# Patient Record
Sex: MALE | Race: WHITE | NOT HISPANIC OR LATINO | URBAN - METROPOLITAN AREA
[De-identification: names, ages, dates, MRNs, and addresses within clinical notes are randomized per-mention and may not be internally consistent; named-entity substitution may affect disease eponyms.]

---

## 2020-06-24 ENCOUNTER — OFFICE (AMBULATORY)
Dept: URBAN - METROPOLITAN AREA CLINIC 75 | Facility: CLINIC | Age: 75
End: 2020-06-24
Payer: MEDICARE

## 2020-06-24 VITALS — TEMPERATURE: 98 F | HEIGHT: 70 IN | WEIGHT: 150 LBS

## 2020-06-24 DIAGNOSIS — I77.9 DISORDER OF ARTERIES AND ARTERIOLES, UNSPECIFIED: ICD-10-CM

## 2020-06-24 DIAGNOSIS — R19.5 OTHER FECAL ABNORMALITIES: ICD-10-CM

## 2020-06-24 DIAGNOSIS — Z86.010 PERSONAL HISTORY OF COLONIC POLYPS: ICD-10-CM

## 2020-06-24 DIAGNOSIS — Z79.82 LONG TERM (CURRENT) USE OF ASPIRIN: ICD-10-CM

## 2020-06-24 DIAGNOSIS — F17.290 NICOTINE DEPENDENCE, OTHER TOBACCO PRODUCT, UNCOMPLICATED: ICD-10-CM

## 2020-06-24 DIAGNOSIS — Z79.01 LONG TERM (CURRENT) USE OF ANTICOAGULANTS: ICD-10-CM

## 2020-06-24 PROCEDURE — 82274 ASSAY TEST FOR BLOOD FECAL: CPT | Mod: QW | Performed by: INTERNAL MEDICINE

## 2020-06-24 PROCEDURE — 99204 OFFICE O/P NEW MOD 45 MIN: CPT | Mod: 25 | Performed by: INTERNAL MEDICINE

## 2020-07-22 ENCOUNTER — OFFICE (AMBULATORY)
Dept: URBAN - METROPOLITAN AREA LAB 2 | Facility: LAB | Age: 75
End: 2020-07-22
Payer: MEDICARE

## 2020-07-22 VITALS
SYSTOLIC BLOOD PRESSURE: 166 MMHG | DIASTOLIC BLOOD PRESSURE: 66 MMHG | SYSTOLIC BLOOD PRESSURE: 125 MMHG | RESPIRATION RATE: 12 BRPM | RESPIRATION RATE: 16 BRPM | DIASTOLIC BLOOD PRESSURE: 73 MMHG | OXYGEN SATURATION: 100 % | OXYGEN SATURATION: 95 % | SYSTOLIC BLOOD PRESSURE: 156 MMHG | HEART RATE: 58 BPM | DIASTOLIC BLOOD PRESSURE: 60 MMHG | HEART RATE: 65 BPM | SYSTOLIC BLOOD PRESSURE: 150 MMHG | RESPIRATION RATE: 18 BRPM | HEART RATE: 78 BPM | SYSTOLIC BLOOD PRESSURE: 126 MMHG | DIASTOLIC BLOOD PRESSURE: 74 MMHG | HEART RATE: 72 BPM | OXYGEN SATURATION: 96 % | HEART RATE: 62 BPM | HEART RATE: 70 BPM | RESPIRATION RATE: 13 BRPM | HEART RATE: 66 BPM | TEMPERATURE: 97.5 F | WEIGHT: 160 LBS | SYSTOLIC BLOOD PRESSURE: 129 MMHG | DIASTOLIC BLOOD PRESSURE: 69 MMHG | SYSTOLIC BLOOD PRESSURE: 124 MMHG | HEART RATE: 59 BPM | TEMPERATURE: 98.2 F | RESPIRATION RATE: 8 BRPM | HEART RATE: 69 BPM | SYSTOLIC BLOOD PRESSURE: 119 MMHG | SYSTOLIC BLOOD PRESSURE: 127 MMHG | HEIGHT: 70 IN | DIASTOLIC BLOOD PRESSURE: 64 MMHG | DIASTOLIC BLOOD PRESSURE: 77 MMHG | DIASTOLIC BLOOD PRESSURE: 85 MMHG | SYSTOLIC BLOOD PRESSURE: 133 MMHG | DIASTOLIC BLOOD PRESSURE: 71 MMHG | RESPIRATION RATE: 9 BRPM

## 2020-07-22 DIAGNOSIS — Z11.59 ENCOUNTER FOR SCREENING FOR OTHER VIRAL DISEASES: ICD-10-CM

## 2020-07-22 PROCEDURE — 87633 RESP VIRUS 12-25 TARGETS: CPT | Performed by: INTERNAL MEDICINE

## 2020-07-22 PROCEDURE — U0002 COVID-19 LAB TEST NON-CDC: HCPCS | Performed by: INTERNAL MEDICINE

## 2020-07-24 ENCOUNTER — OFFICE (AMBULATORY)
Dept: URBAN - METROPOLITAN AREA PATHOLOGY 4 | Facility: PATHOLOGY | Age: 75
End: 2020-07-24
Payer: MEDICARE

## 2020-07-24 ENCOUNTER — AMBULATORY SURGICAL CENTER (AMBULATORY)
Dept: URBAN - METROPOLITAN AREA SURGERY 17 | Facility: SURGERY | Age: 75
End: 2020-07-24
Payer: MEDICARE

## 2020-07-24 DIAGNOSIS — R19.5 OTHER FECAL ABNORMALITIES: ICD-10-CM

## 2020-07-24 DIAGNOSIS — K22.70 BARRETT'S ESOPHAGUS WITHOUT DYSPLASIA: ICD-10-CM

## 2020-07-24 DIAGNOSIS — K31.819 ANGIODYSPLASIA OF STOMACH AND DUODENUM WITHOUT BLEEDING: ICD-10-CM

## 2020-07-24 DIAGNOSIS — K31.811 ANGIODYSPLASIA OF STOMACH AND DUODENUM WITH BLEEDING: ICD-10-CM

## 2020-07-24 DIAGNOSIS — K31.89 OTHER DISEASES OF STOMACH AND DUODENUM: ICD-10-CM

## 2020-07-24 LAB
GI HISTOLOGY: A. UNSPECIFIED: (no result)
GI HISTOLOGY: PDF REPORT: (no result)

## 2020-07-24 PROCEDURE — 43255 EGD CONTROL BLEEDING ANY: CPT | Mod: 59 | Performed by: INTERNAL MEDICINE

## 2020-07-24 PROCEDURE — 43239 EGD BIOPSY SINGLE/MULTIPLE: CPT | Mod: 59 | Performed by: INTERNAL MEDICINE

## 2020-07-24 PROCEDURE — 43270 EGD LESION ABLATION: CPT | Performed by: INTERNAL MEDICINE

## 2020-07-24 PROCEDURE — 88305 TISSUE EXAM BY PATHOLOGIST: CPT | Performed by: INTERNAL MEDICINE

## 2020-08-05 ENCOUNTER — OFFICE (AMBULATORY)
Dept: URBAN - METROPOLITAN AREA CLINIC 75 | Facility: CLINIC | Age: 75
End: 2020-08-05
Payer: MEDICARE

## 2020-08-05 VITALS — HEIGHT: 70 IN

## 2020-08-05 DIAGNOSIS — Z86.010 PERSONAL HISTORY OF COLONIC POLYPS: ICD-10-CM

## 2020-08-05 DIAGNOSIS — R19.5 OTHER FECAL ABNORMALITIES: ICD-10-CM

## 2020-08-05 DIAGNOSIS — I77.9 DISORDER OF ARTERIES AND ARTERIOLES, UNSPECIFIED: ICD-10-CM

## 2020-08-05 DIAGNOSIS — K31.89 OTHER DISEASES OF STOMACH AND DUODENUM: ICD-10-CM

## 2020-08-05 DIAGNOSIS — K22.8 OTHER SPECIFIED DISEASES OF ESOPHAGUS: ICD-10-CM

## 2020-08-05 DIAGNOSIS — Z79.82 LONG TERM (CURRENT) USE OF ASPIRIN: ICD-10-CM

## 2020-08-05 DIAGNOSIS — K31.819 ANGIODYSPLASIA OF STOMACH AND DUODENUM WITHOUT BLEEDING: ICD-10-CM

## 2020-08-05 DIAGNOSIS — Z79.01 LONG TERM (CURRENT) USE OF ANTICOAGULANTS: ICD-10-CM

## 2020-08-05 DIAGNOSIS — Z11.59 ENCOUNTER FOR SCREENING FOR OTHER VIRAL DISEASES: ICD-10-CM

## 2020-08-05 DIAGNOSIS — K31.811 ANGIODYSPLASIA OF STOMACH AND DUODENUM WITH BLEEDING: ICD-10-CM

## 2020-08-05 PROCEDURE — 99441: CPT | Mod: 95 | Performed by: NURSE PRACTITIONER

## 2020-08-05 NOTE — SERVICEHPINOTES
6/24/2020 I did have the pleasure of seeing MOE SHARP 75 1945 at the request of KIMBERLEE CHARLES for a new patient consultation in our [facility] office. I sincerely appreciate the opportunity to participate in the care of this patient. He developed black tarry stool earlier this month. He has not had any significant abdominal pain. His hemoglobin has remained stable. He is on multiple anticoagulants due to his history of vascular disease. No heartburn indigestion dyspepsia or dysphagia. He is not on NSAIDsLast colonoscopy was many years ago although he does have a history of polyps. There has been no change in stool caliber or frequency. He denies Pepto-Bismol or iron ingestion Pertinent positive symptoms include black stools, change in bowel habits pertinent negative symptoms include abdominal pain, belching, bloating, constipation, diarrhea, difficulty swallowing, painful swallowing, flatulence/rectal gas, heartburn/reflux, mucous in stools, nausea, painful stools, rectal bleeding, rectal protrusions, rectal urgency, soiling/incontinence, vomiting, vomiting with blood.BR_______________________________________________________________________________________BR8/5/2020: I had the pleasure of seeing Mr. Sharp via telemedicine. As you know, he is a very pleasant 75-year-old male who was initially seen 6/24/2020 by Dr. Lynne for evaluation of melena... 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 No iron or Pepto Bismol. He denies abdominal pain, nausea or vomiting. He continues omeprazole 40mg once daily. He has resumed the Eliquis. DATA REVIEWED:BREGD 7/24/2020 as below bx (+) nondysplastic Knight's esophagus

## 2021-06-23 ENCOUNTER — TRANSCRIBE ORDERS (OUTPATIENT)
Dept: ADMINISTRATIVE | Facility: HOSPITAL | Age: 76
End: 2021-06-23

## 2021-06-28 ENCOUNTER — LAB (OUTPATIENT)
Dept: LAB | Facility: HOSPITAL | Age: 76
End: 2021-06-28

## 2021-06-28 ENCOUNTER — TRANSCRIBE ORDERS (OUTPATIENT)
Dept: ADMINISTRATIVE | Facility: HOSPITAL | Age: 76
End: 2021-06-28

## 2021-06-28 DIAGNOSIS — I25.10 CORONARY ARTERIOSCLEROSIS: ICD-10-CM

## 2021-06-28 DIAGNOSIS — E78.5 HYPERLIPIDEMIA, UNSPECIFIED HYPERLIPIDEMIA TYPE: ICD-10-CM

## 2021-06-28 DIAGNOSIS — I10 ESSENTIAL HYPERTENSION, MALIGNANT: Primary | ICD-10-CM

## 2021-06-28 DIAGNOSIS — I10 ESSENTIAL HYPERTENSION, MALIGNANT: ICD-10-CM

## 2021-06-28 LAB
ALBUMIN UR-MCNC: <1.2 MG/DL
ALT SERPL W P-5'-P-CCNC: 40 U/L (ref 1–41)
ANION GAP SERPL CALCULATED.3IONS-SCNC: 10.3 MMOL/L (ref 5–15)
BUN SERPL-MCNC: 10 MG/DL (ref 8–23)
BUN/CREAT SERPL: 11.6 (ref 7–25)
CALCIUM SPEC-SCNC: 9.3 MG/DL (ref 8.6–10.5)
CHLORIDE SERPL-SCNC: 103 MMOL/L (ref 98–107)
CHOLEST SERPL-MCNC: 160 MG/DL (ref 0–200)
CO2 SERPL-SCNC: 24.7 MMOL/L (ref 22–29)
CREAT SERPL-MCNC: 0.86 MG/DL (ref 0.76–1.27)
CREAT UR-MCNC: 66.1 MG/DL
CRP SERPL-MCNC: 0.1 MG/DL (ref 0.01–0.5)
GFR SERPL CREATININE-BSD FRML MDRD: 86 ML/MIN/1.73
GLUCOSE SERPL-MCNC: 87 MG/DL (ref 65–99)
HBA1C MFR BLD: 5.42 % (ref 4.8–5.6)
HDLC SERPL-MCNC: 76 MG/DL (ref 40–60)
LDLC SERPL CALC-MCNC: 69 MG/DL (ref 0–100)
LDLC/HDLC SERPL: 0.89 {RATIO}
MICROALBUMIN/CREAT UR: NORMAL MG/G{CREAT}
POTASSIUM SERPL-SCNC: 4.3 MMOL/L (ref 3.5–5.2)
SODIUM SERPL-SCNC: 138 MMOL/L (ref 136–145)
TRIGL SERPL-MCNC: 82 MG/DL (ref 0–150)
VLDLC SERPL-MCNC: 15 MG/DL (ref 5–40)

## 2021-06-28 PROCEDURE — 82570 ASSAY OF URINE CREATININE: CPT

## 2021-06-28 PROCEDURE — 80061 LIPID PANEL: CPT

## 2021-06-28 PROCEDURE — 80048 BASIC METABOLIC PNL TOTAL CA: CPT

## 2021-06-28 PROCEDURE — 82043 UR ALBUMIN QUANTITATIVE: CPT

## 2021-06-28 PROCEDURE — 36415 COLL VENOUS BLD VENIPUNCTURE: CPT

## 2021-06-28 PROCEDURE — 84460 ALANINE AMINO (ALT) (SGPT): CPT

## 2021-06-28 PROCEDURE — 83036 HEMOGLOBIN GLYCOSYLATED A1C: CPT

## 2021-06-28 PROCEDURE — 86141 C-REACTIVE PROTEIN HS: CPT

## 2021-11-29 ENCOUNTER — TRANSCRIBE ORDERS (OUTPATIENT)
Dept: ADMINISTRATIVE | Facility: HOSPITAL | Age: 76
End: 2021-11-29

## 2021-11-29 DIAGNOSIS — Z12.2 SCREENING FOR MALIGNANT NEOPLASM OF RESPIRATORY ORGAN: ICD-10-CM

## 2021-11-29 DIAGNOSIS — Z87.891 PERSONAL HISTORY OF TOBACCO USE, PRESENTING HAZARDS TO HEALTH: Primary | ICD-10-CM

## 2021-12-28 ENCOUNTER — TELEPHONE (OUTPATIENT)
Dept: FAMILY MEDICINE CLINIC | Facility: CLINIC | Age: 76
End: 2021-12-28

## 2021-12-28 NOTE — TELEPHONE ENCOUNTER
PATIENT MISSED APPOINTMENT ON 12/27/*2021 @ 1030 WITH NATALIA GILLESPIE. CALLED NUMBER IN CHART-SPOKE TO WIFE. EXPRESSED UNDERSTANDING OF POLICY CONCERNING MISSED APPOINTMENTS AND SAME DAY CANCELLATIONS

## 2022-01-19 ENCOUNTER — HOSPITAL ENCOUNTER (OUTPATIENT)
Dept: CT IMAGING | Facility: HOSPITAL | Age: 77
Discharge: HOME | End: 2022-01-19
Admitting: FAMILY MEDICINE

## 2022-01-19 DIAGNOSIS — Z12.2 SCREENING FOR MALIGNANT NEOPLASM OF RESPIRATORY ORGAN: ICD-10-CM

## 2022-01-19 DIAGNOSIS — Z87.891 PERSONAL HISTORY OF TOBACCO USE, PRESENTING HAZARDS TO HEALTH: ICD-10-CM

## 2022-01-19 PROCEDURE — 71271 CT THORAX LUNG CANCER SCR C-: CPT

## 2022-01-27 ENCOUNTER — OFFICE VISIT (OUTPATIENT)
Dept: FAMILY MEDICINE CLINIC | Facility: CLINIC | Age: 77
End: 2022-01-27

## 2022-01-27 VITALS
WEIGHT: 149.1 LBS | DIASTOLIC BLOOD PRESSURE: 68 MMHG | SYSTOLIC BLOOD PRESSURE: 122 MMHG | HEIGHT: 70 IN | HEART RATE: 60 BPM | BODY MASS INDEX: 21.35 KG/M2 | TEMPERATURE: 97.8 F | OXYGEN SATURATION: 97 %

## 2022-01-27 DIAGNOSIS — I65.23 BILATERAL CAROTID ARTERY STENOSIS: ICD-10-CM

## 2022-01-27 DIAGNOSIS — J30.9 ALLERGIC RHINITIS, UNSPECIFIED SEASONALITY, UNSPECIFIED TRIGGER: ICD-10-CM

## 2022-01-27 DIAGNOSIS — I25.810 CORONARY ARTERY DISEASE INVOLVING CORONARY BYPASS GRAFT OF NATIVE HEART WITHOUT ANGINA PECTORIS: ICD-10-CM

## 2022-01-27 DIAGNOSIS — R73.09 ABNORMAL GLUCOSE: ICD-10-CM

## 2022-01-27 DIAGNOSIS — E78.5 HYPERLIPIDEMIA, UNSPECIFIED HYPERLIPIDEMIA TYPE: ICD-10-CM

## 2022-01-27 DIAGNOSIS — Z51.81 MEDICATION MONITORING ENCOUNTER: ICD-10-CM

## 2022-01-27 DIAGNOSIS — I10 PRIMARY HYPERTENSION: Primary | ICD-10-CM

## 2022-01-27 DIAGNOSIS — Z12.5 SCREENING FOR PROSTATE CANCER: ICD-10-CM

## 2022-01-27 DIAGNOSIS — J43.9 PULMONARY EMPHYSEMA, UNSPECIFIED EMPHYSEMA TYPE: ICD-10-CM

## 2022-01-27 DIAGNOSIS — Z86.718 HISTORY OF DVT (DEEP VEIN THROMBOSIS): ICD-10-CM

## 2022-01-27 DIAGNOSIS — I73.9 PERIPHERAL ARTERIAL DISEASE: ICD-10-CM

## 2022-01-27 DIAGNOSIS — R21 RASH: ICD-10-CM

## 2022-01-27 DIAGNOSIS — K21.9 GASTROESOPHAGEAL REFLUX DISEASE, UNSPECIFIED WHETHER ESOPHAGITIS PRESENT: ICD-10-CM

## 2022-01-27 DIAGNOSIS — Z78.9 ALCOHOL USE: ICD-10-CM

## 2022-01-27 PROCEDURE — 99204 OFFICE O/P NEW MOD 45 MIN: CPT | Performed by: FAMILY MEDICINE

## 2022-01-27 RX ORDER — ALBUTEROL SULFATE 90 UG/1
2 AEROSOL, METERED RESPIRATORY (INHALATION) EVERY 4 HOURS PRN
Qty: 8 G | Refills: 1 | Status: SHIPPED | OUTPATIENT
Start: 2022-01-27 | End: 2022-04-27

## 2022-01-27 RX ORDER — CETIRIZINE HYDROCHLORIDE 10 MG/1
10 TABLET ORAL DAILY
Qty: 90 TABLET | Refills: 3 | Status: SHIPPED | OUTPATIENT
Start: 2022-01-27 | End: 2023-02-06

## 2022-01-27 RX ORDER — CLOBETASOL PROPIONATE 0.5 MG/G
1 CREAM TOPICAL 2 TIMES DAILY
Qty: 60 G | Refills: 1 | Status: SHIPPED | OUTPATIENT
Start: 2022-01-27

## 2022-01-27 NOTE — PROGRESS NOTES
Chief Complaint  Establish care  Discuss allergies  Rash  Alcohol use  Recent Chest CT    Subjective          Familia Thompson presents to [facility]  History of Present Illness  Patient presents today to establish care with myself.  His most recent primary care was Dr. Stanford Cordon.  Patient reports that they are establishing here to be closer to home as they had to go to [place] for appointments.  He reports that he recently had a chest CT done which was low-dose chest CT for lung cancer screening purposes.  He did have a calcified granuloma in the left upper lobe per report.  There is mild emphysema noted.  Patient had no evidence of lung cancer on his low-dose chest CT.  He quit smoking about a year ago.  He does cough from time to time which his wife is concerned about.  He is accompanied by his wife, Mely today.  He denies any shortness of breath.  Patient does drink about 12 beers a week.  He reports that he used to drink more.  He has cut back.  We discussed on further cutting this back and hopefully over time eliminating his alcohol use.  He reports having a rash on the lower extremities that has been present for the past several months.  He reports that triamcinolone has not helped.  He does have dry skin.  Reports that it does itch.  Patient also has issues with allergies and runny nose.  Zyrtec does help out.  He is requesting a prescription today.  He does have hypertension and takes metoprolol.  He is taking 25 mg twice daily.  He also has had open heart surgery.  He does see a cardiologist and will be establishing with a new cardiologist, Dr. Dixon.  He was previously seeing Dr. Hilario but reports that due to distance he will be switching to a more local cardiologist.  He denies any chest pain.  He is taking simvastatin 40 mg daily.  He also takes Eliquis 5 mg twice daily for previous DVTs in the lower extremities.  He is still being seen by vascular with  "Dr. Kenia Burger.  He is also taking aspirin.  He reports that he has had bypasses.  Depression screening is negative at 0 points.  He was previously noted to have carotid artery disease.  He did have a recent Doppler done on 11/12/2021.  This showed that he had plaque buildup bilaterally but less than 50% stenosis.  Objective   Vital Signs:   /68   Pulse 60   Temp 97.8 °F (36.6 °C)   Ht 177.8 cm (70\")   Wt 67.6 kg (149 lb 1.6 oz)   SpO2 97%   BMI 21.39 kg/m²     Physical Exam   General: AAO ×3, no acute distress, pleasant  HEENT: Normocephalic, atraumatic, no discharge in the eyes, nasal congestion noted, no oropharyngeal erythema or exudates, and TMs intact bilaterally with no erythema, no cervical tenderness or lymphadenopathy  Cardiovascular: Regular rate and rhythm without appreciable murmur  Respiratory: Clear to auscultation bilaterally no RRW  Gastrointestinal: Soft nontender nondistended with bowel sounds present  Skin: Dry in the lower extremities.  extremities: No edema  Neurologic: CN II through XII grossly intact   Psychiatric: Normal mood and affect  Result Review :                 Assessment and Plan    Diagnoses and all orders for this visit:    1. Primary hypertension (Primary)  -     CBC & Differential; Future  -     Comprehensive Metabolic Panel; Future    2. History of DVT (deep vein thrombosis)    3. Peripheral arterial disease (HCC)    4. Bilateral carotid artery stenosis    5. Coronary artery disease involving coronary bypass graft of native heart without angina pectoris    6. Gastroesophageal reflux disease, unspecified whether esophagitis present    7. Hyperlipidemia, unspecified hyperlipidemia type  -     Lipid Panel; Future    8. Rash    9. Pulmonary emphysema, unspecified emphysema type (HCC)    10. Alcohol use    11. Abnormal glucose  -     Hemoglobin A1c; Future    12. Allergic rhinitis, unspecified seasonality, unspecified trigger    13. Screening for prostate cancer  -     " PSA Screen; Future    14. Medication monitoring encounter  -     CBC & Differential; Future  -     Comprehensive Metabolic Panel; Future  -     Lipid Panel; Future  -     Hemoglobin A1c; Future  -     PSA Screen; Future    Other orders  -     clobetasol (TEMOVATE) 0.05 % cream; Apply 1 application topically to the appropriate area as directed 2 (Two) Times a Day.  Dispense: 60 g; Refill: 1  -     albuterol sulfate  (90 Base) MCG/ACT inhaler; Inhale 2 puffs Every 4 (Four) Hours As Needed for Wheezing.  Dispense: 8 g; Refill: 1  -     cetirizine (zyrTEC) 10 MG tablet; Take 1 tablet by mouth Daily.  Dispense: 90 tablet; Refill: 3    I discussed with patient continuing current medications at this time.  I will add Zyrtec for treatment of allergic rhinitis and give him albuterol for emphysema.  If he does have issues with coughing he is instructed on proper use on how to take albuterol.  Clobetasol has also been prescribed today for his lower extremity rash/dry skin.  If patient continues to have issues he is instructed to call or return.  I also asked for them to start using Cetaphil twice daily to help out with dry skin as I suspect his symptoms will improve with daily moisturization.  I will see patient back in 1 month with labs done prior to next appointment.  They are instructed to call with any questions or concerns.    Follow Up   Return in about 1 month (around 2/27/2022) for CAD.  Patient was given instructions and counseling regarding his condition or for health maintenance advice. Please see specific information pulled into the AVS if appropriate.

## 2022-01-28 ENCOUNTER — PATIENT ROUNDING (BHMG ONLY) (OUTPATIENT)
Dept: FAMILY MEDICINE CLINIC | Facility: CLINIC | Age: 77
End: 2022-01-28

## 2022-01-28 NOTE — PROGRESS NOTES
January 28, 2022    Hello, may I speak with Familia Thompson?    My name is Narinder Polanco       I am  with [facility]  [address]  [address]  [place] [address]  [phone].    Before we get started may I verify your date of birth? 1945    I am calling to officially welcome you to our practice and ask about your recent visit. Is this a good time to talk? yes    Tell me about your visit with us. What things went well?  He  liked the provider        We're always looking for ways to make our patients' experiences even better. Do you have recommendations on ways we may improve?  no    Overall were you satisfied with your first visit to our practice? yes       I appreciate you taking the time to speak with me today. Is there anything else I can do for you? no      Thank you, and have a great day.

## 2022-02-01 ENCOUNTER — LAB (OUTPATIENT)
Dept: FAMILY MEDICINE CLINIC | Facility: CLINIC | Age: 77
End: 2022-02-01

## 2022-02-01 DIAGNOSIS — Z12.5 SCREENING FOR PROSTATE CANCER: ICD-10-CM

## 2022-02-01 DIAGNOSIS — I10 PRIMARY HYPERTENSION: ICD-10-CM

## 2022-02-01 DIAGNOSIS — R73.09 ABNORMAL GLUCOSE: ICD-10-CM

## 2022-02-01 DIAGNOSIS — Z51.81 MEDICATION MONITORING ENCOUNTER: ICD-10-CM

## 2022-02-01 DIAGNOSIS — E78.5 HYPERLIPIDEMIA, UNSPECIFIED HYPERLIPIDEMIA TYPE: ICD-10-CM

## 2022-02-01 LAB
ALBUMIN SERPL-MCNC: 4.2 G/DL (ref 3.5–5.2)
ALBUMIN/GLOB SERPL: 1.7 G/DL
ALP SERPL-CCNC: 84 U/L (ref 39–117)
ALT SERPL W P-5'-P-CCNC: 16 U/L (ref 1–41)
ANION GAP SERPL CALCULATED.3IONS-SCNC: 8.5 MMOL/L (ref 5–15)
AST SERPL-CCNC: 24 U/L (ref 1–40)
BASOPHILS # BLD AUTO: 0.06 10*3/MM3 (ref 0–0.2)
BASOPHILS NFR BLD AUTO: 1.4 % (ref 0–1.5)
BILIRUB SERPL-MCNC: 0.5 MG/DL (ref 0–1.2)
BUN SERPL-MCNC: 8 MG/DL (ref 8–23)
BUN/CREAT SERPL: 8.7 (ref 7–25)
CALCIUM SPEC-SCNC: 9.6 MG/DL (ref 8.6–10.5)
CHLORIDE SERPL-SCNC: 102 MMOL/L (ref 98–107)
CHOLEST SERPL-MCNC: 160 MG/DL (ref 0–200)
CO2 SERPL-SCNC: 25.5 MMOL/L (ref 22–29)
CREAT SERPL-MCNC: 0.92 MG/DL (ref 0.76–1.27)
DEPRECATED RDW RBC AUTO: 40.1 FL (ref 37–54)
EOSINOPHIL # BLD AUTO: 0.12 10*3/MM3 (ref 0–0.4)
EOSINOPHIL NFR BLD AUTO: 2.8 % (ref 0.3–6.2)
ERYTHROCYTE [DISTWIDTH] IN BLOOD BY AUTOMATED COUNT: 12.4 % (ref 12.3–15.4)
GFR SERPL CREATININE-BSD FRML MDRD: 80 ML/MIN/1.73
GLOBULIN UR ELPH-MCNC: 2.5 GM/DL
GLUCOSE SERPL-MCNC: 94 MG/DL (ref 65–99)
HBA1C MFR BLD: 5.85 % (ref 4.8–5.6)
HCT VFR BLD AUTO: 43.2 % (ref 37.5–51)
HDLC SERPL-MCNC: 76 MG/DL (ref 40–60)
HGB BLD-MCNC: 14.5 G/DL (ref 13–17.7)
IMM GRANULOCYTES # BLD AUTO: 0.01 10*3/MM3 (ref 0–0.05)
IMM GRANULOCYTES NFR BLD AUTO: 0.2 % (ref 0–0.5)
LDLC SERPL CALC-MCNC: 72 MG/DL (ref 0–100)
LDLC/HDLC SERPL: 0.95 {RATIO}
LYMPHOCYTES # BLD AUTO: 1.57 10*3/MM3 (ref 0.7–3.1)
LYMPHOCYTES NFR BLD AUTO: 37 % (ref 19.6–45.3)
MCH RBC QN AUTO: 30.6 PG (ref 26.6–33)
MCHC RBC AUTO-ENTMCNC: 33.6 G/DL (ref 31.5–35.7)
MCV RBC AUTO: 91.1 FL (ref 79–97)
MONOCYTES # BLD AUTO: 0.39 10*3/MM3 (ref 0.1–0.9)
MONOCYTES NFR BLD AUTO: 9.2 % (ref 5–12)
NEUTROPHILS NFR BLD AUTO: 2.09 10*3/MM3 (ref 1.7–7)
NEUTROPHILS NFR BLD AUTO: 49.4 % (ref 42.7–76)
NRBC BLD AUTO-RTO: 0 /100 WBC (ref 0–0.2)
PLATELET # BLD AUTO: 168 10*3/MM3 (ref 140–450)
PMV BLD AUTO: 11.6 FL (ref 6–12)
POTASSIUM SERPL-SCNC: 4.2 MMOL/L (ref 3.5–5.2)
PROT SERPL-MCNC: 6.7 G/DL (ref 6–8.5)
RBC # BLD AUTO: 4.74 10*6/MM3 (ref 4.14–5.8)
SODIUM SERPL-SCNC: 136 MMOL/L (ref 136–145)
TRIGL SERPL-MCNC: 59 MG/DL (ref 0–150)
VLDLC SERPL-MCNC: 12 MG/DL (ref 5–40)
WBC NRBC COR # BLD: 4.24 10*3/MM3 (ref 3.4–10.8)

## 2022-02-01 PROCEDURE — 80053 COMPREHEN METABOLIC PANEL: CPT | Performed by: FAMILY MEDICINE

## 2022-02-01 PROCEDURE — 83036 HEMOGLOBIN GLYCOSYLATED A1C: CPT | Performed by: FAMILY MEDICINE

## 2022-02-01 PROCEDURE — 80061 LIPID PANEL: CPT | Performed by: FAMILY MEDICINE

## 2022-02-01 PROCEDURE — G0103 PSA SCREENING: HCPCS | Performed by: FAMILY MEDICINE

## 2022-02-01 PROCEDURE — 85025 COMPLETE CBC W/AUTO DIFF WBC: CPT | Performed by: FAMILY MEDICINE

## 2022-02-01 PROCEDURE — 36415 COLL VENOUS BLD VENIPUNCTURE: CPT | Performed by: FAMILY MEDICINE

## 2022-02-02 LAB — PSA SERPL-MCNC: 1.67 NG/ML (ref 0–4)

## 2022-02-08 ENCOUNTER — TELEPHONE (OUTPATIENT)
Dept: FAMILY MEDICINE CLINIC | Facility: CLINIC | Age: 77
End: 2022-02-08

## 2022-02-08 NOTE — TELEPHONE ENCOUNTER
Caller: Familia Thompson    Relationship: Self    Best call back number: [phone]     What is the best time to reach you: ANY     Who are you requesting to speak with STAFF     What was the call regarding: PATIENT IS RETURNING PHONE CALL FROM OFFICE.    HUB WAS UNABLE TO WARM TRANSFER     Do you require a callback: YES

## 2022-02-21 ENCOUNTER — OFFICE VISIT (OUTPATIENT)
Dept: FAMILY MEDICINE CLINIC | Facility: CLINIC | Age: 77
End: 2022-02-21

## 2022-02-21 VITALS
SYSTOLIC BLOOD PRESSURE: 128 MMHG | OXYGEN SATURATION: 98 % | TEMPERATURE: 97.9 F | DIASTOLIC BLOOD PRESSURE: 70 MMHG | WEIGHT: 150.8 LBS | BODY MASS INDEX: 21.59 KG/M2 | HEIGHT: 70 IN | HEART RATE: 62 BPM

## 2022-02-21 DIAGNOSIS — I65.23 BILATERAL CAROTID ARTERY STENOSIS: ICD-10-CM

## 2022-02-21 DIAGNOSIS — H53.9 VISION CHANGES: ICD-10-CM

## 2022-02-21 DIAGNOSIS — R73.03 PREDIABETES: ICD-10-CM

## 2022-02-21 DIAGNOSIS — Z78.9 ALCOHOL USE: ICD-10-CM

## 2022-02-21 DIAGNOSIS — L85.3 DRY SKIN: ICD-10-CM

## 2022-02-21 DIAGNOSIS — I25.810 CORONARY ARTERY DISEASE INVOLVING CORONARY BYPASS GRAFT OF NATIVE HEART WITHOUT ANGINA PECTORIS: ICD-10-CM

## 2022-02-21 DIAGNOSIS — I10 PRIMARY HYPERTENSION: Primary | ICD-10-CM

## 2022-02-21 DIAGNOSIS — J30.9 ALLERGIC RHINITIS, UNSPECIFIED SEASONALITY, UNSPECIFIED TRIGGER: ICD-10-CM

## 2022-02-21 DIAGNOSIS — E78.00 PURE HYPERCHOLESTEROLEMIA: ICD-10-CM

## 2022-02-21 DIAGNOSIS — R19.8 ABNORMAL FINDINGS ON ESOPHAGOGASTRODUODENOSCOPY (EGD): ICD-10-CM

## 2022-02-21 DIAGNOSIS — Z86.718 HISTORY OF DVT (DEEP VEIN THROMBOSIS): ICD-10-CM

## 2022-02-21 PROCEDURE — 99214 OFFICE O/P EST MOD 30 MIN: CPT | Performed by: FAMILY MEDICINE

## 2022-02-21 RX ORDER — FLUTICASONE PROPIONATE 50 MCG
2 SPRAY, SUSPENSION (ML) NASAL DAILY
Qty: 16 G | Refills: 3 | Status: SHIPPED | OUTPATIENT
Start: 2022-02-21

## 2022-02-21 NOTE — PROGRESS NOTES
Chief Complaint  Hypertension    Subjective          Familia Thompson presents to [facility]  History of Present Illness  Patient presents today for 1 month follow-up for hypertension.  Blood pressure has been adequately controlled.  He is taking metoprolol tartrate 25 mg twice daily.  He denies any adverse effects from medication.  He is accompanied by his wife, Mely today.  She brings in an EGD report from June 2020.  This was an EGD done by Dr. Reyez.  There was mention about biopsies being done and if there was Collins's esophagus without dysplasia then recommended follow-up in 2 to 3 years and if dysplasia was noted on repeat EGD in 3 to 12 months.  I do not have the results of this and patient/spouse were never notified of the results per the report.  I discussed obtaining the biopsy records with further recommendations to follow depending on findings.  Patient still has issues with allergic rhinitis.  He is taking Zyrtec which helps out some.  Discussed adding Flonase today.  He has decreased his alcohol use intake and now drinks about 12 beers a week whereas previously he was drinking more.  Last time I spoke with him he was also drinking 12 beers a week.  He is working on cutting back.  Patient had really dry skin the last time I saw him.  I placed him on Cetaphil cream and also gave him clobetasol as needed.  This has helped out quite a bit.  His hands are dry as well.  I discussed with him using Cetaphil regularly to help out with the dry hands.  He admits to washing his hands a lot which contributes to this as well.  He did have labs done prior to the appointment.  His PSA level was within normal range at 1.670.  A year prior it was 3.47.  No history of prior prostate cancer.  His A1c was in the prediabetic range at 5.85%.  I discussed lifestyle changes including diet and exercise.  His LDL cholesterol is at 72.  He is currently taking Zocor 40 mg daily.  We discussed  "continue current management.  He does have history of coronary artery disease with CABG previously being done.  His CBC is unremarkable.  He is still being seen by vascular for bilateral carotid artery stenosis.  He has been seen by Dr. Burger.  Objective   Vital Signs:   /70   Pulse 62   Temp 97.9 °F (36.6 °C)   Ht 177.8 cm (70\")   Wt 68.4 kg (150 lb 12.8 oz)   SpO2 98%   BMI 21.64 kg/m²     Physical Exam   General: AAO ×3, no acute distress, pleasant  HEENT: Normocephalic, atraumatic, no discharge in the eyes, nasal congestion noted bilaterally, no oropharyngeal erythema or exudates, and TMs intact bilaterally with no erythema, no cervical tenderness or lymphadenopathy  Cardiovascular: Regular rate and rhythm without appreciable murmur  Respiratory: Clear to auscultation bilaterally no RRW  Gastrointestinal: Soft nontender nondistended with bowel sounds present  extremities: No edema  Neurologic: CN II through XII grossly intact   Psychiatric: Normal mood and affect  Result Review :                 Assessment and Plan    Diagnoses and all orders for this visit:    1. Primary hypertension (Primary)    2. Alcohol use    3. History of DVT (deep vein thrombosis)    4. Dry skin    5. Allergic rhinitis, unspecified seasonality, unspecified trigger    6. Coronary artery disease involving coronary bypass graft of native heart without angina pectoris    7. Bilateral carotid artery stenosis    8. Prediabetes    9. Pure hypercholesterolemia    10. Vision changes  -     Ambulatory Referral to Ophthalmology    11. Abnormal findings on esophagogastroduodenoscopy (EGD)    Other orders  -     fluticasone (Flonase) 50 MCG/ACT nasal spray; 2 sprays into the nostril(s) as directed by provider Daily.  Dispense: 16 g; Refill: 3    I discussed with patient adding Flonase at this time in addition to the Zyrtec he is taking.  He does need to get into see ophthalmology due to vision issues he has had.  We discussed continue " current management of hypertension as well as dry skin.  Plan to see patient back in 3 months or sooner if needed.  He is instructed to call with any questions or concerns.    Follow Up   Return in about 3 months (around 5/21/2022) for hypertension.  Patient was given instructions and counseling regarding his condition or for health maintenance advice. Please see specific information pulled into the AVS if appropriate.

## 2022-03-11 ENCOUNTER — TRANSCRIBE ORDERS (OUTPATIENT)
Dept: ADMINISTRATIVE | Facility: HOSPITAL | Age: 77
End: 2022-03-11

## 2022-03-11 DIAGNOSIS — R07.9 CHEST PAIN, UNSPECIFIED TYPE: Primary | ICD-10-CM

## 2022-04-05 ENCOUNTER — HOSPITAL ENCOUNTER (OUTPATIENT)
Dept: NUCLEAR MEDICINE | Facility: HOSPITAL | Age: 77
Discharge: HOME | End: 2022-04-05

## 2022-04-05 DIAGNOSIS — R07.9 CHEST PAIN, UNSPECIFIED TYPE: ICD-10-CM

## 2022-04-05 LAB
BH CV IMMEDIATE POST TECH DATA BLOOD PRESSURE: NORMAL MMHG
BH CV IMMEDIATE POST TECH DATA HEART RATE: 81 BPM
BH CV IMMEDIATE POST TECH DATA OXYGEN SATS: 97 %
BH CV NINE MINUTE RECOVERY TECH DATA BLOOD PRESSURE: NORMAL MMHG
BH CV NINE MINUTE RECOVERY TECH DATA HEART RATE: 70 BPM
BH CV NINE MINUTE RECOVERY TECH DATA OXYGEN SATURATION: 94 %
BH CV NINE MINUTE RECOVERY TECH DATA SYMPTOMS: NORMAL
BH CV REST NUCLEAR ISOTOPE DOSE: 9.7 MCI
BH CV SIX MINUTE RECOVERY TECH DATA BLOOD PRESSURE: NORMAL
BH CV SIX MINUTE RECOVERY TECH DATA HEART RATE: 72 BPM
BH CV SIX MINUTE RECOVERY TECH DATA OXYGEN SATURATION: 96 %
BH CV SIX MINUTE RECOVERY TECH DATA SYMPTOMS: NORMAL
BH CV STRESS BP STAGE 1: NORMAL
BH CV STRESS COMMENTS STAGE 1: NORMAL
BH CV STRESS DOSE REGADENOSON STAGE 1: 0.4
BH CV STRESS DURATION MIN STAGE 1: 0
BH CV STRESS DURATION SEC STAGE 1: 10
BH CV STRESS HR STAGE 1: 83
BH CV STRESS NUCLEAR ISOTOPE DOSE: 36.9 MCI
BH CV STRESS O2 STAGE 1: 97
BH CV STRESS PROTOCOL 1: NORMAL
BH CV STRESS RECOVERY BP: NORMAL MMHG
BH CV STRESS RECOVERY HR: 69 BPM
BH CV STRESS RECOVERY O2: 94 %
BH CV STRESS STAGE 1: 1
BH CV THREE MINUTE POST TECH DATA BLOOD PRESSURE: NORMAL MMHG
BH CV THREE MINUTE POST TECH DATA HEART RATE: 74 BPM
BH CV THREE MINUTE POST TECH DATA OXYGEN SATURATION: 97 %
LV EF NUC BP: 42 %
MAXIMAL PREDICTED HEART RATE: 143 BPM
PERCENT MAX PREDICTED HR: 56.64 %
STRESS BASELINE BP: NORMAL MMHG
STRESS BASELINE HR: 58 BPM
STRESS O2 SAT REST: 93 %
STRESS PERCENT HR: 67 %
STRESS POST O2 SAT PEAK: 97 %
STRESS POST PEAK BP: NORMAL MMHG
STRESS POST PEAK HR: 81 BPM
STRESS TARGET HR: 122 BPM

## 2022-04-05 PROCEDURE — 93017 CV STRESS TEST TRACING ONLY: CPT

## 2022-04-05 PROCEDURE — A9502 TC99M TETROFOSMIN: HCPCS | Performed by: SPECIALIST

## 2022-04-05 PROCEDURE — 0 TECHNETIUM TETROFOSMIN KIT: Performed by: SPECIALIST

## 2022-04-05 PROCEDURE — 25010000002 REGADENOSON 0.4 MG/5ML SOLUTION

## 2022-04-05 PROCEDURE — 78452 HT MUSCLE IMAGE SPECT MULT: CPT

## 2022-04-05 RX ADMIN — REGADENOSON: 0.08 INJECTION, SOLUTION INTRAVENOUS at 10:36

## 2022-04-05 RX ADMIN — TETROFOSMIN 1 DOSE: 1.38 INJECTION, POWDER, LYOPHILIZED, FOR SOLUTION INTRAVENOUS at 09:15

## 2022-04-05 RX ADMIN — TETROFOSMIN 1 DOSE: 1.38 INJECTION, POWDER, LYOPHILIZED, FOR SOLUTION INTRAVENOUS at 10:36

## 2022-04-12 RX ORDER — OMEPRAZOLE 40 MG/1
CAPSULE, DELAYED RELEASE ORAL
Qty: 90 CAPSULE | Refills: 1 | Status: SHIPPED | OUTPATIENT
Start: 2022-04-12 | End: 2023-01-30

## 2022-04-12 RX ORDER — SIMVASTATIN 40 MG
TABLET ORAL
Qty: 90 TABLET | Refills: 1 | Status: SHIPPED | OUTPATIENT
Start: 2022-04-12 | End: 2022-05-03 | Stop reason: HOSPADM

## 2022-04-27 ENCOUNTER — APPOINTMENT (OUTPATIENT)
Dept: GENERAL RADIOLOGY | Facility: HOSPITAL | Age: 77
End: 2022-04-27

## 2022-04-27 ENCOUNTER — HOSPITAL ENCOUNTER (INPATIENT)
Facility: HOSPITAL | Age: 77
LOS: 1 days | Discharge: ANOTHER HOSPITAL | End: 2022-04-29
Attending: EMERGENCY MEDICINE | Admitting: INTERNAL MEDICINE

## 2022-04-27 ENCOUNTER — APPOINTMENT (OUTPATIENT)
Dept: CT IMAGING | Facility: HOSPITAL | Age: 77
End: 2022-04-27

## 2022-04-27 DIAGNOSIS — J44.1 COPD EXACERBATION: Primary | ICD-10-CM

## 2022-04-27 DIAGNOSIS — I49.01 VENTRICULAR FIBRILLATION: ICD-10-CM

## 2022-04-27 DIAGNOSIS — I21.4 NSTEMI (NON-ST ELEVATED MYOCARDIAL INFARCTION): ICD-10-CM

## 2022-04-27 DIAGNOSIS — I73.9 PERIPHERAL ARTERIAL DISEASE: ICD-10-CM

## 2022-04-27 DIAGNOSIS — J18.9 PNEUMONIA DUE TO INFECTIOUS ORGANISM, UNSPECIFIED LATERALITY, UNSPECIFIED PART OF LUNG: ICD-10-CM

## 2022-04-27 DIAGNOSIS — I47.20 VENTRICULAR TACHYCARDIA: ICD-10-CM

## 2022-04-27 DIAGNOSIS — I47.20 VENTRICULAR TACHYARRHYTHMIA: ICD-10-CM

## 2022-04-27 DIAGNOSIS — I48.91 ATRIAL FIBRILLATION WITH RAPID VENTRICULAR RESPONSE: ICD-10-CM

## 2022-04-27 LAB
ALBUMIN SERPL-MCNC: 4.3 G/DL (ref 3.5–5.2)
ALBUMIN/GLOB SERPL: 1.5 G/DL
ALP SERPL-CCNC: 84 U/L (ref 39–117)
ALT SERPL W P-5'-P-CCNC: 27 U/L (ref 1–41)
ANION GAP SERPL CALCULATED.3IONS-SCNC: 14.8 MMOL/L (ref 5–15)
AST SERPL-CCNC: 26 U/L (ref 1–40)
BASOPHILS # BLD AUTO: 0.03 10*3/MM3 (ref 0–0.2)
BASOPHILS NFR BLD AUTO: 0.5 % (ref 0–1.5)
BILIRUB SERPL-MCNC: 0.6 MG/DL (ref 0–1.2)
BUN SERPL-MCNC: 4 MG/DL (ref 8–23)
BUN/CREAT SERPL: 5.3 (ref 7–25)
CALCIUM SPEC-SCNC: 9.5 MG/DL (ref 8.6–10.5)
CHLORIDE SERPL-SCNC: 91 MMOL/L (ref 98–107)
CO2 SERPL-SCNC: 21.2 MMOL/L (ref 22–29)
CREAT SERPL-MCNC: 0.75 MG/DL (ref 0.76–1.27)
D DIMER PPP FEU-MCNC: 2.39 MCGFEU/ML (ref 0–0.57)
DEPRECATED RDW RBC AUTO: 40.8 FL (ref 37–54)
EGFRCR SERPLBLD CKD-EPI 2021: 92.9 ML/MIN/1.73
EOSINOPHIL # BLD AUTO: 0.09 10*3/MM3 (ref 0–0.4)
EOSINOPHIL NFR BLD AUTO: 1.4 % (ref 0.3–6.2)
ERYTHROCYTE [DISTWIDTH] IN BLOOD BY AUTOMATED COUNT: 12.3 % (ref 12.3–15.4)
FLUAV AG NPH QL: NEGATIVE
FLUBV AG NPH QL IA: NEGATIVE
GLOBULIN UR ELPH-MCNC: 2.9 GM/DL
GLUCOSE SERPL-MCNC: 90 MG/DL (ref 65–99)
HCT VFR BLD AUTO: 39.7 % (ref 37.5–51)
HGB BLD-MCNC: 13.8 G/DL (ref 13–17.7)
HOLD SPECIMEN: NORMAL
HOLD SPECIMEN: NORMAL
IMM GRANULOCYTES # BLD AUTO: 0.05 10*3/MM3 (ref 0–0.05)
IMM GRANULOCYTES NFR BLD AUTO: 0.8 % (ref 0–0.5)
LYMPHOCYTES # BLD AUTO: 1.52 10*3/MM3 (ref 0.7–3.1)
LYMPHOCYTES NFR BLD AUTO: 24 % (ref 19.6–45.3)
MCH RBC QN AUTO: 31.4 PG (ref 26.6–33)
MCHC RBC AUTO-ENTMCNC: 34.8 G/DL (ref 31.5–35.7)
MCV RBC AUTO: 90.4 FL (ref 79–97)
MONOCYTES # BLD AUTO: 0.71 10*3/MM3 (ref 0.1–0.9)
MONOCYTES NFR BLD AUTO: 11.2 % (ref 5–12)
NEUTROPHILS NFR BLD AUTO: 3.93 10*3/MM3 (ref 1.7–7)
NEUTROPHILS NFR BLD AUTO: 62.1 % (ref 42.7–76)
NRBC BLD AUTO-RTO: 0 /100 WBC (ref 0–0.2)
NT-PROBNP SERPL-MCNC: 1186 PG/ML (ref 0–1800)
PLATELET # BLD AUTO: 245 10*3/MM3 (ref 140–450)
PMV BLD AUTO: 10.3 FL (ref 6–12)
POTASSIUM SERPL-SCNC: 3.6 MMOL/L (ref 3.5–5.2)
PROT SERPL-MCNC: 7.2 G/DL (ref 6–8.5)
RBC # BLD AUTO: 4.39 10*6/MM3 (ref 4.14–5.8)
SODIUM SERPL-SCNC: 127 MMOL/L (ref 136–145)
TROPONIN T SERPL-MCNC: <0.01 NG/ML (ref 0–0.03)
WBC NRBC COR # BLD: 6.33 10*3/MM3 (ref 3.4–10.8)
WHOLE BLOOD HOLD SPECIMEN: NORMAL
WHOLE BLOOD HOLD SPECIMEN: NORMAL

## 2022-04-27 PROCEDURE — 93005 ELECTROCARDIOGRAM TRACING: CPT

## 2022-04-27 PROCEDURE — 85379 FIBRIN DEGRADATION QUANT: CPT | Performed by: EMERGENCY MEDICINE

## 2022-04-27 PROCEDURE — 71045 X-RAY EXAM CHEST 1 VIEW: CPT

## 2022-04-27 PROCEDURE — 0 IOPAMIDOL PER 1 ML: Performed by: EMERGENCY MEDICINE

## 2022-04-27 PROCEDURE — 25010000002 METHYLPREDNISOLONE PER 125 MG: Performed by: EMERGENCY MEDICINE

## 2022-04-27 PROCEDURE — 84484 ASSAY OF TROPONIN QUANT: CPT

## 2022-04-27 PROCEDURE — 93005 ELECTROCARDIOGRAM TRACING: CPT | Performed by: EMERGENCY MEDICINE

## 2022-04-27 PROCEDURE — 83880 ASSAY OF NATRIURETIC PEPTIDE: CPT

## 2022-04-27 PROCEDURE — 93010 ELECTROCARDIOGRAM REPORT: CPT | Performed by: INTERNAL MEDICINE

## 2022-04-27 PROCEDURE — 94799 UNLISTED PULMONARY SVC/PX: CPT

## 2022-04-27 PROCEDURE — 71260 CT THORAX DX C+: CPT

## 2022-04-27 PROCEDURE — 99291 CRITICAL CARE FIRST HOUR: CPT

## 2022-04-27 PROCEDURE — 87804 INFLUENZA ASSAY W/OPTIC: CPT | Performed by: EMERGENCY MEDICINE

## 2022-04-27 PROCEDURE — U0004 COV-19 TEST NON-CDC HGH THRU: HCPCS | Performed by: EMERGENCY MEDICINE

## 2022-04-27 PROCEDURE — 80053 COMPREHEN METABOLIC PANEL: CPT

## 2022-04-27 PROCEDURE — 25010000002 MORPHINE PER 10 MG: Performed by: EMERGENCY MEDICINE

## 2022-04-27 PROCEDURE — 36415 COLL VENOUS BLD VENIPUNCTURE: CPT

## 2022-04-27 PROCEDURE — 94640 AIRWAY INHALATION TREATMENT: CPT

## 2022-04-27 PROCEDURE — 85025 COMPLETE CBC W/AUTO DIFF WBC: CPT

## 2022-04-27 RX ORDER — METHYLPREDNISOLONE SODIUM SUCCINATE 125 MG/2ML
125 INJECTION, POWDER, LYOPHILIZED, FOR SOLUTION INTRAMUSCULAR; INTRAVENOUS ONCE
Status: COMPLETED | OUTPATIENT
Start: 2022-04-27 | End: 2022-04-27

## 2022-04-27 RX ORDER — SODIUM CHLORIDE 0.9 % (FLUSH) 0.9 %
10 SYRINGE (ML) INJECTION AS NEEDED
Status: DISCONTINUED | OUTPATIENT
Start: 2022-04-27 | End: 2022-04-29 | Stop reason: HOSPADM

## 2022-04-27 RX ORDER — IPRATROPIUM BROMIDE AND ALBUTEROL SULFATE 2.5; .5 MG/3ML; MG/3ML
3 SOLUTION RESPIRATORY (INHALATION)
Status: COMPLETED | OUTPATIENT
Start: 2022-04-27 | End: 2022-04-27

## 2022-04-27 RX ADMIN — IPRATROPIUM BROMIDE AND ALBUTEROL SULFATE 3 ML: .5; 3 SOLUTION RESPIRATORY (INHALATION) at 21:45

## 2022-04-27 RX ADMIN — IPRATROPIUM BROMIDE AND ALBUTEROL SULFATE 3 ML: .5; 3 SOLUTION RESPIRATORY (INHALATION) at 21:55

## 2022-04-27 RX ADMIN — MORPHINE SULFATE 4 MG: 4 INJECTION INTRAVENOUS at 21:53

## 2022-04-27 RX ADMIN — METHYLPREDNISOLONE SODIUM SUCCINATE 125 MG: 125 INJECTION, POWDER, FOR SOLUTION INTRAMUSCULAR; INTRAVENOUS at 21:52

## 2022-04-27 RX ADMIN — IPRATROPIUM BROMIDE AND ALBUTEROL SULFATE 3 ML: .5; 3 SOLUTION RESPIRATORY (INHALATION) at 21:50

## 2022-04-27 RX ADMIN — IOPAMIDOL 100 ML: 755 INJECTION, SOLUTION INTRAVENOUS at 23:43

## 2022-04-28 ENCOUNTER — APPOINTMENT (OUTPATIENT)
Dept: GENERAL RADIOLOGY | Facility: HOSPITAL | Age: 77
End: 2022-04-28

## 2022-04-28 ENCOUNTER — APPOINTMENT (OUTPATIENT)
Dept: CARDIOLOGY | Facility: HOSPITAL | Age: 77
End: 2022-04-28

## 2022-04-28 PROBLEM — I47.20 VENTRICULAR TACHYARRHYTHMIA: Status: ACTIVE | Noted: 2022-04-28

## 2022-04-28 LAB
ALBUMIN SERPL-MCNC: 3.9 G/DL (ref 3.5–5.2)
ALBUMIN/GLOB SERPL: 1.3 G/DL
ALP SERPL-CCNC: 84 U/L (ref 39–117)
ALT SERPL W P-5'-P-CCNC: 25 U/L (ref 1–41)
ANION GAP SERPL CALCULATED.3IONS-SCNC: 15.7 MMOL/L (ref 5–15)
AST SERPL-CCNC: 41 U/L (ref 1–40)
BACTERIA SPEC RESP CULT: NORMAL
BH CV ECHO MEAS - AO ROOT DIAM: 3.5 CM
BH CV ECHO MEAS - EDV(MOD-SP2): 86 ML
BH CV ECHO MEAS - EDV(MOD-SP4): 94 ML
BH CV ECHO MEAS - EF(MOD-BP): 40 %
BH CV ECHO MEAS - ESV(MOD-SP2): 49 ML
BH CV ECHO MEAS - ESV(MOD-SP4): 57 ML
BH CV ECHO MEAS - IVSD: 1.1 CM
BH CV ECHO MEAS - LA DIMENSION(2D): 4.3 CM
BH CV ECHO MEAS - LAT PEAK E' VEL: 5.9 CM/SEC
BH CV ECHO MEAS - LVIDD: 5 CM
BH CV ECHO MEAS - LVIDS: 3.6 CM
BH CV ECHO MEAS - LVOT DIAM: 2 CM
BH CV ECHO MEAS - LVPWD: 1.1 CM
BH CV ECHO MEAS - MED PEAK E' VEL: 4.12 CM/SEC
BH CV ECHO MEAS - MV A MAX VEL: 46 CM/SEC
BH CV ECHO MEAS - MV DEC TIME: 161 MSEC
BH CV ECHO MEAS - MV E MAX VEL: 86 CM/SEC
BH CV ECHO MEAS - MV E/A: 1.9
BH CV ECHO MEAS - RAP SYSTOLE: 3 MMHG
BH CV ECHO MEAS - RVDD: 2.8 CM
BH CV ECHO MEAS - RVSP: 44 MMHG
BH CV ECHO MEAS - TR MAX PG: 41 MMHG
BH CV ECHO MEAS - TR MAX VEL: 320 CM/SEC
BH CV ECHO MEASUREMENTS AVERAGE E/E' RATIO: 17.17
BILIRUB SERPL-MCNC: 0.5 MG/DL (ref 0–1.2)
BUN SERPL-MCNC: 7 MG/DL (ref 8–23)
BUN/CREAT SERPL: 8 (ref 7–25)
CALCIUM SPEC-SCNC: 9.4 MG/DL (ref 8.6–10.5)
CHLORIDE SERPL-SCNC: 89 MMOL/L (ref 98–107)
CO2 SERPL-SCNC: 18.3 MMOL/L (ref 22–29)
CREAT SERPL-MCNC: 0.87 MG/DL (ref 0.76–1.27)
D-LACTATE SERPL-SCNC: 2.4 MMOL/L (ref 0.5–2)
D-LACTATE SERPL-SCNC: 3.2 MMOL/L (ref 0.5–2)
EGFRCR SERPLBLD CKD-EPI 2021: 88.9 ML/MIN/1.73
GLOBULIN UR ELPH-MCNC: 3.1 GM/DL
GLUCOSE BLDC GLUCOMTR-MCNC: 137 MG/DL (ref 70–99)
GLUCOSE BLDC GLUCOMTR-MCNC: 202 MG/DL (ref 70–99)
GLUCOSE BLDC GLUCOMTR-MCNC: 252 MG/DL (ref 70–99)
GLUCOSE SERPL-MCNC: 212 MG/DL (ref 65–99)
GRAM STN SPEC: NORMAL
IVRT: 85 MSEC
L PNEUMO1 AG UR QL IA: NEGATIVE
LEFT ATRIUM VOLUME INDEX: 36.1 ML/M2
MAGNESIUM SERPL-MCNC: 1.8 MG/DL (ref 1.6–2.4)
MAGNESIUM SERPL-MCNC: 1.8 MG/DL (ref 1.6–2.4)
MAXIMAL PREDICTED HEART RATE: 143 BPM
MRSA DNA SPEC QL NAA+PROBE: NORMAL
PHOSPHATE SERPL-MCNC: 3.2 MG/DL (ref 2.5–4.5)
POTASSIUM SERPL-SCNC: 3.8 MMOL/L (ref 3.5–5.2)
PROCALCITONIN SERPL-MCNC: 0.1 NG/ML (ref 0–0.25)
PROT SERPL-MCNC: 7 G/DL (ref 6–8.5)
S PNEUM AG SPEC QL LA: NEGATIVE
SARS-COV-2 RNA PNL SPEC NAA+PROBE: NOT DETECTED
SODIUM SERPL-SCNC: 123 MMOL/L (ref 136–145)
STRESS TARGET HR: 122 BPM
T4 FREE SERPL-MCNC: 1.34 NG/DL (ref 0.93–1.7)
TROPONIN I SERPL-MCNC: 0.02 NG/ML (ref 0–0.6)

## 2022-04-28 PROCEDURE — 99231 SBSQ HOSP IP/OBS SF/LOW 25: CPT | Performed by: INTERNAL MEDICINE

## 2022-04-28 PROCEDURE — 82962 GLUCOSE BLOOD TEST: CPT

## 2022-04-28 PROCEDURE — 84484 ASSAY OF TROPONIN QUANT: CPT

## 2022-04-28 PROCEDURE — 99291 CRITICAL CARE FIRST HOUR: CPT | Performed by: INTERNAL MEDICINE

## 2022-04-28 PROCEDURE — 93306 TTE W/DOPPLER COMPLETE: CPT

## 2022-04-28 PROCEDURE — 94799 UNLISTED PULMONARY SVC/PX: CPT

## 2022-04-28 PROCEDURE — 25010000002 AMIODARONE PER 30 MG

## 2022-04-28 PROCEDURE — 25010000002 AMIODARONE IN DEXTROSE 5% 360-4.14 MG/200ML-% SOLUTION: Performed by: EMERGENCY MEDICINE

## 2022-04-28 PROCEDURE — 71045 X-RAY EXAM CHEST 1 VIEW: CPT

## 2022-04-28 PROCEDURE — 93005 ELECTROCARDIOGRAM TRACING: CPT | Performed by: EMERGENCY MEDICINE

## 2022-04-28 PROCEDURE — 99223 1ST HOSP IP/OBS HIGH 75: CPT | Performed by: INTERNAL MEDICINE

## 2022-04-28 PROCEDURE — 83605 ASSAY OF LACTIC ACID: CPT | Performed by: EMERGENCY MEDICINE

## 2022-04-28 PROCEDURE — 87040 BLOOD CULTURE FOR BACTERIA: CPT | Performed by: EMERGENCY MEDICINE

## 2022-04-28 PROCEDURE — 25010000002 MAGNESIUM SULFATE 2 GM/50ML SOLUTION: Performed by: INTERNAL MEDICINE

## 2022-04-28 PROCEDURE — 83735 ASSAY OF MAGNESIUM: CPT | Performed by: EMERGENCY MEDICINE

## 2022-04-28 PROCEDURE — 25010000002 ENOXAPARIN PER 10 MG: Performed by: INTERNAL MEDICINE

## 2022-04-28 PROCEDURE — 93010 ELECTROCARDIOGRAM REPORT: CPT | Performed by: INTERNAL MEDICINE

## 2022-04-28 PROCEDURE — 87205 SMEAR GRAM STAIN: CPT | Performed by: INTERNAL MEDICINE

## 2022-04-28 PROCEDURE — 25010000002 CEFTRIAXONE PER 250 MG: Performed by: INTERNAL MEDICINE

## 2022-04-28 PROCEDURE — 63710000001 PREDNISONE PER 1 MG: Performed by: INTERNAL MEDICINE

## 2022-04-28 PROCEDURE — 84439 ASSAY OF FREE THYROXINE: CPT | Performed by: INTERNAL MEDICINE

## 2022-04-28 PROCEDURE — 84145 PROCALCITONIN (PCT): CPT | Performed by: INTERNAL MEDICINE

## 2022-04-28 PROCEDURE — 87641 MR-STAPH DNA AMP PROBE: CPT | Performed by: INTERNAL MEDICINE

## 2022-04-28 PROCEDURE — 87899 AGENT NOS ASSAY W/OPTIC: CPT | Performed by: INTERNAL MEDICINE

## 2022-04-28 PROCEDURE — 25010000002 PIPERACILLIN SOD-TAZOBACTAM PER 1 G: Performed by: EMERGENCY MEDICINE

## 2022-04-28 PROCEDURE — 80053 COMPREHEN METABOLIC PANEL: CPT | Performed by: INTERNAL MEDICINE

## 2022-04-28 PROCEDURE — 94761 N-INVAS EAR/PLS OXIMETRY MLT: CPT

## 2022-04-28 PROCEDURE — 63710000001 INSULIN LISPRO (HUMAN) PER 5 UNITS: Performed by: PHYSICIAN ASSISTANT

## 2022-04-28 PROCEDURE — 84100 ASSAY OF PHOSPHORUS: CPT | Performed by: INTERNAL MEDICINE

## 2022-04-28 PROCEDURE — 83735 ASSAY OF MAGNESIUM: CPT | Performed by: INTERNAL MEDICINE

## 2022-04-28 RX ORDER — PREDNISONE 20 MG/1
40 TABLET ORAL
Status: DISCONTINUED | OUTPATIENT
Start: 2022-04-28 | End: 2022-04-29 | Stop reason: HOSPADM

## 2022-04-28 RX ORDER — SODIUM CHLORIDE 0.9 % (FLUSH) 0.9 %
10 SYRINGE (ML) INJECTION AS NEEDED
Status: DISCONTINUED | OUTPATIENT
Start: 2022-04-28 | End: 2022-04-28

## 2022-04-28 RX ORDER — ACETAMINOPHEN 325 MG/1
650 TABLET ORAL EVERY 4 HOURS PRN
Status: DISCONTINUED | OUTPATIENT
Start: 2022-04-28 | End: 2022-04-29 | Stop reason: SDUPTHER

## 2022-04-28 RX ORDER — INSULIN LISPRO 100 [IU]/ML
0-9 INJECTION, SOLUTION INTRAVENOUS; SUBCUTANEOUS
Status: DISCONTINUED | OUTPATIENT
Start: 2022-04-28 | End: 2022-04-29 | Stop reason: HOSPADM

## 2022-04-28 RX ORDER — ASPIRIN 81 MG/1
81 TABLET ORAL DAILY
Status: DISCONTINUED | OUTPATIENT
Start: 2022-04-28 | End: 2022-04-29 | Stop reason: HOSPADM

## 2022-04-28 RX ORDER — ATORVASTATIN CALCIUM 40 MG/1
80 TABLET, FILM COATED ORAL ONCE
Status: COMPLETED | OUTPATIENT
Start: 2022-04-28 | End: 2022-04-28

## 2022-04-28 RX ORDER — CEFTRIAXONE SODIUM 1 G/50ML
1 INJECTION, SOLUTION INTRAVENOUS ONCE
Status: DISCONTINUED | OUTPATIENT
Start: 2022-04-28 | End: 2022-04-28

## 2022-04-28 RX ORDER — POTASSIUM CHLORIDE 750 MG/1
40 CAPSULE, EXTENDED RELEASE ORAL ONCE
Status: COMPLETED | OUTPATIENT
Start: 2022-04-28 | End: 2022-04-28

## 2022-04-28 RX ORDER — BUDESONIDE 0.5 MG/2ML
0.5 INHALANT ORAL
Status: DISCONTINUED | OUTPATIENT
Start: 2022-04-28 | End: 2022-04-29 | Stop reason: HOSPADM

## 2022-04-28 RX ORDER — ARFORMOTEROL TARTRATE 15 UG/2ML
15 SOLUTION RESPIRATORY (INHALATION)
Status: DISCONTINUED | OUTPATIENT
Start: 2022-04-28 | End: 2022-04-29 | Stop reason: HOSPADM

## 2022-04-28 RX ORDER — MAGNESIUM SULFATE HEPTAHYDRATE 40 MG/ML
2 INJECTION, SOLUTION INTRAVENOUS ONCE
Status: COMPLETED | OUTPATIENT
Start: 2022-04-28 | End: 2022-04-28

## 2022-04-28 RX ORDER — CEFTRIAXONE SODIUM 1 G/50ML
1 INJECTION, SOLUTION INTRAVENOUS EVERY 24 HOURS
Status: DISCONTINUED | OUTPATIENT
Start: 2022-04-28 | End: 2022-04-29 | Stop reason: HOSPADM

## 2022-04-28 RX ORDER — DEXTROSE MONOHYDRATE 100 MG/ML
25 INJECTION, SOLUTION INTRAVENOUS
Status: DISCONTINUED | OUTPATIENT
Start: 2022-04-28 | End: 2022-04-29 | Stop reason: HOSPADM

## 2022-04-28 RX ORDER — ATORVASTATIN CALCIUM 40 MG/1
80 TABLET, FILM COATED ORAL NIGHTLY
Status: DISCONTINUED | OUTPATIENT
Start: 2022-04-28 | End: 2022-04-29 | Stop reason: HOSPADM

## 2022-04-28 RX ORDER — ALBUTEROL SULFATE 90 UG/1
2 AEROSOL, METERED RESPIRATORY (INHALATION) EVERY 4 HOURS PRN
COMMUNITY

## 2022-04-28 RX ORDER — ENOXAPARIN SODIUM 100 MG/ML
1 INJECTION SUBCUTANEOUS ONCE
Status: COMPLETED | OUTPATIENT
Start: 2022-04-28 | End: 2022-04-28

## 2022-04-28 RX ORDER — PANTOPRAZOLE SODIUM 40 MG/1
40 TABLET, DELAYED RELEASE ORAL EVERY MORNING
Status: DISCONTINUED | OUTPATIENT
Start: 2022-04-28 | End: 2022-04-29 | Stop reason: HOSPADM

## 2022-04-28 RX ORDER — NITROGLYCERIN 0.4 MG/1
0.4 TABLET SUBLINGUAL
Status: DISCONTINUED | OUTPATIENT
Start: 2022-04-28 | End: 2022-04-29 | Stop reason: HOSPADM

## 2022-04-28 RX ORDER — NICOTINE POLACRILEX 4 MG
24 LOZENGE BUCCAL
Status: DISCONTINUED | OUTPATIENT
Start: 2022-04-28 | End: 2022-04-29 | Stop reason: HOSPADM

## 2022-04-28 RX ORDER — SODIUM CHLORIDE 0.9 % (FLUSH) 0.9 %
10 SYRINGE (ML) INJECTION EVERY 12 HOURS SCHEDULED
Status: DISCONTINUED | OUTPATIENT
Start: 2022-04-28 | End: 2022-04-29 | Stop reason: HOSPADM

## 2022-04-28 RX ORDER — FLUTICASONE PROPIONATE 50 MCG
2 SPRAY, SUSPENSION (ML) NASAL DAILY
Status: DISCONTINUED | OUTPATIENT
Start: 2022-04-28 | End: 2022-04-29 | Stop reason: HOSPADM

## 2022-04-28 RX ORDER — DILTIAZEM HYDROCHLORIDE 5 MG/ML
20 INJECTION INTRAVENOUS ONCE
Status: DISCONTINUED | OUTPATIENT
Start: 2022-04-28 | End: 2022-04-28

## 2022-04-28 RX ORDER — ETOMIDATE 2 MG/ML
INJECTION INTRAVENOUS
Status: DISCONTINUED
Start: 2022-04-28 | End: 2022-04-28

## 2022-04-28 RX ADMIN — ARFORMOTEROL TARTRATE 15 MCG: 15 SOLUTION RESPIRATORY (INHALATION) at 10:38

## 2022-04-28 RX ADMIN — INSULIN LISPRO 4 UNITS: 100 INJECTION, SOLUTION INTRAVENOUS; SUBCUTANEOUS at 13:38

## 2022-04-28 RX ADMIN — ATORVASTATIN CALCIUM 80 MG: 40 TABLET, FILM COATED ORAL at 03:41

## 2022-04-28 RX ADMIN — METOPROLOL TARTRATE 25 MG: 25 TABLET, FILM COATED ORAL at 13:38

## 2022-04-28 RX ADMIN — AMIODARONE HYDROCHLORIDE 1 MG/MIN: 1.8 INJECTION, SOLUTION INTRAVENOUS at 01:25

## 2022-04-28 RX ADMIN — SODIUM CHLORIDE, POTASSIUM CHLORIDE, SODIUM LACTATE AND CALCIUM CHLORIDE 1000 ML: 600; 310; 30; 20 INJECTION, SOLUTION INTRAVENOUS at 09:35

## 2022-04-28 RX ADMIN — APIXABAN 5 MG: 5 TABLET, FILM COATED ORAL at 13:38

## 2022-04-28 RX ADMIN — CEFTRIAXONE SODIUM 1 G: 1 INJECTION, SOLUTION INTRAVENOUS at 10:56

## 2022-04-28 RX ADMIN — POTASSIUM CHLORIDE 40 MEQ: 750 CAPSULE, EXTENDED RELEASE ORAL at 06:34

## 2022-04-28 RX ADMIN — PREDNISONE 40 MG: 20 TABLET ORAL at 10:56

## 2022-04-28 RX ADMIN — MAGNESIUM SULFATE 2 G: 2 INJECTION INTRAVENOUS at 06:35

## 2022-04-28 RX ADMIN — FLUTICASONE PROPIONATE 2 SPRAY: 50 SPRAY, METERED NASAL at 13:39

## 2022-04-28 RX ADMIN — PANTOPRAZOLE SODIUM 40 MG: 40 TABLET, DELAYED RELEASE ORAL at 13:38

## 2022-04-28 RX ADMIN — BUDESONIDE 0.5 MG: 0.5 SUSPENSION RESPIRATORY (INHALATION) at 10:38

## 2022-04-28 RX ADMIN — AMIODARONE HYDROCHLORIDE 1 MG/MIN: 1.8 INJECTION, SOLUTION INTRAVENOUS at 06:50

## 2022-04-28 RX ADMIN — ARFORMOTEROL TARTRATE 15 MCG: 15 SOLUTION RESPIRATORY (INHALATION) at 22:09

## 2022-04-28 RX ADMIN — Medication 10 ML: at 20:04

## 2022-04-28 RX ADMIN — TAZOBACTAM SODIUM AND PIPERACILLIN SODIUM 3.38 G: 375; 3 INJECTION, SOLUTION INTRAVENOUS at 02:16

## 2022-04-28 RX ADMIN — ATORVASTATIN CALCIUM 80 MG: 40 TABLET, FILM COATED ORAL at 20:04

## 2022-04-28 RX ADMIN — BUDESONIDE 0.5 MG: 0.5 SUSPENSION RESPIRATORY (INHALATION) at 22:09

## 2022-04-28 RX ADMIN — METOPROLOL TARTRATE 25 MG: 25 TABLET, FILM COATED ORAL at 20:04

## 2022-04-28 RX ADMIN — ENOXAPARIN SODIUM 60 MG: 100 INJECTION SUBCUTANEOUS at 20:02

## 2022-04-28 RX ADMIN — ASPIRIN 81 MG: 81 TABLET, COATED ORAL at 13:38

## 2022-04-29 ENCOUNTER — HOSPITAL ENCOUNTER (INPATIENT)
Facility: HOSPITAL | Age: 77
LOS: 4 days | Discharge: HOME | End: 2022-05-03
Attending: INTERNAL MEDICINE | Admitting: INTERNAL MEDICINE

## 2022-04-29 VITALS
DIASTOLIC BLOOD PRESSURE: 55 MMHG | HEART RATE: 70 BPM | BODY MASS INDEX: 21.24 KG/M2 | HEIGHT: 70 IN | WEIGHT: 148.37 LBS | TEMPERATURE: 97.1 F | OXYGEN SATURATION: 93 % | RESPIRATION RATE: 20 BRPM | SYSTOLIC BLOOD PRESSURE: 115 MMHG

## 2022-04-29 DIAGNOSIS — I50.22 CHRONIC SYSTOLIC HEART FAILURE: ICD-10-CM

## 2022-04-29 DIAGNOSIS — I21.4 NSTEMI (NON-ST ELEVATED MYOCARDIAL INFARCTION): ICD-10-CM

## 2022-04-29 DIAGNOSIS — I47.20 VENTRICULAR TACHYARRHYTHMIA: Primary | ICD-10-CM

## 2022-04-29 LAB
ALBUMIN SERPL-MCNC: 3.1 G/DL (ref 3.5–5.2)
ALBUMIN/GLOB SERPL: 1.1 G/DL
ALP SERPL-CCNC: 74 U/L (ref 39–117)
ALT SERPL W P-5'-P-CCNC: 23 U/L (ref 1–41)
ANION GAP SERPL CALCULATED.3IONS-SCNC: 11.7 MMOL/L (ref 5–15)
APTT PPP: 28.9 SECONDS (ref 22.7–35.4)
AST SERPL-CCNC: 39 U/L (ref 1–40)
BASOPHILS # BLD AUTO: 0.03 10*3/MM3 (ref 0–0.2)
BASOPHILS # BLD AUTO: 0.04 10*3/MM3 (ref 0–0.2)
BASOPHILS NFR BLD AUTO: 0.3 % (ref 0–1.5)
BASOPHILS NFR BLD AUTO: 0.5 % (ref 0–1.5)
BILIRUB CONJ SERPL-MCNC: <0.2 MG/DL (ref 0–0.3)
BILIRUB SERPL-MCNC: 0.3 MG/DL (ref 0–1.2)
BUN SERPL-MCNC: 7 MG/DL (ref 8–23)
BUN/CREAT SERPL: 9 (ref 7–25)
CALCIUM SPEC-SCNC: 9.1 MG/DL (ref 8.6–10.5)
CHLORIDE SERPL-SCNC: 102 MMOL/L (ref 98–107)
CK SERPL-CCNC: 211 U/L (ref 20–200)
CO2 SERPL-SCNC: 21.3 MMOL/L (ref 22–29)
CREAT SERPL-MCNC: 0.78 MG/DL (ref 0.76–1.27)
DEPRECATED RDW RBC AUTO: 42.4 FL (ref 37–54)
DEPRECATED RDW RBC AUTO: 43.4 FL (ref 37–54)
EGFRCR SERPLBLD CKD-EPI 2021: 91.9 ML/MIN/1.73
EOSINOPHIL # BLD AUTO: 0.01 10*3/MM3 (ref 0–0.4)
EOSINOPHIL # BLD AUTO: 0.01 10*3/MM3 (ref 0–0.4)
EOSINOPHIL NFR BLD AUTO: 0.1 % (ref 0.3–6.2)
EOSINOPHIL NFR BLD AUTO: 0.1 % (ref 0.3–6.2)
ERYTHROCYTE [DISTWIDTH] IN BLOOD BY AUTOMATED COUNT: 12.7 % (ref 12.3–15.4)
ERYTHROCYTE [DISTWIDTH] IN BLOOD BY AUTOMATED COUNT: 12.8 % (ref 12.3–15.4)
GLOBULIN UR ELPH-MCNC: 2.8 GM/DL
GLUCOSE BLDC GLUCOMTR-MCNC: 103 MG/DL (ref 70–99)
GLUCOSE BLDC GLUCOMTR-MCNC: 111 MG/DL (ref 70–130)
GLUCOSE BLDC GLUCOMTR-MCNC: 114 MG/DL (ref 70–130)
GLUCOSE BLDC GLUCOMTR-MCNC: 114 MG/DL (ref 70–99)
GLUCOSE SERPL-MCNC: 112 MG/DL (ref 65–99)
HCT VFR BLD AUTO: 35.7 % (ref 37.5–51)
HCT VFR BLD AUTO: 39.4 % (ref 37.5–51)
HGB BLD-MCNC: 12 G/DL (ref 13–17.7)
HGB BLD-MCNC: 13 G/DL (ref 13–17.7)
IMM GRANULOCYTES # BLD AUTO: 0.05 10*3/MM3 (ref 0–0.05)
IMM GRANULOCYTES # BLD AUTO: 0.07 10*3/MM3 (ref 0–0.05)
IMM GRANULOCYTES NFR BLD AUTO: 0.6 % (ref 0–0.5)
IMM GRANULOCYTES NFR BLD AUTO: 0.7 % (ref 0–0.5)
INR PPP: 1.17 (ref 0.9–1.1)
INR PPP: 1.28 (ref 0.86–1.15)
LYMPHOCYTES # BLD AUTO: 1.06 10*3/MM3 (ref 0.7–3.1)
LYMPHOCYTES # BLD AUTO: 1.15 10*3/MM3 (ref 0.7–3.1)
LYMPHOCYTES NFR BLD AUTO: 15 % (ref 19.6–45.3)
LYMPHOCYTES NFR BLD AUTO: 9.3 % (ref 19.6–45.3)
MAGNESIUM SERPL-MCNC: 2.1 MG/DL (ref 1.6–2.4)
MCH RBC QN AUTO: 30.7 PG (ref 26.6–33)
MCH RBC QN AUTO: 30.8 PG (ref 26.6–33)
MCHC RBC AUTO-ENTMCNC: 33 G/DL (ref 31.5–35.7)
MCHC RBC AUTO-ENTMCNC: 33.6 G/DL (ref 31.5–35.7)
MCV RBC AUTO: 91.5 FL (ref 79–97)
MCV RBC AUTO: 93.1 FL (ref 79–97)
MONOCYTES # BLD AUTO: 0.6 10*3/MM3 (ref 0.1–0.9)
MONOCYTES # BLD AUTO: 1.13 10*3/MM3 (ref 0.1–0.9)
MONOCYTES NFR BLD AUTO: 7.8 % (ref 5–12)
MONOCYTES NFR BLD AUTO: 9.9 % (ref 5–12)
NEUTROPHILS NFR BLD AUTO: 5.83 10*3/MM3 (ref 1.7–7)
NEUTROPHILS NFR BLD AUTO: 75.9 % (ref 42.7–76)
NEUTROPHILS NFR BLD AUTO: 79.8 % (ref 42.7–76)
NEUTROPHILS NFR BLD AUTO: 9.12 10*3/MM3 (ref 1.7–7)
NRBC BLD AUTO-RTO: 0 /100 WBC (ref 0–0.2)
NRBC BLD AUTO-RTO: 0 /100 WBC (ref 0–0.2)
PHOSPHATE SERPL-MCNC: 3.2 MG/DL (ref 2.5–4.5)
PLATELET # BLD AUTO: 248 10*3/MM3 (ref 140–450)
PLATELET # BLD AUTO: 281 10*3/MM3 (ref 140–450)
PMV BLD AUTO: 10.5 FL (ref 6–12)
PMV BLD AUTO: 9.9 FL (ref 6–12)
POTASSIUM SERPL-SCNC: 3.9 MMOL/L (ref 3.5–5.2)
PROT SERPL-MCNC: 5.9 G/DL (ref 6–8.5)
PROTHROMBIN TIME: 14.9 SECONDS (ref 11.7–14.2)
PROTHROMBIN TIME: 16.2 SECONDS (ref 11.8–14.9)
RBC # BLD AUTO: 3.9 10*6/MM3 (ref 4.14–5.8)
RBC # BLD AUTO: 4.23 10*6/MM3 (ref 4.14–5.8)
SODIUM SERPL-SCNC: 135 MMOL/L (ref 136–145)
TROPONIN T SERPL-MCNC: 0.32 NG/ML (ref 0–0.03)
WBC NRBC COR # BLD: 11.42 10*3/MM3 (ref 3.4–10.8)
WBC NRBC COR # BLD: 7.68 10*3/MM3 (ref 3.4–10.8)

## 2022-04-29 PROCEDURE — 25010000002 MIDAZOLAM PER 1 MG: Performed by: INTERNAL MEDICINE

## 2022-04-29 PROCEDURE — 82962 GLUCOSE BLOOD TEST: CPT

## 2022-04-29 PROCEDURE — 94799 UNLISTED PULMONARY SVC/PX: CPT

## 2022-04-29 PROCEDURE — 93459 L HRT ART/GRFT ANGIO: CPT | Performed by: INTERNAL MEDICINE

## 2022-04-29 PROCEDURE — 85610 PROTHROMBIN TIME: CPT | Performed by: INTERNAL MEDICINE

## 2022-04-29 PROCEDURE — 99152 MOD SED SAME PHYS/QHP 5/>YRS: CPT | Performed by: INTERNAL MEDICINE

## 2022-04-29 PROCEDURE — 85025 COMPLETE CBC W/AUTO DIFF WBC: CPT | Performed by: FAMILY MEDICINE

## 2022-04-29 PROCEDURE — 83735 ASSAY OF MAGNESIUM: CPT | Performed by: FAMILY MEDICINE

## 2022-04-29 PROCEDURE — 84100 ASSAY OF PHOSPHORUS: CPT | Performed by: FAMILY MEDICINE

## 2022-04-29 PROCEDURE — 25010000002 CEFTRIAXONE PER 250 MG: Performed by: INTERNAL MEDICINE

## 2022-04-29 PROCEDURE — C1769 GUIDE WIRE: HCPCS | Performed by: INTERNAL MEDICINE

## 2022-04-29 PROCEDURE — 80053 COMPREHEN METABOLIC PANEL: CPT | Performed by: INTERNAL MEDICINE

## 2022-04-29 PROCEDURE — 99231 SBSQ HOSP IP/OBS SF/LOW 25: CPT | Performed by: INTERNAL MEDICINE

## 2022-04-29 PROCEDURE — B2181ZZ FLUOROSCOPY OF LEFT INTERNAL MAMMARY BYPASS GRAFT USING LOW OSMOLAR CONTRAST: ICD-10-PCS | Performed by: INTERNAL MEDICINE

## 2022-04-29 PROCEDURE — 25010000002 HEPARIN (PORCINE) PER 1000 UNITS: Performed by: INTERNAL MEDICINE

## 2022-04-29 PROCEDURE — 84484 ASSAY OF TROPONIN QUANT: CPT | Performed by: INTERNAL MEDICINE

## 2022-04-29 PROCEDURE — C1894 INTRO/SHEATH, NON-LASER: HCPCS | Performed by: INTERNAL MEDICINE

## 2022-04-29 PROCEDURE — 82248 BILIRUBIN DIRECT: CPT | Performed by: FAMILY MEDICINE

## 2022-04-29 PROCEDURE — 85730 THROMBOPLASTIN TIME PARTIAL: CPT | Performed by: INTERNAL MEDICINE

## 2022-04-29 PROCEDURE — 99239 HOSP IP/OBS DSCHRG MGMT >30: CPT | Performed by: FAMILY MEDICINE

## 2022-04-29 PROCEDURE — 85025 COMPLETE CBC W/AUTO DIFF WBC: CPT | Performed by: INTERNAL MEDICINE

## 2022-04-29 PROCEDURE — 99153 MOD SED SAME PHYS/QHP EA: CPT | Performed by: INTERNAL MEDICINE

## 2022-04-29 PROCEDURE — 25010000002 FENTANYL CITRATE (PF) 100 MCG/2ML SOLUTION: Performed by: INTERNAL MEDICINE

## 2022-04-29 PROCEDURE — B2111ZZ FLUOROSCOPY OF MULTIPLE CORONARY ARTERIES USING LOW OSMOLAR CONTRAST: ICD-10-PCS | Performed by: INTERNAL MEDICINE

## 2022-04-29 PROCEDURE — 82550 ASSAY OF CK (CPK): CPT | Performed by: INTERNAL MEDICINE

## 2022-04-29 PROCEDURE — 0 IOPAMIDOL PER 1 ML: Performed by: INTERNAL MEDICINE

## 2022-04-29 PROCEDURE — 4A023N7 MEASUREMENT OF CARDIAC SAMPLING AND PRESSURE, LEFT HEART, PERCUTANEOUS APPROACH: ICD-10-PCS | Performed by: INTERNAL MEDICINE

## 2022-04-29 PROCEDURE — B2151ZZ FLUOROSCOPY OF LEFT HEART USING LOW OSMOLAR CONTRAST: ICD-10-PCS | Performed by: INTERNAL MEDICINE

## 2022-04-29 PROCEDURE — 99291 CRITICAL CARE FIRST HOUR: CPT | Performed by: INTERNAL MEDICINE

## 2022-04-29 RX ORDER — HEPARIN SODIUM 1000 [USP'U]/ML
INJECTION, SOLUTION INTRAVENOUS; SUBCUTANEOUS AS NEEDED
Status: DISCONTINUED | OUTPATIENT
Start: 2022-04-29 | End: 2022-04-29 | Stop reason: HOSPADM

## 2022-04-29 RX ORDER — PREDNISONE 20 MG/1
40 TABLET ORAL
Qty: 10 TABLET | Refills: 0
Start: 2022-04-30 | End: 2022-05-03 | Stop reason: HOSPADM

## 2022-04-29 RX ORDER — HEPARIN SOD,PORCINE/0.9 % NACL 25000/250
12 INTRAVENOUS SOLUTION INTRAVENOUS
Status: DISCONTINUED | OUTPATIENT
Start: 2022-04-29 | End: 2022-05-03 | Stop reason: HOSPADM

## 2022-04-29 RX ORDER — ONDANSETRON 4 MG/1
4 TABLET, FILM COATED ORAL EVERY 6 HOURS PRN
Status: DISCONTINUED | OUTPATIENT
Start: 2022-04-29 | End: 2022-05-03 | Stop reason: HOSPADM

## 2022-04-29 RX ORDER — ATORVASTATIN CALCIUM 80 MG/1
80 TABLET, FILM COATED ORAL NIGHTLY
Start: 2022-04-29 | End: 2022-05-04 | Stop reason: SDUPTHER

## 2022-04-29 RX ORDER — BUDESONIDE 0.5 MG/2ML
0.5 INHALANT ORAL
Status: DISCONTINUED | OUTPATIENT
Start: 2022-04-29 | End: 2022-05-03 | Stop reason: HOSPADM

## 2022-04-29 RX ORDER — MIDAZOLAM HYDROCHLORIDE 1 MG/ML
INJECTION INTRAMUSCULAR; INTRAVENOUS AS NEEDED
Status: DISCONTINUED | OUTPATIENT
Start: 2022-04-29 | End: 2022-04-29 | Stop reason: HOSPADM

## 2022-04-29 RX ORDER — ONDANSETRON 2 MG/ML
4 INJECTION INTRAMUSCULAR; INTRAVENOUS EVERY 6 HOURS PRN
Status: DISCONTINUED | OUTPATIENT
Start: 2022-04-29 | End: 2022-04-29 | Stop reason: HOSPADM

## 2022-04-29 RX ORDER — CEFTRIAXONE SODIUM 1 G/50ML
1 INJECTION, SOLUTION INTRAVENOUS EVERY 24 HOURS
Qty: 150 ML | Refills: 0
Start: 2022-04-30 | End: 2022-05-03 | Stop reason: HOSPADM

## 2022-04-29 RX ORDER — SODIUM CHLORIDE 9 MG/ML
INJECTION, SOLUTION INTRAVENOUS CONTINUOUS PRN
Status: COMPLETED | OUTPATIENT
Start: 2022-04-29 | End: 2022-04-29

## 2022-04-29 RX ORDER — ARFORMOTEROL TARTRATE 15 UG/2ML
15 SOLUTION RESPIRATORY (INHALATION)
Status: DISCONTINUED | OUTPATIENT
Start: 2022-04-29 | End: 2022-05-03 | Stop reason: HOSPADM

## 2022-04-29 RX ORDER — NITROGLYCERIN 0.4 MG/1
0.4 TABLET SUBLINGUAL
Status: DISCONTINUED | OUTPATIENT
Start: 2022-04-29 | End: 2022-05-03 | Stop reason: HOSPADM

## 2022-04-29 RX ORDER — ACETAMINOPHEN 325 MG/1
650 TABLET ORAL EVERY 4 HOURS PRN
Status: DISCONTINUED | OUTPATIENT
Start: 2022-04-29 | End: 2022-05-02 | Stop reason: SDUPTHER

## 2022-04-29 RX ORDER — ACETAMINOPHEN 160 MG/5ML
650 SOLUTION ORAL EVERY 4 HOURS PRN
Status: DISCONTINUED | OUTPATIENT
Start: 2022-04-29 | End: 2022-05-02 | Stop reason: SDUPTHER

## 2022-04-29 RX ORDER — HEPARIN SODIUM 5000 [USP'U]/ML
30-60 INJECTION, SOLUTION INTRAVENOUS; SUBCUTANEOUS EVERY 6 HOURS PRN
Status: DISCONTINUED | OUTPATIENT
Start: 2022-04-29 | End: 2022-05-03 | Stop reason: HOSPADM

## 2022-04-29 RX ORDER — AMIODARONE HYDROCHLORIDE 200 MG/1
200 TABLET ORAL EVERY 12 HOURS SCHEDULED
Status: DISCONTINUED | OUTPATIENT
Start: 2022-04-29 | End: 2022-04-29

## 2022-04-29 RX ORDER — SODIUM CHLORIDE 9 MG/ML
100 INJECTION, SOLUTION INTRAVENOUS CONTINUOUS
Status: ACTIVE | OUTPATIENT
Start: 2022-04-29 | End: 2022-04-29

## 2022-04-29 RX ORDER — NITROGLYCERIN 5 MG/ML
INJECTION, SOLUTION INTRAVENOUS AS NEEDED
Status: DISCONTINUED | OUTPATIENT
Start: 2022-04-29 | End: 2022-04-29 | Stop reason: HOSPADM

## 2022-04-29 RX ORDER — ACETAMINOPHEN 325 MG/1
650 TABLET ORAL EVERY 4 HOURS PRN
Status: DISCONTINUED | OUTPATIENT
Start: 2022-04-29 | End: 2022-04-29 | Stop reason: HOSPADM

## 2022-04-29 RX ORDER — HEPARIN SODIUM 5000 [USP'U]/ML
60 INJECTION, SOLUTION INTRAVENOUS; SUBCUTANEOUS ONCE
Status: COMPLETED | OUTPATIENT
Start: 2022-04-29 | End: 2022-04-29

## 2022-04-29 RX ORDER — PREDNISONE 20 MG/1
40 TABLET ORAL
Status: DISCONTINUED | OUTPATIENT
Start: 2022-04-30 | End: 2022-05-03

## 2022-04-29 RX ORDER — FENTANYL CITRATE 50 UG/ML
INJECTION, SOLUTION INTRAMUSCULAR; INTRAVENOUS AS NEEDED
Status: DISCONTINUED | OUTPATIENT
Start: 2022-04-29 | End: 2022-04-29 | Stop reason: HOSPADM

## 2022-04-29 RX ORDER — ONDANSETRON 4 MG/1
4 TABLET, FILM COATED ORAL EVERY 6 HOURS PRN
Status: DISCONTINUED | OUTPATIENT
Start: 2022-04-29 | End: 2022-04-29 | Stop reason: HOSPADM

## 2022-04-29 RX ORDER — AMIODARONE HYDROCHLORIDE 400 MG/1
400 TABLET ORAL EVERY 12 HOURS SCHEDULED
Start: 2022-04-29 | End: 2022-05-03 | Stop reason: HOSPADM

## 2022-04-29 RX ORDER — AMIODARONE HYDROCHLORIDE 200 MG/1
400 TABLET ORAL EVERY 12 HOURS SCHEDULED
Status: DISCONTINUED | OUTPATIENT
Start: 2022-04-29 | End: 2022-04-29 | Stop reason: HOSPADM

## 2022-04-29 RX ORDER — PANTOPRAZOLE SODIUM 40 MG/1
40 TABLET, DELAYED RELEASE ORAL
Status: DISCONTINUED | OUTPATIENT
Start: 2022-04-30 | End: 2022-04-30

## 2022-04-29 RX ORDER — ONDANSETRON 2 MG/ML
4 INJECTION INTRAMUSCULAR; INTRAVENOUS EVERY 6 HOURS PRN
Status: DISCONTINUED | OUTPATIENT
Start: 2022-04-29 | End: 2022-05-03 | Stop reason: HOSPADM

## 2022-04-29 RX ORDER — SODIUM CHLORIDE 9 MG/ML
75 INJECTION, SOLUTION INTRAVENOUS CONTINUOUS
Status: DISCONTINUED | OUTPATIENT
Start: 2022-04-29 | End: 2022-04-29

## 2022-04-29 RX ORDER — ATORVASTATIN CALCIUM 80 MG/1
80 TABLET, FILM COATED ORAL NIGHTLY
Status: DISCONTINUED | OUTPATIENT
Start: 2022-04-29 | End: 2022-05-03 | Stop reason: HOSPADM

## 2022-04-29 RX ORDER — LIDOCAINE HYDROCHLORIDE 20 MG/ML
INJECTION, SOLUTION INFILTRATION; PERINEURAL AS NEEDED
Status: DISCONTINUED | OUTPATIENT
Start: 2022-04-29 | End: 2022-04-29 | Stop reason: HOSPADM

## 2022-04-29 RX ORDER — AMIODARONE HYDROCHLORIDE 200 MG/1
200 TABLET ORAL EVERY 12 HOURS SCHEDULED
Status: DISCONTINUED | OUTPATIENT
Start: 2022-04-29 | End: 2022-05-03 | Stop reason: HOSPADM

## 2022-04-29 RX ADMIN — METOPROLOL TARTRATE 25 MG: 25 TABLET, FILM COATED ORAL at 20:31

## 2022-04-29 RX ADMIN — HEPARIN SODIUM 12 UNITS/KG/HR: 5000 INJECTION INTRAVENOUS; SUBCUTANEOUS at 21:10

## 2022-04-29 RX ADMIN — HEPARIN SODIUM 4000 UNITS: 5000 INJECTION INTRAVENOUS; SUBCUTANEOUS at 21:10

## 2022-04-29 RX ADMIN — ATORVASTATIN CALCIUM 80 MG: 80 TABLET, FILM COATED ORAL at 20:31

## 2022-04-29 RX ADMIN — METOPROLOL TARTRATE 25 MG: 25 TABLET, FILM COATED ORAL at 09:29

## 2022-04-29 RX ADMIN — AMIODARONE HYDROCHLORIDE 200 MG: 200 TABLET ORAL at 20:31

## 2022-04-29 RX ADMIN — ARFORMOTEROL TARTRATE 15 MCG: 15 SOLUTION RESPIRATORY (INHALATION) at 10:17

## 2022-04-29 RX ADMIN — SODIUM CHLORIDE 100 ML/HR: 9 INJECTION, SOLUTION INTRAVENOUS at 13:50

## 2022-04-29 RX ADMIN — ACETAMINOPHEN 650 MG: 325 TABLET ORAL at 20:30

## 2022-04-29 RX ADMIN — CEFTRIAXONE SODIUM 1 G: 1 INJECTION, SOLUTION INTRAVENOUS at 09:30

## 2022-04-29 RX ADMIN — BUDESONIDE 0.5 MG: 0.5 SUSPENSION RESPIRATORY (INHALATION) at 10:17

## 2022-04-29 RX ADMIN — AMIODARONE HYDROCHLORIDE 200 MG: 200 TABLET ORAL at 09:29

## 2022-04-29 RX ADMIN — ACETAMINOPHEN 650 MG: 325 TABLET ORAL at 15:49

## 2022-04-30 LAB
ANION GAP SERPL CALCULATED.3IONS-SCNC: 12 MMOL/L (ref 5–15)
APTT PPP: 65.4 SECONDS (ref 22.7–35.4)
BASOPHILS # BLD AUTO: 0.02 10*3/MM3 (ref 0–0.2)
BASOPHILS NFR BLD AUTO: 0.3 % (ref 0–1.5)
BUN SERPL-MCNC: 7 MG/DL (ref 8–23)
BUN/CREAT SERPL: 9 (ref 7–25)
CALCIUM SPEC-SCNC: 7.7 MG/DL (ref 8.6–10.5)
CHLORIDE SERPL-SCNC: 102 MMOL/L (ref 98–107)
CO2 SERPL-SCNC: 23 MMOL/L (ref 22–29)
CREAT SERPL-MCNC: 0.78 MG/DL (ref 0.76–1.27)
DEPRECATED RDW RBC AUTO: 46.2 FL (ref 37–54)
EGFRCR SERPLBLD CKD-EPI 2021: 91.9 ML/MIN/1.73
EOSINOPHIL # BLD AUTO: 0.04 10*3/MM3 (ref 0–0.4)
EOSINOPHIL NFR BLD AUTO: 0.6 % (ref 0.3–6.2)
ERYTHROCYTE [DISTWIDTH] IN BLOOD BY AUTOMATED COUNT: 13.2 % (ref 12.3–15.4)
GLUCOSE BLDC GLUCOMTR-MCNC: 111 MG/DL (ref 70–130)
GLUCOSE SERPL-MCNC: 90 MG/DL (ref 65–99)
HCT VFR BLD AUTO: 37.2 % (ref 37.5–51)
HGB BLD-MCNC: 12.3 G/DL (ref 13–17.7)
IMM GRANULOCYTES # BLD AUTO: 0.04 10*3/MM3 (ref 0–0.05)
IMM GRANULOCYTES NFR BLD AUTO: 0.6 % (ref 0–0.5)
LYMPHOCYTES # BLD AUTO: 1.41 10*3/MM3 (ref 0.7–3.1)
LYMPHOCYTES NFR BLD AUTO: 22.2 % (ref 19.6–45.3)
MCH RBC QN AUTO: 31.4 PG (ref 26.6–33)
MCHC RBC AUTO-ENTMCNC: 33.1 G/DL (ref 31.5–35.7)
MCV RBC AUTO: 94.9 FL (ref 79–97)
MONOCYTES # BLD AUTO: 0.53 10*3/MM3 (ref 0.1–0.9)
MONOCYTES NFR BLD AUTO: 8.3 % (ref 5–12)
NEUTROPHILS NFR BLD AUTO: 4.32 10*3/MM3 (ref 1.7–7)
NEUTROPHILS NFR BLD AUTO: 68 % (ref 42.7–76)
NRBC BLD AUTO-RTO: 0 /100 WBC (ref 0–0.2)
PLATELET # BLD AUTO: 258 10*3/MM3 (ref 140–450)
PMV BLD AUTO: 10.1 FL (ref 6–12)
POTASSIUM SERPL-SCNC: 4.3 MMOL/L (ref 3.5–5.2)
RBC # BLD AUTO: 3.92 10*6/MM3 (ref 4.14–5.8)
SODIUM SERPL-SCNC: 137 MMOL/L (ref 136–145)
WBC NRBC COR # BLD: 6.36 10*3/MM3 (ref 3.4–10.8)

## 2022-04-30 PROCEDURE — 85025 COMPLETE CBC W/AUTO DIFF WBC: CPT | Performed by: INTERNAL MEDICINE

## 2022-04-30 PROCEDURE — 25010000002 CEFTRIAXONE PER 250 MG: Performed by: INTERNAL MEDICINE

## 2022-04-30 PROCEDURE — 94799 UNLISTED PULMONARY SVC/PX: CPT

## 2022-04-30 PROCEDURE — 80048 BASIC METABOLIC PNL TOTAL CA: CPT | Performed by: INTERNAL MEDICINE

## 2022-04-30 PROCEDURE — 85730 THROMBOPLASTIN TIME PARTIAL: CPT | Performed by: INTERNAL MEDICINE

## 2022-04-30 PROCEDURE — 99223 1ST HOSP IP/OBS HIGH 75: CPT | Performed by: INTERNAL MEDICINE

## 2022-04-30 PROCEDURE — 63710000001 PREDNISONE PER 1 MG: Performed by: INTERNAL MEDICINE

## 2022-04-30 PROCEDURE — 82962 GLUCOSE BLOOD TEST: CPT

## 2022-04-30 PROCEDURE — 94664 DEMO&/EVAL PT USE INHALER: CPT

## 2022-04-30 PROCEDURE — 94761 N-INVAS EAR/PLS OXIMETRY MLT: CPT

## 2022-04-30 RX ORDER — SODIUM CHLORIDE 0.9 % (FLUSH) 0.9 %
10 SYRINGE (ML) INJECTION AS NEEDED
Status: DISCONTINUED | OUTPATIENT
Start: 2022-04-30 | End: 2022-05-03 | Stop reason: HOSPADM

## 2022-04-30 RX ORDER — MAGNESIUM SULFATE HEPTAHYDRATE 40 MG/ML
2 INJECTION, SOLUTION INTRAVENOUS AS NEEDED
Status: DISCONTINUED | OUTPATIENT
Start: 2022-04-30 | End: 2022-05-03 | Stop reason: HOSPADM

## 2022-04-30 RX ORDER — PANTOPRAZOLE SODIUM 40 MG/1
40 TABLET, DELAYED RELEASE ORAL EVERY MORNING
Refills: 1 | Status: DISCONTINUED | OUTPATIENT
Start: 2022-04-30 | End: 2022-05-03 | Stop reason: HOSPADM

## 2022-04-30 RX ORDER — POTASSIUM CHLORIDE 750 MG/1
40 TABLET, FILM COATED, EXTENDED RELEASE ORAL AS NEEDED
Status: DISCONTINUED | OUTPATIENT
Start: 2022-04-30 | End: 2022-05-03 | Stop reason: HOSPADM

## 2022-04-30 RX ORDER — CEFTRIAXONE SODIUM 1 G/50ML
1 INJECTION, SOLUTION INTRAVENOUS EVERY 24 HOURS
Status: DISCONTINUED | OUTPATIENT
Start: 2022-04-30 | End: 2022-04-30 | Stop reason: CLARIF

## 2022-04-30 RX ORDER — SODIUM CHLORIDE 0.9 % (FLUSH) 0.9 %
10 SYRINGE (ML) INJECTION EVERY 12 HOURS SCHEDULED
Status: DISCONTINUED | OUTPATIENT
Start: 2022-04-30 | End: 2022-05-03 | Stop reason: HOSPADM

## 2022-04-30 RX ORDER — MAGNESIUM SULFATE HEPTAHYDRATE 40 MG/ML
4 INJECTION, SOLUTION INTRAVENOUS AS NEEDED
Status: DISCONTINUED | OUTPATIENT
Start: 2022-04-30 | End: 2022-05-03 | Stop reason: HOSPADM

## 2022-04-30 RX ORDER — POTASSIUM CHLORIDE 1.5 G/1.77G
40 POWDER, FOR SOLUTION ORAL AS NEEDED
Status: DISCONTINUED | OUTPATIENT
Start: 2022-04-30 | End: 2022-05-03 | Stop reason: HOSPADM

## 2022-04-30 RX ADMIN — ARFORMOTEROL TARTRATE 15 MCG: 15 SOLUTION RESPIRATORY (INHALATION) at 09:19

## 2022-04-30 RX ADMIN — PANTOPRAZOLE SODIUM 40 MG: 40 TABLET, DELAYED RELEASE ORAL at 06:15

## 2022-04-30 RX ADMIN — AMIODARONE HYDROCHLORIDE 200 MG: 200 TABLET ORAL at 21:01

## 2022-04-30 RX ADMIN — ARFORMOTEROL TARTRATE 15 MCG: 15 SOLUTION RESPIRATORY (INHALATION) at 20:21

## 2022-04-30 RX ADMIN — ACETAMINOPHEN 650 MG: 325 TABLET ORAL at 06:15

## 2022-04-30 RX ADMIN — BUDESONIDE 0.5 MG: 0.5 INHALANT ORAL at 09:19

## 2022-04-30 RX ADMIN — ACETAMINOPHEN 650 MG: 325 TABLET ORAL at 00:40

## 2022-04-30 RX ADMIN — AMIODARONE HYDROCHLORIDE 200 MG: 200 TABLET ORAL at 08:15

## 2022-04-30 RX ADMIN — PREDNISONE 40 MG: 20 TABLET ORAL at 08:15

## 2022-04-30 RX ADMIN — ATORVASTATIN CALCIUM 80 MG: 80 TABLET, FILM COATED ORAL at 21:01

## 2022-04-30 RX ADMIN — METOPROLOL TARTRATE 25 MG: 25 TABLET, FILM COATED ORAL at 08:52

## 2022-04-30 RX ADMIN — METOPROLOL TARTRATE 25 MG: 25 TABLET, FILM COATED ORAL at 21:01

## 2022-04-30 RX ADMIN — BUDESONIDE 0.5 MG: 0.5 INHALANT ORAL at 20:21

## 2022-04-30 RX ADMIN — Medication 10 ML: at 21:02

## 2022-04-30 RX ADMIN — Medication 10 ML: at 09:00

## 2022-04-30 RX ADMIN — CEFTRIAXONE 1 G: 1 INJECTION, POWDER, FOR SOLUTION INTRAMUSCULAR; INTRAVENOUS at 08:52

## 2022-05-01 LAB
ANION GAP SERPL CALCULATED.3IONS-SCNC: 12 MMOL/L (ref 5–15)
APTT PPP: 32.8 SECONDS (ref 22.7–35.4)
APTT PPP: 38 SECONDS (ref 22.7–35.4)
APTT PPP: 46.9 SECONDS (ref 22.7–35.4)
BASOPHILS # BLD AUTO: 0.01 10*3/MM3 (ref 0–0.2)
BASOPHILS NFR BLD AUTO: 0.1 % (ref 0–1.5)
BUN SERPL-MCNC: 7 MG/DL (ref 8–23)
BUN/CREAT SERPL: 10.6 (ref 7–25)
CALCIUM SPEC-SCNC: 8.4 MG/DL (ref 8.6–10.5)
CHLORIDE SERPL-SCNC: 102 MMOL/L (ref 98–107)
CO2 SERPL-SCNC: 22 MMOL/L (ref 22–29)
CREAT SERPL-MCNC: 0.66 MG/DL (ref 0.76–1.27)
DEPRECATED RDW RBC AUTO: 40.8 FL (ref 37–54)
EGFRCR SERPLBLD CKD-EPI 2021: 96.6 ML/MIN/1.73
EOSINOPHIL # BLD AUTO: 0 10*3/MM3 (ref 0–0.4)
EOSINOPHIL NFR BLD AUTO: 0 % (ref 0.3–6.2)
ERYTHROCYTE [DISTWIDTH] IN BLOOD BY AUTOMATED COUNT: 12.5 % (ref 12.3–15.4)
GLUCOSE SERPL-MCNC: 100 MG/DL (ref 65–99)
HCT VFR BLD AUTO: 35.4 % (ref 37.5–51)
HGB BLD-MCNC: 12.2 G/DL (ref 13–17.7)
IMM GRANULOCYTES # BLD AUTO: 0.04 10*3/MM3 (ref 0–0.05)
IMM GRANULOCYTES NFR BLD AUTO: 0.6 % (ref 0–0.5)
LYMPHOCYTES # BLD AUTO: 0.91 10*3/MM3 (ref 0.7–3.1)
LYMPHOCYTES NFR BLD AUTO: 13 % (ref 19.6–45.3)
MCH RBC QN AUTO: 30.8 PG (ref 26.6–33)
MCHC RBC AUTO-ENTMCNC: 34.5 G/DL (ref 31.5–35.7)
MCV RBC AUTO: 89.4 FL (ref 79–97)
MONOCYTES # BLD AUTO: 0.42 10*3/MM3 (ref 0.1–0.9)
MONOCYTES NFR BLD AUTO: 6 % (ref 5–12)
NEUTROPHILS NFR BLD AUTO: 5.62 10*3/MM3 (ref 1.7–7)
NEUTROPHILS NFR BLD AUTO: 80.3 % (ref 42.7–76)
NRBC BLD AUTO-RTO: 0 /100 WBC (ref 0–0.2)
PLATELET # BLD AUTO: 270 10*3/MM3 (ref 140–450)
PMV BLD AUTO: 10.2 FL (ref 6–12)
POTASSIUM SERPL-SCNC: 3.9 MMOL/L (ref 3.5–5.2)
QT INTERVAL: 245 MS
QT INTERVAL: 285 MS
QT INTERVAL: 375 MS
RBC # BLD AUTO: 3.96 10*6/MM3 (ref 4.14–5.8)
SODIUM SERPL-SCNC: 136 MMOL/L (ref 136–145)
WBC NRBC COR # BLD: 7 10*3/MM3 (ref 3.4–10.8)

## 2022-05-01 PROCEDURE — 85730 THROMBOPLASTIN TIME PARTIAL: CPT | Performed by: INTERNAL MEDICINE

## 2022-05-01 PROCEDURE — 99232 SBSQ HOSP IP/OBS MODERATE 35: CPT | Performed by: INTERNAL MEDICINE

## 2022-05-01 PROCEDURE — 63710000001 PREDNISONE PER 1 MG: Performed by: INTERNAL MEDICINE

## 2022-05-01 PROCEDURE — 25010000002 CEFTRIAXONE PER 250 MG: Performed by: INTERNAL MEDICINE

## 2022-05-01 PROCEDURE — 80048 BASIC METABOLIC PNL TOTAL CA: CPT | Performed by: INTERNAL MEDICINE

## 2022-05-01 PROCEDURE — 94664 DEMO&/EVAL PT USE INHALER: CPT

## 2022-05-01 PROCEDURE — 94761 N-INVAS EAR/PLS OXIMETRY MLT: CPT

## 2022-05-01 PROCEDURE — 25010000002 HEPARIN (PORCINE) PER 1000 UNITS: Performed by: INTERNAL MEDICINE

## 2022-05-01 PROCEDURE — 94799 UNLISTED PULMONARY SVC/PX: CPT

## 2022-05-01 PROCEDURE — 85025 COMPLETE CBC W/AUTO DIFF WBC: CPT | Performed by: INTERNAL MEDICINE

## 2022-05-01 RX ORDER — CLOPIDOGREL BISULFATE 75 MG/1
75 TABLET ORAL DAILY
Status: DISCONTINUED | OUTPATIENT
Start: 2022-05-01 | End: 2022-05-02 | Stop reason: SDUPTHER

## 2022-05-01 RX ADMIN — PANTOPRAZOLE SODIUM 40 MG: 40 TABLET, DELAYED RELEASE ORAL at 06:17

## 2022-05-01 RX ADMIN — AMIODARONE HYDROCHLORIDE 200 MG: 200 TABLET ORAL at 08:27

## 2022-05-01 RX ADMIN — METOPROLOL TARTRATE 25 MG: 25 TABLET, FILM COATED ORAL at 08:28

## 2022-05-01 RX ADMIN — HEPARIN SODIUM 4000 UNITS: 5000 INJECTION INTRAVENOUS; SUBCUTANEOUS at 21:46

## 2022-05-01 RX ADMIN — CEFTRIAXONE 1 G: 1 INJECTION, POWDER, FOR SOLUTION INTRAMUSCULAR; INTRAVENOUS at 08:27

## 2022-05-01 RX ADMIN — Medication 10 ML: at 08:28

## 2022-05-01 RX ADMIN — ACETAMINOPHEN 650 MG: 325 TABLET ORAL at 20:22

## 2022-05-01 RX ADMIN — BUDESONIDE 0.5 MG: 0.5 INHALANT ORAL at 23:10

## 2022-05-01 RX ADMIN — METOPROLOL TARTRATE 25 MG: 25 TABLET, FILM COATED ORAL at 20:20

## 2022-05-01 RX ADMIN — BUDESONIDE 0.5 MG: 0.5 INHALANT ORAL at 07:03

## 2022-05-01 RX ADMIN — ARFORMOTEROL TARTRATE 15 MCG: 15 SOLUTION RESPIRATORY (INHALATION) at 07:03

## 2022-05-01 RX ADMIN — Medication 10 ML: at 20:23

## 2022-05-01 RX ADMIN — ARFORMOTEROL TARTRATE 15 MCG: 15 SOLUTION RESPIRATORY (INHALATION) at 23:10

## 2022-05-01 RX ADMIN — HEPARIN SODIUM 12 UNITS/KG/HR: 5000 INJECTION INTRAVENOUS; SUBCUTANEOUS at 03:19

## 2022-05-01 RX ADMIN — HEPARIN SODIUM 3912 UNITS: 5000 INJECTION INTRAVENOUS; SUBCUTANEOUS at 05:12

## 2022-05-01 RX ADMIN — AMIODARONE HYDROCHLORIDE 200 MG: 200 TABLET ORAL at 20:20

## 2022-05-01 RX ADMIN — CLOPIDOGREL 75 MG: 75 TABLET, FILM COATED ORAL at 16:13

## 2022-05-01 RX ADMIN — HEPARIN SODIUM 3900 UNITS: 5000 INJECTION INTRAVENOUS; SUBCUTANEOUS at 13:16

## 2022-05-01 RX ADMIN — ATORVASTATIN CALCIUM 80 MG: 80 TABLET, FILM COATED ORAL at 20:20

## 2022-05-01 RX ADMIN — PREDNISONE 40 MG: 20 TABLET ORAL at 07:32

## 2022-05-01 NOTE — PROGRESS NOTES
[facility] Progress Note       Encounter Date:22  Patient:Familia Thompson  :1945  MRN:[id number]      Chief Complaint: Follow-up on coronary artery disease      Subjective:        Patient doing well no further episodes of chest pain.  Plan for cardiac catheterization tomorrow we will start clopidogrel today    Review of Systems:  Review of Systems   Constitutional: Negative for malaise/fatigue.   Cardiovascular: Negative for chest pain.   Respiratory: Negative for shortness of breath.        Medications:  Scheduled Meds:  amiodarone, 200 mg, Oral, Q12H  arformoterol, 15 mcg, Nebulization, BID - RT  atorvastatin, 80 mg, Oral, Nightly  budesonide, 0.5 mg, Nebulization, BID - RT  cefTRIAXone (ROCEPHIN) 1GM IVPB in 100 mL NS (VTB), 1 g, Intravenous, Q24H  metoprolol tartrate, 25 mg, Oral, Q12H  pantoprazole, 40 mg, Oral, QAM  predniSONE, 40 mg, Oral, Daily With Breakfast  sodium chloride, 10 mL, Intravenous, Q12H    Continuous Infusions:  heparin, 12 Units/kg/hr, Last Rate: 18 Units/kg/hr (22 1316)    PRN Meds:  •  acetaminophen  •  acetaminophen  •  heparin (porcine)  •  magnesium sulfate **OR** magnesium sulfate **OR** magnesium sulfate  •  nitroglycerin  •  ondansetron  •  ondansetron  •  potassium chloride  •  potassium chloride  •  sodium chloride         Objective:       Vitals:    22 0703 22 0827 22 1001 22 1201   BP:  (!) 119/107 115/69 128/77   Pulse: 66 74 66 61   Resp: 18      Temp:       TempSrc:       SpO2: 94%  91% 98%   Weight:       Height:               Physical Exam:  Constitutional: Well appearing, well developed, no acute distress   HENT: Oropharynx clear and membrane moist  Eyes: Normal conjunctiva, no sclera icterus.  Neck: Supple, no carotid bruit bilaterally.  Cardiovascular: Regular rate and rhythm, No Murmur, No bilateral lower extremity edema.  Pulmonary: Normal respiratory effort, normal lung sounds, no wheezing.  Abdominal: Soft,  nontender, no hepatosplenomegaly, liver is non-pulsatile.  Neurological: Alert and orient x 3.   Skin: Warm, dry, no ecchymosis, no rash.  Psych: Appropriate mood and affect. Normal judgment and insight.           Lab Review:   Results from last 7 days   Lab Units 05/01/22 0317 04/30/22 0315 04/29/22 0552 04/28/22 0642 04/27/22  1903   SODIUM mmol/L 136 137 135* 123* 127*   POTASSIUM mmol/L 3.9 4.3 3.9 3.8 3.6   CHLORIDE mmol/L 102 102 102 89* 91*   CO2 mmol/L 22.0 23.0 21.3* 18.3* 21.2*   BUN mg/dL 7* 7* 7* 7* 4*   CREATININE mg/dL 0.66* 0.78 0.78 0.87 0.75*   GLUCOSE mg/dL 100* 90 112* 212* 90   CALCIUM mg/dL 8.4* 7.7* 9.1 9.4 9.5   AST (SGOT) U/L  --   --  39 41* 26   ALT (SGPT) U/L  --   --  23 25 27     Results from last 7 days   Lab Units 04/29/22 0552 04/27/22  1903   CK TOTAL U/L 211*  --    TROPONIN T ng/mL 0.323* <0.010     Results from last 7 days   Lab Units 05/01/22 0317 04/30/22 0315 04/29/22 2038 04/29/22 0552 04/27/22  1903   WBC 10*3/mm3 7.00 6.36 7.68 11.42* 6.33   HEMOGLOBIN g/dL 12.2* 12.3* 13.0 12.0* 13.8   HEMATOCRIT % 35.4* 37.2* 39.4 35.7* 39.7   PLATELETS 10*3/mm3 270 258 281 248 245     Results from last 7 days   Lab Units 05/01/22  1129 05/01/22 0317 04/30/22 0315 04/29/22 2038 04/29/22  0552   INR   --   --   --  1.17* 1.28*   APTT seconds 46.9* 38.0* 65.4* 28.9  --      Results from last 7 days   Lab Units 04/29/22  0552 04/28/22  0642 04/28/22  0140   MAGNESIUM mg/dL 2.1 1.8 1.8           Invalid input(s): LDLCALC  Results from last 7 days   Lab Units 04/27/22  1903   PROBNP pg/mL 1,186.0           Cardiac catheterization 4/29/2022:  · Dominance: Left  · Left Main: Heavily calcified with 95% stenosis  · Left Anterior Descending: Arteries 100% occluded at its ostium.  A long stented segment is noted in mid to distal LAD artery  · Circumflex Artery: Large dominant vessel giving rise to various OM branches and left PDA branch.  A tight lesion is noted in the ostium of left  circumflex artery, and continuation with the left main stenosis.  This lesion is angiographically 95% severity.  There are no graft attachments noted on any of the OM branches.  · Right Coronary Artery: Medium caliber nondominant vessel with no significant stenosis.  It has an anterior takeoff.  Mid artery has a 30% stenosis.   · LIMA graft to LAD : The graft is widely patent its ostium, body and anastomosis.  Distal LAD, distal to the graft anastomosis has no significant stenosis.  The graft partially fills made LAD retrogradely.  · Patient also has a SVG to OM branch per previous cardiology office notes.  We could not engage this graft, no graft markers were seen.  There were no possible graft attachments noted on any of the OM branches.     Echocardiogram 4/28/2022 [facility]:  · Estimated right ventricular systolic pressure from tricuspid regurgitation is moderately elevated (45-55 mmHg).  · Calculated left ventricular EF = 40% Estimated left ventricular EF was in agreement with the calculated left ventricular EF.  · Left ventricular diastolic function is consistent with (grade II w/high LAP) pseudonormalization.  · There is moderate calcification of the aortic valve.     Stress MPI 4/5/2022 [facility]:  · Left ventricular ejection fraction is mildly reduced. (Calculated EF = 42%).  · There was no clear evidence of ischemia in this study             Assessment:          Diagnosis Plan   1. Ventricular tachyarrhythmia (HCC)  Case Request Cath Lab: Percutaneous Coronary Intervention    Case Request Cath Lab: Percutaneous Coronary Intervention   2. NSTEMI (non-ST elevated myocardial infarction) ([facility])  Case Request Cath Lab: Percutaneous Coronary Intervention    Case Request Cath Lab: Percutaneous Coronary Intervention          Plan:       Mr. Thompson is a 77 y.o. gentleman who follows with Dr. Byrd in [place] with past medical history notable for coronary artery disease status  post CABG, peripheral artery disease status post bilateral femoropopliteal bypasses, history of paroxysmal atrial fibrillation, COPD, hypertension, and mixed hyperlipidemia who originally presented to [facility] on 4/27/2022 with worsening dyspnea exertion and shortness of breath over the last couple of days while there he was found to be in atrial fibrillation with rapid ventricular response which unfortunately deteriorated into ventricular tachycardia and subsequent ventricular fibrillation and was defibrillated.  Fortunately has done well but was found to have complex left main into circumflex disease which will require rotational atherectomy.  Patient also likely had an aspiration but fortunately thus far showing no overt signs of infection.  We will continue empiric antibiotics over the next couple of days but so far his white count is stable.  Did receive some [facility] at [facility].  We will plan on PCI on Monday, 5/2/2022 assuming that he remains stable over the weekend given the complex nature of his coronary disease and access would likely try and do this during regular hours where we would have more time to get an optimal result.     Cardiac arrest/VF:  · VF arrest likely related to induced ischemia in the setting of complex fixed left main and circumflex stenoses  · We will plan on intervention Monday  · Currently on amiodarone and metoprolol to help prevent recurrent atrial fibrillation with rapid ventricular response  · If any change in clinical status would pursue more emergent revascularization.     Type II myocardial infarction due to tachycardia:  · Likely represents more of a type II myocardial infarction with demand ischemia in setting of tachycardia rather than acute plaque rupture  · Will need revascularization  · Currently on heparin infusion and aspirin  · Will likely initiate clopidogrel in preparation for PCI on Monday  · Continue high potency statin  · Continue  beta-blocker  · Plan for PCI on Monday     Coronary artery disease with stable angina:  · As above     Paroxysmal atrial fibrillation:  · Continue heparin infusion  · Continue beta-blocker and amiodarone  · Will restart Eliquis after revascularization     Aspiration:  · Likely in setting of cardiac arrest  · Will continue empiric ceftriaxone this admission total of 5 days                  Pawan Guerrero MD  [facility]  05/01/22  13:42 EDT

## 2022-05-01 NOTE — PLAN OF CARE
"Goal Outcome Evaluation:      pt in [facility]U transferred here from [facility] for \"high risk angioplasty\" Monday. No procedure time scheduled yet. VSS on room air. Still a heparin gtt overnight. No chest pain or rhythm changes. Pt did well overnight           Problem: Adult Inpatient Plan of Care  Goal: Plan of Care Review  Outcome: Ongoing, Progressing  Goal: Patient-Specific Goal (Individualized)  Outcome: Ongoing, Progressing  Goal: Absence of Hospital-Acquired Illness or Injury  Outcome: Ongoing, Progressing  Intervention: Identify and Manage Fall Risk  Recent Flowsheet Documentation  Taken 5/1/2022 0400 by Emily Turner RN  Safety Promotion/Fall Prevention:   safety round/check completed   room organization consistent   lighting adjusted   fall prevention program maintained   clutter free environment maintained   assistive device/personal items within reach  Taken 5/1/2022 0300 by Emily Turner RN  Safety Promotion/Fall Prevention:   safety round/check completed   room organization consistent   lighting adjusted   fall prevention program maintained   assistive device/personal items within reach   clutter free environment maintained  Taken 5/1/2022 0200 by Emily Turner RN  Safety Promotion/Fall Prevention:   safety round/check completed   room organization consistent   lighting adjusted   fall prevention program maintained   clutter free environment maintained   assistive device/personal items within reach  Taken 5/1/2022 0100 by Emily Turner RN  Safety Promotion/Fall Prevention:   safety round/check completed   room organization consistent   lighting adjusted   fall prevention program maintained   clutter free environment maintained   assistive device/personal items within reach  Taken 5/1/2022 0000 by Emily Turner RN  Safety Promotion/Fall Prevention:   safety round/check completed   room organization consistent   lighting adjusted   fall prevention program maintained   clutter free environment " maintained   assistive device/personal items within reach  Taken 4/30/2022 2300 by Emily Turner RN  Safety Promotion/Fall Prevention:   safety round/check completed   room organization consistent   lighting adjusted   fall prevention program maintained   clutter free environment maintained   assistive device/personal items within reach  Taken 4/30/2022 2200 by Emily Turner RN  Safety Promotion/Fall Prevention:   safety round/check completed   room organization consistent   lighting adjusted   fall prevention program maintained   assistive device/personal items within reach   clutter free environment maintained  Taken 4/30/2022 2100 by Emily Turner RN  Safety Promotion/Fall Prevention:   safety round/check completed   room organization consistent   lighting adjusted   fall prevention program maintained   clutter free environment maintained   assistive device/personal items within reach  Taken 4/30/2022 2000 by Emily Turner RN  Safety Promotion/Fall Prevention:   safety round/check completed   room organization consistent   lighting adjusted   fall prevention program maintained   clutter free environment maintained   assistive device/personal items within reach  Intervention: Prevent Skin Injury  Recent Flowsheet Documentation  Taken 5/1/2022 0400 by Emily Turner RN  Body Position: position changed independently  Taken 5/1/2022 0200 by Emily Turner RN  Body Position: position changed independently  Taken 5/1/2022 0000 by Emily Turner RN  Body Position: position changed independently  Taken 4/30/2022 2200 by Emily Turner RN  Body Position: position changed independently  Taken 4/30/2022 2000 by Emily Turner RN  Body Position: position changed independently  Intervention: Prevent and Manage VTE (Venous Thromboembolism) Risk  Recent Flowsheet Documentation  Taken 5/1/2022 0430 by Emily Turner RN  VTE Prevention/Management: (heparin gtt) other (see comments)  Taken 5/1/2022 0000 by Johnny  RICARDO Diaz  VTE Prevention/Management: (heparin gtt) other (see comments)  Taken 4/30/2022 2030 by Emily Turner RN  Activity Management: activity adjusted per tolerance  VTE Prevention/Management: (heparin gtt) other (see comments)  Goal: Optimal Comfort and Wellbeing  Outcome: Ongoing, Progressing  Goal: Readiness for Transition of Care  Outcome: Ongoing, Progressing     Problem: Arrhythmia/Dysrhythmia (Cardiac Catheterization)  Goal: Stable Heart Rate and Rhythm  Outcome: Ongoing, Progressing     Problem: Bleeding (Cardiac Catheterization)  Goal: Absence of Bleeding  Outcome: Ongoing, Progressing     Problem: Contrast-Induced Injury Risk (Cardiac Catheterization)  Goal: Absence of Contrast-Induced Injury  Outcome: Ongoing, Progressing     Problem: Embolism (Cardiac Catheterization)  Goal: Absence of Embolism Signs and Symptoms  Outcome: Ongoing, Progressing  Intervention: Prevent or Manage Embolism  Recent Flowsheet Documentation  Taken 5/1/2022 0430 by Emily Turner RN  VTE Prevention/Management: (heparin gtt) other (see comments)  Taken 5/1/2022 0000 by Emily Turner RN  VTE Prevention/Management: (heparin gtt) other (see comments)  Taken 4/30/2022 2030 by Emily Turner RN  VTE Prevention/Management: (heparin gtt) other (see comments)     Problem: Ongoing Anesthesia/Sedation Effects (Cardiac Catheterization)  Goal: Anesthesia/Sedation Recovery  Outcome: Ongoing, Progressing  Intervention: Optimize Anesthesia Recovery  Recent Flowsheet Documentation  Taken 5/1/2022 0400 by Emily Turner RN  Safety Promotion/Fall Prevention:   safety round/check completed   room organization consistent   lighting adjusted   fall prevention program maintained   clutter free environment maintained   assistive device/personal items within reach  Taken 5/1/2022 0300 by Emily Turner RN  Safety Promotion/Fall Prevention:   safety round/check completed   room organization consistent   lighting adjusted   fall prevention  program maintained   assistive device/personal items within reach   clutter free environment maintained  Taken 5/1/2022 0200 by Emily Turner RN  Safety Promotion/Fall Prevention:   safety round/check completed   room organization consistent   lighting adjusted   fall prevention program maintained   clutter free environment maintained   assistive device/personal items within reach  Taken 5/1/2022 0100 by Emily Turner RN  Safety Promotion/Fall Prevention:   safety round/check completed   room organization consistent   lighting adjusted   fall prevention program maintained   clutter free environment maintained   assistive device/personal items within reach  Taken 5/1/2022 0000 by Emily Turner RN  Safety Promotion/Fall Prevention:   safety round/check completed   room organization consistent   lighting adjusted   fall prevention program maintained   clutter free environment maintained   assistive device/personal items within reach  Taken 4/30/2022 2300 by Emily Turner RN  Safety Promotion/Fall Prevention:   safety round/check completed   room organization consistent   lighting adjusted   fall prevention program maintained   clutter free environment maintained   assistive device/personal items within reach  Taken 4/30/2022 2200 by Emily Turner RN  Safety Promotion/Fall Prevention:   safety round/check completed   room organization consistent   lighting adjusted   fall prevention program maintained   assistive device/personal items within reach   clutter free environment maintained  Taken 4/30/2022 2100 by Emily Turner RN  Safety Promotion/Fall Prevention:   safety round/check completed   room organization consistent   lighting adjusted   fall prevention program maintained   clutter free environment maintained   assistive device/personal items within reach  Taken 4/30/2022 2000 by Emily Turner RN  Safety Promotion/Fall Prevention:   safety round/check completed   room organization consistent    lighting adjusted   fall prevention program maintained   clutter free environment maintained   assistive device/personal items within reach     Problem: Pain (Cardiac Catheterization)  Goal: Acceptable Pain Control  Outcome: Ongoing, Progressing     Problem: Vascular Access Protection (Cardiac Catheterization)  Goal: Absence of Vascular Access Complication  Outcome: Ongoing, Progressing  Intervention: Prevent Access Site Complications  Recent Flowsheet Documentation  Taken 5/1/2022 0400 by Emily Turner RN  Head of Bed (HOB) Positioning: HOB at 20-30 degrees  Taken 5/1/2022 0200 by Emily Turner RN  Head of Bed (HOB) Positioning: HOB at 20-30 degrees  Taken 5/1/2022 0000 by Emily Turner RN  Head of Bed (HOB) Positioning: HOB at 20-30 degrees  Taken 4/30/2022 2200 by Emily Turner RN  Head of Bed (HOB) Positioning: HOB at 30-45 degrees  Taken 4/30/2022 2030 by Emily Turner RN  Activity Management: activity adjusted per tolerance  Taken 4/30/2022 2000 by Emily Turner RN  Head of Bed (HOB) Positioning: HOB at 30-45 degrees     Problem: Adjustment to Illness COPD (Chronic Obstructive Pulmonary Disease)  Goal: Optimal Chronic Illness Coping  Outcome: Ongoing, Progressing     Problem: Functional Ability Impaired COPD (Chronic Obstructive Pulmonary Disease)  Goal: Optimal Level of Functional Atco  Outcome: Ongoing, Progressing  Intervention: Optimize Functional Ability  Recent Flowsheet Documentation  Taken 4/30/2022 2030 by Emily Turner RN  Activity Management: activity adjusted per tolerance     Problem: Infection COPD (Chronic Obstructive Pulmonary Disease)  Goal: Absence of Infection Signs and Symptoms  Outcome: Ongoing, Progressing     Problem: Oral Intake Inadequate COPD (Chronic Obstructive Pulmonary Disease)  Goal: Improved Nutrition Intake  Outcome: Ongoing, Progressing     Problem: Respiratory Compromise COPD (Chronic Obstructive Pulmonary Disease)  Goal: Effective Oxygenation and  Ventilation  Outcome: Ongoing, Progressing  Intervention: Promote Airway Secretion Clearance  Recent Flowsheet Documentation  Taken 4/30/2022 2030 by Emily Turner RN  Activity Management: activity adjusted per tolerance  Intervention: Optimize Oxygenation and Ventilation  Recent Flowsheet Documentation  Taken 5/1/2022 0400 by Emily Turenr RN  Head of Bed (HOB) Positioning: HOB at 20-30 degrees  Taken 5/1/2022 0200 by Emily Turner RN  Head of Bed (HOB) Positioning: HOB at 20-30 degrees  Taken 5/1/2022 0000 by Emily Turner RN  Head of Bed (HOB) Positioning: HOB at 20-30 degrees  Taken 4/30/2022 2200 by Emily Turner RN  Head of Bed (HOB) Positioning: HOB at 30-45 degrees  Taken 4/30/2022 2000 by Emily Turner RN  Head of Bed (HOB) Positioning: HOB at 30-45 degrees     Problem: Infection  Goal: Absence of Infection Signs and Symptoms  Outcome: Ongoing, Progressing     Problem: Fluid Imbalance (Pneumonia)  Goal: Fluid Balance  Outcome: Ongoing, Progressing     Problem: Infection (Pneumonia)  Goal: Resolution of Infection Signs and Symptoms  Outcome: Ongoing, Progressing     Problem: Respiratory Compromise (Pneumonia)  Goal: Effective Oxygenation and Ventilation  Outcome: Ongoing, Progressing  Intervention: Optimize Oxygenation and Ventilation  Recent Flowsheet Documentation  Taken 5/1/2022 0400 by Emily Turner RN  Head of Bed (HOB) Positioning: HOB at 20-30 degrees  Taken 5/1/2022 0200 by Emily Turner RN  Head of Bed (HOB) Positioning: HOB at 20-30 degrees  Taken 5/1/2022 0000 by Emily Turner RN  Head of Bed (HOB) Positioning: HOB at 20-30 degrees  Taken 4/30/2022 2200 by Emily Turner RN  Head of Bed (HOB) Positioning: HOB at 30-45 degrees  Taken 4/30/2022 2000 by Emily Turner RN  Head of Bed (HOB) Positioning: HOB at 30-45 degrees     Problem: COPD (Chronic Obstructive Pulmonary Disease) Comorbidity  Goal: Maintenance of COPD Symptom Control  Outcome: Ongoing, Progressing  Intervention:  Maintain COPD-Symptom Control  Recent Flowsheet Documentation  Taken 5/1/2022 0400 by Emily Turner RN  Medication Review/Management: medications reviewed  Taken 5/1/2022 0300 by Emily Turner RN  Medication Review/Management: medications reviewed  Taken 5/1/2022 0200 by Emily Turner RN  Medication Review/Management: medications reviewed  Taken 5/1/2022 0100 by Emily Turner RN  Medication Review/Management: medications reviewed  Taken 5/1/2022 0000 by Emily Turner RN  Medication Review/Management: medications reviewed  Taken 4/30/2022 2300 by Emily Turner RN  Medication Review/Management: medications reviewed  Taken 4/30/2022 2200 by Emily Turner RN  Medication Review/Management: medications reviewed  Taken 4/30/2022 2100 by Emily Turner RN  Medication Review/Management: medications reviewed  Taken 4/30/2022 2000 by Emily Turner RN  Medication Review/Management: medications reviewed     Problem: Heart Failure Comorbidity  Goal: Maintenance of Heart Failure Symptom Control  Outcome: Ongoing, Progressing  Intervention: Maintain Heart Failure-Management  Recent Flowsheet Documentation  Taken 5/1/2022 0400 by Emily Turner RN  Medication Review/Management: medications reviewed  Taken 5/1/2022 0300 by Emily Turner RN  Medication Review/Management: medications reviewed  Taken 5/1/2022 0200 by Emily Turner RN  Medication Review/Management: medications reviewed  Taken 5/1/2022 0100 by Emily Turner RN  Medication Review/Management: medications reviewed  Taken 5/1/2022 0000 by Emily Turner RN  Medication Review/Management: medications reviewed  Taken 4/30/2022 2300 by Emily Turner RN  Medication Review/Management: medications reviewed  Taken 4/30/2022 2200 by Emily Turner RN  Medication Review/Management: medications reviewed  Taken 4/30/2022 2100 by Emily Turner RN  Medication Review/Management: medications reviewed  Taken 4/30/2022 2000 by Emily Turner RN  Medication  Review/Management: medications reviewed     Problem: Hypertension Comorbidity  Goal: Blood Pressure in Desired Range  Outcome: Ongoing, Progressing  Intervention: Maintain Blood Pressure Management  Recent Flowsheet Documentation  Taken 5/1/2022 0400 by Emily Turner RN  Medication Review/Management: medications reviewed  Taken 5/1/2022 0300 by Emily Turner RN  Medication Review/Management: medications reviewed  Taken 5/1/2022 0200 by Emily Turner RN  Medication Review/Management: medications reviewed  Taken 5/1/2022 0100 by Emily Turner RN  Medication Review/Management: medications reviewed  Taken 5/1/2022 0000 by Emily Turner RN  Medication Review/Management: medications reviewed  Taken 4/30/2022 2300 by Emily Turner RN  Medication Review/Management: medications reviewed  Taken 4/30/2022 2200 by Emily Turner RN  Medication Review/Management: medications reviewed  Taken 4/30/2022 2100 by Emily Turner RN  Medication Review/Management: medications reviewed  Taken 4/30/2022 2000 by Emily Turner RN  Medication Review/Management: medications reviewed

## 2022-05-02 LAB
ACT BLD: 190 SECONDS (ref 82–152)
ACT BLD: 261 SECONDS (ref 82–152)
ACT BLD: 273 SECONDS (ref 82–152)
ANION GAP SERPL CALCULATED.3IONS-SCNC: 11 MMOL/L (ref 5–15)
APTT PPP: 137.3 SECONDS (ref 22.7–35.4)
APTT PPP: 61.6 SECONDS (ref 22.7–35.4)
APTT PPP: >200 SECONDS (ref 22.7–35.4)
BASOPHILS # BLD AUTO: 0.01 10*3/MM3 (ref 0–0.2)
BASOPHILS NFR BLD AUTO: 0.1 % (ref 0–1.5)
BUN SERPL-MCNC: 7 MG/DL (ref 8–23)
BUN/CREAT SERPL: 10 (ref 7–25)
CALCIUM SPEC-SCNC: 8.6 MG/DL (ref 8.6–10.5)
CHLORIDE SERPL-SCNC: 104 MMOL/L (ref 98–107)
CO2 SERPL-SCNC: 23 MMOL/L (ref 22–29)
CREAT SERPL-MCNC: 0.7 MG/DL (ref 0.76–1.27)
DEPRECATED RDW RBC AUTO: 41 FL (ref 37–54)
EGFRCR SERPLBLD CKD-EPI 2021: 94.9 ML/MIN/1.73
EOSINOPHIL # BLD AUTO: 0 10*3/MM3 (ref 0–0.4)
EOSINOPHIL NFR BLD AUTO: 0 % (ref 0.3–6.2)
ERYTHROCYTE [DISTWIDTH] IN BLOOD BY AUTOMATED COUNT: 12.5 % (ref 12.3–15.4)
GLUCOSE SERPL-MCNC: 92 MG/DL (ref 65–99)
HCT VFR BLD AUTO: 36 % (ref 37.5–51)
HGB BLD-MCNC: 12.2 G/DL (ref 13–17.7)
IMM GRANULOCYTES # BLD AUTO: 0.05 10*3/MM3 (ref 0–0.05)
IMM GRANULOCYTES NFR BLD AUTO: 0.7 % (ref 0–0.5)
LYMPHOCYTES # BLD AUTO: 1.48 10*3/MM3 (ref 0.7–3.1)
LYMPHOCYTES NFR BLD AUTO: 19.8 % (ref 19.6–45.3)
MCH RBC QN AUTO: 30.9 PG (ref 26.6–33)
MCHC RBC AUTO-ENTMCNC: 33.9 G/DL (ref 31.5–35.7)
MCV RBC AUTO: 91.1 FL (ref 79–97)
MONOCYTES # BLD AUTO: 0.59 10*3/MM3 (ref 0.1–0.9)
MONOCYTES NFR BLD AUTO: 7.9 % (ref 5–12)
NEUTROPHILS NFR BLD AUTO: 5.33 10*3/MM3 (ref 1.7–7)
NEUTROPHILS NFR BLD AUTO: 71.5 % (ref 42.7–76)
NRBC BLD AUTO-RTO: 0 /100 WBC (ref 0–0.2)
PLATELET # BLD AUTO: 300 10*3/MM3 (ref 140–450)
PMV BLD AUTO: 9.6 FL (ref 6–12)
POTASSIUM SERPL-SCNC: 4 MMOL/L (ref 3.5–5.2)
RBC # BLD AUTO: 3.95 10*6/MM3 (ref 4.14–5.8)
SODIUM SERPL-SCNC: 138 MMOL/L (ref 136–145)
WBC NRBC COR # BLD: 7.46 10*3/MM3 (ref 3.4–10.8)

## 2022-05-02 PROCEDURE — 93005 ELECTROCARDIOGRAM TRACING: CPT | Performed by: INTERNAL MEDICINE

## 2022-05-02 PROCEDURE — 99152 MOD SED SAME PHYS/QHP 5/>YRS: CPT | Performed by: INTERNAL MEDICINE

## 2022-05-02 PROCEDURE — 94799 UNLISTED PULMONARY SVC/PX: CPT

## 2022-05-02 PROCEDURE — 25010000002 HEPARIN (PORCINE) PER 1000 UNITS: Performed by: INTERNAL MEDICINE

## 2022-05-02 PROCEDURE — 94760 N-INVAS EAR/PLS OXIMETRY 1: CPT

## 2022-05-02 PROCEDURE — C1769 GUIDE WIRE: HCPCS | Performed by: INTERNAL MEDICINE

## 2022-05-02 PROCEDURE — 25010000002 FENTANYL CITRATE (PF) 50 MCG/ML SOLUTION: Performed by: INTERNAL MEDICINE

## 2022-05-02 PROCEDURE — 94761 N-INVAS EAR/PLS OXIMETRY MLT: CPT

## 2022-05-02 PROCEDURE — 85730 THROMBOPLASTIN TIME PARTIAL: CPT | Performed by: INTERNAL MEDICINE

## 2022-05-02 PROCEDURE — 99153 MOD SED SAME PHYS/QHP EA: CPT | Performed by: INTERNAL MEDICINE

## 2022-05-02 PROCEDURE — 25010000002 CEFTRIAXONE PER 250 MG: Performed by: INTERNAL MEDICINE

## 2022-05-02 PROCEDURE — C1894 INTRO/SHEATH, NON-LASER: HCPCS | Performed by: INTERNAL MEDICINE

## 2022-05-02 PROCEDURE — 92933 PRQ TRLML C ATHRC ST ANGIOP1: CPT | Performed by: INTERNAL MEDICINE

## 2022-05-02 PROCEDURE — 63710000001 PREDNISONE PER 1 MG: Performed by: INTERNAL MEDICINE

## 2022-05-02 PROCEDURE — C1725 CATH, TRANSLUMIN NON-LASER: HCPCS | Performed by: INTERNAL MEDICINE

## 2022-05-02 PROCEDURE — C1887 CATHETER, GUIDING: HCPCS | Performed by: INTERNAL MEDICINE

## 2022-05-02 PROCEDURE — C9602 PERC D-E COR STENT ATHER S: HCPCS | Performed by: INTERNAL MEDICINE

## 2022-05-02 PROCEDURE — 85347 COAGULATION TIME ACTIVATED: CPT

## 2022-05-02 PROCEDURE — C1874 STENT, COATED/COV W/DEL SYS: HCPCS | Performed by: INTERNAL MEDICINE

## 2022-05-02 PROCEDURE — 25010000002 MIDAZOLAM PER 1 MG: Performed by: INTERNAL MEDICINE

## 2022-05-02 PROCEDURE — 85025 COMPLETE CBC W/AUTO DIFF WBC: CPT | Performed by: INTERNAL MEDICINE

## 2022-05-02 PROCEDURE — 80048 BASIC METABOLIC PNL TOTAL CA: CPT | Performed by: INTERNAL MEDICINE

## 2022-05-02 PROCEDURE — 027034Z DILATION OF CORONARY ARTERY, ONE ARTERY WITH DRUG-ELUTING INTRALUMINAL DEVICE, PERCUTANEOUS APPROACH: ICD-10-PCS | Performed by: INTERNAL MEDICINE

## 2022-05-02 PROCEDURE — 99232 SBSQ HOSP IP/OBS MODERATE 35: CPT | Performed by: INTERNAL MEDICINE

## 2022-05-02 PROCEDURE — 0 IOPAMIDOL PER 1 ML: Performed by: INTERNAL MEDICINE

## 2022-05-02 PROCEDURE — C1761: HCPCS | Performed by: INTERNAL MEDICINE

## 2022-05-02 PROCEDURE — 02C03ZZ EXTIRPATION OF MATTER FROM CORONARY ARTERY, ONE ARTERY, PERCUTANEOUS APPROACH: ICD-10-PCS | Performed by: INTERNAL MEDICINE

## 2022-05-02 DEVICE — XIENCE SKYPOINT™ EVEROLIMUS ELUTING CORONARY STENT SYSTEM 3.50 MM X 23 MM / RAPID-EXCHANGE
Type: IMPLANTABLE DEVICE | Status: FUNCTIONAL
Brand: XIENCE SKYPOINT™

## 2022-05-02 RX ORDER — ASPIRIN 325 MG
TABLET ORAL AS NEEDED
Status: DISCONTINUED | OUTPATIENT
Start: 2022-05-02 | End: 2022-05-02 | Stop reason: HOSPADM

## 2022-05-02 RX ORDER — SODIUM CHLORIDE 9 MG/ML
INJECTION, SOLUTION INTRAVENOUS CONTINUOUS PRN
Status: COMPLETED | OUTPATIENT
Start: 2022-05-02 | End: 2022-05-02

## 2022-05-02 RX ORDER — LIDOCAINE HYDROCHLORIDE 20 MG/ML
INJECTION, SOLUTION INFILTRATION; PERINEURAL AS NEEDED
Status: DISCONTINUED | OUTPATIENT
Start: 2022-05-02 | End: 2022-05-02 | Stop reason: HOSPADM

## 2022-05-02 RX ORDER — ASPIRIN 81 MG/1
81 TABLET ORAL DAILY
Status: DISCONTINUED | OUTPATIENT
Start: 2022-05-02 | End: 2022-05-03 | Stop reason: HOSPADM

## 2022-05-02 RX ORDER — FENTANYL CITRATE 50 UG/ML
INJECTION, SOLUTION INTRAMUSCULAR; INTRAVENOUS AS NEEDED
Status: DISCONTINUED | OUTPATIENT
Start: 2022-05-02 | End: 2022-05-02 | Stop reason: HOSPADM

## 2022-05-02 RX ORDER — HEPARIN SODIUM 1000 [USP'U]/ML
INJECTION, SOLUTION INTRAVENOUS; SUBCUTANEOUS AS NEEDED
Status: DISCONTINUED | OUTPATIENT
Start: 2022-05-02 | End: 2022-05-02 | Stop reason: HOSPADM

## 2022-05-02 RX ORDER — MIDAZOLAM HYDROCHLORIDE 1 MG/ML
INJECTION INTRAMUSCULAR; INTRAVENOUS AS NEEDED
Status: DISCONTINUED | OUTPATIENT
Start: 2022-05-02 | End: 2022-05-02 | Stop reason: HOSPADM

## 2022-05-02 RX ORDER — CLOPIDOGREL BISULFATE 75 MG/1
75 TABLET ORAL DAILY
Status: DISCONTINUED | OUTPATIENT
Start: 2022-05-03 | End: 2022-05-03 | Stop reason: HOSPADM

## 2022-05-02 RX ORDER — SODIUM CHLORIDE 9 MG/ML
125 INJECTION, SOLUTION INTRAVENOUS CONTINUOUS
Status: ACTIVE | OUTPATIENT
Start: 2022-05-02 | End: 2022-05-03

## 2022-05-02 RX ORDER — ONDANSETRON 2 MG/ML
4 INJECTION INTRAMUSCULAR; INTRAVENOUS EVERY 6 HOURS PRN
Status: DISCONTINUED | OUTPATIENT
Start: 2022-05-02 | End: 2022-05-02 | Stop reason: SDUPTHER

## 2022-05-02 RX ORDER — ONDANSETRON 4 MG/1
4 TABLET, FILM COATED ORAL EVERY 6 HOURS PRN
Status: DISCONTINUED | OUTPATIENT
Start: 2022-05-02 | End: 2022-05-02 | Stop reason: SDUPTHER

## 2022-05-02 RX ORDER — ACETAMINOPHEN 325 MG/1
650 TABLET ORAL EVERY 4 HOURS PRN
Status: DISCONTINUED | OUTPATIENT
Start: 2022-05-02 | End: 2022-05-03 | Stop reason: HOSPADM

## 2022-05-02 RX ORDER — CLOPIDOGREL BISULFATE 75 MG/1
TABLET ORAL AS NEEDED
Status: DISCONTINUED | OUTPATIENT
Start: 2022-05-02 | End: 2022-05-02 | Stop reason: HOSPADM

## 2022-05-02 RX ADMIN — Medication 10 ML: at 08:38

## 2022-05-02 RX ADMIN — BUDESONIDE 0.5 MG: 0.5 INHALANT ORAL at 07:32

## 2022-05-02 RX ADMIN — METOPROLOL TARTRATE 25 MG: 25 TABLET, FILM COATED ORAL at 20:10

## 2022-05-02 RX ADMIN — PREDNISONE 40 MG: 20 TABLET ORAL at 05:16

## 2022-05-02 RX ADMIN — AMIODARONE HYDROCHLORIDE 200 MG: 200 TABLET ORAL at 08:38

## 2022-05-02 RX ADMIN — ATORVASTATIN CALCIUM 80 MG: 80 TABLET, FILM COATED ORAL at 20:10

## 2022-05-02 RX ADMIN — CLOPIDOGREL 75 MG: 75 TABLET, FILM COATED ORAL at 08:38

## 2022-05-02 RX ADMIN — HEPARIN SODIUM 19 UNITS/KG/HR: 5000 INJECTION INTRAVENOUS; SUBCUTANEOUS at 11:52

## 2022-05-02 RX ADMIN — ARFORMOTEROL TARTRATE 15 MCG: 15 SOLUTION RESPIRATORY (INHALATION) at 19:44

## 2022-05-02 RX ADMIN — CEFTRIAXONE 1 G: 1 INJECTION, POWDER, FOR SOLUTION INTRAMUSCULAR; INTRAVENOUS at 08:38

## 2022-05-02 RX ADMIN — SODIUM CHLORIDE 125 ML/HR: 9 INJECTION, SOLUTION INTRAVENOUS at 16:18

## 2022-05-02 RX ADMIN — BUDESONIDE 0.5 MG: 0.5 INHALANT ORAL at 19:44

## 2022-05-02 RX ADMIN — PANTOPRAZOLE SODIUM 40 MG: 40 TABLET, DELAYED RELEASE ORAL at 05:16

## 2022-05-02 RX ADMIN — HEPARIN SODIUM 2000 UNITS: 5000 INJECTION INTRAVENOUS; SUBCUTANEOUS at 11:51

## 2022-05-02 RX ADMIN — AMIODARONE HYDROCHLORIDE 200 MG: 200 TABLET ORAL at 20:10

## 2022-05-02 RX ADMIN — HEPARIN SODIUM 21 UNITS/KG/HR: 5000 INJECTION INTRAVENOUS; SUBCUTANEOUS at 02:32

## 2022-05-02 RX ADMIN — ARFORMOTEROL TARTRATE 15 MCG: 15 SOLUTION RESPIRATORY (INHALATION) at 07:33

## 2022-05-02 RX ADMIN — METOPROLOL TARTRATE 25 MG: 25 TABLET, FILM COATED ORAL at 08:38

## 2022-05-02 NOTE — PROGRESS NOTES
"Familia Thompson  1945 77 y.o.  [id number]      Patient Care Team:  Robb Gómez DO as PCP - General (Family Medicine)    CC: History of PVD, hypertension A. fib, VF and cath with critical left main disease    Interval History: He has been doing well he did have some epistaxis this winter      Objective   Vital Signs  Temp:  [97.5 °F (36.4 °C)-97.9 °F (36.6 °C)] 97.9 °F (36.6 °C)  Heart Rate:  [58-80] 63  Resp:  [16-19] 16  BP: (120-153)/(63-97) 138/76    Intake/Output Summary (Last 24 hours) at 5/2/2022 1019  Last data filed at 5/2/2022 0732  Gross per 24 hour   Intake 240 ml   Output 275 ml   Net -35 ml     Flowsheet Rows    Flowsheet Row First Filed Value   Admission Height 177.8 cm (70\") Documented at 04/29/2022 1923   Admission Weight 65.2 kg (143 lb 11.8 oz) Documented at 04/29/2022 1923          Physical Exam:   General Appearance:    Alert,oriented, in no acute distress   Lungs:     Clear to auscultation,BS are equal    Heart:    Normal S1 and S2, iRRR without murmur, gallop or rub   HEENT:    Sclerae are clear, no JVD or adenopathy   Abdomen:     Normal bowel sounds, soft nontender, nondistended, no HSM   Extremities:   Moves all extremities well, no edema, no cyanosis, no             Redness, no rash     Medication Review:      amiodarone, 200 mg, Oral, Q12H  arformoterol, 15 mcg, Nebulization, BID - RT  atorvastatin, 80 mg, Oral, Nightly  budesonide, 0.5 mg, Nebulization, BID - RT  clopidogrel, 75 mg, Oral, Daily  metoprolol tartrate, 25 mg, Oral, Q12H  pantoprazole, 40 mg, Oral, QAM  predniSONE, 40 mg, Oral, Daily With Breakfast  sodium chloride, 10 mL, Intravenous, Q12H      heparin, 12 Units/kg/hr, Last Rate: 18 Units/kg/hr (05/02/22 0502)          I reviewed the patient's new clinical results.  I personally viewed and interpreted the patient's EKG/Telemetry data    Assessment/Plan  Active Hospital Problems    Diagnosis  POA   • Ventricular tachyarrhythmia (HCC) [I47.2]  Unknown   • NSTEMI " (non-ST elevated myocardial infarction) ([facility]) [I21.4]  Yes      Resolved Hospital Problems   No resolved problems to display.       Gentleman who had a VF arrest subsequently revealed a critical left main the really just supplies the circumflex it is densely calcified his LIMA to the LAD is patent.  He has been referred for complex intervention on his left main.  He has a history of being on anticoagulation for his A. fib also and has had some epistaxis.  I think if we can we get a try and do this from the radial artery and were going to depending on if I can get a wire to go through then we will do rotational atherectomy followed by PTCA shockwave lithotripsy and PTCA and stenting.  Like to stay away from his legs if possible as he has had femoropopliteal bypasses.  I discussed this with the patient and the family today and they agree and understand.  H&P reviewed. The patient was examined and there are no changes to the H&P. I have explained the risks and benefits of the procedure to the patient.  The patient understands and agrees to proceed    Josue Hensley MD  05/02/22  10:19 EDT

## 2022-05-02 NOTE — PAYOR COMM NOTE
"Geraldo Thompson (77 y.o. Male)                   ATTENTION;     INPATIENT AUTH REQUEST -     ICD 10 -   I21.4                     FACILITY NPI [id number] [facility]                            PHYSICIAN NPI - PREMA GUERRERO MD  [id number]                 Date of Birth   1945    Social Security Number       Address   [address] [place] [address]    Home Phone   [phone]    MRN   [id number]       Bahai   Sikhism    Marital Status                               Admission Date   4/29/22    Admission Type   Urgent    Admitting Provider   Prema Guerrero MD    Attending Provider   Prema Guerrero MD    Department, Room/Bed   [facility] CARDIOVASC UNIT, 2224/1       Discharge Date       Discharge Disposition       Discharge Destination                               Attending Provider: Prema Guerrero MD    Allergies: No Known Allergies    Isolation: None   Infection: None   Code Status: CPR   Advance Care Planning Activity    Ht: 177.8 cm (70\")   Wt: 66.2 kg (146 lb)    Admission Cmt: None   Principal Problem: None                Active Insurance as of 4/29/2022     Primary Coverage     Payor Plan Insurance Group Employer/Plan Group    [facility] MEDICARE REPLACEMENT [employer] MEDICARE REPLACEMENT      Payor Plan Address Payor Plan Phone Number Payor Plan Fax Number Effective Dates    [address] [address] [phone]  6/1/2021 - None Entered    [facility] [id number]       Subscriber Name Subscriber Birth Date Member ID       GERALDO THOMPSON 1945 [id number]                 Emergency Contacts      (Rel.) Home Phone Work Phone Mobile Phone    EDWIN THOMPSON (Spouse) [phone] -- [phone]    TristonElke fry (Daughter) -- -- [phone]               History & Physical      Prema Guerrero MD at 04/30/22 0933          [facility] History and Physical       Encounter Date:04/30/22  Patient:Geraldo" Jay  :1945  MRN:[id number]    Date of Admission: 2022  Date of Encounter Visit: 22  Encounter Provider: Pawan Guerrero MD  Place of Service: [facility]  Patient Care Team:  Robb Gómez DO as PCP - General (Family Medicine)      Chief Complaint: Chest pain      History of Presenting Illness:      Mr. Thompson is a 77 y.o. gentleman who follows with Dr. Byrd in [place] with past medical history notable for coronary artery disease status post CABG, peripheral artery disease status post bilateral femoropopliteal bypasses, history of paroxysmal atrial fibrillation, COPD, hypertension, and mixed hyperlipidemia who originally presented to [facility] on 2022 with worsening dyspnea exertion and shortness of breath over the last couple of days while there he was found to be in atrial fibrillation with rapid ventricular response.  He initially was medically managed but unfortunately rates became more erratic and actually had episodes and runs of ventricular tachycardia and actually eventually decompensated down into ventricular fibrillation requiring brief chest compressions and defibrillation.  During this time there is concern that he might of aspirated but fortunately converted to sinus rhythm and since then has actually been doing fairly well.  He was taken to the Cath Lab on 2022 where he was found to have complex left main into circumflex calcified lesion.  He does have a patent LIMA to his LAD.  No other revascularization targets were noted.  This was a 99% stenoses was felt to be possibly the contributor to his arrhythmia and cardiac arrest.  Given the complex nature of his coronary disease he was transferred to our institution for possible PCI and rotational atherectomy.  Fortunately since coming here the patient has continued to do quite well.  He denies any chest pains does have somewhat of a mild productive cough but denies any fevers or chills.   He has been maintained on amiodarone and beta-blocker.      Review of Systems:  Review of Systems   Constitutional: Negative.   HENT: Negative.    Eyes: Negative.    Cardiovascular: Positive for chest pain, dyspnea on exertion and palpitations.   Respiratory: Positive for cough and shortness of breath.    Endocrine: Negative.    Hematologic/Lymphatic: Negative.    Skin: Negative.    Musculoskeletal: Negative.    Gastrointestinal: Negative.    Genitourinary: Negative.    Neurological: Negative.    Psychiatric/Behavioral: Negative.    Allergic/Immunologic: Negative.        Medications:  Prior to Admission medications    Medication Sig Start Date End Date Taking? Authorizing Provider   albuterol sulfate  (90 Base) MCG/ACT inhaler Inhale 2 puffs Every 4 (Four) Hours As Needed for Wheezing or Shortness of Air.   Yes ProviderRoberta MD   apixaban (ELIQUIS) 5 MG tablet tablet TAKE 1 TABLET BY MOUTH TWICE DAILY 6/7/21  Yes ProviderRoberta MD   aspirin 81 MG EC tablet Take 81 mg by mouth Daily.   Yes Roberta Melendrez MD   atorvastatin (LIPITOR) 80 MG tablet Take 1 tablet by mouth Every Night. 4/29/22  Yes Tatiana Veliz MD   cefTRIAXone (ROCEPHIN) 20 MG/ML IVPB Infuse 50 mL into a venous catheter Daily for 3 doses. Indications: Pneumonia 4/30/22 5/3/22 Yes Tatiana Veliz MD   cetirizine (zyrTEC) 10 MG tablet Take 1 tablet by mouth Daily. 1/27/22  Yes Robb Gómez DO   fluticasone (Flonase) 50 MCG/ACT nasal spray 2 sprays into the nostril(s) as directed by provider Daily. 2/21/22  Yes Robb óGmez DO   metoprolol tartrate (LOPRESSOR) 25 MG tablet TAKE 1 TABLET BY MOUTH TWICE DAILY 4/12/22  Yes Robb Gómez DO   omeprazole (priLOSEC) 40 MG capsule TAKE 1 CAPSULE BY MOUTH DAILY 4/12/22  Yes Robb Gómez DO   predniSONE (DELTASONE) 20 MG tablet Take 2 tablets by mouth Daily With Breakfast for 5 days. 4/30/22 5/5/22 Yes Tatiana Veliz MD   simvastatin (ZOCOR) 40 MG  tablet TAKE 1 TABLET BY MOUTH EVERY NIGHT 22  Yes Robb Gómez DO   amiodarone (PACERONE) 400 MG tablet Take 1 tablet by mouth Every 12 (Twelve) Hours. 22   Tatiana Veliz MD   clobetasol (TEMOVATE) 0.05 % cream Apply 1 application topically to the appropriate area as directed 2 (Two) Times a Day. 22   Robb Gómez DO   Iron-Vitamins (GERITOL PO) Take 1 tablet by mouth Daily.    Provider, MD Roberta       No Known Allergies    Past Medical History:   Diagnosis Date   • Arthritis    • Chronic systolic CHF (congestive heart failure) ([facility])    • COPD (chronic obstructive pulmonary disease) ([facility])    • Coronary artery disease    • Hemorrhoids    • HL (hearing loss)    • Hyperlipidemia    • Hypertension    • Myocardial infarction ([facility])    • PVD (peripheral vascular disease) ([facility])    • Sinus disorder        Past Surgical History:   Procedure Laterality Date   • CORONARY ANGIOPLASTY WITH STENT PLACEMENT     • VEIN LIGATION AND STRIPPING     • WRIST FRACTURE SURGERY         Social History     Socioeconomic History   • Marital status:    Tobacco Use   • Smoking status: Former Smoker     Packs/day: 1.00     Years: 30.00     Pack years: 30.00     Types: Cigarettes     Quit date:      Years since quittin.3   • Smokeless tobacco: Never Used   Substance and Sexual Activity   • Alcohol use: Not Currently     Alcohol/week: 12.0 standard drinks     Types: 12 Cans of beer per week   • Drug use: Not Currently       History reviewed. No pertinent family history.      The following portions of the patient's history were reviewed and updated as appropriate: allergies, current medications, past family history, past medical history, past social history, past surgical history and problem list.         Objective:      Temp:  [97.4 °F (36.3 °C)-98.2 °F (36.8 °C)] 97.4 °F (36.3 °C)  Heart Rate:  [61-92] 71  Resp:  [12-20] 18  BP: ()/() 120/73     Intake/Output Summary (Last 24 hours)  at 4/30/2022 0933  Last data filed at 4/30/2022 0600  Gross per 24 hour   Intake 1081.4 ml   Output 500 ml   Net 581.4 ml     Body mass index is 20.62 kg/m².      04/29/22 1923   Weight: 65.2 kg (143 lb 11.8 oz)           Physical Exam:  Constitutional: Well appearing, well developed, no acute distress   HENT: Oropharynx clear and membrane moist  Eyes: Normal conjunctiva, no sclera icterus.  Neck: Supple, no carotid bruit bilaterally.  Cardiovascular: Regular rate and rhythm, No Murmur, No bilateral lower extremity edema.  Pulmonary: Normal respiratory effort, normal lung sounds, no wheezing.  Abdominal: Soft, nontender, no hepatosplenomegaly, liver is non-pulsatile.  Neurological: Alert and orient x 3.   Skin: Warm, dry, no ecchymosis, no rash.  Psych: Appropriate mood and affect. Normal judgment and insight.        Lab Review:   Results from last 7 days   Lab Units 04/30/22 0315 04/29/22 0552 04/28/22 0642 04/27/22 1903   SODIUM mmol/L 137 135* 123* 127*   POTASSIUM mmol/L 4.3 3.9 3.8 3.6   CHLORIDE mmol/L 102 102 89* 91*   CO2 mmol/L 23.0 21.3* 18.3* 21.2*   BUN mg/dL 7* 7* 7* 4*   CREATININE mg/dL 0.78 0.78 0.87 0.75*   GLUCOSE mg/dL 90 112* 212* 90   CALCIUM mg/dL 7.7* 9.1 9.4 9.5   AST (SGOT) U/L  --  39 41* 26   ALT (SGPT) U/L  --  23 25 27     Results from last 7 days   Lab Units 04/29/22  0552 04/27/22  1903   CK TOTAL U/L 211*  --    TROPONIN T ng/mL 0.323* <0.010     Results from last 7 days   Lab Units 04/30/22 0315 04/29/22 2038 04/29/22  0552 04/27/22 1903   WBC 10*3/mm3 6.36 7.68 11.42* 6.33   HEMOGLOBIN g/dL 12.3* 13.0 12.0* 13.8   HEMATOCRIT % 37.2* 39.4 35.7* 39.7   PLATELETS 10*3/mm3 258 281 248 245     Results from last 7 days   Lab Units 04/30/22  0315 04/29/22 2038 04/29/22  0552   INR   --  1.17* 1.28*   APTT seconds 65.4* 28.9  --      Results from last 7 days   Lab Units 04/29/22  0552 04/28/22  0642 04/28/22  0140   MAGNESIUM mg/dL 2.1 1.8 1.8           Invalid input(s):  LDLCALC  The ASCVD Risk score (Samuel STEWART Jr., et al., 2013) failed to calculate for the following reasons:    The patient has a prior MI or stroke diagnosis     Results from last 7 days   Lab Units 04/27/22  1903   PROBNP pg/mL 1,186.0           Cardiac catheterization 4/29/2022 with images reviewed by myself:  · Dominance: Left  · Left Main: Heavily calcified with 95% stenosis  · Left Anterior Descending: Arteries 100% occluded at its ostium.  A long stented segment is noted in mid to distal LAD artery  · Circumflex Artery: Large dominant vessel giving rise to various OM branches and left PDA branch.  A tight lesion is noted in the ostium of left circumflex artery, and continuation with the left main stenosis.  This lesion is angiographically 95% severity.  There are no graft attachments noted on any of the OM branches.  · Right Coronary Artery: Medium caliber nondominant vessel with no significant stenosis.  It has an anterior takeoff.  Mid artery has a 30% stenosis.   · LIMA graft to LAD : The graft is widely patent its ostium, body and anastomosis.  Distal LAD, distal to the graft anastomosis has no significant stenosis.  The graft partially fills made LAD retrogradely.  · Patient also has a SVG to OM branch per previous cardiology office notes.  We could not engage this graft, no graft markers were seen.  There were no possible graft attachments noted on any of the OM branches.    Echocardiogram 4/28/2022 :  · Estimated right ventricular systolic pressure from tricuspid regurgitation is moderately elevated (45-55 mmHg).  · Calculated left ventricular EF = 40% Estimated left ventricular EF was in agreement with the calculated left ventricular EF.  · Left ventricular diastolic function is consistent with (grade II w/high LAP) pseudonormalization.  · There is moderate calcification of the aortic valve.    Stress MPI 4/5/2022 :  · Left ventricular ejection fraction is  mildly reduced. (Calculated EF = 42%).  · There was no clear evidence of ischemia in this study            Assessment:           NSTEMI (non-ST elevated myocardial infarction) ([facility])         Plan:       Mr. Thompson is a 77 y.o. gentleman who follows with Dr. Byrd in [place] with past medical history notable for coronary artery disease status post CABG, peripheral artery disease status post bilateral femoropopliteal bypasses, history of paroxysmal atrial fibrillation, COPD, hypertension, and mixed hyperlipidemia who originally presented to [facility] on 4/27/2022 with worsening dyspnea exertion and shortness of breath over the last couple of days while there he was found to be in atrial fibrillation with rapid ventricular response which unfortunately deteriorated into ventricular tachycardia and subsequent ventricular fibrillation and was defibrillated.  Fortunately has done well but was found to have complex left main into circumflex disease which will require rotational atherectomy.  Patient also likely had an aspiration but fortunately thus far showing no overt signs of infection.  We will continue empiric antibiotics over the next couple of days but so far his white count is stable.  Did receive some steroids at [facility].  We will plan on PCI on Monday, 5/2/2022 assuming that he remains stable over the weekend given the complex nature of his coronary disease and access would likely try and do this during regular hours where we would have more time to get an optimal result.    Cardiac arrest/VF:  · VF arrest likely related to induced ischemia in the setting of complex fixed left main and circumflex stenoses  · We will plan on intervention Monday  · Currently on amiodarone and metoprolol to help prevent recurrent atrial fibrillation with rapid ventricular response  · If any change in clinical status would pursue more emergent revascularization.    Type II myocardial infarction due to  tachycardia:  · Likely represents more of a type II myocardial infarction with demand ischemia in setting of tachycardia rather than acute plaque rupture  · Will need revascularization  · Currently on heparin infusion and aspirin  · Will likely initiate clopidogrel in preparation for PCI on Monday  · Continue high potency statin  · Continue beta-blocker  · Plan for PCI on Monday    Coronary artery disease with stable angina:  · As above    Paroxysmal atrial fibrillation:  · Continue heparin infusion  · Continue beta-blocker and amiodarone  · Will restart Eliquis after revascularization    Aspiration:  · Likely in setting of cardiac arrest  · Will continue empiric ceftriaxone this admission total of 5 days             Pawan Guerrero MD  [facility]  04/30/22  09:33 EDT      Electronically signed by Pawan Guerrero MD at 04/30/22 0910

## 2022-05-03 ENCOUNTER — READMISSION MANAGEMENT (OUTPATIENT)
Dept: CALL CENTER | Facility: HOSPITAL | Age: 77
End: 2022-05-03

## 2022-05-03 VITALS
HEIGHT: 70 IN | TEMPERATURE: 97.5 F | RESPIRATION RATE: 18 BRPM | WEIGHT: 146 LBS | HEART RATE: 60 BPM | DIASTOLIC BLOOD PRESSURE: 65 MMHG | OXYGEN SATURATION: 95 % | SYSTOLIC BLOOD PRESSURE: 121 MMHG | BODY MASS INDEX: 20.9 KG/M2

## 2022-05-03 LAB
ANION GAP SERPL CALCULATED.3IONS-SCNC: 8 MMOL/L (ref 5–15)
APTT PPP: 28.4 SECONDS (ref 22.7–35.4)
BACTERIA SPEC AEROBE CULT: NORMAL
BACTERIA SPEC AEROBE CULT: NORMAL
BASOPHILS # BLD AUTO: 0.01 10*3/MM3 (ref 0–0.2)
BASOPHILS NFR BLD AUTO: 0.1 % (ref 0–1.5)
BUN SERPL-MCNC: 8 MG/DL (ref 8–23)
BUN/CREAT SERPL: 10.1 (ref 7–25)
CALCIUM SPEC-SCNC: 8.5 MG/DL (ref 8.6–10.5)
CHLORIDE SERPL-SCNC: 105 MMOL/L (ref 98–107)
CO2 SERPL-SCNC: 24 MMOL/L (ref 22–29)
CREAT SERPL-MCNC: 0.79 MG/DL (ref 0.76–1.27)
DEPRECATED RDW RBC AUTO: 41.9 FL (ref 37–54)
EGFRCR SERPLBLD CKD-EPI 2021: 91.5 ML/MIN/1.73
EOSINOPHIL # BLD AUTO: 0.01 10*3/MM3 (ref 0–0.4)
EOSINOPHIL NFR BLD AUTO: 0.1 % (ref 0.3–6.2)
ERYTHROCYTE [DISTWIDTH] IN BLOOD BY AUTOMATED COUNT: 12.7 % (ref 12.3–15.4)
GLUCOSE SERPL-MCNC: 81 MG/DL (ref 65–99)
HCT VFR BLD AUTO: 34.8 % (ref 37.5–51)
HGB BLD-MCNC: 11.8 G/DL (ref 13–17.7)
IMM GRANULOCYTES # BLD AUTO: 0.06 10*3/MM3 (ref 0–0.05)
IMM GRANULOCYTES NFR BLD AUTO: 0.9 % (ref 0–0.5)
LYMPHOCYTES # BLD AUTO: 1.6 10*3/MM3 (ref 0.7–3.1)
LYMPHOCYTES NFR BLD AUTO: 23.4 % (ref 19.6–45.3)
MCH RBC QN AUTO: 31.1 PG (ref 26.6–33)
MCHC RBC AUTO-ENTMCNC: 33.9 G/DL (ref 31.5–35.7)
MCV RBC AUTO: 91.8 FL (ref 79–97)
MONOCYTES # BLD AUTO: 0.55 10*3/MM3 (ref 0.1–0.9)
MONOCYTES NFR BLD AUTO: 8 % (ref 5–12)
NEUTROPHILS NFR BLD AUTO: 4.61 10*3/MM3 (ref 1.7–7)
NEUTROPHILS NFR BLD AUTO: 67.5 % (ref 42.7–76)
NRBC BLD AUTO-RTO: 0.1 /100 WBC (ref 0–0.2)
PLATELET # BLD AUTO: 289 10*3/MM3 (ref 140–450)
PMV BLD AUTO: 10 FL (ref 6–12)
POTASSIUM SERPL-SCNC: 3.8 MMOL/L (ref 3.5–5.2)
QT INTERVAL: 490 MS
QT INTERVAL: 532 MS
RBC # BLD AUTO: 3.79 10*6/MM3 (ref 4.14–5.8)
SODIUM SERPL-SCNC: 137 MMOL/L (ref 136–145)
WBC NRBC COR # BLD: 6.84 10*3/MM3 (ref 3.4–10.8)

## 2022-05-03 PROCEDURE — 94799 UNLISTED PULMONARY SVC/PX: CPT

## 2022-05-03 PROCEDURE — 93005 ELECTROCARDIOGRAM TRACING: CPT | Performed by: INTERNAL MEDICINE

## 2022-05-03 PROCEDURE — 85025 COMPLETE CBC W/AUTO DIFF WBC: CPT | Performed by: INTERNAL MEDICINE

## 2022-05-03 PROCEDURE — 80048 BASIC METABOLIC PNL TOTAL CA: CPT | Performed by: INTERNAL MEDICINE

## 2022-05-03 PROCEDURE — 85730 THROMBOPLASTIN TIME PARTIAL: CPT | Performed by: INTERNAL MEDICINE

## 2022-05-03 PROCEDURE — 99238 HOSP IP/OBS DSCHRG MGMT 30/<: CPT | Performed by: INTERNAL MEDICINE

## 2022-05-03 PROCEDURE — 93010 ELECTROCARDIOGRAM REPORT: CPT | Performed by: INTERNAL MEDICINE

## 2022-05-03 RX ORDER — CLOPIDOGREL BISULFATE 75 MG/1
75 TABLET ORAL DAILY
Qty: 30 TABLET | Refills: 11 | Status: SHIPPED | OUTPATIENT
Start: 2022-05-03

## 2022-05-03 RX ORDER — AMIODARONE HYDROCHLORIDE 200 MG/1
200 TABLET ORAL EVERY 12 HOURS SCHEDULED
Qty: 30 TABLET | Refills: 11 | Status: SHIPPED | OUTPATIENT
Start: 2022-05-03

## 2022-05-03 RX ADMIN — CLOPIDOGREL 75 MG: 75 TABLET, FILM COATED ORAL at 09:43

## 2022-05-03 RX ADMIN — ARFORMOTEROL TARTRATE 15 MCG: 15 SOLUTION RESPIRATORY (INHALATION) at 07:15

## 2022-05-03 RX ADMIN — AMIODARONE HYDROCHLORIDE 200 MG: 200 TABLET ORAL at 11:44

## 2022-05-03 RX ADMIN — Medication 10 ML: at 09:43

## 2022-05-03 RX ADMIN — ASPIRIN 81 MG: 81 TABLET, COATED ORAL at 09:43

## 2022-05-03 RX ADMIN — PANTOPRAZOLE SODIUM 40 MG: 40 TABLET, DELAYED RELEASE ORAL at 06:37

## 2022-05-03 RX ADMIN — BUDESONIDE 0.5 MG: 0.5 INHALANT ORAL at 07:13

## 2022-05-03 RX ADMIN — METOPROLOL TARTRATE 25 MG: 25 TABLET, FILM COATED ORAL at 09:43

## 2022-05-03 NOTE — PROGRESS NOTES
[facility] Clinical Pharmacy Services: National Cardiology Data Registry (NCDR) Medication Review    Familia Thompson is s/p PCI with drug-eluding stent placement for NSTEMI. Pharmacy to review discharge medications to make sure appropriate medications have been prescribed.    Patient has been discharged on the following:  · Aspirin: 81 mg daily  · High Intensity Statin: atorvastatin 80 mg nightly  · Beta-blocker: metoprolol tartrate 25 mg twice daily  · P2Y12 Inhibitor: clopidogrel 75 mg daily    These medications meet the requirements for NCDR discharge medication for chest pain and MI.    Andressa Kamara, PharmD  Clinical Pharmacist

## 2022-05-03 NOTE — DISCHARGE SUMMARY
Familia Thompson  [id number]    Date of Admit: 4/29/2022  Date of Discharge:  5/3/2022    Discharge Diagnosis:  Active Hospital Problems    Diagnosis  POA   • Ventricular tachyarrhythmia (HCC) [I47.2]  Unknown   • NSTEMI (non-ST elevated myocardial infarction) (HCC) [I21.4]  Yes      Resolved Hospital Problems   No resolved problems to display.       Hospital Course:   This is a 77 year old male who is normally followed by Dr. Byrd in [place] with a past history of CAD s/p CABG,PAD status post bilateral femoropopliteal bypasses,PAF, COPD, HTN, HLD, who originally presented to [facility] on 4/27/2022 with worsening dyspnea exertion and shortness of breath over the last couple of days while there he was found to be in atrial fibrillation with rapid ventricular response which unfortunately deteriorated into ventricular tachycardia and subsequent ventricular fibrillation and was defibrillated. He did have aspiration likely from this event and he was treated with ceftriaxone this admission total of 5 days.  He was found to have complex left main into circumflex disease . He was referred & transferred here for complex intervention on his left main. On 5/2/22 he underwent Successful but complex intervention/BLAIRE with rotational atherectomy and lithotripsy of his left main into circumflex.     I had a long discussion with him I think he is very frail to take aspirin clopidogrel and Eliquis he is in sinus rhythm and will get a keep him on amiodarone at least for a period of time and so I am not going to treat him with Eliquis.  We will have him on amnio at 200 twice a day and then when he follows up in [place] they probably can decrease it to 200 mg a day and may even consider stopping it.  I would continue the aspirin for 3 months and then stop it and if he has recurrent A. fib then he will need clopidogrel and Eliquis    He is now stable for discharge to home. He will need a follow up in 1-2 weeks with his primary  cardiologist in [place].       Procedures Performed  Procedure(s):  Percutaneous Coronary Intervention  Stent BLAIRE coronary  Intravascular Lithotripsy-Atherectomy      Cardiac catheterization on 5/2/22-  SUMMARY: Successful but complex intervention with rotational atherectomy and lithotripsy     EBL:                     Minimal  Specimens:         None     PCI Segment:                   Left main  Pre-stenosis:                    90  Post-stenosis                   0  Lesion Type                      C  MYAH Flow Pre                   3  MYAH Flow Post                 3  Dissection                        none        RECOMMENDATIONS: Routine PCI care Home in the morning if he is okay      Consults     Date and Time Order Name Status Description    4/28/2022 10:49 AM Inpatient Interventional Cardiology Consult Completed     4/28/2022  6:33 AM Inpatient Pulmonology Consult Completed     4/28/2022  3:51 AM Inpatient Cardiology Consult Completed           Discharge Medications     Your medication list      START taking these medications      Instructions Last Dose Given Next Dose Due   clopidogrel 75 MG tablet  Commonly known as: PLAVIX      Take 1 tablet by mouth Daily.          CHANGE how you take these medications      Instructions Last Dose Given Next Dose Due   amiodarone 200 MG tablet  Commonly known as: PACERONE  What changed:   · medication strength  · how much to take      Take 1 tablet by mouth Every 12 (Twelve) Hours.          CONTINUE taking these medications      Instructions Last Dose Given Next Dose Due   albuterol sulfate  (90 Base) MCG/ACT inhaler  Commonly known as: PROVENTIL HFA;VENTOLIN HFA;PROAIR HFA      Inhale 2 puffs Every 4 (Four) Hours As Needed for Wheezing or Shortness of Air.       aspirin 81 MG EC tablet      Take 81 mg by mouth Daily.       atorvastatin 80 MG tablet  Commonly known as: LIPITOR      Take 1 tablet by mouth Every Night.       cetirizine 10 MG tablet  Commonly known  as: zyrTEC      Take 1 tablet by mouth Daily.       clobetasol 0.05 % cream  Commonly known as: TEMOVATE      Apply 1 application topically to the appropriate area as directed 2 (Two) Times a Day.       fluticasone 50 MCG/ACT nasal spray  Commonly known as: Flonase      2 sprays into the nostril(s) as directed by provider Daily.       metoprolol tartrate 25 MG tablet  Commonly known as: LOPRESSOR      TAKE 1 TABLET BY MOUTH TWICE DAILY       omeprazole 40 MG capsule  Commonly known as: priLOSEC      TAKE 1 CAPSULE BY MOUTH DAILY          STOP taking these medications    apixaban 5 MG tablet tablet  Commonly known as: ELIQUIS        cefTRIAXone 20 MG/ML IVPB  Commonly known as: ROCEPHIN        GERITOL PO        predniSONE 20 MG tablet  Commonly known as: DELTASONE        simvastatin 40 MG tablet  Commonly known as: ZOCOR              Where to Get Your Medications      These medications were sent to [id number] - [place], [place] - [address] AT [facility] OF [address]/[facility] & [place] - [phone] [facility] [phone]   [address] [place] [address]    Phone: [phone]   · amiodarone 200 MG tablet  · clopidogrel 75 MG tablet         Discharge Diet: healthy heart    Activity at Discharge: as tolerated    Discharge disposition: home    Condition on Discharge: stable    Follow-up Appointments  Future Appointments   Date Time Provider Department Center   5/24/2022  8:00 AM Robb Gómez DO [facility]     Additional Instructions for the Follow-ups that You Need to Schedule     Ambulatory Referral to Cardiac Rehab   As directed      Discharge Follow-up with Specialty: Follow up with your regular cardiologist in 1-2 weeks   As directed      Specialty: Follow up with your regular cardiologist in 1-2 weeks               Test Results Pending at Discharge       Josue Hensley MD  05/03/22  11:40 EDT

## 2022-05-04 ENCOUNTER — TELEPHONE (OUTPATIENT)
Dept: FAMILY MEDICINE CLINIC | Facility: CLINIC | Age: 77
End: 2022-05-04

## 2022-05-04 ENCOUNTER — TRANSITIONAL CARE MANAGEMENT TELEPHONE ENCOUNTER (OUTPATIENT)
Dept: CALL CENTER | Facility: HOSPITAL | Age: 77
End: 2022-05-04

## 2022-05-04 ENCOUNTER — NURSE TRIAGE (OUTPATIENT)
Dept: CALL CENTER | Facility: HOSPITAL | Age: 77
End: 2022-05-04

## 2022-05-04 RX ORDER — ATORVASTATIN CALCIUM 80 MG/1
80 TABLET, FILM COATED ORAL NIGHTLY
Qty: 30 TABLET | Refills: 3
Start: 2022-05-04 | End: 2022-05-05 | Stop reason: SDUPTHER

## 2022-05-04 RX ORDER — ATORVASTATIN CALCIUM 80 MG/1
80 TABLET, FILM COATED ORAL NIGHTLY
Qty: 30 TABLET | Refills: 3
Start: 2022-05-04 | End: 2022-05-04 | Stop reason: SDUPTHER

## 2022-05-04 NOTE — TELEPHONE ENCOUNTER
Phone call placed to patient with review of what medicines he needs at present. Patient stated he picked up the other medicine sent but needs his Atorvastatin refill.  Refill sent to his pharmacy for pickup. Offered further assistance if needed with voiced appreciation.

## 2022-05-04 NOTE — OUTREACH NOTE
Prep Survey    Flowsheet Row Responses   [facility] patient discharged from? [facility]   Is LACE score < 7 ? No   Emergency Room discharge w/ pulse ox? No   Eligibility [facility]   Date of Admission 04/29/22   Date of Discharge 05/03/22   Discharge Disposition Home or Self Care   Discharge diagnosis Cardiac arrest/VF,    Type II myocardial infarction due to tachycardia   Does the patient have one of the following disease processes/diagnoses(primary or secondary)? Other   Does the patient have Home health ordered? No   Is there a DME ordered? No   Prep survey completed? Yes          BUFFY ZHU - Registered Nurse

## 2022-05-04 NOTE — PAYOR COMM NOTE
"Geraldo Thompson (77 y.o. Male)             ATTENTION DC DATE CASE REF [id number]            Date of Birth   1945    Social Security Number       Address   [address] [place] [address]    Home Phone   [phone]    MRN   [id number]       Baptism   Gnosticism    Marital Status                               Admission Date   4/29/22    Admission Type   Urgent    Admitting Provider   Pawan Guerrero MD    Attending Provider       Department, Room/Bed   [facility] CARDIOVASC UNIT, 2224/1       Discharge Date   5/3/2022    Discharge Disposition   Home or Self Care    Discharge Destination                               Attending Provider: (none)   Allergies: No Known Allergies    Isolation: None   Infection: None   Code Status: Prior   Advance Care Planning Activity    Ht: 177.8 cm (70\")   Wt: 66.2 kg (146 lb)    Admission Cmt: None   Principal Problem: None                Active Insurance as of 4/29/2022     Primary Coverage     Payor Plan Insurance Group Employer/Plan Group    [facility] MEDICARE REPLACEMENT [employer] MEDICARE REPLACEMENT      Payor Plan Address Payor Plan Phone Number Payor Plan Fax Number Effective Dates    [address] [address] [phone]  6/1/2021 - None Entered    [facility] [id number]       Subscriber Name Subscriber Birth Date Member ID       GERALDO THOMPSON 1945 [id number]                 Emergency Contacts      (Rel.) Home Phone Work Phone Mobile Phone    EDWIN THOMPSON (Spouse) [phone] -- [phone]    Elke Carter (Daughter) -- -- [phone]               Discharge Summary      Josue Hensley MD at 05/03/22 0818          Geraldo Thompson  [id number]    Date of Admit: 4/29/2022  Date of Discharge:  5/3/2022    Discharge Diagnosis:  Active Hospital Problems    Diagnosis  POA   • Ventricular tachyarrhythmia (HCC) [I47.2]  Unknown   • NSTEMI (non-ST elevated myocardial infarction) (HCC) [I21.4]  Yes      Resolved Hospital " Problems   No resolved problems to display.       Hospital Course:   This is a 77 year old male who is normally followed by Dr. Byrd in [place] with a past history of CAD s/p CABG,PAD status post bilateral femoropopliteal bypasses,PAF, COPD, HTN, HLD, who originally presented to [facility] on 4/27/2022 with worsening dyspnea exertion and shortness of breath over the last couple of days while there he was found to be in atrial fibrillation with rapid ventricular response which unfortunately deteriorated into ventricular tachycardia and subsequent ventricular fibrillation and was defibrillated. He did have aspiration likely from this event and he was treated with ceftriaxone this admission total of 5 days.  He was found to have complex left main into circumflex disease . He was referred & transferred here for complex intervention on his left main. On 5/2/22 he underwent Successful but complex intervention/BLAIRE with rotational atherectomy and lithotripsy of his left main into circumflex.     I had a long discussion with him I think he is very frail to take aspirin clopidogrel and Eliquis he is in sinus rhythm and will get a keep him on amiodarone at least for a period of time and so I am not going to treat him with Eliquis.  We will have him on amnio at 200 twice a day and then when he follows up in [facility] they probably can decrease it to 200 mg a day and may even consider stopping it.  I would continue the aspirin for 3 months and then stop it and if he has recurrent A. fib then he will need clopidogrel and Eliquis    He is now stable for discharge to home. He will need a follow up in 1-2 weeks with his primary cardiologist in [place].       Procedures Performed  Procedure(s):  Percutaneous Coronary Intervention  Stent BLAIRE coronary  Intravascular Lithotripsy-Atherectomy      Cardiac catheterization on 5/2/22-  SUMMARY: Successful but complex intervention with rotational atherectomy and  lithotripsy     EBL:                     Minimal  Specimens:         None     PCI Segment:                   Left main  Pre-stenosis:                    90  Post-stenosis                   0  Lesion Type                      C  MYAH Flow Pre                   3  MYAH Flow Post                 3  Dissection                        none        RECOMMENDATIONS: Routine PCI care Home in the morning if he is okay      Consults     Date and Time Order Name Status Description    4/28/2022 10:49 AM Inpatient Interventional Cardiology Consult Completed     4/28/2022  6:33 AM Inpatient Pulmonology Consult Completed     4/28/2022  3:51 AM Inpatient Cardiology Consult Completed           Discharge Medications     Your medication list      START taking these medications      Instructions Last Dose Given Next Dose Due   clopidogrel 75 MG tablet  Commonly known as: PLAVIX      Take 1 tablet by mouth Daily.          CHANGE how you take these medications      Instructions Last Dose Given Next Dose Due   amiodarone 200 MG tablet  Commonly known as: PACERONE  What changed:   · medication strength  · how much to take      Take 1 tablet by mouth Every 12 (Twelve) Hours.          CONTINUE taking these medications      Instructions Last Dose Given Next Dose Due   albuterol sulfate  (90 Base) MCG/ACT inhaler  Commonly known as: PROVENTIL HFA;VENTOLIN HFA;PROAIR HFA      Inhale 2 puffs Every 4 (Four) Hours As Needed for Wheezing or Shortness of Air.       aspirin 81 MG EC tablet      Take 81 mg by mouth Daily.       atorvastatin 80 MG tablet  Commonly known as: LIPITOR      Take 1 tablet by mouth Every Night.       cetirizine 10 MG tablet  Commonly known as: zyrTEC      Take 1 tablet by mouth Daily.       clobetasol 0.05 % cream  Commonly known as: TEMOVATE      Apply 1 application topically to the appropriate area as directed 2 (Two) Times a Day.       fluticasone 50 MCG/ACT nasal spray  Commonly known as: Flonase      2 sprays into  the nostril(s) as directed by provider Daily.       metoprolol tartrate 25 MG tablet  Commonly known as: LOPRESSOR      TAKE 1 TABLET BY MOUTH TWICE DAILY       omeprazole 40 MG capsule  Commonly known as: priLOSEC      TAKE 1 CAPSULE BY MOUTH DAILY          STOP taking these medications    apixaban 5 MG tablet tablet  Commonly known as: ELIQUIS        cefTRIAXone 20 MG/ML IVPB  Commonly known as: ROCEPHIN        GERITOL PO        predniSONE 20 MG tablet  Commonly known as: DELTASONE        simvastatin 40 MG tablet  Commonly known as: ZOCOR              Where to Get Your Medications      These medications were sent to [id number] - [place], [place] - [address] AT [facility] OF [address] [facility] & [place] - [phone]  - [phone] FX  [address], [place] [address]    Phone: [phone]   · amiodarone 200 MG tablet  · clopidogrel 75 MG tablet         Discharge Diet: healthy heart    Activity at Discharge: as tolerated    Discharge disposition: home    Condition on Discharge: stable    Follow-up Appointments  Future Appointments   Date Time Provider Department Center   5/24/2022  8:00 AM Robb Gómez DO [facility]     Additional Instructions for the Follow-ups that You Need to Schedule     Ambulatory Referral to Cardiac Rehab   As directed      Discharge Follow-up with Specialty: Follow up with your regular cardiologist in 1-2 weeks   As directed      Specialty: Follow up with your regular cardiologist in 1-2 weeks               Test Results Pending at Discharge       Josue Hensley MD  05/03/22  11:40 EDT                          Electronically signed by Josue Hensley MD at 05/03/22 1141       Discharge Order (From admission, onward)     Start     Ordered    05/03/22 1140  Discharge patient  Once        Expected Discharge Date: 05/03/22    Expected Discharge Time: Afternoon    Discharge Disposition: Home or Self Care    Physician of Record for Attribution - Please select  from Treatment Team: NGUYEN PAZ [[id number]]    Review needed by CMO to determine Physician of Record: No       Question Answer Comment   Physician of Record for Attribution - Please select from Treatment Team NGUYEN PAZ    Review needed by CMO to determine Physician of Record No        05/03/22 3631

## 2022-05-04 NOTE — TELEPHONE ENCOUNTER
Phone call placed with spouse to inform  that he needs to take his atorvastatin daily with a refill sent to pharmacy with voiced understanding.

## 2022-05-04 NOTE — PAYOR COMM NOTE
"Geraldo Thompson (77 y.o. Male)                          ATTENTION DC DATE CASE REF             Date of Birth   1945    Social Security Number       Address   [address] [place] [address]    Home Phone   [phone]    MRN   [id number]       Congregational   Hindu    Marital Status                               Admission Date   4/29/22    Admission Type   Urgent    Admitting Provider   Pawan Guerrero MD    Attending Provider       Department, Room/Bed   [facility] CARDIOVASC UNIT, 2224/1       Discharge Date   5/3/2022    Discharge Disposition   Home or Self Care    Discharge Destination                               Attending Provider: (none)   Allergies: No Known Allergies    Isolation: None   Infection: None   Code Status: Prior   Advance Care Planning Activity    Ht: 177.8 cm (70\")   Wt: 66.2 kg (146 lb)    Admission Cmt: None   Principal Problem: None                Active Insurance as of 4/29/2022     Primary Coverage     Payor Plan Insurance Group Employer/Plan Group    [employer] [place] MEDICARE REPLACEMENT WELL[employer]ARE MEDICARE REPLACEMENT      Payor Plan Address Payor Plan Phone Number Payor Plan Fax Number Effective Dates    [address] [address] [phone]  6/1/2021 - None Entered    [facility] [id number]       Subscriber Name Subscriber Birth Date Member ID       GERALDO THOMPSON 1945 [id number]                 Emergency Contacts      (Rel.) Home Phone Work Phone Mobile Phone    EDWIN THOMPSON (Spouse) [phone] -- [phone]    Elke Carter (Daughter) -- -- [phone]            Regina: NPI [id number]  Tax ID [id number]        Discharge Summaryl       Geraldo Thompson is s/p PCI with drug-eluding stent placement for NSTEMI. Pharmacy to review discharge medications to make sure appropriate medications have been prescribed.       Josue Hensley MD at 05/02/22 Coy9          Geraldo Thompson  1945 77 y.o.  [id number]      Patient Care " "Team:  Robb Gómez DO as PCP - General (Family Medicine)    CC: History of PVD, hypertension A. fib, VF and cath with critical left main disease    Interval History: He has been doing well he did have some epistaxis this winter      Objective   Vital Signs  Temp:  [97.5 °F (36.4 °C)-97.9 °F (36.6 °C)] 97.9 °F (36.6 °C)  Heart Rate:  [58-80] 63  Resp:  [16-19] 16  BP: (120-153)/(63-97) 138/76    Intake/Output Summary (Last 24 hours) at 5/2/2022 1019  Last data filed at 5/2/2022 0732  Gross per 24 hour   Intake 240 ml   Output 275 ml   Net -35 ml     Flowsheet Rows    Flowsheet Row First Filed Value   Admission Height 177.8 cm (70\") Documented at 04/29/2022 1923   Admission Weight 65.2 kg (143 lb 11.8 oz) Documented at 04/29/2022 1923          Physical Exam:   General Appearance:    Alert,oriented, in no acute distress   Lungs:     Clear to auscultation,BS are equal    Heart:    Normal S1 and S2, iRRR without murmur, gallop or rub   HEENT:    Sclerae are clear, no JVD or adenopathy   Abdomen:     Normal bowel sounds, soft nontender, nondistended, no HSM   Extremities:   Moves all extremities well, no edema, no cyanosis, no             Redness, no rash     Medication Review:      amiodarone, 200 mg, Oral, Q12H  arformoterol, 15 mcg, Nebulization, BID - RT  atorvastatin, 80 mg, Oral, Nightly  budesonide, 0.5 mg, Nebulization, BID - RT  clopidogrel, 75 mg, Oral, Daily  metoprolol tartrate, 25 mg, Oral, Q12H  pantoprazole, 40 mg, Oral, QAM  predniSONE, 40 mg, Oral, Daily With Breakfast  sodium chloride, 10 mL, Intravenous, Q12H      heparin, 12 Units/kg/hr, Last Rate: 18 Units/kg/hr (05/02/22 0502)          I reviewed the patient's new clinical results.  I personally viewed and interpreted the patient's EKG/Telemetry data    Assessment/Plan  Active Hospital Problems    Diagnosis  POA   • Ventricular tachyarrhythmia (HCC) [I47.2]  Unknown   • NSTEMI (non-ST elevated myocardial infarction) (HCC) [I21.4]  Yes    "   Resolved Hospital Problems   No resolved problems to display.       Gentleman who had a VF arrest subsequently revealed a critical left main the really just supplies the circumflex it is densely calcified his LIMA to the LAD is patent.  He has been referred for complex intervention on his left main.  He has a history of being on anticoagulation for his A. fib also and has had some epistaxis.  I think if we can we get a try and do this from the radial artery and were going to depending on if I can get a wire to go through then we will do rotational atherectomy followed by PTCA shockwave lithotripsy and PTCA and stenting.  Like to stay away from his legs if possible as he has had femoropopliteal bypasses.  I discussed this with the patient and the family today and they agree and understand.  H&P reviewed. The patient was examined and there are no changes to the H&P. I have explained the risks and benefits of the procedure to the patient.  The patient understands and agrees to proceed    Josue Hensley MD  05/02/22  10:19 EDT              Electronically signed by Josue Hensley MD at 05/02/22 1021     Karen Adler LPN at 05/02/22 0917          Javi Thompsono (77 y.o. Male)                   ATTENTION;     INPATIENT AUTH REQUEST -     ICD 10 -   I21.4                     FACILITY NPI [id number] [facility]                            PHYSICIAN NPI - PAWAN GUERRERO MD  [id number]                 Date of Birth   1945    Social Security Number       Address   [address] [place] [address]    Home Phone   [phone]    MRN   [id number]       Worship   Buddhist    Marital Status                               Admission Date   4/29/22    Admission Type   Urgent    Admitting Provider   Pawan Guerrero MD    Attending Provider   Pawan Guerrero MD    Department, Room/Bed   [facility] CARDIOVASC UNIT, 2224/1       Discharge Date       Discharge  "Disposition       Discharge Destination                               Attending Provider: Pawan Guerrero MD    Allergies: No Known Allergies    Isolation: None   Infection: None   Code Status: CPR   Advance Care Planning Activity    Ht: 177.8 cm (70\")   Wt: 66.2 kg (146 lb)    Admission Cmt: None   Principal Problem: None                Active Insurance as of 2022     Primary Coverage     Payor Plan Insurance Group Employer/Plan Group    [facility] MEDICARE REPLACEMENT WELLCARE MEDICARE REPLACEMENT      Payor Plan Address Payor Plan Phone Number Payor Plan Fax Number Effective Dates    [address] [address] [phone]  2021 - None Entered    [facility] [id number]       Subscriber Name Subscriber Birth Date Member ID       GERALDO THOMPSON 1945 [id number]                 Emergency Contacts      (Rel.) Home Phone Work Phone Mobile Phone    EDWIN THOMPSON (Spouse) [phone] -- [phone]    Elke Carter (Daughter) -- -- [phone]               History & Physical      Pawan Guerrero MD at 22 [address] [place] [address] Cardiology [facility] History and Physical       Encounter Date:22  Patient:Geraldo Thompson  :1945  MRN:[id number]    Date of Admission: 2022  Date of Encounter Visit: 22  Encounter Provider: Pawan Guerrero MD  Place of Service: [facility]  Patient Care Team:  Robb Gómez DO as PCP - General (Family Medicine)      Chief Complaint: Chest pain      History of Presenting Illness:      Mr. Thompson is a 77 y.o. gentleman who follows with Dr. Byrd in [place] with past medical history notable for coronary artery disease status post CABG, peripheral artery disease status post bilateral femoropopliteal bypasses, history of paroxysmal atrial fibrillation, COPD, hypertension, and mixed hyperlipidemia who originally presented to [facility] on 2022 with worsening dyspnea exertion and shortness of breath over the " last couple of days while there he was found to be in atrial fibrillation with rapid ventricular response.  He initially was medically managed but unfortunately rates became more erratic and actually had episodes and runs of ventricular tachycardia and actually eventually decompensated down into ventricular fibrillation requiring brief chest compressions and defibrillation.  During this time there is concern that he might of aspirated but fortunately converted to sinus rhythm and since then has actually been doing fairly well.  He was taken to the Cath Lab on 4/29/2022 where he was found to have complex left main into circumflex calcified lesion.  He does have a patent LIMA to his LAD.  No other revascularization targets were noted.  This was a 99% stenoses was felt to be possibly the contributor to his arrhythmia and cardiac arrest.  Given the complex nature of his coronary disease he was transferred to our institution for possible PCI and rotational atherectomy.  Fortunately since coming here the patient has continued to do quite well.  He denies any chest pains does have somewhat of a mild productive cough but denies any fevers or chills.  He has been maintained on amiodarone and beta-blocker.      Review of Systems:  Review of Systems   Constitutional: Negative.   HENT: Negative.    Eyes: Negative.    Cardiovascular: Positive for chest pain, dyspnea on exertion and palpitations.   Respiratory: Positive for cough and shortness of breath.    Endocrine: Negative.    Hematologic/Lymphatic: Negative.    Skin: Negative.    Musculoskeletal: Negative.    Gastrointestinal: Negative.    Genitourinary: Negative.    Neurological: Negative.    Psychiatric/Behavioral: Negative.    Allergic/Immunologic: Negative.        Medications:  Prior to Admission medications    Medication Sig Start Date End Date Taking? Authorizing Provider   albuterol sulfate  (90 Base) MCG/ACT inhaler Inhale 2 puffs Every 4 (Four) Hours As  Needed for Wheezing or Shortness of Air.   Yes ProviderRoberta MD   apixaban (ELIQUIS) 5 MG tablet tablet TAKE 1 TABLET BY MOUTH TWICE DAILY 6/7/21  Yes Roberta Melendrez MD   aspirin 81 MG EC tablet Take 81 mg by mouth Daily.   Yes Roberta Melendrez MD   atorvastatin (LIPITOR) 80 MG tablet Take 1 tablet by mouth Every Night. 4/29/22  Yes Tatiana Veliz MD   cefTRIAXone (ROCEPHIN) 20 MG/ML IVPB Infuse 50 mL into a venous catheter Daily for 3 doses. Indications: Pneumonia 4/30/22 5/3/22 Yes Tatiana Veliz MD   cetirizine (zyrTEC) 10 MG tablet Take 1 tablet by mouth Daily. 1/27/22  Yes Robb Gómez DO   fluticasone (Flonase) 50 MCG/ACT nasal spray 2 sprays into the nostril(s) as directed by provider Daily. 2/21/22  Yes Robb Gómez DO   metoprolol tartrate (LOPRESSOR) 25 MG tablet TAKE 1 TABLET BY MOUTH TWICE DAILY 4/12/22  Yes Robb Gómez DO   omeprazole (priLOSEC) 40 MG capsule TAKE 1 CAPSULE BY MOUTH DAILY 4/12/22  Yes Robb Gómez DO   predniSONE (DELTASONE) 20 MG tablet Take 2 tablets by mouth Daily With Breakfast for 5 days. 4/30/22 5/5/22 Yes Tatiana Veliz MD   simvastatin (ZOCOR) 40 MG tablet TAKE 1 TABLET BY MOUTH EVERY NIGHT 4/12/22  Yes Robb Gómez DO   amiodarone (PACERONE) 400 MG tablet Take 1 tablet by mouth Every 12 (Twelve) Hours. 4/29/22   Tatiana Veliz MD   clobetasol (TEMOVATE) 0.05 % cream Apply 1 application topically to the appropriate area as directed 2 (Two) Times a Day. 1/27/22   Robb Gómez DO   Iron-Vitamins (GERITOL PO) Take 1 tablet by mouth Daily.    ProviderRoberta MD       No Known Allergies    Past Medical History:   Diagnosis Date   • Arthritis    • Chronic systolic CHF (congestive heart failure) (HCC)    • COPD (chronic obstructive pulmonary disease) ([facility])    • Coronary artery disease    • Hemorrhoids    • HL (hearing loss)    • Hyperlipidemia    • Hypertension    • Myocardial infarction (HCC)    • PVD  (peripheral vascular disease) (HCC)    • Sinus disorder        Past Surgical History:   Procedure Laterality Date   • CORONARY ANGIOPLASTY WITH STENT PLACEMENT     • VEIN LIGATION AND STRIPPING     • WRIST FRACTURE SURGERY         Social History     Socioeconomic History   • Marital status:    Tobacco Use   • Smoking status: Former Smoker     Packs/day: 1.00     Years: 30.00     Pack years: 30.00     Types: Cigarettes     Quit date:      Years since quittin.3   • Smokeless tobacco: Never Used   Substance and Sexual Activity   • Alcohol use: Not Currently     Alcohol/week: 12.0 standard drinks     Types: 12 Cans of beer per week   • Drug use: Not Currently       History reviewed. No pertinent family history.      The following portions of the patient's history were reviewed and updated as appropriate: allergies, current medications, past family history, past medical history, past social history, past surgical history and problem list.         Objective:      Temp:  [97.4 °F (36.3 °C)-98.2 °F (36.8 °C)] 97.4 °F (36.3 °C)  Heart Rate:  [61-92] 71  Resp:  [12-20] 18  BP: ()/() 120/73     Intake/Output Summary (Last 24 hours) at 2022 0933  Last data filed at 2022 0600  Gross per 24 hour   Intake 1081.4 ml   Output 500 ml   Net 581.4 ml     Body mass index is 20.62 kg/m².      22   Weight: 65.2 kg (143 lb 11.8 oz)           Physical Exam:  Constitutional: Well appearing, well developed, no acute distress   HENT: Oropharynx clear and membrane moist  Eyes: Normal conjunctiva, no sclera icterus.  Neck: Supple, no carotid bruit bilaterally.  Cardiovascular: Regular rate and rhythm, No Murmur, No bilateral lower extremity edema.  Pulmonary: Normal respiratory effort, normal lung sounds, no wheezing.  Abdominal: Soft, nontender, no hepatosplenomegaly, liver is non-pulsatile.  Neurological: Alert and orient x 3.   Skin: Warm, dry, no ecchymosis, no rash.  Psych: Appropriate mood  and affect. Normal judgment and insight.        Lab Review:   Results from last 7 days   Lab Units 04/30/22 0315 04/29/22 0552 04/28/22 0642 04/27/22 1903   SODIUM mmol/L 137 135* 123* 127*   POTASSIUM mmol/L 4.3 3.9 3.8 3.6   CHLORIDE mmol/L 102 102 89* 91*   CO2 mmol/L 23.0 21.3* 18.3* 21.2*   BUN mg/dL 7* 7* 7* 4*   CREATININE mg/dL 0.78 0.78 0.87 0.75*   GLUCOSE mg/dL 90 112* 212* 90   CALCIUM mg/dL 7.7* 9.1 9.4 9.5   AST (SGOT) U/L  --  39 41* 26   ALT (SGPT) U/L  --  23 25 27     Results from last 7 days   Lab Units 04/29/22 0552 04/27/22 1903   CK TOTAL U/L 211*  --    TROPONIN T ng/mL 0.323* <0.010     Results from last 7 days   Lab Units 04/30/22 0315 04/29/22 2038 04/29/22  0552 04/27/22  1903   WBC 10*3/mm3 6.36 7.68 11.42* 6.33   HEMOGLOBIN g/dL 12.3* 13.0 12.0* 13.8   HEMATOCRIT % 37.2* 39.4 35.7* 39.7   PLATELETS 10*3/mm3 258 281 248 245     Results from last 7 days   Lab Units 04/30/22 0315 04/29/22 2038 04/29/22  0552   INR   --  1.17* 1.28*   APTT seconds 65.4* 28.9  --      Results from last 7 days   Lab Units 04/29/22 0552 04/28/22  0642 04/28/22  0140   MAGNESIUM mg/dL 2.1 1.8 1.8           Invalid input(s): LDLCALC  The ASCVD Risk score (Samuel STEWART Jr., et al., 2013) failed to calculate for the following reasons:    The patient has a prior MI or stroke diagnosis     Results from last 7 days   Lab Units 04/27/22  1903   PROBNP pg/mL 1,186.0           Cardiac catheterization 4/29/2022 with images reviewed by myself:  · Dominance: Left  · Left Main: Heavily calcified with 95% stenosis  · Left Anterior Descending: Arteries 100% occluded at its ostium.  A long stented segment is noted in mid to distal LAD artery  · Circumflex Artery: Large dominant vessel giving rise to various OM branches and left PDA branch.  A tight lesion is noted in the ostium of left circumflex artery, and continuation with the left main stenosis.  This lesion is angiographically 95% severity.  There are no graft  attachments noted on any of the OM branches.  · Right Coronary Artery: Medium caliber nondominant vessel with no significant stenosis.  It has an anterior takeoff.  Mid artery has a 30% stenosis.   · LIMA graft to LAD : The graft is widely patent its ostium, body and anastomosis.  Distal LAD, distal to the graft anastomosis has no significant stenosis.  The graft partially fills made LAD retrogradely.  · Patient also has a SVG to OM branch per previous cardiology office notes.  We could not engage this graft, no graft markers were seen.  There were no possible graft attachments noted on any of the OM branches.    Echocardiogram 4/28/2022 [facility]:  · Estimated right ventricular systolic pressure from tricuspid regurgitation is moderately elevated (45-55 mmHg).  · Calculated left ventricular EF = 40% Estimated left ventricular EF was in agreement with the calculated left ventricular EF.  · Left ventricular diastolic function is consistent with (grade II w/high LAP) pseudonormalization.  · There is moderate calcification of the aortic valve.    Stress MPI 4/5/2022 [facility]:  · Left ventricular ejection fraction is mildly reduced. (Calculated EF = 42%).  · There was no clear evidence of ischemia in this study            Assessment:           NSTEMI (non-ST elevated myocardial infarction) ([facility])         Plan:       Mr. Thompson is a 77 y.o. gentleman who follows with Dr. Byrd in [place] with past medical history notable for coronary artery disease status post CABG, peripheral artery disease status post bilateral femoropopliteal bypasses, history of paroxysmal atrial fibrillation, COPD, hypertension, and mixed hyperlipidemia who originally presented to [facility] on 4/27/2022 with worsening dyspnea exertion and shortness of breath over the last couple of days while there he was found to be in atrial fibrillation with rapid ventricular response which unfortunately deteriorated into  ventricular tachycardia and subsequent ventricular fibrillation and was defibrillated.  Fortunately has done well but was found to have complex left main into circumflex disease which will require rotational atherectomy.  Patient also likely had an aspiration but fortunately thus far showing no overt signs of infection.  We will continue empiric antibiotics over the next couple of days but so far his white count is stable.  Did receive some steroids at [facility].  We will plan on PCI on Monday, 5/2/2022 assuming that he remains stable over the weekend given the complex nature of his coronary disease and access would likely try and do this during regular hours where we would have more time to get an optimal result.    Cardiac arrest/VF:  · VF arrest likely related to induced ischemia in the setting of complex fixed left main and circumflex stenoses  · We will plan on intervention Monday  · Currently on amiodarone and metoprolol to help prevent recurrent atrial fibrillation with rapid ventricular response  · If any change in clinical status would pursue more emergent revascularization.    Type II myocardial infarction due to tachycardia:  · Likely represents more of a type II myocardial infarction with demand ischemia in setting of tachycardia rather than acute plaque rupture  · Will need revascularization  · Currently on heparin infusion and aspirin  · Will likely initiate clopidogrel in preparation for PCI on Monday  · Continue high potency statin  · Continue beta-blocker  · Plan for PCI on Monday    Coronary artery disease with stable angina:  · As above    Paroxysmal atrial fibrillation:  · Continue heparin infusion  · Continue beta-blocker and amiodarone  · Will restart Eliquis after revascularization    Aspiration:  · Likely in setting of cardiac arrest  · Will continue empiric ceftriaxone this admission total of 5 days             Pawan Guerrero MD  [facility]  04/30/22  09:33  EDT      Electronically signed by Pawan Guerrero MD at 22 0948         Electronically signed by Karen Adler LPN at 22 0921     Pawan Guerrero MD at 22 1342          [place] [facility] Progress Note       Encounter Date:22  Patient:Familia Thompson  :1945  MRN:[id number]      Chief Complaint: Follow-up on coronary artery disease      Subjective:        Patient doing well no further episodes of chest pain.  Plan for cardiac catheterization tomorrow we will start clopidogrel today    Review of Systems:  Review of Systems   Constitutional: Negative for malaise/fatigue.   Cardiovascular: Negative for chest pain.   Respiratory: Negative for shortness of breath.        Medications:  Scheduled Meds:  amiodarone, 200 mg, Oral, Q12H  arformoterol, 15 mcg, Nebulization, BID - RT  atorvastatin, 80 mg, Oral, Nightly  budesonide, 0.5 mg, Nebulization, BID - RT  cefTRIAXone (ROCEPHIN) 1GM IVPB in 100 mL NS (VTB), 1 g, Intravenous, Q24H  metoprolol tartrate, 25 mg, Oral, Q12H  pantoprazole, 40 mg, Oral, QAM  predniSONE, 40 mg, Oral, Daily With Breakfast  sodium chloride, 10 mL, Intravenous, Q12H    Continuous Infusions:  heparin, 12 Units/kg/hr, Last Rate: 18 Units/kg/hr (22 1316)    PRN Meds:  •  acetaminophen  •  acetaminophen  •  heparin (porcine)  •  magnesium sulfate **OR** magnesium sulfate **OR** magnesium sulfate  •  nitroglycerin  •  ondansetron  •  ondansetron  •  potassium chloride  •  potassium chloride  •  sodium chloride         Objective:       Vitals:    22 0703 22 0827 22 1001 22 1201   BP:  (!) 119/107 115/69 128/77   Pulse: 66 74 66 61   Resp: 18      Temp:       TempSrc:       SpO2: 94%  91% 98%   Weight:       Height:               Physical Exam:  Constitutional: Well appearing, well developed, no acute distress   HENT: Oropharynx clear and membrane moist  Eyes: Normal conjunctiva, no sclera icterus.  Neck: Supple, no carotid  bruit bilaterally.  Cardiovascular: Regular rate and rhythm, No Murmur, No bilateral lower extremity edema.  Pulmonary: Normal respiratory effort, normal lung sounds, no wheezing.  Abdominal: Soft, nontender, no hepatosplenomegaly, liver is non-pulsatile.  Neurological: Alert and orient x 3.   Skin: Warm, dry, no ecchymosis, no rash.  Psych: Appropriate mood and affect. Normal judgment and insight.           Lab Review:   Results from last 7 days   Lab Units 05/01/22 0317 04/30/22 0315 04/29/22 0552 04/28/22 0642 04/27/22  1903   SODIUM mmol/L 136 137 135* 123* 127*   POTASSIUM mmol/L 3.9 4.3 3.9 3.8 3.6   CHLORIDE mmol/L 102 102 102 89* 91*   CO2 mmol/L 22.0 23.0 21.3* 18.3* 21.2*   BUN mg/dL 7* 7* 7* 7* 4*   CREATININE mg/dL 0.66* 0.78 0.78 0.87 0.75*   GLUCOSE mg/dL 100* 90 112* 212* 90   CALCIUM mg/dL 8.4* 7.7* 9.1 9.4 9.5   AST (SGOT) U/L  --   --  39 41* 26   ALT (SGPT) U/L  --   --  23 25 27     Results from last 7 days   Lab Units 04/29/22 0552 04/27/22  1903   CK TOTAL U/L 211*  --    TROPONIN T ng/mL 0.323* <0.010     Results from last 7 days   Lab Units 05/01/22 0317 04/30/22 0315 04/29/22 2038 04/29/22 0552 04/27/22  1903   WBC 10*3/mm3 7.00 6.36 7.68 11.42* 6.33   HEMOGLOBIN g/dL 12.2* 12.3* 13.0 12.0* 13.8   HEMATOCRIT % 35.4* 37.2* 39.4 35.7* 39.7   PLATELETS 10*3/mm3 270 258 281 248 245     Results from last 7 days   Lab Units 05/01/22  1129 05/01/22 0317 04/30/22 0315 04/29/22 2038 04/29/22 0552   INR   --   --   --  1.17* 1.28*   APTT seconds 46.9* 38.0* 65.4* 28.9  --      Results from last 7 days   Lab Units 04/29/22  0552 04/28/22  0642 04/28/22  0140   MAGNESIUM mg/dL 2.1 1.8 1.8           Invalid input(s): LDLCALC  Results from last 7 days   Lab Units 04/27/22  1903   PROBNP pg/mL 1,186.0           Cardiac catheterization 4/29/2022:  · Dominance: Left  · Left Main: Heavily calcified with 95% stenosis  · Left Anterior Descending: Arteries 100% occluded at its ostium.  A long  stented segment is noted in mid to distal LAD artery  · Circumflex Artery: Large dominant vessel giving rise to various OM branches and left PDA branch.  A tight lesion is noted in the ostium of left circumflex artery, and continuation with the left main stenosis.  This lesion is angiographically 95% severity.  There are no graft attachments noted on any of the OM branches.  · Right Coronary Artery: Medium caliber nondominant vessel with no significant stenosis.  It has an anterior takeoff.  Mid artery has a 30% stenosis.   · LIMA graft to LAD : The graft is widely patent its ostium, body and anastomosis.  Distal LAD, distal to the graft anastomosis has no significant stenosis.  The graft partially fills made LAD retrogradely.  · Patient also has a SVG to OM branch per previous cardiology office notes.  We could not engage this graft, no graft markers were seen.  There were no possible graft attachments noted on any of the OM branches.     Echocardiogram 4/28/2022 [facility]:  · Estimated right ventricular systolic pressure from tricuspid regurgitation is moderately elevated (45-55 mmHg).  · Calculated left ventricular EF = 40% Estimated left ventricular EF was in agreement with the calculated left ventricular EF.  · Left ventricular diastolic function is consistent with (grade II w/high LAP) pseudonormalization.  · There is moderate calcification of the aortic valve.     Stress MPI 4/5/2022 [facility]:  · Left ventricular ejection fraction is mildly reduced. (Calculated EF = 42%).  · There was no clear evidence of ischemia in this study             Assessment:          Diagnosis Plan   1. Ventricular tachyarrhythmia (HCC)  Case Request Cath Lab: Percutaneous Coronary Intervention    Case Request Cath Lab: Percutaneous Coronary Intervention   2. NSTEMI (non-ST elevated myocardial infarction) ([facility])  Case Request Cath Lab: Percutaneous Coronary Intervention    Case Request Cath Lab:  Percutaneous Coronary Intervention          Plan:       Mr. Thompson is a 77 y.o. gentleman who follows with Dr. Byrd in [place] with past medical history notable for coronary artery disease status post CABG, peripheral artery disease status post bilateral femoropopliteal bypasses, history of paroxysmal atrial fibrillation, COPD, hypertension, and mixed hyperlipidemia who originally presented to [facility] on 4/27/2022 with worsening dyspnea exertion and shortness of breath over the last couple of days while there he was found to be in atrial fibrillation with rapid ventricular response which unfortunately deteriorated into ventricular tachycardia and subsequent ventricular fibrillation and was defibrillated.  Fortunately has done well but was found to have complex left main into circumflex disease which will require rotational atherectomy.  Patient also likely had an aspiration but fortunately thus far showing no overt signs of infection.  We will continue empiric antibiotics over the next couple of days but so far his white count is stable.  Did receive some steroids at [facility].  We will plan on PCI on Monday, 5/2/2022 assuming that he remains stable over the weekend given the complex nature of his coronary disease and access would likely try and do this during regular hours where we would have more time to get an optimal result.     Cardiac arrest/VF:  · VF arrest likely related to induced ischemia in the setting of complex fixed left main and circumflex stenoses  · We will plan on intervention Monday  · Currently on amiodarone and metoprolol to help prevent recurrent atrial fibrillation with rapid ventricular response  · If any change in clinical status would pursue more emergent revascularization.     Type II myocardial infarction due to tachycardia:  · Likely represents more of a type II myocardial infarction with demand ischemia in setting of tachycardia rather than acute plaque  "rupture  · Will need revascularization  · Currently on heparin infusion and aspirin  · Will likely initiate clopidogrel in preparation for PCI on Monday  · Continue high potency statin  · Continue beta-blocker  · Plan for PCI on Monday     Coronary artery disease with stable angina:  · As above     Paroxysmal atrial fibrillation:  · Continue heparin infusion  · Continue beta-blocker and amiodarone  · Will restart Eliquis after revascularization     Aspiration:  · Likely in setting of cardiac arrest  · Will continue empiric ceftriaxone this admission total of 5 days                  Pawan Guerrero MD  [facility]  05/01/22  13:42 EDT      Electronically signed by Pawan Guerrero MD at 05/01/22 1343     Emily Turner, RN at 05/01/22 0443        Goal Outcome Evaluation:      pt in CCU transferred here from [place] for \"high risk angioplasty\" Monday. No procedure time scheduled yet. VSS on room air. Still a heparin gtt overnight. No chest pain or rhythm changes. Pt did well overnight           Problem: Adult Inpatient Plan of Care  Goal: Plan of Care Review  Outcome: Ongoing, Progressing  Goal: Patient-Specific Goal (Individualized)  Outcome: Ongoing, Progressing  Goal: Absence of Hospital-Acquired Illness or Injury  Outcome: Ongoing, Progressing  Intervention: Identify and Manage Fall Risk  Recent Flowsheet Documentation  Taken 5/1/2022 0400 by Emily Turner, RN  Safety Promotion/Fall Prevention:  • safety round/check completed  • room organization consistent  • lighting adjusted  • fall prevention program maintained  • clutter free environment maintained  • assistive device/personal items within reach  Taken 5/1/2022 0300 by Emily Turner, RN  Safety Promotion/Fall Prevention:  • safety round/check completed  • room organization consistent  • lighting adjusted  • fall prevention program maintained  • assistive device/personal items within reach  • clutter free environment maintained  Taken " 5/1/2022 0200 by Emily Turner RN  Safety Promotion/Fall Prevention:  • safety round/check completed  • room organization consistent  • lighting adjusted  • fall prevention program maintained  • clutter free environment maintained  • assistive device/personal items within reach  Taken 5/1/2022 0100 by Emily Turner RN  Safety Promotion/Fall Prevention:  • safety round/check completed  • room organization consistent  • lighting adjusted  • fall prevention program maintained  • clutter free environment maintained  • assistive device/personal items within reach  Taken 5/1/2022 0000 by Emily Turner RN  Safety Promotion/Fall Prevention:  • safety round/check completed  • room organization consistent  • lighting adjusted  • fall prevention program maintained  • clutter free environment maintained  • assistive device/personal items within reach  Taken 4/30/2022 2300 by Emily Turner RN  Safety Promotion/Fall Prevention:  • safety round/check completed  • room organization consistent  • lighting adjusted  • fall prevention program maintained  • clutter free environment maintained  • assistive device/personal items within reach  Taken 4/30/2022 2200 by Emily Turner RN  Safety Promotion/Fall Prevention:  • safety round/check completed  • room organization consistent  • lighting adjusted  • fall prevention program maintained  • assistive device/personal items within reach  • clutter free environment maintained  Taken 4/30/2022 2100 by Emily Turner RN  Safety Promotion/Fall Prevention:  • safety round/check completed  • room organization consistent  • lighting adjusted  • fall prevention program maintained  • clutter free environment maintained  • assistive device/personal items within reach  Taken 4/30/2022 2000 by Emily Turner RN  Safety Promotion/Fall Prevention:  • safety round/check completed  • room organization consistent  • lighting adjusted  • fall prevention program maintained  • clutter free  environment maintained  • assistive device/personal items within reach  Intervention: Prevent Skin Injury  Recent Flowsheet Documentation  Taken 5/1/2022 0400 by Emily Turner RN  Body Position: position changed independently  Taken 5/1/2022 0200 by Emily Turner RN  Body Position: position changed independently  Taken 5/1/2022 0000 by Emily Turner RN  Body Position: position changed independently  Taken 4/30/2022 2200 by Emily Turner RN  Body Position: position changed independently  Taken 4/30/2022 2000 by Emily Turner RN  Body Position: position changed independently  Intervention: Prevent and Manage VTE (Venous Thromboembolism) Risk  Recent Flowsheet Documentation  Taken 5/1/2022 0430 by Emily Turner RN  VTE Prevention/Management: (heparin gtt) other (see comments)  Taken 5/1/2022 0000 by Emily Turner RN  VTE Prevention/Management: (heparin gtt) other (see comments)  Taken 4/30/2022 2030 by Emily Turner RN  Activity Management: activity adjusted per tolerance  VTE Prevention/Management: (heparin gtt) other (see comments)  Goal: Optimal Comfort and Wellbeing  Outcome: Ongoing, Progressing  Goal: Readiness for Transition of Care  Outcome: Ongoing, Progressing     Problem: Arrhythmia/Dysrhythmia (Cardiac Catheterization)  Goal: Stable Heart Rate and Rhythm  Outcome: Ongoing, Progressing     Problem: Bleeding (Cardiac Catheterization)  Goal: Absence of Bleeding  Outcome: Ongoing, Progressing     Problem: Contrast-Induced Injury Risk (Cardiac Catheterization)  Goal: Absence of Contrast-Induced Injury  Outcome: Ongoing, Progressing     Problem: Embolism (Cardiac Catheterization)  Goal: Absence of Embolism Signs and Symptoms  Outcome: Ongoing, Progressing  Intervention: Prevent or Manage Embolism  Recent Flowsheet Documentation  Taken 5/1/2022 0430 by Emily Turner RN  VTE Prevention/Management: (heparin gtt) other (see comments)  Taken 5/1/2022 0000 by Emily Turner RN  VTE  Prevention/Management: (heparin gtt) other (see comments)  Taken 4/30/2022 2030 by Emily Turner RN  VTE Prevention/Management: (heparin gtt) other (see comments)     Problem: Ongoing Anesthesia/Sedation Effects (Cardiac Catheterization)  Goal: Anesthesia/Sedation Recovery  Outcome: Ongoing, Progressing  Intervention: Optimize Anesthesia Recovery  Recent Flowsheet Documentation  Taken 5/1/2022 0400 by Emily Turner RN  Safety Promotion/Fall Prevention:  • safety round/check completed  • room organization consistent  • lighting adjusted  • fall prevention program maintained  • clutter free environment maintained  • assistive device/personal items within reach  Taken 5/1/2022 0300 by Emily Turner RN  Safety Promotion/Fall Prevention:  • safety round/check completed  • room organization consistent  • lighting adjusted  • fall prevention program maintained  • assistive device/personal items within reach  • clutter free environment maintained  Taken 5/1/2022 0200 by Emily Turner RN  Safety Promotion/Fall Prevention:  • safety round/check completed  • room organization consistent  • lighting adjusted  • fall prevention program maintained  • clutter free environment maintained  • assistive device/personal items within reach  Taken 5/1/2022 0100 by Emily Turner RN  Safety Promotion/Fall Prevention:  • safety round/check completed  • room organization consistent  • lighting adjusted  • fall prevention program maintained  • clutter free environment maintained  • assistive device/personal items within reach  Taken 5/1/2022 0000 by Emily Turner RN  Safety Promotion/Fall Prevention:  • safety round/check completed  • room organization consistent  • lighting adjusted  • fall prevention program maintained  • clutter free environment maintained  • assistive device/personal items within reach  Taken 4/30/2022 2300 by Emily Turner RN  Safety Promotion/Fall Prevention:  • safety round/check completed  • room  organization consistent  • lighting adjusted  • fall prevention program maintained  • clutter free environment maintained  • assistive device/personal items within reach  Taken 4/30/2022 2200 by Emily Turner RN  Safety Promotion/Fall Prevention:  • safety round/check completed  • room organization consistent  • lighting adjusted  • fall prevention program maintained  • assistive device/personal items within reach  • clutter free environment maintained  Taken 4/30/2022 2100 by Emily Turner RN  Safety Promotion/Fall Prevention:  • safety round/check completed  • room organization consistent  • lighting adjusted  • fall prevention program maintained  • clutter free environment maintained  • assistive device/personal items within reach  Taken 4/30/2022 2000 by Emily Turner RN  Safety Promotion/Fall Prevention:  • safety round/check completed  • room organization consistent  • lighting adjusted  • fall prevention program maintained  • clutter free environment maintained  • assistive device/personal items within reach     Problem: Pain (Cardiac Catheterization)  Goal: Acceptable Pain Control  Outcome: Ongoing, Progressing     Problem: Vascular Access Protection (Cardiac Catheterization)  Goal: Absence of Vascular Access Complication  Outcome: Ongoing, Progressing  Intervention: Prevent Access Site Complications  Recent Flowsheet Documentation  Taken 5/1/2022 0400 by Emily Turner RN  Head of Bed (HOB) Positioning: HOB at 20-30 degrees  Taken 5/1/2022 0200 by Emily Turner RN  Head of Bed (HOB) Positioning: HOB at 20-30 degrees  Taken 5/1/2022 0000 by Emily Turner RN  Head of Bed (HOB) Positioning: HOB at 20-30 degrees  Taken 4/30/2022 2200 by Emily Turner RN  Head of Bed (HOB) Positioning: HOB at 30-45 degrees  Taken 4/30/2022 2030 by Emily Turner RN  Activity Management: activity adjusted per tolerance  Taken 4/30/2022 2000 by Emily Turner RN  Head of Bed (HOB) Positioning: HOB at 30-45 degrees      Problem: Adjustment to Illness COPD (Chronic Obstructive Pulmonary Disease)  Goal: Optimal Chronic Illness Coping  Outcome: Ongoing, Progressing     Problem: Functional Ability Impaired COPD (Chronic Obstructive Pulmonary Disease)  Goal: Optimal Level of Functional [place]  Outcome: Ongoing, Progressing  Intervention: Optimize Functional Ability  Recent Flowsheet Documentation  Taken 4/30/2022 2030 by Emily Turner RN  Activity Management: activity adjusted per tolerance     Problem: Infection COPD (Chronic Obstructive Pulmonary Disease)  Goal: Absence of Infection Signs and Symptoms  Outcome: Ongoing, Progressing     Problem: Oral Intake Inadequate COPD (Chronic Obstructive Pulmonary Disease)  Goal: Improved Nutrition Intake  Outcome: Ongoing, Progressing     Problem: Respiratory Compromise COPD (Chronic Obstructive Pulmonary Disease)  Goal: Effective Oxygenation and Ventilation  Outcome: Ongoing, Progressing  Intervention: Promote Airway Secretion Clearance  Recent Flowsheet Documentation  Taken 4/30/2022 2030 by Emily Turner RN  Activity Management: activity adjusted per tolerance  Intervention: Optimize Oxygenation and Ventilation  Recent Flowsheet Documentation  Taken 5/1/2022 0400 by Emily Turner RN  Head of Bed (HOB) Positioning: HOB at 20-30 degrees  Taken 5/1/2022 0200 by Emily Turner RN  Head of Bed (HOB) Positioning: HOB at 20-30 degrees  Taken 5/1/2022 0000 by Emily Turner RN  Head of Bed (HOB) Positioning: HOB at 20-30 degrees  Taken 4/30/2022 2200 by Emily Turner RN  Head of Bed (HOB) Positioning: HOB at 30-45 degrees  Taken 4/30/2022 2000 by Emily Turner RN  Head of Bed (HOB) Positioning: HOB at 30-45 degrees     Problem: Infection  Goal: Absence of Infection Signs and Symptoms  Outcome: Ongoing, Progressing     Problem: Fluid Imbalance (Pneumonia)  Goal: Fluid Balance  Outcome: Ongoing, Progressing     Problem: Infection (Pneumonia)  Goal: Resolution of Infection Signs and  Symptoms  Outcome: Ongoing, Progressing     Problem: Respiratory Compromise (Pneumonia)  Goal: Effective Oxygenation and Ventilation  Outcome: Ongoing, Progressing  Intervention: Optimize Oxygenation and Ventilation  Recent Flowsheet Documentation  Taken 5/1/2022 0400 by Emily Turner RN  Head of Bed (HOB) Positioning: HOB at 20-30 degrees  Taken 5/1/2022 0200 by Emily Turner RN  Head of Bed (HOB) Positioning: HOB at 20-30 degrees  Taken 5/1/2022 0000 by Emily Turner RN  Head of Bed (HOB) Positioning: HOB at 20-30 degrees  Taken 4/30/2022 2200 by Emily Turner RN  Head of Bed (HOB) Positioning: HOB at 30-45 degrees  Taken 4/30/2022 2000 by Emily Turner RN  Head of Bed (HOB) Positioning: HOB at 30-45 degrees     Problem: COPD (Chronic Obstructive Pulmonary Disease) Comorbidity  Goal: Maintenance of COPD Symptom Control  Outcome: Ongoing, Progressing  Intervention: Maintain COPD-Symptom Control  Recent Flowsheet Documentation  Taken 5/1/2022 0400 by Emily Turner RN  Medication Review/Management: medications reviewed  Taken 5/1/2022 0300 by Emily Turner RN  Medication Review/Management: medications reviewed  Taken 5/1/2022 0200 by Emily Turner RN  Medication Review/Management: medications reviewed  Taken 5/1/2022 0100 by Emily Turner RN  Medication Review/Management: medications reviewed  Taken 5/1/2022 0000 by Emily Turner RN  Medication Review/Management: medications reviewed  Taken 4/30/2022 2300 by Emily Turner RN  Medication Review/Management: medications reviewed  Taken 4/30/2022 2200 by Emily Turner RN  Medication Review/Management: medications reviewed  Taken 4/30/2022 2100 by Emily Turner RN  Medication Review/Management: medications reviewed  Taken 4/30/2022 2000 by Emily Turner RN  Medication Review/Management: medications reviewed     Problem: Heart Failure Comorbidity  Goal: Maintenance of Heart Failure Symptom Control  Outcome: Ongoing, Progressing  Intervention:  Maintain Heart Failure-Management  Recent Flowsheet Documentation  Taken 5/1/2022 0400 by Emily Turner RN  Medication Review/Management: medications reviewed  Taken 5/1/2022 0300 by Emily Turner RN  Medication Review/Management: medications reviewed  Taken 5/1/2022 0200 by Emily Turner RN  Medication Review/Management: medications reviewed  Taken 5/1/2022 0100 by Emily Turner RN  Medication Review/Management: medications reviewed  Taken 5/1/2022 0000 by Emily Turner RN  Medication Review/Management: medications reviewed  Taken 4/30/2022 2300 by Emily Turner RN  Medication Review/Management: medications reviewed  Taken 4/30/2022 2200 by Emily Turner RN  Medication Review/Management: medications reviewed  Taken 4/30/2022 2100 by Emily Turner RN  Medication Review/Management: medications reviewed  Taken 4/30/2022 2000 by Emily Turner RN  Medication Review/Management: medications reviewed     Problem: Hypertension Comorbidity  Goal: Blood Pressure in Desired Range  Outcome: Ongoing, Progressing  Intervention: Maintain Blood Pressure Management  Recent Flowsheet Documentation  Taken 5/1/2022 0400 by Emily Turner RN  Medication Review/Management: medications reviewed  Taken 5/1/2022 0300 by Emily Turner RN  Medication Review/Management: medications reviewed  Taken 5/1/2022 0200 by Emily Turner RN  Medication Review/Management: medications reviewed  Taken 5/1/2022 0100 by Emily Turner RN  Medication Review/Management: medications reviewed  Taken 5/1/2022 0000 by Emily Turner RN  Medication Review/Management: medications reviewed  Taken 4/30/2022 2300 by Emily Turner RN  Medication Review/Management: medications reviewed  Taken 4/30/2022 2200 by Emily Turner RN  Medication Review/Management: medications reviewed  Taken 4/30/2022 2100 by Emily Turner RN  Medication Review/Management: medications reviewed  Taken 4/30/2022 2000 by Emily Turner RN  Medication  Review/Management: medications reviewed       Electronically signed by Emily Turner RN at 22 0444     Pawan Guerrero MD at 22 0933          [facility] History and Physical       Encounter Date:22  Patient:Familia Thompson  :1945  MRN:[id number]    Date of Admission: 2022  Date of Encounter Visit: 22  Encounter Provider: Pawan Guerrero MD  [facility]  Patient Care Team:  Robb Gómez DO as PCP - General (Family Medicine)      Chief Complaint: Chest pain      History of Presenting Illness:      Mr. Thompson is a 77 y.o. gentleman who follows with Dr. Byrd in [place] with past medical history notable for coronary artery disease status post CABG, peripheral artery disease status post bilateral femoropopliteal bypasses, history of paroxysmal atrial fibrillation, COPD, hypertension, and mixed hyperlipidemia who originally presented to [facility] on 2022 with worsening dyspnea exertion and shortness of breath over the last couple of days while there he was found to be in atrial fibrillation with rapid ventricular response.  He initially was medically managed but unfortunately rates became more erratic and actually had episodes and runs of ventricular tachycardia and actually eventually decompensated down into ventricular fibrillation requiring brief chest compressions and defibrillation.  During this time there is concern that he might of aspirated but fortunately converted to sinus rhythm and since then has actually been doing fairly well.  He was taken to the Cath Lab on 2022 where he was found to have complex left main into circumflex calcified lesion.  He does have a patent LIMA to his LAD.  No other revascularization targets were noted.  This was a 99% stenoses was felt to be possibly the contributor to his arrhythmia and cardiac arrest.  Given the complex nature of his coronary disease he was  transferred to our institution for possible PCI and rotational atherectomy.  Fortunately since coming here the patient has continued to do quite well.  He denies any chest pains does have somewhat of a mild productive cough but denies any fevers or chills.  He has been maintained on amiodarone and beta-blocker.      Review of Systems:  Review of Systems   Constitutional: Negative.   HENT: Negative.    Eyes: Negative.    Cardiovascular: Positive for chest pain, dyspnea on exertion and palpitations.   Respiratory: Positive for cough and shortness of breath.    Endocrine: Negative.    Hematologic/Lymphatic: Negative.    Skin: Negative.    Musculoskeletal: Negative.    Gastrointestinal: Negative.    Genitourinary: Negative.    Neurological: Negative.    Psychiatric/Behavioral: Negative.    Allergic/Immunologic: Negative.        Medications:  Prior to Admission medications    Medication Sig Start Date End Date Taking? Authorizing Provider   albuterol sulfate  (90 Base) MCG/ACT inhaler Inhale 2 puffs Every 4 (Four) Hours As Needed for Wheezing or Shortness of Air.   Yes ProviderRoberta MD   apixaban (ELIQUIS) 5 MG tablet tablet TAKE 1 TABLET BY MOUTH TWICE DAILY 6/7/21  Yes ProviderRoberta MD   aspirin 81 MG EC tablet Take 81 mg by mouth Daily.   Yes ProviderRoberta MD   atorvastatin (LIPITOR) 80 MG tablet Take 1 tablet by mouth Every Night. 4/29/22  Yes Tatiana Veliz MD   cefTRIAXone (ROCEPHIN) 20 MG/ML IVPB Infuse 50 mL into a venous catheter Daily for 3 doses. Indications: Pneumonia 4/30/22 5/3/22 Yes Tatiana Veliz MD   cetirizine (zyrTEC) 10 MG tablet Take 1 tablet by mouth Daily. 1/27/22  Yes Robb Gómez DO   fluticasone (Flonase) 50 MCG/ACT nasal spray 2 sprays into the nostril(s) as directed by provider Daily. 2/21/22  Yes Robb Gómez DO   metoprolol tartrate (LOPRESSOR) 25 MG tablet TAKE 1 TABLET BY MOUTH TWICE DAILY 4/12/22  Yes Robb Gómez DO   omeprazole  (priLOSEC) 40 MG capsule TAKE 1 CAPSULE BY MOUTH DAILY 22  Yes Robb Gómez DO   predniSONE (DELTASONE) 20 MG tablet Take 2 tablets by mouth Daily With Breakfast for 5 days. 22 Yes Tatiana Veliz MD   simvastatin (ZOCOR) 40 MG tablet TAKE 1 TABLET BY MOUTH EVERY NIGHT 22  Yes Robb Gómez DO   amiodarone (PACERONE) 400 MG tablet Take 1 tablet by mouth Every 12 (Twelve) Hours. 22   Tatiana Veliz MD   clobetasol (TEMOVATE) 0.05 % cream Apply 1 application topically to the appropriate area as directed 2 (Two) Times a Day. 22   Robb Gómez DO   Iron-Vitamins (GERITOL PO) Take 1 tablet by mouth Daily.    Provider, MD Roberta       No Known Allergies    Past Medical History:   Diagnosis Date   • Arthritis    • Chronic systolic CHF (congestive heart failure) ([facility])    • COPD (chronic obstructive pulmonary disease) ([facility])    • Coronary artery disease    • Hemorrhoids    • HL (hearing loss)    • Hyperlipidemia    • Hypertension    • Myocardial infarction ([facility])    • PVD (peripheral vascular disease) ([facility])    • Sinus disorder        Past Surgical History:   Procedure Laterality Date   • CORONARY ANGIOPLASTY WITH STENT PLACEMENT     • VEIN LIGATION AND STRIPPING     • WRIST FRACTURE SURGERY         Social History     Socioeconomic History   • Marital status:    Tobacco Use   • Smoking status: Former Smoker     Packs/day: 1.00     Years: 30.00     Pack years: 30.00     Types: Cigarettes     Quit date:      Years since quittin.3   • Smokeless tobacco: Never Used   Substance and Sexual Activity   • Alcohol use: Not Currently     Alcohol/week: 12.0 standard drinks     Types: 12 Cans of beer per week   • Drug use: Not Currently       History reviewed. No pertinent family history.      The following portions of the patient's history were reviewed and updated as appropriate: allergies, current medications, past family history, past medical history, past  social history, past surgical history and problem list.         Objective:      Temp:  [97.4 °F (36.3 °C)-98.2 °F (36.8 °C)] 97.4 °F (36.3 °C)  Heart Rate:  [61-92] 71  Resp:  [12-20] 18  BP: ()/() 120/73     Intake/Output Summary (Last 24 hours) at 4/30/2022 0933  Last data filed at 4/30/2022 0600  Gross per 24 hour   Intake 1081.4 ml   Output 500 ml   Net 581.4 ml     Body mass index is 20.62 kg/m².      04/29/22 1923   Weight: 65.2 kg (143 lb 11.8 oz)           Physical Exam:  Constitutional: Well appearing, well developed, no acute distress   HENT: Oropharynx clear and membrane moist  Eyes: Normal conjunctiva, no sclera icterus.  Neck: Supple, no carotid bruit bilaterally.  Cardiovascular: Regular rate and rhythm, No Murmur, No bilateral lower extremity edema.  Pulmonary: Normal respiratory effort, normal lung sounds, no wheezing.  Abdominal: Soft, nontender, no hepatosplenomegaly, liver is non-pulsatile.  Neurological: Alert and orient x 3.   Skin: Warm, dry, no ecchymosis, no rash.  Psych: Appropriate mood and affect. Normal judgment and insight.        Lab Review:   Results from last 7 days   Lab Units 04/30/22  0315 04/29/22  0552 04/28/22  0642 04/27/22  1903   SODIUM mmol/L 137 135* 123* 127*   POTASSIUM mmol/L 4.3 3.9 3.8 3.6   CHLORIDE mmol/L 102 102 89* 91*   CO2 mmol/L 23.0 21.3* 18.3* 21.2*   BUN mg/dL 7* 7* 7* 4*   CREATININE mg/dL 0.78 0.78 0.87 0.75*   GLUCOSE mg/dL 90 112* 212* 90   CALCIUM mg/dL 7.7* 9.1 9.4 9.5   AST (SGOT) U/L  --  39 41* 26   ALT (SGPT) U/L  --  23 25 27     Results from last 7 days   Lab Units 04/29/22  0552 04/27/22  1903   CK TOTAL U/L 211*  --    TROPONIN T ng/mL 0.323* <0.010     Results from last 7 days   Lab Units 04/30/22 0315 04/29/22 2038 04/29/22  0552 04/27/22  1903   WBC 10*3/mm3 6.36 7.68 11.42* 6.33   HEMOGLOBIN g/dL 12.3* 13.0 12.0* 13.8   HEMATOCRIT % 37.2* 39.4 35.7* 39.7   PLATELETS 10*3/mm3 258 281 248 245     Results from last 7 days   Lab  Units 04/30/22  0315 04/29/22 2038 04/29/22  0552   INR   --  1.17* 1.28*   APTT seconds 65.4* 28.9  --      Results from last 7 days   Lab Units 04/29/22  0552 04/28/22  0642 04/28/22  0140   MAGNESIUM mg/dL 2.1 1.8 1.8           Invalid input(s): LDLCALC  The ASCVD Risk score (Farber PAT Jr., et al., 2013) failed to calculate for the following reasons:    The patient has a prior MI or stroke diagnosis     Results from last 7 days   Lab Units 04/27/22  1903   PROBNP pg/mL 1,186.0           Cardiac catheterization 4/29/2022 with images reviewed by myself:  · Dominance: Left  · Left Main: Heavily calcified with 95% stenosis  · Left Anterior Descending: Arteries 100% occluded at its ostium.  A long stented segment is noted in mid to distal LAD artery  · Circumflex Artery: Large dominant vessel giving rise to various OM branches and left PDA branch.  A tight lesion is noted in the ostium of left circumflex artery, and continuation with the left main stenosis.  This lesion is angiographically 95% severity.  There are no graft attachments noted on any of the OM branches.  · Right Coronary Artery: Medium caliber nondominant vessel with no significant stenosis.  It has an anterior takeoff.  Mid artery has a 30% stenosis.   · LIMA graft to LAD : The graft is widely patent its ostium, body and anastomosis.  Distal LAD, distal to the graft anastomosis has no significant stenosis.  The graft partially fills made LAD retrogradely.  · Patient also has a SVG to OM branch per previous cardiology office notes.  We could not engage this graft, no graft markers were seen.  There were no possible graft attachments noted on any of the OM branches.    Echocardiogram 4/28/2022 [facility]:  · Estimated right ventricular systolic pressure from tricuspid regurgitation is moderately elevated (45-55 mmHg).  · Calculated left ventricular EF = 40% Estimated left ventricular EF was in agreement with the calculated left ventricular  EF.  · Left ventricular diastolic function is consistent with (grade II w/high LAP) pseudonormalization.  · There is moderate calcification of the aortic valve.    Stress MPI 4/5/2022 [facility]:  · Left ventricular ejection fraction is mildly reduced. (Calculated EF = 42%).  · There was no clear evidence of ischemia in this study            Assessment:           NSTEMI (non-ST elevated myocardial infarction) ([facility])         Plan:       Mr. Thompson is a 77 y.o. gentleman who follows with Dr. Byrd in [place] with past medical history notable for coronary artery disease status post CABG, peripheral artery disease status post bilateral femoropopliteal bypasses, history of paroxysmal atrial fibrillation, COPD, hypertension, and mixed hyperlipidemia who originally presented to [facility] on 4/27/2022 with worsening dyspnea exertion and shortness of breath over the last couple of days while there he was found to be in atrial fibrillation with rapid ventricular response which unfortunately deteriorated into ventricular tachycardia and subsequent ventricular fibrillation and was defibrillated.  Fortunately has done well but was found to have complex left main into circumflex disease which will require rotational atherectomy.  Patient also likely had an aspiration but fortunately thus far showing no overt signs of infection.  We will continue empiric antibiotics over the next couple of days but so far his white count is stable.  Did receive some steroids at [facility].  We will plan on PCI on Monday, 5/2/2022 assuming that he remains stable over the weekend given the complex nature of his coronary disease and access would likely try and do this during regular hours where we would have more time to get an optimal result.    Cardiac arrest/VF:  · VF arrest likely related to induced ischemia in the setting of complex fixed left main and circumflex stenoses  · We will plan on intervention  "Monday  · Currently on amiodarone and metoprolol to help prevent recurrent atrial fibrillation with rapid ventricular response  · If any change in clinical status would pursue more emergent revascularization.    Type II myocardial infarction due to tachycardia:  · Likely represents more of a type II myocardial infarction with demand ischemia in setting of tachycardia rather than acute plaque rupture  · Will need revascularization  · Currently on heparin infusion and aspirin  · Will likely initiate clopidogrel in preparation for PCI on Monday  · Continue high potency statin  · Continue beta-blocker  · Plan for PCI on Monday    Coronary artery disease with stable angina:  · As above    Paroxysmal atrial fibrillation:  · Continue heparin infusion  · Continue beta-blocker and amiodarone  · Will restart Eliquis after revascularization    Aspiration:  · Likely in setting of cardiac arrest  · Will continue empiric ceftriaxone this admission total of 5 days             Pawan Guerrero MD  [facility]  04/30/22  09:33 EDT      Electronically signed by Pawan Guerrero MD at 04/30/22 0989     Emanuel Mckinney, RN at 04/30/22 5454        Goal Outcome Evaluation:      Newly admitted pt to CCU transferred here from [place] for \"high risk angioplasty, likely with rotational atherectomy of the LM- ostial proximal LCx artery\". No procedure time scheduled yet. VSS on room air. Started a heparin gtt overnight. No chest pain but pt complains of L side pain when coughs, tylenol given. Will continue to monitor closely.             Electronically signed by Emanuel Mckinney, RN at 04/30/22 0942       "

## 2022-05-04 NOTE — OUTREACH NOTE
"Call Center TCM Note    Flowsheet Row Responses   [facility] patient discharged from? [place]   Does the patient have one of the following disease processes/diagnoses(primary or secondary)? Other   TCM attempt successful? Yes  [verbal release on file]   Call start time 1205   Call end time 1217   Discharge diagnosis Cardiac arrest/VF,    Type II myocardial infarction due to tachycardia   Medication alerts for this patient [place]---   Meds reviewed with patient/caregiver? Yes   Is the patient having any side effects they believe may be caused by any medication additions or changes? No   Does the patient have all medications ordered at discharge? Yes   Is the patient taking all medications as directed (includes completed medication regime)? Yes   Medication comments Family has sent message to PCP as he needs refill on Lipitor if it is to be continued   Comments regarding appointments Cardiology on 5/13/22   Does the patient have a primary care provider?  Yes   Does the patient have an appointment with their PCP within 7 days of discharge? Yes   Comments regarding PCP Hospital PCP FOLLOW UP APPOINTMENT IS 5/10/22@0800am   Has the patient kept scheduled appointments due by today? N/A   Has home health visited the patient within 72 hours of discharge? N/A   Psychosocial issues? No   Did the patient receive a copy of their discharge instructions? Yes   Nursing interventions Reviewed instructions with patient   What is the patient's perception of their health status since discharge? Improving  [Patient reports he feels a pain to left side of chest--reports noticed this am when he woke up and described as a \"poke.\"  Patient reports the pain feels worse with movement,cough.  Reviewed cardiac s/s and he denies other issues at time of call. ]   Is the patient/caregiver able to teach back signs and symptoms related to disease process for when to call PCP? Yes   Is the patient/caregiver able to teach back signs and " symptoms related to disease process for when to call 911? Yes  [REviewed cardiac s/s with patient and encouraged to seek care prn]   Additional teach back comments Patient had stent place,  cardiac cath via right wrist--cath site bruised but otherwise w/o s/s infection.  Patient reports he is no longer having any pain at conclusion of call--encouraged him to seek care for pain as needed--again reviewed s/s of cardiac issues and to call 911 as indicated.     TCM call completed? Yes          Taylor Oleary RN    5/4/2022, 12:27 EDT

## 2022-05-04 NOTE — TELEPHONE ENCOUNTER
Caller: Elke Carter    Relationship to patient: Emergency Contact    Best call back number: [phone]    Patient is needing: PATIENTS DAUGHTER CALLED STATING SHE WOULD LIKE TO KNOW IF THE PATIENT IS SUPPOSED TO BE TAKING atorvastatin (LIPITOR) 80 MG tablet. SHE STATED THE PATIENT WAS GIVEN THIS MEDICATION IN THE HOSPITAL BUT SHE IS CONCERNED AND WOULD LIKE A CALL BACK. DAUGHTER STATED SHE IS AT WORK AND IF SHE DOES NOT ANSWER TO PLEASE LEAVE A DEATILED VOICEMAIL REGARDING THIS.  PLEASE ADVISE THANK YOU.

## 2022-05-04 NOTE — TELEPHONE ENCOUNTER
Reason for Disposition  • Prescription request for new medicine (not a refill)    Additional Information  • Negative: [1] Intentional drug overdose AND [2] suicidal thoughts or ideas  • Negative: Drug overdose and triager unable to answer question  • Negative: Caller requesting information unrelated to medicine  • Negative: Caller requesting information about COVID-19 Vaccine  • Negative: Caller requesting information about Emergency Contraception  • Negative: Caller requesting information about Combined Birth Control Pills  • Negative: Caller requesting information about Progestin Birth Control Pills  • Negative: Caller requesting information about Post-Op pain or medicines  • Negative: Caller requesting a prescription antibiotic (such as Penicillin) for Strep throat and has a positive culture result  • Negative: Caller requesting a prescription anti-viral med (such as Tamiflu) and has influenza (flu)  symptoms  • Negative: Immunization reaction suspected  • Negative: Rash while taking a medicine or within 3 days of stopping it  • Negative: [1] Asthma and [2] having symptoms of asthma (cough, wheezing, etc.)  • Negative: [1] Symptom of illness (e.g., headache, abdominal pain, earache, vomiting) AND [2] more than mild  • Negative: Breastfeeding questions about mother's medicines and diet  • Negative: MORE THAN A DOUBLE DOSE of a prescription or over-the-counter (OTC) drug  • Negative: [1] DOUBLE DOSE (an extra dose or lesser amount) of prescription drug AND [2] any symptoms (e.g., dizziness, nausea, pain, sleepiness)  • Negative: [1] DOUBLE DOSE (an extra dose or lesser amount) of over-the-counter (OTC) drug AND [2] any symptoms (e.g., dizziness, nausea, pain, sleepiness)  • Negative: Took another person's prescription drug  • Negative: [1] DOUBLE DOSE (an extra dose or lesser amount) of prescription drug AND [2] NO symptoms (Exception: a double dose of antibiotics)  • Negative: Diabetes drug error or overdose  "(e.g., took wrong type of insulin or took extra dose)  • Negative: [1] Prescription refill request for ESSENTIAL medicine (i.e., likelihood of harm to patient if not taken) AND [2] triager unable to refill per department policy  • Negative: [1] Prescription not at pharmacy AND [2] was prescribed by PCP recently (Exception: triager has access to EMR and prescription is recorded there. Go to Home Care and confirm for pharmacy.)  • Negative: [1] Pharmacy calling with prescription question AND [2] triager unable to answer question  • Negative: [1] Caller has URGENT medicine question about med that PCP or specialist prescribed AND [2] triager unable to answer question  • Negative: Medicine patch causing local rash or itching  • Negative: [1] Caller has medicine question about med NOT prescribed by PCP AND [2] triager unable to answer question (e.g., compatibility with other med, storage)    Answer Assessment - Initial Assessment Questions  1. NAME of MEDICATION: \"What medicine are you calling about?\"      Lipitor    2. QUESTION: \"What is your question?\" (e.g., medication refill, side effect)      It is on his medication list ut he did not receive a prescription for it on discharge.    3. PRESCRIBING HCP: \"Who prescribed it?\" Reason: if prescribed by specialist, call should be referred to that group.      Unknown    4. SYMPTOMS: \"Do you have any symptoms?\"      No   5. SEVERITY: If symptoms are present, ask \"Are they mild, moderate or severe?\"      No   6. PREGNANCY:  \"Is there any chance that you are pregnant?\" \"When was your last menstrual period?\"      Male    Protocols used: MEDICATION QUESTION CALL-ADULT-AH      "

## 2022-05-05 RX ORDER — ATORVASTATIN CALCIUM 80 MG/1
80 TABLET, FILM COATED ORAL NIGHTLY
Qty: 30 TABLET | Refills: 3
Start: 2022-05-05 | End: 2022-05-05 | Stop reason: SDUPTHER

## 2022-05-05 RX ORDER — ATORVASTATIN CALCIUM 80 MG/1
80 TABLET, FILM COATED ORAL NIGHTLY
Qty: 30 TABLET | Refills: 3 | Status: SHIPPED | OUTPATIENT
Start: 2022-05-05 | End: 2022-08-29 | Stop reason: SDUPTHER

## 2022-05-05 NOTE — TELEPHONE ENCOUNTER
Caller: Familia Thompson    Relationship: Self    Best call back number: [phone]     Requested Prescriptions:   Requested Prescriptions     Pending Prescriptions Disp Refills   • atorvastatin (LIPITOR) 80 MG tablet 30 tablet 3     Sig: Take 1 tablet by mouth Every Night.        Pharmacy where request should be sent: [id number] - [place], [place] - [address] AT [facility] [address] & [place] - [phone] [facility] [phone] FX     Additional details provided by patient:     Does the patient have less than a 3 day supply:  [x] Yes  [] No    Agatha Chao Rep   05/05/22 13:17 EDT

## 2022-05-10 ENCOUNTER — OFFICE VISIT (OUTPATIENT)
Dept: FAMILY MEDICINE CLINIC | Facility: CLINIC | Age: 77
End: 2022-05-10

## 2022-05-10 VITALS
SYSTOLIC BLOOD PRESSURE: 118 MMHG | WEIGHT: 146.4 LBS | DIASTOLIC BLOOD PRESSURE: 64 MMHG | HEART RATE: 57 BPM | BODY MASS INDEX: 20.96 KG/M2 | OXYGEN SATURATION: 97 % | TEMPERATURE: 97.7 F | HEIGHT: 70 IN

## 2022-05-10 DIAGNOSIS — I73.9 PERIPHERAL ARTERIAL DISEASE: ICD-10-CM

## 2022-05-10 DIAGNOSIS — R93.89 ABNORMAL CHEST CT: ICD-10-CM

## 2022-05-10 DIAGNOSIS — K59.00 CONSTIPATION, UNSPECIFIED CONSTIPATION TYPE: ICD-10-CM

## 2022-05-10 DIAGNOSIS — K31.89 GASTRIC MASS: ICD-10-CM

## 2022-05-10 DIAGNOSIS — J18.9 PNEUMONIA DUE TO INFECTIOUS ORGANISM, UNSPECIFIED LATERALITY, UNSPECIFIED PART OF LUNG: ICD-10-CM

## 2022-05-10 DIAGNOSIS — I48.91 ATRIAL FIBRILLATION, UNSPECIFIED TYPE: ICD-10-CM

## 2022-05-10 DIAGNOSIS — I21.4 NSTEMI (NON-ST ELEVATED MYOCARDIAL INFARCTION): ICD-10-CM

## 2022-05-10 DIAGNOSIS — I47.20 VENTRICULAR TACHYARRHYTHMIA: Primary | ICD-10-CM

## 2022-05-10 DIAGNOSIS — I73.9 CLAUDICATION: ICD-10-CM

## 2022-05-10 DIAGNOSIS — I25.708 CORONARY ARTERY DISEASE INVOLVING CORONARY BYPASS GRAFT OF NATIVE HEART WITH OTHER FORMS OF ANGINA PECTORIS: ICD-10-CM

## 2022-05-10 DIAGNOSIS — I10 PRIMARY HYPERTENSION: ICD-10-CM

## 2022-05-10 DIAGNOSIS — D64.9 ANEMIA, UNSPECIFIED TYPE: ICD-10-CM

## 2022-05-10 LAB
BASOPHILS # BLD AUTO: 0.07 10*3/MM3 (ref 0–0.2)
BASOPHILS NFR BLD AUTO: 1.1 % (ref 0–1.5)
DEPRECATED RDW RBC AUTO: 41.1 FL (ref 37–54)
EOSINOPHIL # BLD AUTO: 0.08 10*3/MM3 (ref 0–0.4)
EOSINOPHIL NFR BLD AUTO: 1.3 % (ref 0.3–6.2)
ERYTHROCYTE [DISTWIDTH] IN BLOOD BY AUTOMATED COUNT: 12.6 % (ref 12.3–15.4)
HCT VFR BLD AUTO: 39.5 % (ref 37.5–51)
HGB BLD-MCNC: 13.4 G/DL (ref 13–17.7)
IMM GRANULOCYTES # BLD AUTO: 0.02 10*3/MM3 (ref 0–0.05)
IMM GRANULOCYTES NFR BLD AUTO: 0.3 % (ref 0–0.5)
LYMPHOCYTES # BLD AUTO: 1.16 10*3/MM3 (ref 0.7–3.1)
LYMPHOCYTES NFR BLD AUTO: 18.7 % (ref 19.6–45.3)
MCH RBC QN AUTO: 31 PG (ref 26.6–33)
MCHC RBC AUTO-ENTMCNC: 33.9 G/DL (ref 31.5–35.7)
MCV RBC AUTO: 91.4 FL (ref 79–97)
MONOCYTES # BLD AUTO: 0.87 10*3/MM3 (ref 0.1–0.9)
MONOCYTES NFR BLD AUTO: 14 % (ref 5–12)
NEUTROPHILS NFR BLD AUTO: 4 10*3/MM3 (ref 1.7–7)
NEUTROPHILS NFR BLD AUTO: 64.6 % (ref 42.7–76)
NRBC BLD AUTO-RTO: 0 /100 WBC (ref 0–0.2)
PLATELET # BLD AUTO: 199 10*3/MM3 (ref 140–450)
PMV BLD AUTO: 11.6 FL (ref 6–12)
RBC # BLD AUTO: 4.32 10*6/MM3 (ref 4.14–5.8)
WBC NRBC COR # BLD: 6.2 10*3/MM3 (ref 3.4–10.8)

## 2022-05-10 PROCEDURE — 1111F DSCHRG MED/CURRENT MED MERGE: CPT | Performed by: FAMILY MEDICINE

## 2022-05-10 PROCEDURE — 36415 COLL VENOUS BLD VENIPUNCTURE: CPT | Performed by: FAMILY MEDICINE

## 2022-05-10 PROCEDURE — 99496 TRANSJ CARE MGMT HIGH F2F 7D: CPT | Performed by: FAMILY MEDICINE

## 2022-05-10 PROCEDURE — 84466 ASSAY OF TRANSFERRIN: CPT | Performed by: FAMILY MEDICINE

## 2022-05-10 PROCEDURE — 85025 COMPLETE CBC W/AUTO DIFF WBC: CPT | Performed by: FAMILY MEDICINE

## 2022-05-10 PROCEDURE — 83540 ASSAY OF IRON: CPT | Performed by: FAMILY MEDICINE

## 2022-05-10 PROCEDURE — 80053 COMPREHEN METABOLIC PANEL: CPT | Performed by: FAMILY MEDICINE

## 2022-05-10 RX ORDER — POLYETHYLENE GLYCOL 3350 17 G/17G
17 POWDER, FOR SOLUTION ORAL DAILY
Qty: 30 EACH | Refills: 1 | Status: SHIPPED | OUTPATIENT
Start: 2022-05-10 | End: 2022-06-22

## 2022-05-10 RX ORDER — GABAPENTIN 300 MG/1
300 CAPSULE ORAL NIGHTLY
Qty: 30 CAPSULE | Refills: 0 | Status: SHIPPED | OUTPATIENT
Start: 2022-05-10 | End: 2022-08-29

## 2022-05-10 NOTE — PROGRESS NOTES
Chief Complaint  Hospital follow up for CAD  pneumonia    Subjective          Familia Thompson presents to [facility]  History of Present Illness  Patient presents today for hospitalization follow-up.  He is accompanied by his spouse, cherrie.  Patient was hospitalized at [facility] in [place], [place] from 4/27/2022 until 4/29/2022 and then subsequently transferred to [facility] in [place] and admitted on 4/29/2022 and discharged on 5/3/2022.  His diagnoses included ventricular tacky arrhythmia, NSTEMI, CAD with history of CABG with tight ostial proximal left circumflex artery lesion, atrial fibrillation with rapid ventricular response, COPD with acute exacerbation, pneumonia, hypertension, and peripheral vascular disease.  Patient's medications have been reviewed and reconciled.  Patient was contacted by transitional care management, Taylor Oleary RN on 5/4/2022.  He is presenting within 7 days of discharge.  Patient had presented and was hospitalized after having shortness of breath in the emergency room.  He was treated for COPD exacerbation but then developed a rhythm change and went in and out of atrial fibrillation.  Cardizem was initiated and he had episodes of V. tach.  He had passed out at 1 point and went into V. fib and he was immediately defibrillated with 120 J and converted to V. fib and then back to A. fib with rapid ventricular response.  Amiodarone was initiated.  His transthoracic echo showed an EF of 40%.  He had a CTA of the chest that was concerning for pneumonia versus aspiration he was started on antibiotics.  He was seen by cardiology given troponin elevation was taken to the Cath Lab which showed critical stenosis involving the left main and ostial left circumflex artery angiographically at 95% severity.  There was 100% occlusion of the native LAD.  Interventional cardiology was contacted [facility] and patient was  "transferred.  Patient's primary cardiologist is Dr. Dixon.  Patient had a successful complex intervention with rotational arthrectomy and lithotripsy.  He had a drug-eluting stent placed.  It was felt that he should not take Eliquis at this time due to already taking aspirin and Plavix.  Patient does have peripheral vascular disease as well and sees Dr. Burger.  He has previously had bypass graft placed in his lower extremities bilaterally.  Arthritis starting to become painful and his right leg will be cool to the touch.  He does have on exam today diminished blood flow noted on the right side with dorsalis pedis and posterior tibialis 1+.  I discussed with him getting back into see vascular surgeon, Dr. Burger.  Patient finished his antibiotics.  I discussed with him having a 1 month follow-up repeat chest x-ray.  We discussed having labs repeated today.  I reviewed his chest CT.  He does have some thickening of the gastric body.  It is recommended consider further evaluation with endoscopy.  Objective   Vital Signs:  /64   Pulse 57   Temp 97.7 °F (36.5 °C)   Ht 177.8 cm (70\")   Wt 66.4 kg (146 lb 6.4 oz)   SpO2 97%   BMI 21.01 kg/m²     BMI is within normal parameters. No follow-up required.      Physical Exam   General: AAO ×3, no acute distress, pleasant  HEENT: Normocephalic, atraumatic  Cardiovascular: Regular rate and rhythm without appreciable murmur  Respiratory: Clear to auscultation bilaterally no RRW.  Diminished pulses noted on the right lower extremity 1+ in the dorsalis pedis and posterior tibialis arteries.  Gastrointestinal: Soft nontender nondistended with bowel sounds present  extremities: No edema  Neurologic: CN II through XII grossly intact   Psychiatric: Normal mood and affect  Result Review :                 Assessment and Plan    Diagnoses and all orders for this visit:    1. Ventricular tachyarrhythmia (HCC) (Primary)    2. Coronary artery disease involving coronary bypass graft of " native heart with other forms of angina pectoris (HCC)    3. Primary hypertension  -     Comprehensive Metabolic Panel    4. Peripheral arterial disease (HCC)    5. Atrial fibrillation, unspecified type (HCC)    6. Pneumonia due to infectious organism, unspecified laterality, unspecified part of lung  -     XR Chest PA & Lateral; Future    7. NSTEMI (non-ST elevated myocardial infarction) (HCC)    8. Claudication (HCC)    9. Abnormal chest CT  -     Ambulatory Referral to Gastroenterology    10. Anemia, unspecified type  -     CBC & Differential  -     Iron Profile    11. Constipation, unspecified constipation type  -     polyethylene glycol (MIRALAX) 17 g packet; Take 17 g by mouth Daily.  Dispense: 30 each; Refill: 1    Other orders  -     gabapentin (NEURONTIN) 300 MG capsule; Take 1 capsule by mouth Every Night.  Dispense: 30 capsule; Refill: 0    Plan as documented above.  I discussed with patient continuing current treatment as outlined during hospitalization with amiodarone.  He will follow-up with Dr. Dixon soon.  I encouraged him to keep appointment.  We discussed continuing on aspirin and Plavix at this time.  I did encourage patient to get back into see Dr. Burger due to concerns about blood flow issues.  Plan to see patient back in 1 month for close follow-up.  He has been experiencing some issues with constipation so we will send in MiraLAX for him as well.         Follow Up   Return in about 1 month (around 6/10/2022) for cad.  Patient was given instructions and counseling regarding his condition or for health maintenance advice. Please see specific information pulled into the AVS if appropriate.

## 2022-05-11 ENCOUNTER — HOSPITAL ENCOUNTER (OUTPATIENT)
Dept: GENERAL RADIOLOGY | Facility: HOSPITAL | Age: 77
Discharge: HOME | End: 2022-05-11
Admitting: FAMILY MEDICINE

## 2022-05-11 DIAGNOSIS — J18.9 PNEUMONIA DUE TO INFECTIOUS ORGANISM, UNSPECIFIED LATERALITY, UNSPECIFIED PART OF LUNG: ICD-10-CM

## 2022-05-11 LAB
ALBUMIN SERPL-MCNC: 3.9 G/DL (ref 3.5–5.2)
ALBUMIN/GLOB SERPL: 1.6 G/DL
ALP SERPL-CCNC: 100 U/L (ref 39–117)
ALT SERPL W P-5'-P-CCNC: 45 U/L (ref 1–41)
ANION GAP SERPL CALCULATED.3IONS-SCNC: 13 MMOL/L (ref 5–15)
AST SERPL-CCNC: 37 U/L (ref 1–40)
BILIRUB SERPL-MCNC: 0.3 MG/DL (ref 0–1.2)
BUN SERPL-MCNC: 7 MG/DL (ref 8–23)
BUN/CREAT SERPL: 7.5 (ref 7–25)
CALCIUM SPEC-SCNC: 9 MG/DL (ref 8.6–10.5)
CHLORIDE SERPL-SCNC: 100 MMOL/L (ref 98–107)
CO2 SERPL-SCNC: 21 MMOL/L (ref 22–29)
CREAT SERPL-MCNC: 0.93 MG/DL (ref 0.76–1.27)
EGFRCR SERPLBLD CKD-EPI 2021: 84.6 ML/MIN/1.73
GLOBULIN UR ELPH-MCNC: 2.4 GM/DL
GLUCOSE SERPL-MCNC: 69 MG/DL (ref 65–99)
IRON 24H UR-MRATE: 42 MCG/DL (ref 59–158)
IRON SATN MFR SERPL: 16 % (ref 20–50)
POTASSIUM SERPL-SCNC: 4.4 MMOL/L (ref 3.5–5.2)
PROT SERPL-MCNC: 6.3 G/DL (ref 6–8.5)
SODIUM SERPL-SCNC: 134 MMOL/L (ref 136–145)
TIBC SERPL-MCNC: 255 MCG/DL (ref 298–536)
TRANSFERRIN SERPL-MCNC: 171 MG/DL (ref 200–360)

## 2022-05-11 PROCEDURE — 71046 X-RAY EXAM CHEST 2 VIEWS: CPT

## 2022-05-11 RX ORDER — FERROUS SULFATE 325(65) MG
325 TABLET ORAL
Qty: 30 TABLET | Refills: 1 | Status: SHIPPED | OUTPATIENT
Start: 2022-05-11 | End: 2022-08-29

## 2022-05-12 ENCOUNTER — READMISSION MANAGEMENT (OUTPATIENT)
Dept: CALL CENTER | Facility: HOSPITAL | Age: 77
End: 2022-05-12

## 2022-05-12 NOTE — OUTREACH NOTE
Medical Week 2 Survey    Flowsheet Row Responses   [facility] patient discharged from? [place]   Does the patient have one of the following disease processes/diagnoses(primary or secondary)? Other   Week 2 attempt successful? Yes   Call start time 1020   Discharge diagnosis Cardiac arrest/VF,    Type II myocardial infarction due to tachycardia   Call end time 1023   Meds reviewed with patient/caregiver? Yes   Is the patient having any side effects they believe may be caused by any medication additions or changes? No   Does the patient have all medications ordered at discharge? Yes   Is the patient taking all medications as directed (includes completed medication regime)? Yes   Does the patient have a primary care provider?  Yes   Comments regarding PCP PATIENT HAS SEEN HIS PCP   Has the patient kept scheduled appointments due by today? Yes   Has home health visited the patient within 72 hours of discharge? N/A   Psychosocial issues? No   Did the patient receive a copy of their discharge instructions? Yes   Nursing interventions Reviewed instructions with patient   What is the patient's perception of their health status since discharge? New symptoms unrelated to diagnosis  [PATIENT STATES HE IS HAVING SOME VASCULAR ISSUES IN HIS LOWER EXTREMITIES AND HE IS GOING TO [facility] TO BE SEEN]   Is the patient/caregiver able to teach back signs and symptoms related to disease process for when to call PCP? Yes   Is the patient/caregiver able to teach back signs and symptoms related to disease process for when to call 911? Yes   Is the patient/caregiver able to teach back the hierarchy of who to call/visit for symptoms/problems? PCP, Specialist, Home health nurse, Urgent Care, ED, 911 Yes   If the patient is a current smoker, are they able to teach back resources for cessation? Not a smoker   Week 2 Call Completed? Yes          OANH KIRBY - Licensed Nurse

## 2022-05-13 ENCOUNTER — TELEPHONE (OUTPATIENT)
Dept: FAMILY MEDICINE CLINIC | Facility: CLINIC | Age: 77
End: 2022-05-13

## 2022-05-13 NOTE — TELEPHONE ENCOUNTER
Caller: EDWIN BAIN    Relationship to patient: Emergency Contact    Best call back number: [phone]    Patient is needing: PATIENT WIFE IS CALLING TO INFORM PROVIDER JOSE MIGUEL PATIENT IS CURRENTLY IS AT [facility] DUE TO TUBING IN RIGHT LEG IS FULLY BLOCK. PATIENT MIGHT BE HAVING SURGERY IN MORNING BUT PATIENT WIFE IS NOT SURE.

## 2022-05-16 ENCOUNTER — TELEPHONE (OUTPATIENT)
Dept: FAMILY MEDICINE CLINIC | Facility: CLINIC | Age: 77
End: 2022-05-16

## 2022-05-16 NOTE — TELEPHONE ENCOUNTER
I called and spoke with patient's wife.  He was admitted at [facility].  He had surgery this morning.  Plan to follow-up with patient after discharge.

## 2022-05-16 NOTE — TELEPHONE ENCOUNTER
Caller: Elke Carter    Relationship to patient: Emergency Contact    Best call back number: [phone] OKAY TO LEAVE MESSAGE ON PHONE    Patient is needing: PATIENT'S DAUGHTER CALLED IN AND WOULD LIKE A CALL BACK REGARDING THE STATUS OF THE [facility] PAPERWORK. PLEASE CALL AND ADVISE.

## 2022-05-24 ENCOUNTER — TRANSCRIBE ORDERS (OUTPATIENT)
Dept: ADMINISTRATIVE | Facility: HOSPITAL | Age: 77
End: 2022-05-24

## 2022-05-24 DIAGNOSIS — I73.9 PERIPHERAL VASCULAR DISEASE, UNSPECIFIED: Primary | ICD-10-CM

## 2022-06-01 ENCOUNTER — LAB REQUISITION (OUTPATIENT)
Dept: LAB | Facility: HOSPITAL | Age: 77
End: 2022-06-01

## 2022-06-01 DIAGNOSIS — I50.9 HEART FAILURE, UNSPECIFIED: ICD-10-CM

## 2022-06-01 LAB
ANION GAP SERPL CALCULATED.3IONS-SCNC: 11.7 MMOL/L (ref 5–15)
BUN SERPL-MCNC: 10 MG/DL (ref 8–23)
BUN/CREAT SERPL: 9.8 (ref 7–25)
CALCIUM SPEC-SCNC: 9.2 MG/DL (ref 8.6–10.5)
CHLORIDE SERPL-SCNC: 104 MMOL/L (ref 98–107)
CO2 SERPL-SCNC: 21.3 MMOL/L (ref 22–29)
CREAT SERPL-MCNC: 1.02 MG/DL (ref 0.76–1.27)
EGFRCR SERPLBLD CKD-EPI 2021: 75.7 ML/MIN/1.73
GLUCOSE SERPL-MCNC: 78 MG/DL (ref 65–99)
POTASSIUM SERPL-SCNC: 3.8 MMOL/L (ref 3.5–5.2)
SODIUM SERPL-SCNC: 137 MMOL/L (ref 136–145)

## 2022-06-01 PROCEDURE — 80048 BASIC METABOLIC PNL TOTAL CA: CPT | Performed by: SPECIALIST

## 2022-06-13 ENCOUNTER — HOSPITAL ENCOUNTER (OUTPATIENT)
Dept: CARDIOLOGY | Facility: HOSPITAL | Age: 77
Discharge: HOME | End: 2022-06-13

## 2022-06-13 DIAGNOSIS — I73.9 PERIPHERAL VASCULAR DISEASE, UNSPECIFIED: ICD-10-CM

## 2022-06-13 LAB
BH CV GRAFT 1 - DISTAL ANASTAMOSIS PSV-RIGHT: 50.1 CM/S
BH CV GRAFT 1 - DISTAL GRAFT PSV-RIGHT: 42.3 CM/S
BH CV GRAFT 1 - INFLOW ARTERY PSV-RIGHT: 114 CM/S
BH CV GRAFT 1 - MID DISTAL GRAFT PSV-RIGHT: 42.8 CM/S
BH CV GRAFT 1 - MID GRAFT PSV-RIGHT: 56.5 CM/S
BH CV GRAFT 1 - OUTFLOW ARTERY PSV-RIGHT: 44.7 CM/S
BH CV GRAFT 1 - PROX GRAFT PSV-RIGHT: 44.7 CM/S
BH CV GRAFT 1 - PROX MID GRAFT PSV-RIGHT: 53.6 CM/S
BH CV GRAFT 1 - PROXIMAL ANASTAMOSIS PSV-RIGHT: 94.1 CM/S
BH CV GRAFT 1- DISTAL ANASTAMOSIS EDV-RIGHT: 5.9 CM/S
BH CV GRAFT 1- DISTAL GRAFT EDV-RIGHT: 6.88 CM/S
BH CV GRAFT 1- INFLOW ARTERY EDV-RIGHT: 16.5 CM/S
BH CV GRAFT 1- MID DISTAL GRAFT EDV-RIGHT: 7.86 CM/S
BH CV GRAFT 1- MID GRAFT EDV-RIGHT: 4.91 CM/S
BH CV GRAFT 1- OUTFLOW ARTERY EDV-RIGHT: 10.6 CM/S
BH CV GRAFT 1- PROX GRAFT EDV-RIGHT: 6.39 CM/S
BH CV GRAFT 1- PROX MID GRAFT EDV-RIGHT: 6.88 CM/S
BH CV GRAFT 1- PROXIMAL ANASTAMOSIS EDV-RIGHT: 8.62 CM/S
BH CV GRAFT BRACHIAL PRESSURE LEFT: 150 MMHG
BH CV GRAFT BRACHIAL PRESSURE RIGHT: 159 MMHG
BH CV LEA RIGHT ANT TIBIAL A MID EDV: 0 CM/S
BH CV LEA RIGHT ANT TIBIAL A MID PSV: 0 CM/S
BH CV LEA RIGHT CFA DISTAL EDV: 16.5 CM/S
BH CV LEA RIGHT CFA DISTAL PSV: 114 CM/S
BH CV LEA RIGHT DFA PROX EDV: 0 CM/S
BH CV LEA RIGHT DFA PROX PSV: 38.6 CM/S
BH CV LEA RIGHT PERONEAL  MID EDV: 0 CM/S
BH CV LEA RIGHT PERONEAL  MID PSV: 0 CM/S
BH CV LEA RIGHT POPITEAL A  MID EDV: 0 CM/S
BH CV LEA RIGHT POPITEAL A  MID PSV: 0 CM/S
BH CV LEA RIGHT PTA DISTAL EDV: 10.6 CM/S
BH CV LEA RIGHT PTA DISTAL PSV: 103 CM/S
BH CV LEA RIGHT PTA MID EDV: 6.21 CM/S
BH CV LEA RIGHT PTA MID PSV: 86.4 CM/S
BH CV LEA RIGHT SFA DISTAL EDV: 0 CM/S
BH CV LEA RIGHT SFA DISTAL PSV: 0 CM/S
BH CV LEA RIGHT SFA MID EDV: 0 CM/S
BH CV LEA RIGHT SFA MID PSV: 12.9 CM/S
BH CV LEA RIGHT SFA PROX EDV: 0 CM/S
BH CV LEA RIGHT SFA PROX PSV: 10.3 CM/S
MAXIMAL PREDICTED HEART RATE: 143 BPM
STRESS TARGET HR: 122 BPM

## 2022-06-13 PROCEDURE — 93926 LOWER EXTREMITY STUDY: CPT

## 2022-06-13 PROCEDURE — 93926 LOWER EXTREMITY STUDY: CPT | Performed by: SURGERY

## 2022-06-21 ENCOUNTER — OFFICE VISIT (OUTPATIENT)
Dept: FAMILY MEDICINE CLINIC | Facility: CLINIC | Age: 77
End: 2022-06-21

## 2022-06-21 VITALS
OXYGEN SATURATION: 99 % | SYSTOLIC BLOOD PRESSURE: 116 MMHG | TEMPERATURE: 97.7 F | DIASTOLIC BLOOD PRESSURE: 68 MMHG | HEART RATE: 45 BPM | WEIGHT: 144.8 LBS | BODY MASS INDEX: 20.73 KG/M2 | HEIGHT: 70 IN

## 2022-06-21 DIAGNOSIS — Z51.81 MEDICATION MONITORING ENCOUNTER: ICD-10-CM

## 2022-06-21 DIAGNOSIS — R93.89 ABNORMAL CHEST CT: ICD-10-CM

## 2022-06-21 DIAGNOSIS — I25.708 CORONARY ARTERY DISEASE INVOLVING CORONARY BYPASS GRAFT OF NATIVE HEART WITH OTHER FORMS OF ANGINA PECTORIS: ICD-10-CM

## 2022-06-21 DIAGNOSIS — I73.9 PERIPHERAL ARTERIAL DISEASE: ICD-10-CM

## 2022-06-21 DIAGNOSIS — I48.91 ATRIAL FIBRILLATION, UNSPECIFIED TYPE: Primary | ICD-10-CM

## 2022-06-21 DIAGNOSIS — D50.9 IRON DEFICIENCY ANEMIA, UNSPECIFIED IRON DEFICIENCY ANEMIA TYPE: ICD-10-CM

## 2022-06-21 DIAGNOSIS — L21.9 SEBORRHEIC DERMATITIS: ICD-10-CM

## 2022-06-21 DIAGNOSIS — I10 PRIMARY HYPERTENSION: ICD-10-CM

## 2022-06-21 PROCEDURE — 99214 OFFICE O/P EST MOD 30 MIN: CPT | Performed by: FAMILY MEDICINE

## 2022-06-21 RX ORDER — KETOCONAZOLE 20 MG/ML
SHAMPOO TOPICAL 2 TIMES WEEKLY
Qty: 120 ML | Refills: 1 | Status: SHIPPED | OUTPATIENT
Start: 2022-06-23

## 2022-06-21 RX ORDER — ASPIRIN 81 MG/1
81 TABLET ORAL EVERY 24 HOURS
COMMUNITY

## 2022-06-21 RX ORDER — APIXABAN 5 MG/1
5 TABLET, FILM COATED ORAL 2 TIMES DAILY
COMMUNITY
Start: 2022-06-19

## 2022-06-21 RX ORDER — FUROSEMIDE 20 MG/1
20 TABLET ORAL DAILY
COMMUNITY
Start: 2022-05-27 | End: 2022-08-29

## 2022-06-22 ENCOUNTER — PREP FOR SURGERY (OUTPATIENT)
Dept: OTHER | Facility: HOSPITAL | Age: 77
End: 2022-06-22

## 2022-06-22 ENCOUNTER — OFFICE VISIT (OUTPATIENT)
Dept: GASTROENTEROLOGY | Facility: CLINIC | Age: 77
End: 2022-06-22

## 2022-06-22 VITALS
DIASTOLIC BLOOD PRESSURE: 46 MMHG | BODY MASS INDEX: 21.05 KG/M2 | SYSTOLIC BLOOD PRESSURE: 110 MMHG | HEIGHT: 70 IN | WEIGHT: 147 LBS | HEART RATE: 51 BPM

## 2022-06-22 DIAGNOSIS — R93.3 ABNORMAL CT SCAN, STOMACH: ICD-10-CM

## 2022-06-22 DIAGNOSIS — D50.9 IRON DEFICIENCY ANEMIA, UNSPECIFIED IRON DEFICIENCY ANEMIA TYPE: Primary | ICD-10-CM

## 2022-06-22 DIAGNOSIS — R68.81 EARLY SATIETY: ICD-10-CM

## 2022-06-22 DIAGNOSIS — K59.04 CHRONIC IDIOPATHIC CONSTIPATION: ICD-10-CM

## 2022-06-22 PROCEDURE — 99204 OFFICE O/P NEW MOD 45 MIN: CPT | Performed by: NURSE PRACTITIONER

## 2022-06-22 RX ORDER — POLYETHYLENE GLYCOL 3350, SODIUM SULFATE ANHYDROUS, SODIUM BICARBONATE, SODIUM CHLORIDE, POTASSIUM CHLORIDE 227.1; 21.5; 6.36; 5.53; .754 G/L; G/L; G/L; G/L; G/L
4 POWDER, FOR SOLUTION ORAL DAILY
Qty: 1 EACH | Refills: 0 | Status: SHIPPED | OUTPATIENT
Start: 2022-06-22 | End: 2022-06-23

## 2022-06-22 NOTE — PROGRESS NOTES
Patient Name: Familia Thompson   Visit Date: 06/22/2022   Patient ID: [id number]  Provider: ISIDRO Perkins    Sex: male  Location:  Location Address:  Location Phone: [address] [address] [phone]    YOB: 1945  Age: 77 y.o.   Primary Care Provider Robb Gómez DO      Referring Provider: Robb Gómez DO        Chief Complaint  CT Chest  (Abnormal) and Gastric mass  (Follow up )    History of Present Illness  New pt here to review CT chest:  CT of the chest done with contrast 4/27/2022, apparent wall thickening of the stomach, may be due to incomplete distention, inflammation, or less likely malignancy per radiologist Dr. Aguila    Pt states he has sx of constipation, taking stool softener in AM, may go several days w/o BM. Not taking Miralax.  No abd pain w meals, taking Omeprazole but pt denies HB.   States he is eating less, he does have some early satiety  Has lost about 11#, thinks started after hospitalization for cardiac issues. Pt also had vascular surgery to right leg in May.   Noted FROYLAN in May  Pt states he 's had colonoscopy in past and hx of colon polyps -maybe 3-4 yrs ago    Pt follows w Dr Dixon (hx NSTEMI and vtach, admitted 4/29-5/3) Pt is on Plavix and Eliquis; hx afib + vascular issues  Past Medical History:   Diagnosis Date   • Arthritis    • Chronic systolic CHF (congestive heart failure) (HCC)    • COPD (chronic obstructive pulmonary disease) ([facility])    • Coronary artery disease    • Hemorrhoids    • HL (hearing loss)    • Hyperlipidemia    • Hypertension    • Myocardial infarction (HCC)    • PVD (peripheral vascular disease) ([facility])    • Sinus disorder        Past Surgical History:   Procedure Laterality Date   • CARDIAC CATHETERIZATION N/A 04/29/2022    Procedure: Left Heart Cath, possible angioplasty;  Surgeon: Zaki Díaz MD;  Location: [facility] CATH INVASIVE LOCATION;  Service: Cardiovascular;  Laterality: N/A;   • CARDIAC  "CATHETERIZATION N/A 2022    Procedure: Percutaneous Coronary Intervention;  Surgeon: Josue Hensley MD;  Location:  [facility] CATH INVASIVE LOCATION;  Service: Cardiovascular;  Laterality: N/A;   • CARDIAC CATHETERIZATION N/A 2022    Procedure: Stent BLAIRE coronary;  Surgeon: Josue Hensley MD;  Location:  [facility] CATH INVASIVE LOCATION;  Service: Cardiovascular;  Laterality: N/A;   • CARDIAC CATHETERIZATION N/A 2022    Procedure: Atherectomy-coronary- Rotoblator;  Surgeon: Josue Hensley MD;  Location:  [facility] CATH INVASIVE LOCATION;  Service: Cardiovascular;  Laterality: N/A;   • COLONOSCOPY     • CORONARY ANGIOPLASTY WITH STENT PLACEMENT     • UPPER GASTROINTESTINAL ENDOSCOPY     • VEIN LIGATION AND STRIPPING     • WRIST FRACTURE SURGERY         No Known Allergies    Family History   Problem Relation Age of Onset   • Colon cancer Neg Hx         Social History     Tobacco Use   • Smoking status: Former Smoker     Packs/day: 1.00     Years: 30.00     Pack years: 30.00     Types: Cigarettes     Quit date:      Years since quittin.4   • Smokeless tobacco: Never Used   Vaping Use   • Vaping Use: Never used   Substance Use Topics   • Alcohol use: Not Currently     Alcohol/week: 12.0 standard drinks     Types: 12 Cans of beer per week   • Drug use: Not Currently       Objective     Vital Signs:   /46 (BP Location: Left arm, Patient Position: Sitting, Cuff Size: Adult)   Pulse 51   Ht 177.8 cm (70\")   Wt 66.7 kg (147 lb)   BMI 21.09 kg/m²       Physical Exam  Constitutional:       General: The patient is not in acute distress.     Appearance: Normal appearance.   HENT:      Head: Normocephalic and atraumatic.      Nose: Nose normal.   Pulmonary:      Effort: Pulmonary effort is normal. No respiratory distress.   Abdominal:      General: Abdomen is flat.      Palpations: Abdomen is soft. There is no mass.      Tenderness: There is no abdominal tenderness. There is no guarding. "   Musculoskeletal:      Cervical back: Neck supple.      Right lower leg: No edema.      Left lower leg: No edema.   Skin:     General: Skin is warm and dry.   Neurological:      General: No focal deficit present.      Mental Status: The patient is alert and oriented to person, place, and time.      Gait: Gait normal.   Psychiatric:         Mood and Affect: Mood normal.         Speech: Speech normal.         Behavior: Behavior normal.         Thought Content: Thought content normal.     Result Review :   The following data was reviewed by: ISIDRO Perkins on 06/22/2022:    CBC w/diff    CBC w/Diff 5/18/22 5/19/22 5/20/22   WBC 6.00 5.65 7.24   RBC 3.46 (A) 3.17 (A) 3.35 (A)   Hemoglobin 10.5 (A) 9.6 (A) 10.2 (A)   Hematocrit 30.9 (A) 28.9 (A) 30.8 (A)   MCV 89.3 91.2 91.9   MCH 30.3 30.3 30.4   MCHC 34.0 33.2 33.1   RDW 12.8 13.0 13.1   Platelets 146 129 (A) 153   Neutrophil Rel % 60.9 69.8 75.9   Immature Granulocyte Rel % 0.5 0.5 0.6   Lymphocyte Rel % 24.7 17.5 12.4 (A)   Monocyte Rel % 10.2 10.3 9.1   Eosinophil Rel % 3.0 1.2 1.4   Basophil Rel % 0.7 0.7 0.6   (A) Abnormal value            CMP    CMP 5/10/22 5/16/22 5/16/22 5/16/22 6/1/22     1810 1810 1810    Glucose 69    78   Glucose  108 (A)      BUN 7 (A)    10   Creatinine 0.93    1.02   Sodium 134 (A)   134 (A) 137   Potassium 4.4  4.0  3.8   Chloride 100    104   Calcium 9.0    9.2   Albumin 3.90       Total Bilirubin 0.3       Alkaline Phosphatase 100       AST (SGOT) 37       ALT (SGPT) 45 (A)       (A) Abnormal value       Comments are available for some flowsheets but are not being displayed.                         Assessment and Plan    Diagnoses and all orders for this visit:    1. Iron deficiency anemia, unspecified iron deficiency anemia type (Primary)    2. Abnormal CT scan, stomach    3. Chronic idiopathic constipation    4. Early satiety    Other orders  -     PEG 3350-KCl-NaBcb-NaCl-NaSulf (Golytely) 227.1 g pack; Take 4 L by  mouth Daily for 1 day. Take per office instructions  Dispense: 1 each; Refill: 0  -     linaclotide (Linzess) 72 MCG capsule capsule; Take 1 capsule by mouth Every Morning Before Breakfast for 90 days.  Dispense: 90 capsule; Refill: 0            Follow Up   Return if symptoms worsen or fail to improve.   Stop Colace, start Linzess  EGD/COLONOSCOPY Surgical Risk and Benefits: Possible risks/complications, benefits, and alternatives to surgical or invasive procedure have been explained to patient and/or legal guardian; risks include bleeding, infection, and perforation. Patient has been evaluated and can tolerate anesthesia and/or sedation. Risks, benefits, and alternatives to anesthesia and sedation have been explained to patient and/or legal guardian.   Cardio clearance and BT Dr Dixon and Dr Burger  Discussed with patient if not cleared to come off blood thinner we can do scopes on Eliquis and Plavix to at least look and rule out any cancer.    Patient was given instructions and counseling regarding his condition or for health maintenance advice. Please see specific information pulled into the AVS if appropriate.

## 2022-06-27 ENCOUNTER — TELEPHONE (OUTPATIENT)
Dept: GASTROENTEROLOGY | Facility: CLINIC | Age: 77
End: 2022-06-27

## 2022-06-27 NOTE — TELEPHONE ENCOUNTER
I s/w home health nurse. She advised that he cannot afford the Linzess @ $143 (he does not qualify to use the coupon since he does not have commercial insurance). Please advise if we can rx something else?    Last Office Visit: 6.22.22  EGD/Colonoscopy Scheduled: 9.15.22    Nurse: [phone]

## 2022-07-07 ENCOUNTER — TELEPHONE (OUTPATIENT)
Dept: FAMILY MEDICINE CLINIC | Facility: CLINIC | Age: 77
End: 2022-07-07

## 2022-07-07 RX ORDER — LUBIPROSTONE 24 UG/1
24 CAPSULE ORAL 2 TIMES DAILY WITH MEALS
Qty: 90 CAPSULE | Refills: 0 | Status: SHIPPED | OUTPATIENT
Start: 2022-07-07 | End: 2022-07-08

## 2022-07-07 NOTE — TELEPHONE ENCOUNTER
Caller: Familia Thompson    Relationship: Self    Best call back number: [phone]    What is the best time to reach you: ANY TIME     Who are you requesting to speak with (clinical staff, provider,  specific staff member): CLINICAL         What was the call regarding: PATIENT IS REQUESTING A NEW MEDICATION IN PLACE OF linaclotide (Linzess) 72 MCG capsule capsule STATES THAT IT WILL COST OVER 500.00 TO  AT THE   [id number] - [place], [place] - [address] AT [facility] OF [address] & [place] - [phone] [facility] [phone] FX      Do you require a callback:  YES

## 2022-07-08 ENCOUNTER — TELEPHONE (OUTPATIENT)
Dept: GASTROENTEROLOGY | Facility: CLINIC | Age: 77
End: 2022-07-08

## 2022-07-08 RX ORDER — PLECANATIDE 3 MG/1
3 TABLET ORAL DAILY
Qty: 30 TABLET | Refills: 1 | Status: SHIPPED | OUTPATIENT
Start: 2022-07-08 | End: 2023-02-15 | Stop reason: SDUPTHER

## 2022-07-08 NOTE — TELEPHONE ENCOUNTER
Can try Trulance , if this is too expensive, will have to go back on Colace + Miralax OTC several x week

## 2022-07-08 NOTE — TELEPHONE ENCOUNTER
Please notify pt I apologize I did not get the original message about the price of his Linzess.   I will send in Amitiza for him to try instead, he can take 1-2 per day w meals; if this is also too expensive, please let us know.

## 2022-07-11 NOTE — TELEPHONE ENCOUNTER
Pt's home health called and stated that he would be willing to try the trulance if it is not too expensive, I explained the plan if this medication is also too expensive to try the miralax and Colace OTC several X a week, pt is agreeable

## 2022-07-13 ENCOUNTER — PATIENT ROUNDING (BHMG ONLY) (OUTPATIENT)
Dept: GASTROENTEROLOGY | Facility: CLINIC | Age: 77
End: 2022-07-13

## 2022-07-13 NOTE — PROGRESS NOTES
June 23, 2022    Hello, may I speak with Familia Thompson?    My name is Cortney Rocha, Clinical Coordinator       I am  with [facility]  [address]  [place] [address] [phone].    Before we get started may I verify your date of birth? 1945    I am calling to officially welcome you to our practice and ask about your recent visit. Is this a good time to talk? Yes    Tell me about your visit with us. What things went well?  I think it all went well, really. I had a medication concern because my rx was too expensive and the office worked with me to get it more affordably and I received it and it's working great.        We're always looking for ways to make our patients' experiences even better. Do you have recommendations on ways we may improve?  No    Overall were you satisfied with your first visit to our practice? Yes, most definitely!        I appreciate you taking the time to speak with me today. Is there anything else I can do for you? No, I appreciate you calling.      Thank you, and have a great day.

## 2022-08-29 ENCOUNTER — OFFICE VISIT (OUTPATIENT)
Dept: FAMILY MEDICINE CLINIC | Facility: CLINIC | Age: 77
End: 2022-08-29

## 2022-08-29 VITALS
TEMPERATURE: 98.2 F | HEIGHT: 70 IN | DIASTOLIC BLOOD PRESSURE: 62 MMHG | SYSTOLIC BLOOD PRESSURE: 108 MMHG | HEART RATE: 59 BPM | WEIGHT: 150.3 LBS | BODY MASS INDEX: 21.52 KG/M2 | OXYGEN SATURATION: 95 %

## 2022-08-29 DIAGNOSIS — R53.83 OTHER FATIGUE: ICD-10-CM

## 2022-08-29 DIAGNOSIS — I48.91 ATRIAL FIBRILLATION, UNSPECIFIED TYPE: ICD-10-CM

## 2022-08-29 DIAGNOSIS — D50.9 IRON DEFICIENCY ANEMIA, UNSPECIFIED IRON DEFICIENCY ANEMIA TYPE: Primary | ICD-10-CM

## 2022-08-29 DIAGNOSIS — I10 PRIMARY HYPERTENSION: ICD-10-CM

## 2022-08-29 DIAGNOSIS — Z51.81 MEDICATION MONITORING ENCOUNTER: ICD-10-CM

## 2022-08-29 DIAGNOSIS — K31.89 GASTRIC MASS: ICD-10-CM

## 2022-08-29 DIAGNOSIS — I73.9 PERIPHERAL ARTERIAL DISEASE: ICD-10-CM

## 2022-08-29 DIAGNOSIS — J30.9 ALLERGIC RHINITIS, UNSPECIFIED SEASONALITY, UNSPECIFIED TRIGGER: ICD-10-CM

## 2022-08-29 PROCEDURE — 99214 OFFICE O/P EST MOD 30 MIN: CPT | Performed by: FAMILY MEDICINE

## 2022-08-29 RX ORDER — PREDNISOLONE ACETATE 10 MG/ML
SUSPENSION/ DROPS OPHTHALMIC
COMMUNITY
Start: 2022-08-02 | End: 2022-09-14

## 2022-08-29 RX ORDER — IPRATROPIUM BROMIDE 21 UG/1
2 SPRAY, METERED NASAL 3 TIMES DAILY
Qty: 30 ML | Refills: 1 | Status: SHIPPED | OUTPATIENT
Start: 2022-08-29 | End: 2022-08-29

## 2022-08-29 RX ORDER — ATORVASTATIN CALCIUM 80 MG/1
80 TABLET, FILM COATED ORAL NIGHTLY
Qty: 30 TABLET | Refills: 3 | Status: SHIPPED | OUTPATIENT
Start: 2022-08-29 | End: 2023-01-17

## 2022-08-29 RX ORDER — IPRATROPIUM BROMIDE 21 UG/1
SPRAY, METERED NASAL
Qty: 90 ML | Refills: 3 | Status: SHIPPED | OUTPATIENT
Start: 2022-08-29

## 2022-08-29 NOTE — PROGRESS NOTES
"Chief Complaint  alleriges  Runny nose  Right leg issues  Fatigue  Atrial fibrillation    Subjective        Familia Thompson presents to [facility]  History of Present Illness  Patient presents today to follow-up for chronic issues regarding his right leg.  He did get seen with office of Dr. Burger on 6/17/2022.  He is status post PT FBE graft on 5/16/2022 of the right profundofemoral artery thromboendarterectomy with patch angioplasty and redo right femoral to posterior tibial artery bypass.  Patient is.  Follow-up in September but appointment has not been made yet.  I did give them the phone number to contact them.  He does report weakness in his legs at times but is able to walk without pain.  He reports that he will have sometimes some pain at the end of the day though.  Patient is using Flonase as well as Zyrtec to help out with allergic rhinitis and runny nose.  He still having issues with this.  He continues to see Dr. Dixon for atrial fibrillation.  He continues to be on aspirin, Plavix and Eliquis.  He does have endoscopy coming up due to previously noted abnormal chest CT showing a wall thickening of the gastric body.  He had been referred to the office of Estrella Herndon.  Objective   Vital Signs:  /62   Pulse 59   Temp 98.2 °F (36.8 °C)   Ht 177.8 cm (70\")   Wt 68.2 kg (150 lb 4.8 oz)   SpO2 95%   BMI 21.57 kg/m²   Estimated body mass index is 21.57 kg/m² as calculated from the following:    Height as of this encounter: 177.8 cm (70\").    Weight as of this encounter: 68.2 kg (150 lb 4.8 oz).    BMI is within normal parameters. No other follow-up for BMI required.      Physical Exam   General: AAO ×3, no acute distress, pleasant  HEENT: Normocephalic, atraumatic  Cardiovascular: Regular rate and rhythm without appreciable murmur  Respiratory: Clear to auscultation bilaterally no RRW  Gastrointestinal: Soft nontender nondistended with bowel sounds " present  extremities: No edema  Neurologic: CN II through XII grossly intact   Psychiatric: Normal mood and affect  Result Review :                Assessment and Plan   Diagnoses and all orders for this visit:    1. Iron deficiency anemia, unspecified iron deficiency anemia type (Primary)  -     Iron Profile    2. Atrial fibrillation, unspecified type (HCC)    3. Gastric mass    4. Peripheral arterial disease (HCC)    5. Allergic rhinitis, unspecified seasonality, unspecified trigger    6. Other fatigue  -     TSH+Free T4    7. Medication monitoring encounter  -     CBC & Differential  -     Comprehensive Metabolic Panel  -     Iron Profile    8. Primary hypertension  -     CBC & Differential  -     Comprehensive Metabolic Panel    Other orders  -     ipratropium (ATROVENT) 0.03 % nasal spray; 2 sprays into the nostril(s) as directed by provider 3 (Three) Times a Day.  Dispense: 30 mL; Refill: 1    I discussed with patient and spouse getting labs done today for further evaluation.  He previously has been on iron supplementation and given fatigue issues I will check iron levels again as well as check a CBC and CMP.  Plan to check a thyroid profile as well.  Plan to see patient back in 1 month for close follow-up.  I will send in ipratropium nasal spray to help out with allergic rhinitis symptoms.         Follow Up   Return in about 1 month (around 9/29/2022) for fatigue.  Patient was given instructions and counseling regarding his condition or for health maintenance advice. Please see specific information pulled into the AVS if appropriate.

## 2022-09-14 NOTE — PRE-PROCEDURE INSTRUCTIONS
PT's wife cherrie INSTRUCTED ON CLEAR LIQ DIET AND PO SPLIT PREP LAST BM SHOULD BE CLEAR  PT CAN TAKE amiodarone, and metoprolol   WITH A SIP OF WATER IN THE AM OF THE PROCEDURE ARRIVAL TIME IS 0800 am  ON 9/15/22

## 2022-09-15 ENCOUNTER — ANESTHESIA EVENT (OUTPATIENT)
Dept: GASTROENTEROLOGY | Facility: HOSPITAL | Age: 77
End: 2022-09-15

## 2022-09-15 ENCOUNTER — HOSPITAL ENCOUNTER (OUTPATIENT)
Facility: HOSPITAL | Age: 77
Setting detail: HOSPITAL OUTPATIENT SURGERY
Discharge: HOME | End: 2022-09-15
Attending: INTERNAL MEDICINE | Admitting: INTERNAL MEDICINE

## 2022-09-15 ENCOUNTER — ANESTHESIA (OUTPATIENT)
Dept: GASTROENTEROLOGY | Facility: HOSPITAL | Age: 77
End: 2022-09-15

## 2022-09-15 VITALS
TEMPERATURE: 97.6 F | BODY MASS INDEX: 21.26 KG/M2 | SYSTOLIC BLOOD PRESSURE: 113 MMHG | WEIGHT: 148.15 LBS | RESPIRATION RATE: 16 BRPM | DIASTOLIC BLOOD PRESSURE: 87 MMHG | HEART RATE: 45 BPM | OXYGEN SATURATION: 93 %

## 2022-09-15 DIAGNOSIS — R93.3 ABNORMAL CT SCAN, STOMACH: ICD-10-CM

## 2022-09-15 DIAGNOSIS — R68.81 EARLY SATIETY: ICD-10-CM

## 2022-09-15 DIAGNOSIS — D50.9 IRON DEFICIENCY ANEMIA, UNSPECIFIED IRON DEFICIENCY ANEMIA TYPE: ICD-10-CM

## 2022-09-15 PROCEDURE — 25010000002 PROPOFOL 10 MG/ML EMULSION: Performed by: NURSE ANESTHETIST, CERTIFIED REGISTERED

## 2022-09-15 PROCEDURE — 88305 TISSUE EXAM BY PATHOLOGIST: CPT | Performed by: INTERNAL MEDICINE

## 2022-09-15 PROCEDURE — 43239 EGD BIOPSY SINGLE/MULTIPLE: CPT | Performed by: INTERNAL MEDICINE

## 2022-09-15 PROCEDURE — 45390 COLONOSCOPY W/RESECTION: CPT | Performed by: INTERNAL MEDICINE

## 2022-09-15 PROCEDURE — 45385 COLONOSCOPY W/LESION REMOVAL: CPT | Performed by: INTERNAL MEDICINE

## 2022-09-15 DEVICE — DEV CLIP ENDO RESOLUTION360 CONTRL ROT 235CM: Type: IMPLANTABLE DEVICE | Site: COLON | Status: FUNCTIONAL

## 2022-09-15 RX ORDER — PROPOFOL 10 MG/ML
VIAL (ML) INTRAVENOUS AS NEEDED
Status: DISCONTINUED | OUTPATIENT
Start: 2022-09-15 | End: 2022-09-15 | Stop reason: SURG

## 2022-09-15 RX ORDER — LIDOCAINE HYDROCHLORIDE 20 MG/ML
INJECTION, SOLUTION EPIDURAL; INFILTRATION; INTRACAUDAL; PERINEURAL AS NEEDED
Status: DISCONTINUED | OUTPATIENT
Start: 2022-09-15 | End: 2022-09-15 | Stop reason: SURG

## 2022-09-15 RX ORDER — SODIUM CHLORIDE, SODIUM LACTATE, POTASSIUM CHLORIDE, CALCIUM CHLORIDE 600; 310; 30; 20 MG/100ML; MG/100ML; MG/100ML; MG/100ML
30 INJECTION, SOLUTION INTRAVENOUS CONTINUOUS
Status: DISCONTINUED | OUTPATIENT
Start: 2022-09-15 | End: 2022-09-15 | Stop reason: HOSPADM

## 2022-09-15 RX ADMIN — PROPOFOL 200 MCG/KG/MIN: 10 INJECTION, EMULSION INTRAVENOUS at 09:35

## 2022-09-15 RX ADMIN — SODIUM CHLORIDE, POTASSIUM CHLORIDE, SODIUM LACTATE AND CALCIUM CHLORIDE 30 ML/HR: 600; 310; 30; 20 INJECTION, SOLUTION INTRAVENOUS at 08:45

## 2022-09-15 RX ADMIN — PROPOFOL 50 MG: 10 INJECTION, EMULSION INTRAVENOUS at 09:35

## 2022-09-15 RX ADMIN — LIDOCAINE HYDROCHLORIDE 100 MG: 20 INJECTION, SOLUTION EPIDURAL; INFILTRATION; INTRACAUDAL; PERINEURAL at 09:35

## 2022-09-15 NOTE — H&P
Pre Procedure History & Physical    Chief Complaint:   Iron deficiency anemia and abnormal CT of the    Subjective     HPI:   Abnormal CT of the stomach and iron deficiency anemia    Past Medical History:   Past Medical History:   Diagnosis Date   • Arthritis    • Chronic systolic CHF (congestive heart failure) ([facility])    • COPD (chronic obstructive pulmonary disease) ([facility])    • Coronary artery disease    • Hemorrhoids    • HL (hearing loss)    • Hyperlipidemia    • Hypertension    • Myocardial infarction ([facility])    • PVD (peripheral vascular disease) ([facility])    • Sinus disorder        Past Surgical History:  Past Surgical History:   Procedure Laterality Date   • CARDIAC CATHETERIZATION N/A 04/29/2022    Procedure: Left Heart Cath, possible angioplasty;  Surgeon: Zaki Díaz MD;  Location: [facility] CATH INVASIVE LOCATION;  Service: Cardiovascular;  Laterality: N/A;   • CARDIAC CATHETERIZATION N/A 05/02/2022    Procedure: Percutaneous Coronary Intervention;  Surgeon: Josue Hensley MD;  Location: [facility] CATH INVASIVE LOCATION;  Service: Cardiovascular;  Laterality: N/A;   • CARDIAC CATHETERIZATION N/A 05/02/2022    Procedure: Stent BLAIRE coronary;  Surgeon: Josue Hensley MD;  Location: [facility] CATH INVASIVE LOCATION;  Service: Cardiovascular;  Laterality: N/A;   • CARDIAC CATHETERIZATION N/A 05/02/2022    Procedure: Atherectomy-coronary- Rotoblator;  Surgeon: Josue Hensley MD;  Location: [facility] CATH INVASIVE LOCATION;  Service: Cardiovascular;  Laterality: N/A;   • CARDIAC SURGERY     • COLONOSCOPY     • CORONARY ANGIOPLASTY WITH STENT PLACEMENT     • EYE SURGERY Bilateral    • UPPER GASTROINTESTINAL ENDOSCOPY     • VEIN LIGATION AND STRIPPING     • WRIST FRACTURE SURGERY         Family History:  Family History   Problem Relation Age of Onset   • Colon cancer Neg Hx        Social History:   reports that he quit smoking about 20 months ago. His smoking use included cigarettes. He has a 30.00 pack-year smoking  history. He has never used smokeless tobacco. He reports previous alcohol use of about 12.0 standard drinks of alcohol per week. He reports previous drug use.    Medications:   Medications Prior to Admission   Medication Sig Dispense Refill Last Dose   • amiodarone (PACERONE) 200 MG tablet Take 1 tablet by mouth Every 12 (Twelve) Hours. 30 tablet 11 9/15/2022 at Unknown time   • aspirin 81 MG EC tablet Take 81 mg by mouth Daily. Last dose 9/13 9/14/2022 at Unknown time   • atorvastatin (LIPITOR) 80 MG tablet Take 1 tablet by mouth Every Night. 30 tablet 3 9/14/2022 at Unknown time   • cetirizine (zyrTEC) 10 MG tablet Take 1 tablet by mouth Daily. 90 tablet 3 9/14/2022 at Unknown time   • clobetasol (TEMOVATE) 0.05 % cream Apply 1 application topically to the appropriate area as directed 2 (Two) Times a Day. 60 g 1 9/14/2022 at Unknown time   • clopidogrel (PLAVIX) 75 MG tablet Take 1 tablet by mouth Daily. (Patient taking differently: Take 75 mg by mouth Daily. Last dose 9/6/22) 30 tablet 11 9/16/22   • fluticasone (Flonase) 50 MCG/ACT nasal spray 2 sprays into the nostril(s) as directed by provider Daily. 16 g 3 9/14/2022 at Unknown time   • ipratropium (ATROVENT) 0.03 % nasal spray INSTILL 2 SPRAYS IN EACH NOSTRIL AS DIRECTED THREE TIMES DAILY 90 mL 3 9/14/2022 at Unknown time   • ketoconazole (NIZORAL) 2 % shampoo Apply  topically to the appropriate area as directed 2 (Two) Times a Week. 120 mL 1 Past Week at Unknown time   • metoprolol tartrate (LOPRESSOR) 25 MG tablet Take 1/2 tablet PO BID (Patient taking differently: 12.5 mg. Take 1/2 tablet PO BID) 180 tablet 1 9/15/2022 at Unknown time   • omeprazole (priLOSEC) 40 MG capsule TAKE 1 CAPSULE BY MOUTH DAILY (Patient taking differently: Take 40 mg by mouth Daily.) 90 capsule 1 9/14/2022 at Unknown time   • Plecanatide (Trulance) 3 MG tablet Take 1 tablet by mouth Daily. 30 tablet 1 9/14/2022 at Unknown time   • albuterol sulfate  (90 Base) MCG/ACT  inhaler Inhale 2 puffs Every 4 (Four) Hours As Needed for Wheezing or Shortness of Air.   More than a month at Unknown time   • Eliquis 5 MG tablet tablet Take 5 mg by mouth 2 (Two) Times a Day. Last dose 9/11/22 9/12/2022       Allergies:  Patient has no known allergies.        Objective     Blood pressure 160/67, pulse 56, temperature 97.3 °F (36.3 °C), temperature source Temporal, resp. rate 16, weight 67.2 kg (148 lb 2.4 oz), SpO2 97 %.    Physical Exam   Constitutional: Pt is oriented to person, place, and time and well-developed, well-nourished, and in no distress.   Mouth/Throat: Oropharynx is clear and moist.   Neck: Normal range of motion.   Cardiovascular: Normal rate, regular rhythm and normal heart sounds.    Pulmonary/Chest: Effort normal and breath sounds normal.   Abdominal: Soft. Nontender  Skin: Skin is warm and dry.   Psychiatric: Mood, memory, affect and judgment normal.     Assessment & Plan     Diagnosis:  Iron deficiency anemia and abnormal CT of the stomach    Anticipated Surgical Procedure:  EGD and colonoscopy    The risks, benefits, and alternatives of this procedure have been discussed with the patient or the responsible party- the patient understands and agrees to proceed.

## 2022-09-15 NOTE — ANESTHESIA POSTPROCEDURE EVALUATION
Patient: Familia Thompson    Procedure Summary     Date: 09/15/22 Room / Location: [facility] ENDOSCOPY 4 / [facility] ENDOSCOPY    Anesthesia Start: 0931 Anesthesia Stop: 1023    Procedures:       ESOPHAGOGASTRODUODENOSCOPY (N/A )      COLONOSCOPY (N/A ) Diagnosis:       Iron deficiency anemia, unspecified iron deficiency anemia type      Abnormal CT scan, stomach      Early satiety      (Iron deficiency anemia, unspecified iron deficiency anemia type [D50.9])      (Abnormal CT scan, stomach [R93.3])      (Early satiety [R68.81])    Surgeons: Dennis Saavedra MD Provider: Josue Quintanilla MD    Anesthesia Type: general ASA Status: 4          Anesthesia Type: general    Vitals  Vitals Value Taken Time   /87 09/15/22 1058   Temp 36.4 °C (97.6 °F) 09/15/22 1051   Pulse 46 09/15/22 1059   Resp 16 09/15/22 1056   SpO2 94 % 09/15/22 1059   Vitals shown include unvalidated device data.        Post Anesthesia Care and Evaluation    Patient location during evaluation: bedside  Patient participation: complete - patient participated  Level of consciousness: awake  Pain score: 0  Pain management: adequate    Airway patency: patent  Anesthetic complications: No anesthetic complications  PONV Status: none  Cardiovascular status: acceptable and stable  Respiratory status: acceptable and room air  Hydration status: acceptable    Comments: An Anesthesiologist personally participated in the most demanding procedures (including induction and emergence if applicable) in the anesthesia plan, monitored the course of anesthesia administration at frequent intervals and remained physically present and available for immediate diagnosis and treatment of emergencies.

## 2022-09-15 NOTE — ANESTHESIA PREPROCEDURE EVALUATION
Anesthesia Evaluation     Patient summary reviewed and Nursing notes reviewed                Airway   Mallampati: I  TM distance: >3 FB  Neck ROM: full  No difficulty expected  Dental    (+) edentulous    Pulmonary - normal exam    breath sounds clear to auscultation  (+) COPD,   Cardiovascular - normal exam    Rhythm: regular  Rate: normal    (+) hypertension, past MI , CAD, CHF , PVD, hyperlipidemia,       Neuro/Psych- negative ROS  GI/Hepatic/Renal/Endo - negative ROS     Musculoskeletal     Abdominal    Substance History - negative use     OB/GYN negative ob/gyn ROS         Other   arthritis,                      Anesthesia Plan    ASA 4     general     intravenous induction     Anesthetic plan, risks, benefits, and alternatives have been provided, discussed and informed consent has been obtained with: patient.        CODE STATUS:

## 2022-09-16 LAB
CYTO UR: NORMAL
LAB AP CASE REPORT: NORMAL
LAB AP CLINICAL INFORMATION: NORMAL
PATH REPORT.FINAL DX SPEC: NORMAL
PATH REPORT.GROSS SPEC: NORMAL

## 2022-09-21 ENCOUNTER — TRANSCRIBE ORDERS (OUTPATIENT)
Dept: ADMINISTRATIVE | Facility: HOSPITAL | Age: 77
End: 2022-09-21

## 2022-09-21 DIAGNOSIS — I73.9 PERIPHERAL VASCULAR DISEASE, UNSPECIFIED: Primary | ICD-10-CM

## 2022-09-22 ENCOUNTER — TELEPHONE (OUTPATIENT)
Dept: GASTROENTEROLOGY | Facility: CLINIC | Age: 77
End: 2022-09-22

## 2022-09-22 NOTE — TELEPHONE ENCOUNTER
----- Message from ISIDRO Perkins sent at 9/21/2022  4:08 PM EDT -----  2 polyps removed-they are benign-repeat colonoscopy 3 years; Collisn's esophagus noted and gastritis-biopsy showed gastropathy, recommend no NSAIDs, no alcohol, repeat EGD 1 year.  Patient should be on PPI-noted omeprazole on his med list , continue this

## 2022-09-22 NOTE — TELEPHONE ENCOUNTER
Spoke to pt and informed of Estrella FELIX result note and recommendations. Pt verified understanding.    Verified pt taking medication Omeprazole; Pt does not report any symptoms or concerns at this time. Educated pt to contact office if symptoms return. Verified understanding.

## 2022-10-03 ENCOUNTER — OFFICE VISIT (OUTPATIENT)
Dept: FAMILY MEDICINE CLINIC | Facility: CLINIC | Age: 77
End: 2022-10-03

## 2022-10-03 ENCOUNTER — TELEPHONE (OUTPATIENT)
Dept: FAMILY MEDICINE CLINIC | Facility: CLINIC | Age: 77
End: 2022-10-03

## 2022-10-03 VITALS
SYSTOLIC BLOOD PRESSURE: 110 MMHG | WEIGHT: 144.5 LBS | HEIGHT: 70 IN | HEART RATE: 49 BPM | OXYGEN SATURATION: 94 % | TEMPERATURE: 97.7 F | BODY MASS INDEX: 20.69 KG/M2 | DIASTOLIC BLOOD PRESSURE: 66 MMHG

## 2022-10-03 DIAGNOSIS — M25.561 CHRONIC PAIN OF RIGHT KNEE: ICD-10-CM

## 2022-10-03 DIAGNOSIS — I48.91 ATRIAL FIBRILLATION, UNSPECIFIED TYPE: ICD-10-CM

## 2022-10-03 DIAGNOSIS — I73.9 PERIPHERAL ARTERIAL DISEASE: ICD-10-CM

## 2022-10-03 DIAGNOSIS — G89.29 CHRONIC PAIN OF RIGHT KNEE: ICD-10-CM

## 2022-10-03 DIAGNOSIS — R53.83 OTHER FATIGUE: ICD-10-CM

## 2022-10-03 DIAGNOSIS — K22.70 BARRETT'S ESOPHAGUS WITHOUT DYSPLASIA: ICD-10-CM

## 2022-10-03 DIAGNOSIS — I10 PRIMARY HYPERTENSION: ICD-10-CM

## 2022-10-03 DIAGNOSIS — Z23 NEED FOR INFLUENZA VACCINATION: ICD-10-CM

## 2022-10-03 DIAGNOSIS — G62.9 NEUROPATHY: ICD-10-CM

## 2022-10-03 DIAGNOSIS — D50.9 IRON DEFICIENCY ANEMIA, UNSPECIFIED IRON DEFICIENCY ANEMIA TYPE: Primary | ICD-10-CM

## 2022-10-03 LAB
ALBUMIN SERPL-MCNC: 4.6 G/DL (ref 3.5–5.2)
ALBUMIN/GLOB SERPL: 1.9 G/DL
ALP SERPL-CCNC: 132 U/L (ref 39–117)
ALT SERPL W P-5'-P-CCNC: 70 U/L (ref 1–41)
ANION GAP SERPL CALCULATED.3IONS-SCNC: 12.3 MMOL/L (ref 5–15)
AST SERPL-CCNC: 56 U/L (ref 1–40)
BASOPHILS # BLD AUTO: 0.06 10*3/MM3 (ref 0–0.2)
BASOPHILS NFR BLD AUTO: 1.2 % (ref 0–1.5)
BILIRUB SERPL-MCNC: 0.4 MG/DL (ref 0–1.2)
BUN SERPL-MCNC: 9 MG/DL (ref 8–23)
BUN/CREAT SERPL: 8.7 (ref 7–25)
CALCIUM SPEC-SCNC: 9.6 MG/DL (ref 8.6–10.5)
CHLORIDE SERPL-SCNC: 98 MMOL/L (ref 98–107)
CO2 SERPL-SCNC: 23.7 MMOL/L (ref 22–29)
CREAT SERPL-MCNC: 1.04 MG/DL (ref 0.76–1.27)
DEPRECATED RDW RBC AUTO: 43.6 FL (ref 37–54)
EGFRCR SERPLBLD CKD-EPI 2021: 74 ML/MIN/1.73
EOSINOPHIL # BLD AUTO: 0.08 10*3/MM3 (ref 0–0.4)
EOSINOPHIL NFR BLD AUTO: 1.6 % (ref 0.3–6.2)
ERYTHROCYTE [DISTWIDTH] IN BLOOD BY AUTOMATED COUNT: 13.6 % (ref 12.3–15.4)
GLOBULIN UR ELPH-MCNC: 2.4 GM/DL
GLUCOSE SERPL-MCNC: 83 MG/DL (ref 65–99)
HCT VFR BLD AUTO: 43 % (ref 37.5–51)
HGB BLD-MCNC: 14.4 G/DL (ref 13–17.7)
IMM GRANULOCYTES # BLD AUTO: 0.02 10*3/MM3 (ref 0–0.05)
IMM GRANULOCYTES NFR BLD AUTO: 0.4 % (ref 0–0.5)
IRON 24H UR-MRATE: 82 MCG/DL (ref 59–158)
IRON SATN MFR SERPL: 22 % (ref 20–50)
LYMPHOCYTES # BLD AUTO: 1.24 10*3/MM3 (ref 0.7–3.1)
LYMPHOCYTES NFR BLD AUTO: 25.4 % (ref 19.6–45.3)
MCH RBC QN AUTO: 29.2 PG (ref 26.6–33)
MCHC RBC AUTO-ENTMCNC: 33.5 G/DL (ref 31.5–35.7)
MCV RBC AUTO: 87.2 FL (ref 79–97)
MONOCYTES # BLD AUTO: 0.59 10*3/MM3 (ref 0.1–0.9)
MONOCYTES NFR BLD AUTO: 12.1 % (ref 5–12)
NEUTROPHILS NFR BLD AUTO: 2.9 10*3/MM3 (ref 1.7–7)
NEUTROPHILS NFR BLD AUTO: 59.3 % (ref 42.7–76)
NRBC BLD AUTO-RTO: 0 /100 WBC (ref 0–0.2)
PLATELET # BLD AUTO: 167 10*3/MM3 (ref 140–450)
PMV BLD AUTO: 12 FL (ref 6–12)
POTASSIUM SERPL-SCNC: 4.5 MMOL/L (ref 3.5–5.2)
PROT SERPL-MCNC: 7 G/DL (ref 6–8.5)
RBC # BLD AUTO: 4.93 10*6/MM3 (ref 4.14–5.8)
SODIUM SERPL-SCNC: 134 MMOL/L (ref 136–145)
T4 FREE SERPL-MCNC: 1.41 NG/DL (ref 0.93–1.7)
TIBC SERPL-MCNC: 367 MCG/DL (ref 298–536)
TRANSFERRIN SERPL-MCNC: 246 MG/DL (ref 200–360)
TSH SERPL DL<=0.05 MIU/L-ACNC: 5.16 UIU/ML (ref 0.27–4.2)
VIT B12 BLD-MCNC: 230 PG/ML (ref 211–946)
WBC NRBC COR # BLD: 4.89 10*3/MM3 (ref 3.4–10.8)

## 2022-10-03 PROCEDURE — 84439 ASSAY OF FREE THYROXINE: CPT | Performed by: FAMILY MEDICINE

## 2022-10-03 PROCEDURE — 80053 COMPREHEN METABOLIC PANEL: CPT | Performed by: FAMILY MEDICINE

## 2022-10-03 PROCEDURE — 36415 COLL VENOUS BLD VENIPUNCTURE: CPT | Performed by: FAMILY MEDICINE

## 2022-10-03 PROCEDURE — 85025 COMPLETE CBC W/AUTO DIFF WBC: CPT | Performed by: FAMILY MEDICINE

## 2022-10-03 PROCEDURE — 84443 ASSAY THYROID STIM HORMONE: CPT | Performed by: FAMILY MEDICINE

## 2022-10-03 PROCEDURE — 99214 OFFICE O/P EST MOD 30 MIN: CPT | Performed by: FAMILY MEDICINE

## 2022-10-03 PROCEDURE — 84466 ASSAY OF TRANSFERRIN: CPT | Performed by: FAMILY MEDICINE

## 2022-10-03 PROCEDURE — 83540 ASSAY OF IRON: CPT | Performed by: FAMILY MEDICINE

## 2022-10-03 PROCEDURE — 82607 VITAMIN B-12: CPT | Performed by: FAMILY MEDICINE

## 2022-10-03 RX ORDER — ROSUVASTATIN CALCIUM 40 MG/1
40 TABLET, COATED ORAL DAILY
COMMUNITY
Start: 2022-09-26 | End: 2022-11-16

## 2022-10-03 NOTE — PROGRESS NOTES
"Chief Complaint  Right knee pain  Needs labs done  Hypertension    Subjective        Familia Thompson presents to [facility]  History of Present Illness  Patient presents today to follow-up from his last visit with myself on 8/29/2022 for iron deficiency anemia, atrial fibrillation, and hypertension.  He was post have labs done prior to the appointment however he reports that the lab had a hard time getting blood from him.  We discussed getting these done today.  He does have a follow-up appointment with Dr. Burger on 10/21/2022.  He is status post PT FBE graft on 5/16/2022 of the right profundofemoral artery thromboendarterectomy with patch angioplasty and redo of right femoral to posterior tibial artery bypass.  He does have numbness in his foot regularly.  He had not mentioned this previously.  He continues to see Dr. Dixon for atrial fibrillation.  He is currently taking aspirin, Plavix and Eliquis.  For hypertension he is taking 12.5 mg metoprolol tartrate twice daily as well as amiodarone 200 mg twice daily.  Blood pressure has been adequately controlled.  His heart rate today is 49.  He did have a EGD done which showed Collins's esophagus.  There was per report evidence of Collins's esophagus as well as gastritis.  He is taking omeprazole 40 mg daily.  There was intestinal metaplasia noted on the biopsy report.  He also had 2 polyps that were removed.  Patient will have a follow-up colonoscopy in 3 years as well as an EGD in 1 year.  He notes a rash on his arms that will come and go.  He does get cold quite easily.  Objective   Vital Signs:  /66   Pulse (!) 49   Temp 97.7 °F (36.5 °C)   Ht 177.8 cm (70\")   Wt 65.5 kg (144 lb 8 oz)   SpO2 94%   BMI 20.73 kg/m²   Estimated body mass index is 20.73 kg/m² as calculated from the following:    Height as of this encounter: 177.8 cm (70\").    Weight as of this encounter: 65.5 kg (144 lb 8 oz).    BMI is within normal " parameters. No other follow-up for BMI required.      Physical Exam   General: AAO ×3, no acute distress, pleasant  HEENT: Normocephalic, atraumatic  Cardiovascular: Regular rhythm with bradycardia noted.  No appreciable murmur.  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  Neurologic: CN II through XII grossly intact   Psychiatric: Normal mood and affect  Result Review :                Assessment and Plan   Diagnoses and all orders for this visit:    1. Iron deficiency anemia, unspecified iron deficiency anemia type (Primary)  -     Iron Profile    2. Atrial fibrillation, unspecified type (HCC)  -     TSH+Free T4    3. Peripheral arterial disease (HCC)    4. Other fatigue  -     CBC & Differential  -     Iron Profile    5. Primary hypertension  -     CBC & Differential  -     Comprehensive Metabolic Panel    6. Collins's esophagus without dysplasia    7. Chronic pain of right knee  -     XR Knee 3 View Right; Future    8. Neuropathy  -     Vitamin B12    9. Need for influenza vaccination    I discussed with patient getting labs drawn today.  Plan to check a thyroid profile.  The rash he has on his arms is livedo reticularis.  I suspect this is due to cold sensitivity.  It improves when adding heat to his arm.  I discussed with him wearing extra layers of clothing to help keep them warm at home.  He does have issues with right knee pain.  I discussed with him getting an x-ray for further evaluation.  We discussed having him return in 6 weeks.  I encouraged him to continue taking omeprazole.  He will continue to see cardiology, Dr. Dixon in regards to atrial fibrillation. I will request records from his office.           Follow Up   Return in about 6 weeks (around 11/14/2022) for neuropathy, right knee  pain.  Patient was given instructions and counseling regarding his condition or for health maintenance advice. Please see specific information pulled into the AVS if appropriate.

## 2022-10-03 NOTE — TELEPHONE ENCOUNTER
Phone call placed to the patients phone with message left to call. Phone call placed to the patient's wife who is with the patient. Informed  while he was on the phone too Informed patient that he had wanted his flu shot and left without it. Patient and his wife stated that they would be back tomorrow.

## 2022-10-04 DIAGNOSIS — E53.8 B12 DEFICIENCY: Primary | ICD-10-CM

## 2022-10-04 DIAGNOSIS — R94.6 ABNORMAL THYROID FUNCTION TEST: ICD-10-CM

## 2022-10-04 DIAGNOSIS — R79.89 ELEVATED LFTS: ICD-10-CM

## 2022-10-04 DIAGNOSIS — R74.8 ELEVATED ALKALINE PHOSPHATASE LEVEL: ICD-10-CM

## 2022-10-04 RX ORDER — LANOLIN ALCOHOL/MO/W.PET/CERES
1000 CREAM (GRAM) TOPICAL DAILY
Qty: 90 TABLET | Refills: 1 | Status: SHIPPED | OUTPATIENT
Start: 2022-10-04 | End: 2023-02-06

## 2022-10-04 NOTE — PROGRESS NOTES
Phone call placed to Dr Dixon's office with request for the last office note with agreement to send today via fax.

## 2022-10-12 ENCOUNTER — HOSPITAL ENCOUNTER (OUTPATIENT)
Dept: CARDIOLOGY | Facility: HOSPITAL | Age: 77
Discharge: HOME | End: 2022-10-12

## 2022-10-12 DIAGNOSIS — I73.9 PERIPHERAL VASCULAR DISEASE, UNSPECIFIED: ICD-10-CM

## 2022-10-12 LAB
BH CV GRAFT 1 - DISTAL ANASTAMOSIS PSV-RIGHT: 0 CM/S
BH CV GRAFT 1 - DISTAL GRAFT PSV-RIGHT: 0 CM/S
BH CV GRAFT 1 - INFLOW ARTERY PSV-RIGHT: 116 CM/S
BH CV GRAFT 1 - MID DISTAL GRAFT PSV-RIGHT: 0 CM/S
BH CV GRAFT 1 - MID GRAFT PSV-RIGHT: 0 CM/S
BH CV GRAFT 1 - OUTFLOW ARTERY PSV-RIGHT: 7 CM/S
BH CV GRAFT 1 - PROX GRAFT PSV-RIGHT: 0 CM/S
BH CV GRAFT 1 - PROX MID GRAFT PSV-RIGHT: 0 CM/S
BH CV GRAFT 1 - PROXIMAL ANASTAMOSIS PSV-RIGHT: 0 CM/S
BH CV GRAFT 1- DISTAL ANASTAMOSIS EDV-RIGHT: 0 CM/S
BH CV GRAFT 1- DISTAL GRAFT EDV-RIGHT: 0 CM/S
BH CV GRAFT 1- INFLOW ARTERY EDV-RIGHT: 9 CM/S
BH CV GRAFT 1- MID DISTAL GRAFT EDV-RIGHT: 0 CM/S
BH CV GRAFT 1- MID GRAFT EDV-RIGHT: 0 CM/S
BH CV GRAFT 1- OUTFLOW ARTERY EDV-RIGHT: 0 CM/S
BH CV GRAFT 1- PROX GRAFT EDV-RIGHT: 0 CM/S
BH CV GRAFT 1- PROX MID GRAFT EDV-RIGHT: 0 CM/S
BH CV GRAFT 1- PROXIMAL ANASTAMOSIS EDV-RIGHT: 0 CM/S
BH CV GRAFT 2 - DISTAL ANASTAMOSIS PSV-RIGHT: 0 CM/S
BH CV GRAFT 2 - DISTAL GRAFT PSV-RIGHT: 0 CM/S
BH CV GRAFT 2 - INFLOW ARTERY PSV-RIGHT: 116 CM/S
BH CV GRAFT 2 - MID GRAFT PSV-RIGHT: 0 CM/S
BH CV GRAFT 2 - OUTFLOW ARTERY PSV-RIGHT: 7 CM/S
BH CV GRAFT 2 - PROX GRAFT PSV-RIGHT: 0 CM/S
BH CV GRAFT 2 - PROXIMAL ANASTAMOSIS PSV-RIGHT: 0 CM/S
BH CV GRAFT BRACHIAL PRESSURE LEFT: 140 MMHG
BH CV GRAFT BRACHIAL PRESSURE RIGHT: 147 MMHG
BH CV LEA RIGHT ANT TIBIAL A DISTAL EDV: 2 CM/S
BH CV LEA RIGHT ANT TIBIAL A DISTAL PSV: 17 CM/S
BH CV LEA RIGHT ANT TIBIAL A MID EDV: 5 CM/S
BH CV LEA RIGHT ANT TIBIAL A MID PSV: 47 CM/S
BH CV LEA RIGHT ANT TIBIAL A PROX EDV: 10 CM/S
BH CV LEA RIGHT ANT TIBIAL A PROX PSV: 33 CM/S
BH CV LEA RIGHT CFA DISTAL EDV: 8.6 CM/S
BH CV LEA RIGHT CFA DISTAL PSV: 116 CM/S
BH CV LEA RIGHT DFA PROX EDV: 11 CM/S
BH CV LEA RIGHT DFA PROX PSV: 109 CM/S
BH CV LEA RIGHT PERONEAL  DISTAL EDV: 0 CM/S
BH CV LEA RIGHT PERONEAL  DISTAL PSV: 0 CM/S
BH CV LEA RIGHT PERONEAL  MID EDV: 0 CM/S
BH CV LEA RIGHT PERONEAL  MID PSV: 0 CM/S
BH CV LEA RIGHT PERONEAL  PROX EDV: 0 CM/S
BH CV LEA RIGHT PERONEAL  PROX PSV: 0 CM/S
BH CV LEA RIGHT POPITEAL A  MID EDV: 0 CM/S
BH CV LEA RIGHT POPITEAL A  MID PSV: 0 CM/S
BH CV LEA RIGHT PTA DISTAL EDV: 0 CM/S
BH CV LEA RIGHT PTA DISTAL PSV: 7 CM/S
BH CV LEA RIGHT PTA MID EDV: 0.8 CM/S
BH CV LEA RIGHT PTA MID PSV: 10 CM/S
BH CV LEA RIGHT PTA PROX EDV: 0 CM/S
BH CV LEA RIGHT PTA PROX PSV: 0 CM/S
BH CV LEA RIGHT SFA DISTAL EDV: 0 CM/S
BH CV LEA RIGHT SFA DISTAL PSV: 0 CM/S
BH CV LEA RIGHT SFA MID EDV: 0 CM/S
BH CV LEA RIGHT SFA MID PSV: 0 CM/S
BH CV LEA RIGHT SFA PROX EDV: 0 CM/S
BH CV LEA RIGHT SFA PROX PSV: 56 CM/S
BH CV VAS PRELIMINARY FINDINGS SCRIPTING: 1
Lab: 0 CM/S
Lab: 9 CM/S
MAXIMAL PREDICTED HEART RATE: 143 BPM
STRESS TARGET HR: 122 BPM

## 2022-10-12 PROCEDURE — 93926 LOWER EXTREMITY STUDY: CPT

## 2022-10-12 PROCEDURE — 93926 LOWER EXTREMITY STUDY: CPT | Performed by: SURGERY

## 2022-10-19 ENCOUNTER — TELEPHONE (OUTPATIENT)
Dept: FAMILY MEDICINE CLINIC | Facility: CLINIC | Age: 77
End: 2022-10-19

## 2022-10-19 NOTE — TELEPHONE ENCOUNTER
Attempted to contact patient to schedule patient for FMLA paperwork, left voicemail for patient to call back

## 2022-10-25 ENCOUNTER — OFFICE VISIT (OUTPATIENT)
Dept: FAMILY MEDICINE CLINIC | Facility: CLINIC | Age: 77
End: 2022-10-25

## 2022-10-25 VITALS
TEMPERATURE: 97.5 F | DIASTOLIC BLOOD PRESSURE: 62 MMHG | WEIGHT: 147.6 LBS | SYSTOLIC BLOOD PRESSURE: 108 MMHG | HEART RATE: 52 BPM | BODY MASS INDEX: 21.13 KG/M2 | OXYGEN SATURATION: 95 % | HEIGHT: 70 IN

## 2022-10-25 DIAGNOSIS — R94.6 ABNORMAL THYROID FUNCTION TEST: ICD-10-CM

## 2022-10-25 DIAGNOSIS — I10 PRIMARY HYPERTENSION: ICD-10-CM

## 2022-10-25 DIAGNOSIS — R79.89 ELEVATED LFTS: ICD-10-CM

## 2022-10-25 DIAGNOSIS — I48.91 ATRIAL FIBRILLATION, UNSPECIFIED TYPE: Primary | ICD-10-CM

## 2022-10-25 DIAGNOSIS — R74.8 ELEVATED ALKALINE PHOSPHATASE LEVEL: ICD-10-CM

## 2022-10-25 DIAGNOSIS — Z23 NEED FOR INFLUENZA VACCINATION: ICD-10-CM

## 2022-10-25 DIAGNOSIS — E53.8 B12 DEFICIENCY: ICD-10-CM

## 2022-10-25 DIAGNOSIS — I73.9 PERIPHERAL ARTERIAL DISEASE: ICD-10-CM

## 2022-10-25 LAB
ALBUMIN SERPL-MCNC: 4.1 G/DL (ref 3.5–5.2)
ALBUMIN/GLOB SERPL: 2 G/DL
ALP SERPL-CCNC: 121 U/L (ref 39–117)
ALT SERPL W P-5'-P-CCNC: 66 U/L (ref 1–41)
ANION GAP SERPL CALCULATED.3IONS-SCNC: 13.3 MMOL/L (ref 5–15)
AST SERPL-CCNC: 69 U/L (ref 1–40)
BILIRUB SERPL-MCNC: 0.5 MG/DL (ref 0–1.2)
BUN SERPL-MCNC: 9 MG/DL (ref 8–23)
BUN/CREAT SERPL: 9.1 (ref 7–25)
CALCIUM SPEC-SCNC: 8.9 MG/DL (ref 8.6–10.5)
CHLORIDE SERPL-SCNC: 101 MMOL/L (ref 98–107)
CO2 SERPL-SCNC: 18.7 MMOL/L (ref 22–29)
CREAT SERPL-MCNC: 0.99 MG/DL (ref 0.76–1.27)
EGFRCR SERPLBLD CKD-EPI 2021: 78.5 ML/MIN/1.73
GGT SERPL-CCNC: 69 U/L (ref 8–61)
GLOBULIN UR ELPH-MCNC: 2.1 GM/DL
GLUCOSE SERPL-MCNC: 82 MG/DL (ref 65–99)
POTASSIUM SERPL-SCNC: 4.3 MMOL/L (ref 3.5–5.2)
PROT SERPL-MCNC: 6.2 G/DL (ref 6–8.5)
SODIUM SERPL-SCNC: 133 MMOL/L (ref 136–145)
T4 FREE SERPL-MCNC: 1.52 NG/DL (ref 0.93–1.7)
TSH SERPL DL<=0.05 MIU/L-ACNC: 4.12 UIU/ML (ref 0.27–4.2)
VIT B12 BLD-MCNC: 957 PG/ML (ref 211–946)

## 2022-10-25 PROCEDURE — G0008 ADMIN INFLUENZA VIRUS VAC: HCPCS | Performed by: FAMILY MEDICINE

## 2022-10-25 PROCEDURE — 36415 COLL VENOUS BLD VENIPUNCTURE: CPT | Performed by: FAMILY MEDICINE

## 2022-10-25 PROCEDURE — 80053 COMPREHEN METABOLIC PANEL: CPT | Performed by: FAMILY MEDICINE

## 2022-10-25 PROCEDURE — 90662 IIV NO PRSV INCREASED AG IM: CPT | Performed by: FAMILY MEDICINE

## 2022-10-25 PROCEDURE — 84443 ASSAY THYROID STIM HORMONE: CPT | Performed by: FAMILY MEDICINE

## 2022-10-25 PROCEDURE — 84439 ASSAY OF FREE THYROXINE: CPT | Performed by: FAMILY MEDICINE

## 2022-10-25 PROCEDURE — 82607 VITAMIN B-12: CPT | Performed by: FAMILY MEDICINE

## 2022-10-25 PROCEDURE — 99214 OFFICE O/P EST MOD 30 MIN: CPT | Performed by: FAMILY MEDICINE

## 2022-10-25 PROCEDURE — 82977 ASSAY OF GGT: CPT | Performed by: FAMILY MEDICINE

## 2022-10-25 NOTE — PROGRESS NOTES
"Chief Complaint  FMLA paperwork  Peripheral arterial disease  Hypertension  Subjective        Familia Thompson presents to [facility]  History of Present Illness  Patient presents today accompanied by his daughter, Elke as she will need [facility] paperwork filled out to take her father to appointment.  He does have peripheral vascular disease and has recently seen Dr. Burger in this regard.  He is currently on dual antiplatelet and Eliquis.  He had a right profundofemoral artery thromboendarterectomy with patch angioplasty and redo right femoral to posterior tibial artery bypass with PTFE graft on 5/16/2022.  He reports doing well from the standpoint.  He reports that he will have a 1 year follow-up in this regard.  He does see Dr. Dixon for atrial fibrillation as well as coronary artery disease status post coronary artery bypass graft.  Heart rate today is 52 bpm.  His blood pressure has been adequately controlled.  I discussed with patient mentioning this to cardiology as he is currently taking amiodarone 200 mg every 12 hours as well as metoprolol tartrate 12.5 mg twice daily as both can contribute to bradycardia.  Patient previously noted to have elevated liver enzymes with AST, ALT and alkaline phosphatase all being elevated.  Plan to check a GGT today.  Also plan to have his TSH and free T4 repeated as the TSH was slightly elevated at 5.160.  He is on B12 supplementation now for deficiency.  Objective   Vital Signs:  /62   Pulse 52   Temp 97.5 °F (36.4 °C)   Ht 177.8 cm (70\")   Wt 67 kg (147 lb 9.6 oz)   SpO2 95%   BMI 21.18 kg/m²   Estimated body mass index is 21.18 kg/m² as calculated from the following:    Height as of this encounter: 177.8 cm (70\").    Weight as of this encounter: 67 kg (147 lb 9.6 oz).    BMI is within normal parameters. No other follow-up for BMI required.      Physical Exam   General: AAO ×3, no acute distress, pleasant  HEENT: Normocephalic, " atraumatic  Cardiovascular: Regular rate and rhythm without appreciable murmur  Respiratory: Clear to auscultation bilaterally no RRW  Gastrointestinal: Soft nontender nondistended with bowel sounds present  extremities: No edema  Neurologic: CN II through XII grossly intact   Psychiatric: Normal mood and affect  Result Review :                Assessment and Plan   Diagnoses and all orders for this visit:    1. Atrial fibrillation, unspecified type (HCC) (Primary)    2. Peripheral arterial disease (HCC)    3. Primary hypertension    4. Elevated LFTs  -     Gamma GT  -     Comprehensive Metabolic Panel    5. Elevated alkaline phosphatase level  -     Gamma GT  -     Comprehensive Metabolic Panel    6. B12 deficiency  -     Vitamin B12    7. Abnormal thyroid function test  -     TSH+Free T4    8. Need for influenza vaccination    Labs have been ordered today.  Plan to see patient back for his next regular scheduled appointment.  Further recommendations to follow once results return.  He is due for a flu vaccination today.  [facility] paperwork has been filled out on his behalf for his daughter.         Follow Up   Return if symptoms worsen or fail to improve.  Patient was given instructions and counseling regarding his condition or for health maintenance advice. Please see specific information pulled into the AVS if appropriate.

## 2022-10-26 DIAGNOSIS — R74.8 ELEVATED ALKALINE PHOSPHATASE LEVEL: Primary | ICD-10-CM

## 2022-10-26 DIAGNOSIS — R79.89 ELEVATED LFTS: ICD-10-CM

## 2022-11-08 RX ORDER — AMIODARONE HYDROCHLORIDE 200 MG/1
TABLET ORAL
Qty: 30 TABLET | Refills: 11 | OUTPATIENT
Start: 2022-11-08

## 2022-11-16 ENCOUNTER — HOSPITAL ENCOUNTER (OUTPATIENT)
Dept: ULTRASOUND IMAGING | Facility: HOSPITAL | Age: 77
Discharge: HOME | End: 2022-11-16
Admitting: FAMILY MEDICINE

## 2022-11-16 ENCOUNTER — OFFICE VISIT (OUTPATIENT)
Dept: FAMILY MEDICINE CLINIC | Facility: CLINIC | Age: 77
End: 2022-11-16

## 2022-11-16 VITALS
SYSTOLIC BLOOD PRESSURE: 150 MMHG | WEIGHT: 148.9 LBS | OXYGEN SATURATION: 95 % | DIASTOLIC BLOOD PRESSURE: 68 MMHG | TEMPERATURE: 97.8 F | BODY MASS INDEX: 21.32 KG/M2 | HEART RATE: 51 BPM | HEIGHT: 70 IN

## 2022-11-16 DIAGNOSIS — I48.91 ATRIAL FIBRILLATION, UNSPECIFIED TYPE: ICD-10-CM

## 2022-11-16 DIAGNOSIS — R39.12 BENIGN PROSTATIC HYPERPLASIA WITH WEAK URINARY STREAM: ICD-10-CM

## 2022-11-16 DIAGNOSIS — R74.8 ELEVATED ALKALINE PHOSPHATASE LEVEL: ICD-10-CM

## 2022-11-16 DIAGNOSIS — J30.9 ALLERGIC RHINITIS, UNSPECIFIED SEASONALITY, UNSPECIFIED TRIGGER: ICD-10-CM

## 2022-11-16 DIAGNOSIS — K22.70 BARRETT'S ESOPHAGUS WITHOUT DYSPLASIA: ICD-10-CM

## 2022-11-16 DIAGNOSIS — I10 PRIMARY HYPERTENSION: ICD-10-CM

## 2022-11-16 DIAGNOSIS — R79.89 ELEVATED LFTS: ICD-10-CM

## 2022-11-16 DIAGNOSIS — I73.9 PERIPHERAL ARTERIAL DISEASE: ICD-10-CM

## 2022-11-16 DIAGNOSIS — N40.1 BENIGN PROSTATIC HYPERPLASIA WITH WEAK URINARY STREAM: ICD-10-CM

## 2022-11-16 DIAGNOSIS — I25.708 CORONARY ARTERY DISEASE INVOLVING CORONARY BYPASS GRAFT OF NATIVE HEART WITH OTHER FORMS OF ANGINA PECTORIS: ICD-10-CM

## 2022-11-16 DIAGNOSIS — E53.8 B12 DEFICIENCY: Primary | ICD-10-CM

## 2022-11-16 PROCEDURE — 76705 ECHO EXAM OF ABDOMEN: CPT

## 2022-11-16 PROCEDURE — 99214 OFFICE O/P EST MOD 30 MIN: CPT | Performed by: FAMILY MEDICINE

## 2022-11-16 RX ORDER — TAMSULOSIN HYDROCHLORIDE 0.4 MG/1
1 CAPSULE ORAL DAILY
Qty: 60 CAPSULE | Refills: 1 | Status: SHIPPED | OUTPATIENT
Start: 2022-11-16 | End: 2023-02-06

## 2022-11-16 NOTE — PROGRESS NOTES
Chief Complaint  Elevated LFTs      Subjective        Familia Thompson presents to [facility]  History of Present Illness  Patient presents today to follow-up for elevated LFTs.  He will have his ultrasound done of his liver after this appointment today so we plan to contact him with results.  He did have labs drawn back on 10/25/2022 so we reviewed these.  His TSH and free T4 levels were normal.  His B12 levels were now slightly supratherapeutic.  He was borderline low previously.  We discussed continue with B12 supplementation.  His CMP previously showed elevated AST and ALT as well as alkaline phosphatase level.  The alkaline phosphatase level has trended downward with a slight increase in AST and a relatively stable ALT.  He does drink beer at nighttime which may be contributory.  His GGT level was slightly elevated at 69.  I plan to have labs repeated when he returns for follow-up.  I will also check an antimitochondrial antibody with his next lab work.  He is still seeing Dr. Dixon for atrial fibrillation.  I will request most recent records.  He is also being seen for hypertension.  He is anticoagulated with Eliquis 5 mg twice daily.  He is still having issues with peripheral vascular disease of the right lower extremity.  He did see Dr. Burger on 10/21/2022.  They are monitoring his symptoms at this time.  He is at high risk for limb loss per report with little options left for revascularization.  He is instructed to follow-up if he has worsening pain or wounds that develop.  He describes a separate issue which is frequent urine voiding at nighttime.  He states that this has been an issue for quite some time now.  He reports that during the day he does not have to void frequently.  He reports that his stream will be weak at times and he does not feel like he empties completely.  He previously did have a PSA checked back on 2/1/2022 which was normal at 1.670.  Back in December  "2020 it was 3.47.  Objective   Vital Signs:  /68   Pulse 51   Temp 97.8 °F (36.6 °C)   Ht 177.8 cm (70\")   Wt 67.5 kg (148 lb 14.4 oz)   SpO2 95%   BMI 21.36 kg/m²   Estimated body mass index is 21.36 kg/m² as calculated from the following:    Height as of this encounter: 177.8 cm (70\").    Weight as of this encounter: 67.5 kg (148 lb 14.4 oz).    BMI is within normal parameters. No other follow-up for BMI required.      Physical Exam    General: AAO ×3, no acute distress, pleasant  HEENT: Normocephalic, atraumatic  Cardiovascular: Regular rate and rhythm without appreciable murmur  Respiratory: Clear to auscultation bilaterally no RRW  Gastrointestinal: Soft nontender nondistended with bowel sounds present  extremities: No edema  Neurologic: CN II through XII grossly intact   Psychiatric: Normal mood and affect  Result Review :                Assessment and Plan   Diagnoses and all orders for this visit:    1. B12 deficiency (Primary)  -     Vitamin B12; Future    2. Coronary artery disease involving coronary bypass graft of native heart with other forms of angina pectoris (HCC)  -     Comprehensive Metabolic Panel; Future  -     CBC & Differential; Future    3. Allergic rhinitis, unspecified seasonality, unspecified trigger    4. Atrial fibrillation, unspecified type (HCC)  -     Comprehensive Metabolic Panel; Future  -     CBC & Differential; Future    5. Collins's esophagus without dysplasia    6. Benign prostatic hyperplasia with weak urinary stream  -     PSA DIAGNOSTIC; Future    7. Elevated alkaline phosphatase level  -     Mitochondrial Antibodies, M2; Future  -     Gamma GT; Future  -     Comprehensive Metabolic Panel; Future  -     CBC & Differential; Future    8. Elevated LFTs  -     Comprehensive Metabolic Panel; Future    9. Peripheral arterial disease (HCC)    10. Primary hypertension    Other orders  -     tamsulosin (FLOMAX) 0.4 MG capsule 24 hr capsule; Take 1 capsule by mouth Daily.  " Dispense: 60 capsule; Refill: 1    I discussed with patient continue current management for B12 deficiency.  I discussed with him continue current management per cardiology for coronary artery disease, atrial fibrillation hypertension.  We discussed continuing to take omeprazole for Collins's esophagus.  I will place him on Flomax to help out with urine flow.  Plan to repeat his PSA with his next lab work.         Follow Up   Return in about 2 months (around 1/16/2023).  Patient was given instructions and counseling regarding his condition or for health maintenance advice. Please see specific information pulled into the AVS if appropriate.

## 2023-01-17 RX ORDER — ATORVASTATIN CALCIUM 80 MG/1
80 TABLET, FILM COATED ORAL NIGHTLY
Qty: 30 TABLET | Refills: 3 | Status: SHIPPED | OUTPATIENT
Start: 2023-01-17 | End: 2023-02-15 | Stop reason: SDUPTHER

## 2023-01-30 RX ORDER — SIMVASTATIN 40 MG
TABLET ORAL
Qty: 90 TABLET | Refills: 1 | Status: SHIPPED | OUTPATIENT
Start: 2023-01-30 | End: 2023-02-15

## 2023-01-30 RX ORDER — OMEPRAZOLE 40 MG/1
CAPSULE, DELAYED RELEASE ORAL
Qty: 90 CAPSULE | Refills: 3 | Status: SHIPPED | OUTPATIENT
Start: 2023-01-30

## 2023-02-06 RX ORDER — LANOLIN ALCOHOL/MO/W.PET/CERES
CREAM (GRAM) TOPICAL
Qty: 90 TABLET | Refills: 1 | Status: SHIPPED | OUTPATIENT
Start: 2023-02-06

## 2023-02-06 RX ORDER — TAMSULOSIN HYDROCHLORIDE 0.4 MG/1
1 CAPSULE ORAL DAILY
Qty: 60 CAPSULE | Refills: 1 | Status: SHIPPED | OUTPATIENT
Start: 2023-02-06

## 2023-02-06 RX ORDER — CETIRIZINE HYDROCHLORIDE 10 MG/1
10 TABLET ORAL DAILY
Qty: 90 TABLET | Refills: 3 | Status: SHIPPED | OUTPATIENT
Start: 2023-02-06

## 2023-02-15 ENCOUNTER — OFFICE VISIT (OUTPATIENT)
Dept: FAMILY MEDICINE CLINIC | Facility: CLINIC | Age: 78
End: 2023-02-15
Payer: MEDICARE

## 2023-02-15 VITALS
SYSTOLIC BLOOD PRESSURE: 142 MMHG | BODY MASS INDEX: 21.64 KG/M2 | TEMPERATURE: 97.8 F | HEIGHT: 70 IN | DIASTOLIC BLOOD PRESSURE: 66 MMHG | OXYGEN SATURATION: 97 % | HEART RATE: 47 BPM | WEIGHT: 151.2 LBS

## 2023-02-15 DIAGNOSIS — R79.89 ELEVATED LFTS: ICD-10-CM

## 2023-02-15 DIAGNOSIS — K59.00 CONSTIPATION, UNSPECIFIED CONSTIPATION TYPE: ICD-10-CM

## 2023-02-15 DIAGNOSIS — R25.1 TREMOR: ICD-10-CM

## 2023-02-15 DIAGNOSIS — E53.8 B12 DEFICIENCY: ICD-10-CM

## 2023-02-15 DIAGNOSIS — Z23 NEED FOR TDAP VACCINATION: ICD-10-CM

## 2023-02-15 DIAGNOSIS — Z11.59 NEED FOR HEPATITIS C SCREENING TEST: ICD-10-CM

## 2023-02-15 DIAGNOSIS — R39.12 BENIGN PROSTATIC HYPERPLASIA WITH WEAK URINARY STREAM: ICD-10-CM

## 2023-02-15 DIAGNOSIS — N40.1 BENIGN PROSTATIC HYPERPLASIA WITH WEAK URINARY STREAM: ICD-10-CM

## 2023-02-15 DIAGNOSIS — R74.8 ELEVATED ALKALINE PHOSPHATASE LEVEL: ICD-10-CM

## 2023-02-15 DIAGNOSIS — I10 PRIMARY HYPERTENSION: Primary | ICD-10-CM

## 2023-02-15 DIAGNOSIS — I25.708 CORONARY ARTERY DISEASE INVOLVING CORONARY BYPASS GRAFT OF NATIVE HEART WITH OTHER FORMS OF ANGINA PECTORIS: ICD-10-CM

## 2023-02-15 DIAGNOSIS — I48.91 ATRIAL FIBRILLATION, UNSPECIFIED TYPE: ICD-10-CM

## 2023-02-15 LAB
BASOPHILS # BLD AUTO: 0.06 10*3/MM3 (ref 0–0.2)
BASOPHILS NFR BLD AUTO: 1.3 % (ref 0–1.5)
DEPRECATED RDW RBC AUTO: 42.4 FL (ref 37–54)
EOSINOPHIL # BLD AUTO: 0.13 10*3/MM3 (ref 0–0.4)
EOSINOPHIL NFR BLD AUTO: 2.9 % (ref 0.3–6.2)
ERYTHROCYTE [DISTWIDTH] IN BLOOD BY AUTOMATED COUNT: 12.8 % (ref 12.3–15.4)
HCT VFR BLD AUTO: 41.7 % (ref 37.5–51)
HCV AB SER DONR QL: NORMAL
HGB BLD-MCNC: 13.6 G/DL (ref 13–17.7)
IMM GRANULOCYTES # BLD AUTO: 0.02 10*3/MM3 (ref 0–0.05)
IMM GRANULOCYTES NFR BLD AUTO: 0.4 % (ref 0–0.5)
LYMPHOCYTES # BLD AUTO: 1.19 10*3/MM3 (ref 0.7–3.1)
LYMPHOCYTES NFR BLD AUTO: 26.6 % (ref 19.6–45.3)
MCH RBC QN AUTO: 30 PG (ref 26.6–33)
MCHC RBC AUTO-ENTMCNC: 32.6 G/DL (ref 31.5–35.7)
MCV RBC AUTO: 91.9 FL (ref 79–97)
MONOCYTES # BLD AUTO: 0.44 10*3/MM3 (ref 0.1–0.9)
MONOCYTES NFR BLD AUTO: 9.8 % (ref 5–12)
NEUTROPHILS NFR BLD AUTO: 2.64 10*3/MM3 (ref 1.7–7)
NEUTROPHILS NFR BLD AUTO: 59 % (ref 42.7–76)
NRBC BLD AUTO-RTO: 0 /100 WBC (ref 0–0.2)
PLATELET # BLD AUTO: 167 10*3/MM3 (ref 140–450)
PMV BLD AUTO: 11.6 FL (ref 6–12)
RBC # BLD AUTO: 4.54 10*6/MM3 (ref 4.14–5.8)
VIT B12 BLD-MCNC: 1077 PG/ML (ref 211–946)
WBC NRBC COR # BLD: 4.48 10*3/MM3 (ref 3.4–10.8)

## 2023-02-15 PROCEDURE — 99214 OFFICE O/P EST MOD 30 MIN: CPT | Performed by: FAMILY MEDICINE

## 2023-02-15 PROCEDURE — 90471 IMMUNIZATION ADMIN: CPT | Performed by: FAMILY MEDICINE

## 2023-02-15 PROCEDURE — 86381 MITOCHONDRIAL ANTIBODY EACH: CPT | Performed by: FAMILY MEDICINE

## 2023-02-15 PROCEDURE — 85025 COMPLETE CBC W/AUTO DIFF WBC: CPT | Performed by: FAMILY MEDICINE

## 2023-02-15 PROCEDURE — 84153 ASSAY OF PSA TOTAL: CPT | Performed by: FAMILY MEDICINE

## 2023-02-15 PROCEDURE — 80053 COMPREHEN METABOLIC PANEL: CPT | Performed by: FAMILY MEDICINE

## 2023-02-15 PROCEDURE — 82607 VITAMIN B-12: CPT | Performed by: FAMILY MEDICINE

## 2023-02-15 PROCEDURE — 86803 HEPATITIS C AB TEST: CPT | Performed by: FAMILY MEDICINE

## 2023-02-15 PROCEDURE — 82977 ASSAY OF GGT: CPT | Performed by: FAMILY MEDICINE

## 2023-02-15 PROCEDURE — 90715 TDAP VACCINE 7 YRS/> IM: CPT | Performed by: FAMILY MEDICINE

## 2023-02-15 RX ORDER — ATORVASTATIN CALCIUM 80 MG/1
80 TABLET, FILM COATED ORAL NIGHTLY
Qty: 90 TABLET | Refills: 3 | Status: SHIPPED | OUTPATIENT
Start: 2023-02-15

## 2023-02-15 RX ORDER — PLECANATIDE 3 MG/1
3 TABLET ORAL DAILY
Qty: 90 TABLET | Refills: 1 | Status: SHIPPED | OUTPATIENT
Start: 2023-02-15

## 2023-02-15 RX ORDER — ROSUVASTATIN CALCIUM 40 MG/1
1 TABLET, COATED ORAL DAILY
COMMUNITY
Start: 2022-12-13 | End: 2023-02-15

## 2023-02-15 RX ORDER — PRIMIDONE 50 MG/1
50 TABLET ORAL DAILY
Qty: 30 TABLET | Refills: 2 | Status: SHIPPED | OUTPATIENT
Start: 2023-02-15

## 2023-02-15 NOTE — PROGRESS NOTES
"Chief Complaint  Due for labs  Due for Tdap  Hand tremor in right hand    Subjective        Familia Thompson presents to [facility]  History of Present Illness  Patient presents today accompanied by his wife, Mely.  His heart rate is 47 bpm which has been relatively stable compared to prior visits.  Repeat was 53 bpm.  He reports overall feeling well.  He does have a tremor in the right hand which occurs mainly with intention such as when trying to eat.  He will try to write in his hand will shake a lot to.  This has been aggravating for him.  We did discuss treatment options including a trial of primidone.  He continues to see cardiology who is managing his blood pressure and heart rate for atrial fibrillation.  He is taking amiodarone as well as metoprolol 12.5 mg twice daily.  He is due for labs today so we discussed getting these done including following up for an elevated alkaline phosphatase level.  He has issues with constipation.  He has not been using the Trulance regularly.  He does need a refill today.  He is due for Tdap vaccination today.  Objective   Vital Signs:  /66   Pulse (!) 47   Temp 97.8 °F (36.6 °C)   Ht 177.8 cm (70\")   Wt 68.6 kg (151 lb 3.2 oz)   SpO2 97%   BMI 21.69 kg/m²   Estimated body mass index is 21.69 kg/m² as calculated from the following:    Height as of this encounter: 177.8 cm (70\").    Weight as of this encounter: 68.6 kg (151 lb 3.2 oz).       BMI is within normal parameters. No other follow-up for BMI required.      Physical Exam   General: AAO ×3, no acute distress, pleasant  HEENT: Normocephalic, atraumatic  Cardiovascular: Regular rate and rhythm without appreciable murmur  Respiratory: Clear to auscultation bilaterally no RRW  Gastrointestinal: Soft nontender nondistended with bowel sounds present  extremities: No edema  Neurologic: CN II through XII grossly intact.  Intention tremor noted in the right upper extremity.   " Psychiatric: Normal mood and affect  Result Review :                   Assessment and Plan   Diagnoses and all orders for this visit:    1. Primary hypertension (Primary)    2. Constipation, unspecified constipation type    3. Need for hepatitis C screening test  -     Hepatitis C Antibody    4. Benign prostatic hyperplasia with weak urinary stream  -     PSA DIAGNOSTIC    5. B12 deficiency  -     Vitamin B12    6. Elevated alkaline phosphatase level  -     Mitochondrial Antibodies, M2  -     Gamma GT  -     Comprehensive Metabolic Panel  -     CBC & Differential    7. Coronary artery disease involving coronary bypass graft of native heart with other forms of angina pectoris (HCC)  -     Comprehensive Metabolic Panel  -     CBC & Differential    8. Atrial fibrillation, unspecified type (HCC)  -     Comprehensive Metabolic Panel  -     CBC & Differential    9. Elevated LFTs  -     Comprehensive Metabolic Panel    10. Need for Tdap vaccination  -     Tdap Vaccine Greater Than or Equal To 6yo IM    11. Tremor    Other orders  -     atorvastatin (LIPITOR) 80 MG tablet; Take 1 tablet by mouth Every Night.  Dispense: 90 tablet; Refill: 3  -     Plecanatide (Trulance) 3 MG tablet; Take 1 tablet by mouth Daily.  Dispense: 90 tablet; Refill: 1  -     primidone (MYSOLINE) 50 MG tablet; Take 1 tablet by mouth Daily.  Dispense: 30 tablet; Refill: 2    I suspect patient is having symptoms of benign essential tremor.  I discussed with him treatment with primidone.  Plan to see him back in 3 months.  We discussed getting labs drawn today.  We are still monitoring his elevated alkaline phosphatase level.  He did have a liver ultrasound done which showed no acute findings.  The common bile duct measured 4 mm when it was checked on 11/16/2022.           Follow Up   Return in about 3 months (around 5/15/2023) for hypertension.  Patient was given instructions and counseling regarding his condition or for health maintenance advice.  Please see specific information pulled into the AVS if appropriate.

## 2023-02-16 LAB
ALBUMIN SERPL-MCNC: 4.2 G/DL (ref 3.5–5.2)
ALBUMIN/GLOB SERPL: 1.8 G/DL
ALP SERPL-CCNC: 142 U/L (ref 39–117)
ALT SERPL W P-5'-P-CCNC: 51 U/L (ref 1–41)
ANION GAP SERPL CALCULATED.3IONS-SCNC: 12.1 MMOL/L (ref 5–15)
AST SERPL-CCNC: 52 U/L (ref 1–40)
BILIRUB SERPL-MCNC: 0.2 MG/DL (ref 0–1.2)
BUN SERPL-MCNC: 6 MG/DL (ref 8–23)
BUN/CREAT SERPL: 6.2 (ref 7–25)
CALCIUM SPEC-SCNC: 9 MG/DL (ref 8.6–10.5)
CHLORIDE SERPL-SCNC: 99 MMOL/L (ref 98–107)
CO2 SERPL-SCNC: 24.9 MMOL/L (ref 22–29)
CREAT SERPL-MCNC: 0.97 MG/DL (ref 0.76–1.27)
EGFRCR SERPLBLD CKD-EPI 2021: 80.4 ML/MIN/1.73
GGT SERPL-CCNC: 76 U/L (ref 8–61)
GLOBULIN UR ELPH-MCNC: 2.3 GM/DL
GLUCOSE SERPL-MCNC: 75 MG/DL (ref 65–99)
POTASSIUM SERPL-SCNC: 4.2 MMOL/L (ref 3.5–5.2)
PROT SERPL-MCNC: 6.5 G/DL (ref 6–8.5)
PSA SERPL-MCNC: 4.2 NG/ML (ref 0–4)
SODIUM SERPL-SCNC: 136 MMOL/L (ref 136–145)

## 2023-02-17 LAB — MITOCHONDRIA M2 IGG SER-ACNC: <20 UNITS (ref 0–20)

## 2023-02-20 DIAGNOSIS — R74.8 ELEVATED ALKALINE PHOSPHATASE LEVEL: Primary | ICD-10-CM

## 2023-02-20 DIAGNOSIS — R97.20 ELEVATED PSA: ICD-10-CM

## 2023-02-20 DIAGNOSIS — R79.89 ELEVATED LFTS: ICD-10-CM

## 2023-02-22 ENCOUNTER — TELEPHONE (OUTPATIENT)
Dept: FAMILY MEDICINE CLINIC | Facility: CLINIC | Age: 78
End: 2023-02-22

## 2023-02-22 ENCOUNTER — HOSPITAL ENCOUNTER (OUTPATIENT)
Dept: GENERAL RADIOLOGY | Facility: HOSPITAL | Age: 78
Discharge: HOME | End: 2023-02-22
Admitting: FAMILY MEDICINE
Payer: MEDICARE

## 2023-02-22 DIAGNOSIS — M25.561 CHRONIC PAIN OF RIGHT KNEE: ICD-10-CM

## 2023-02-22 DIAGNOSIS — G89.29 CHRONIC PAIN OF RIGHT KNEE: ICD-10-CM

## 2023-02-22 PROCEDURE — 73562 X-RAY EXAM OF KNEE 3: CPT

## 2023-02-22 NOTE — TELEPHONE ENCOUNTER
Caller: EDWIN BAIN    Relationship to patient: Emergency Contact    Best call back number: [phone]    Patient is needing: PATIENT'S WIFE CALLED STATING THE PATIENT WAS SUPPOSED TO GET AN XRAY OF HIS KNEE DONE AND SAYS THEY WERE GIVEN A PAPER ON THE LAST VISIT BUT SHE HAS MISPLACED THE PAPER.     PATIENT'S WIFE IS ASKING FOR A CALL BACK TO ADVISE WHEN AND WHERE SHOULD THE PATIENT GET HIS XRAY.

## 2023-04-06 ENCOUNTER — TRANSCRIBE ORDERS (OUTPATIENT)
Dept: ADMINISTRATIVE | Facility: HOSPITAL | Age: 78
End: 2023-04-06
Payer: MEDICARE

## 2023-04-06 DIAGNOSIS — I73.9 PERIPHERAL VASCULAR DISEASE, UNSPECIFIED: ICD-10-CM

## 2023-04-06 DIAGNOSIS — I65.23 BILATERAL CAROTID ARTERY OCCLUSION: Primary | ICD-10-CM

## 2023-04-07 ENCOUNTER — TRANSCRIBE ORDERS (OUTPATIENT)
Dept: ADMINISTRATIVE | Facility: HOSPITAL | Age: 78
End: 2023-04-07
Payer: MEDICARE

## 2023-04-07 DIAGNOSIS — I65.23 BILATERAL CAROTID ARTERY STENOSIS: ICD-10-CM

## 2023-04-07 DIAGNOSIS — T82.898D OCCLUSION OF BYPASS GRAFT, SUBSEQUENT ENCOUNTER: Primary | ICD-10-CM

## 2023-04-07 DIAGNOSIS — I73.9 PAD (PERIPHERAL ARTERY DISEASE): ICD-10-CM

## 2023-04-13 ENCOUNTER — TRANSCRIBE ORDERS (OUTPATIENT)
Dept: ADMINISTRATIVE | Facility: HOSPITAL | Age: 78
End: 2023-04-13
Payer: MEDICARE

## 2023-04-13 DIAGNOSIS — T82.898D OCCLUSION OF BYPASS GRAFT, SUBSEQUENT ENCOUNTER: Primary | ICD-10-CM

## 2023-04-13 DIAGNOSIS — I65.23 BILATERAL CAROTID ARTERY STENOSIS: ICD-10-CM

## 2023-04-13 DIAGNOSIS — I73.9 PAD (PERIPHERAL ARTERY DISEASE): ICD-10-CM

## 2023-05-03 RX ORDER — TAMSULOSIN HYDROCHLORIDE 0.4 MG/1
1 CAPSULE ORAL DAILY
Qty: 60 CAPSULE | Refills: 1 | Status: SHIPPED | OUTPATIENT
Start: 2023-05-03

## 2023-05-17 ENCOUNTER — OFFICE VISIT (OUTPATIENT)
Dept: FAMILY MEDICINE CLINIC | Facility: CLINIC | Age: 78
End: 2023-05-17
Payer: MEDICARE

## 2023-05-17 VITALS
OXYGEN SATURATION: 100 % | WEIGHT: 147.6 LBS | HEIGHT: 70 IN | SYSTOLIC BLOOD PRESSURE: 114 MMHG | DIASTOLIC BLOOD PRESSURE: 66 MMHG | BODY MASS INDEX: 21.13 KG/M2 | HEART RATE: 50 BPM | TEMPERATURE: 97.5 F

## 2023-05-17 DIAGNOSIS — M25.561 CHRONIC PAIN OF RIGHT KNEE: ICD-10-CM

## 2023-05-17 DIAGNOSIS — R39.12 BENIGN PROSTATIC HYPERPLASIA WITH WEAK URINARY STREAM: ICD-10-CM

## 2023-05-17 DIAGNOSIS — R97.20 ELEVATED PSA: ICD-10-CM

## 2023-05-17 DIAGNOSIS — E53.8 B12 DEFICIENCY: ICD-10-CM

## 2023-05-17 DIAGNOSIS — J30.9 ALLERGIC RHINITIS, UNSPECIFIED SEASONALITY, UNSPECIFIED TRIGGER: ICD-10-CM

## 2023-05-17 DIAGNOSIS — I73.9 PERIPHERAL ARTERIAL DISEASE: Primary | ICD-10-CM

## 2023-05-17 DIAGNOSIS — I48.91 ATRIAL FIBRILLATION, UNSPECIFIED TYPE: ICD-10-CM

## 2023-05-17 DIAGNOSIS — I25.708 CORONARY ARTERY DISEASE INVOLVING CORONARY BYPASS GRAFT OF NATIVE HEART WITH OTHER FORMS OF ANGINA PECTORIS: ICD-10-CM

## 2023-05-17 DIAGNOSIS — G89.29 CHRONIC PAIN OF RIGHT KNEE: ICD-10-CM

## 2023-05-17 DIAGNOSIS — N40.1 BENIGN PROSTATIC HYPERPLASIA WITH WEAK URINARY STREAM: ICD-10-CM

## 2023-05-17 DIAGNOSIS — R74.8 ELEVATED ALKALINE PHOSPHATASE LEVEL: ICD-10-CM

## 2023-05-17 DIAGNOSIS — R25.1 TREMOR: ICD-10-CM

## 2023-05-17 LAB — PSA SERPL-MCNC: 1.32 NG/ML (ref 0–4)

## 2023-05-17 PROCEDURE — 84153 ASSAY OF PSA TOTAL: CPT | Performed by: FAMILY MEDICINE

## 2023-05-17 PROCEDURE — 36415 COLL VENOUS BLD VENIPUNCTURE: CPT | Performed by: FAMILY MEDICINE

## 2023-05-17 PROCEDURE — 99214 OFFICE O/P EST MOD 30 MIN: CPT | Performed by: FAMILY MEDICINE

## 2023-05-17 RX ORDER — SIMVASTATIN 40 MG
40 TABLET ORAL NIGHTLY
COMMUNITY
Start: 2023-05-03

## 2023-05-17 RX ORDER — MONTELUKAST SODIUM 10 MG/1
10 TABLET ORAL NIGHTLY
Qty: 90 TABLET | Refills: 1 | Status: SHIPPED | OUTPATIENT
Start: 2023-05-17

## 2023-05-17 RX ORDER — PRIMIDONE 50 MG/1
50 TABLET ORAL DAILY
Qty: 30 TABLET | Refills: 2 | Status: SHIPPED | OUTPATIENT
Start: 2023-05-17

## 2023-05-17 NOTE — PROGRESS NOTES
"Chief Complaint  PVD  Allergies   Subjective        Familia Thompson presents to [facility]  History of Present Illness  Patient presents today to discuss peripheral vascular disease.  He was last seen by Dr. Burger on 10/21/2022 and does have a follow-up scheduled next week.  There is concerns that he may need to have his right leg amputated due to peripheral vascular disease.  I did review the report from his last visit with vascular.  He has had occlusion of his previous bypass graft.  He is also being evaluated for carotid artery stenosis.  He does report taking Plavix but is not currently on aspirin.  He reports that this stopped per vascular.  He is considering his options and we did discuss a 1 month follow-up.  He continues to have issues with allergies.  He has congestion on the right side of his nostril.  His right knee continues to bother him.  X-ray was previously done on 2/22/2023.  This showed per report chondrocalcinosis with mild medial compartmental narrowing.  His right leg/knee continues to bother him.  I suspect that some of this is related to peripheral vascular disease however we did discuss getting an MRI for further evaluation.  Blood pressure has been adequately controlled.  He is currently taking metoprolol tartrate 12.5 mg twice daily.  He also has atrial fibrillation and is on amiodarone.  He is on Eliquis 5 mg twice daily.  His heart rate is bradycardic today 50 bpm.  He reports that he was just recently seen by his cardiologist, Dr. Dixon who is managing his atrial fibrillation.  He does have BPH.  His previous PSA was elevated at 4.2.  He is due to have his PSA repeated today.  He does have an elevated alkaline phosphatase level and GGT and will be seeing gastroenterology in July.  Objective   Vital Signs:  /66   Pulse 50   Temp 97.5 °F (36.4 °C)   Ht 177.8 cm (70\")   Wt 67 kg (147 lb 9.6 oz)   SpO2 100%   BMI 21.18 kg/m²   Estimated body mass " "index is 21.18 kg/m² as calculated from the following:    Height as of this encounter: 177.8 cm (70\").    Weight as of this encounter: 67 kg (147 lb 9.6 oz).       BMI is within normal parameters. No other follow-up for BMI required.      Physical Exam   General: AAO ×3, no acute distress, pleasant  HEENT: Normocephalic, atraumatic.  Nasal congestion noted which is most significant on the right side.  Septum is deviated to the right.  Cardiovascular: Regular rate and rhythm without appreciable murmur  Respiratory: Clear to auscultation bilaterally no RRW  Gastrointestinal: Soft nontender nondistended with bowel sounds present  Musculoskeletal: Right knee demonstrates negative valgus and varus stress testing, negative Randell, negative anterior and posterior drawer testing.  No effusion noted.  extremities: No edema  Neurologic: CN II through XII grossly intact   Psychiatric: Normal mood and affect  Result Review :                   Assessment and Plan   Diagnoses and all orders for this visit:    1. Peripheral arterial disease (Primary)    2. Benign prostatic hyperplasia with weak urinary stream    3. Tremor    4. B12 deficiency    5. Elevated alkaline phosphatase level    6. Allergic rhinitis, unspecified seasonality, unspecified trigger    7. Chronic pain of right knee  -     MRI Knee Right Without Contrast; Future    8. Coronary artery disease involving coronary bypass graft of native heart with other forms of angina pectoris    9. Elevated PSA  -     PSA DIAGNOSTIC    10. Atrial fibrillation, unspecified type    Other orders  -     primidone (MYSOLINE) 50 MG tablet; Take 1 tablet by mouth Daily.  Dispense: 30 tablet; Refill: 2  -     montelukast (Singulair) 10 MG tablet; Take 1 tablet by mouth Every Night.  Dispense: 90 tablet; Refill: 1    I discussed with patient starting Singulair to help out with his allergies in addition to Zyrtec.  We discussed using Flonase principally on the right side given that he has " more significant congestion on this side.  Plan to have his PSA repeated today.  Further recommendations to follow once results return.  MRI of the right knee has been ordered to further evaluate.  I plan to see him back in 1 month or sooner if needed.  Patient instructed to call with any questions or concerns.         Follow Up   Return in about 1 month (around 6/17/2023) for peripheral vascular disease.  Patient was given instructions and counseling regarding his condition or for health maintenance advice. Please see specific information pulled into the AVS if appropriate.

## 2023-06-19 ENCOUNTER — OFFICE VISIT (OUTPATIENT)
Dept: FAMILY MEDICINE CLINIC | Facility: CLINIC | Age: 78
End: 2023-06-19
Payer: MEDICARE

## 2023-06-19 VITALS
DIASTOLIC BLOOD PRESSURE: 64 MMHG | SYSTOLIC BLOOD PRESSURE: 134 MMHG | OXYGEN SATURATION: 98 % | HEART RATE: 50 BPM | HEIGHT: 70 IN | BODY MASS INDEX: 21.32 KG/M2 | WEIGHT: 148.9 LBS | TEMPERATURE: 97.7 F

## 2023-06-19 DIAGNOSIS — I73.9 PERIPHERAL ARTERIAL DISEASE: ICD-10-CM

## 2023-06-19 DIAGNOSIS — I25.708 CORONARY ARTERY DISEASE INVOLVING CORONARY BYPASS GRAFT OF NATIVE HEART WITH OTHER FORMS OF ANGINA PECTORIS: ICD-10-CM

## 2023-06-19 DIAGNOSIS — J45.20 MILD INTERMITTENT REACTIVE AIRWAY DISEASE WITHOUT COMPLICATION: ICD-10-CM

## 2023-06-19 DIAGNOSIS — M25.561 CHRONIC PAIN OF RIGHT KNEE: ICD-10-CM

## 2023-06-19 DIAGNOSIS — R97.20 ELEVATED PSA: ICD-10-CM

## 2023-06-19 DIAGNOSIS — G89.29 CHRONIC PAIN OF RIGHT KNEE: ICD-10-CM

## 2023-06-19 DIAGNOSIS — R79.89 ELEVATED TSH: ICD-10-CM

## 2023-06-19 DIAGNOSIS — R25.1 TREMOR: ICD-10-CM

## 2023-06-19 DIAGNOSIS — N40.1 BENIGN PROSTATIC HYPERPLASIA WITH WEAK URINARY STREAM: ICD-10-CM

## 2023-06-19 DIAGNOSIS — R39.12 BENIGN PROSTATIC HYPERPLASIA WITH WEAK URINARY STREAM: ICD-10-CM

## 2023-06-19 DIAGNOSIS — R74.8 ELEVATED ALKALINE PHOSPHATASE LEVEL: Primary | ICD-10-CM

## 2023-06-19 DIAGNOSIS — E78.5 HYPERLIPIDEMIA, UNSPECIFIED HYPERLIPIDEMIA TYPE: ICD-10-CM

## 2023-06-19 DIAGNOSIS — E03.9 HYPOTHYROIDISM, UNSPECIFIED TYPE: ICD-10-CM

## 2023-06-19 DIAGNOSIS — Z51.81 MEDICATION MONITORING ENCOUNTER: ICD-10-CM

## 2023-06-19 DIAGNOSIS — I48.91 ATRIAL FIBRILLATION, UNSPECIFIED TYPE: ICD-10-CM

## 2023-06-19 PROCEDURE — 99214 OFFICE O/P EST MOD 30 MIN: CPT | Performed by: FAMILY MEDICINE

## 2023-06-19 RX ORDER — LANOLIN ALCOHOL/MO/W.PET/CERES
CREAM (GRAM) TOPICAL
Qty: 90 TABLET | Refills: 3 | Status: SHIPPED | OUTPATIENT
Start: 2023-06-19

## 2023-06-19 RX ORDER — ALBUTEROL SULFATE 90 UG/1
2 AEROSOL, METERED RESPIRATORY (INHALATION) EVERY 4 HOURS PRN
Qty: 8 G | Refills: 3 | Status: SHIPPED | OUTPATIENT
Start: 2023-06-19

## 2023-06-19 NOTE — PROGRESS NOTES
"Chief Complaint  Chronic right knee pain    Subjective        Familia Thompson presents to [facility]  History of Present Illness  Patient presents today to follow-up for chronic right knee pain.  He previously had a x-ray of the right knee showing chondrocalcinosis with mild medial compartmental narrowing.  He reports his right knee feels worse now.  He points to pain on the lateral aspect.  I did previously order an MRI of the right knee.  This is scheduled for 6/26/2023.  He is encouraged to keep this appointment.  He continues to have issues with circulation in the right lower extremity as well.  He is supposed to see Dr. Burger but this had to be rescheduled twice now.  He does report having follow-up.  I encouraged to keep this appointment especially considering that there are concerns since he has occlusion of his previous bypass graft.  He is also being followed for carotid artery stenosis.  I reviewed his labs with him.  His PSA last month returned back to normal at 1.320.  4 months prior it was elevated at 4.200.  We discussed monitoring at this time.  He does have BPH and is taking Flomax.  He is requesting refill of albuterol which he takes for presumed asthma.  He does not report a formal diagnosis of this.  He uses the medication sparingly.  His tremors have been manageable.  He is using primidone to help out with this.  He has an elevated alkaline phosphatase level with an elevated GGT.  Ultrasound was done of the liver previously showing no acute findings.  The liver was normal per report.  He does have an appointment to see GI on 7/10/2023.  I discussed with patient having labs repeated when he returns for follow-up.  He continues to be followed by Dr. Dixon for atrial fibrillation.  He is taking amiodarone as well as metoprolol tartrate 12.5 mg twice daily.  Objective   Vital Signs:  /64   Pulse 50   Temp 97.7 °F (36.5 °C)   Ht 177.8 cm (70\")   Wt 67.5 kg (148 " "lb 14.4 oz)   SpO2 98%   BMI 21.36 kg/m²   Estimated body mass index is 21.36 kg/m² as calculated from the following:    Height as of this encounter: 177.8 cm (70\").    Weight as of this encounter: 67.5 kg (148 lb 14.4 oz).       BMI is within normal parameters. No other follow-up for BMI required.      Physical Exam   General: AAO ×3, no acute distress, pleasant  HEENT: Normocephalic, atraumatic  bilaterally with no erythema, no cervical tenderness or lymphadenopathy  Cardiovascular: Regular rate and rhythm without appreciable murmur  Respiratory: Clear to auscultation bilaterally no RRW  Gastrointestinal: Soft nontender nondistended with bowel sounds present  extremities: No edema  Neurologic: CN II through XII grossly intact   Psychiatric: Normal mood and affect  Result Review :                   Assessment and Plan   Diagnoses and all orders for this visit:    1. Elevated alkaline phosphatase level (Primary)    2. Chronic pain of right knee    3. Coronary artery disease involving coronary bypass graft of native heart with other forms of angina pectoris    4. Peripheral arterial disease    5. Elevated PSA    6. Mild intermittent reactive airway disease without complication    7. Tremor    8. Benign prostatic hyperplasia with weak urinary stream    9. Elevated TSH    10. Medication monitoring encounter    11. Atrial fibrillation, unspecified type    12. Hyperlipidemia, unspecified hyperlipidemia type    13. Hypothyroidism, unspecified type    Other orders  -     albuterol sulfate  (90 Base) MCG/ACT inhaler; Inhale 2 puffs Every 4 (Four) Hours As Needed for Wheezing or Shortness of Air.  Dispense: 8 g; Refill: 3    Plan as documented above.  I discussed with patient's having labs repeated prior to next appointment.  I plan to monitor his TSH as well.  Previously was noted to have a slightly elevated TSH however the repeat most recently was normal.  He is encouraged to keep appointment with GI.  I also " encouraged him to keep his appointment for his MRI of the right knee as well as with vascular surgery.  I plan to see him back in 2 months or sooner if needed.  Patient instructed to call with any questions or concerns.         Follow Up   Return in about 6 weeks (around 8/1/2023) for PAD.  Patient was given instructions and counseling regarding his condition or for health maintenance advice. Please see specific information pulled into the AVS if appropriate.

## 2023-07-10 PROBLEM — K22.70 BARRETT'S ESOPHAGUS WITHOUT DYSPLASIA: Status: ACTIVE | Noted: 2023-07-10

## 2023-07-31 ENCOUNTER — TELEPHONE (OUTPATIENT)
Dept: FAMILY MEDICINE CLINIC | Facility: CLINIC | Age: 78
End: 2023-07-31
Payer: MEDICARE

## 2023-07-31 RX ORDER — TAMSULOSIN HYDROCHLORIDE 0.4 MG/1
1 CAPSULE ORAL DAILY
Qty: 60 CAPSULE | Refills: 1 | Status: SHIPPED | OUTPATIENT
Start: 2023-07-31

## 2023-08-07 ENCOUNTER — OFFICE VISIT (OUTPATIENT)
Dept: FAMILY MEDICINE CLINIC | Facility: CLINIC | Age: 78
End: 2023-08-07
Payer: MEDICARE

## 2023-08-07 VITALS
TEMPERATURE: 98.2 F | DIASTOLIC BLOOD PRESSURE: 72 MMHG | OXYGEN SATURATION: 99 % | WEIGHT: 149.3 LBS | SYSTOLIC BLOOD PRESSURE: 148 MMHG | BODY MASS INDEX: 21.37 KG/M2 | HEART RATE: 58 BPM | HEIGHT: 70 IN

## 2023-08-07 DIAGNOSIS — I73.9 PERIPHERAL ARTERIAL DISEASE: ICD-10-CM

## 2023-08-07 DIAGNOSIS — R79.89 ELEVATED TSH: ICD-10-CM

## 2023-08-07 DIAGNOSIS — R39.12 BENIGN PROSTATIC HYPERPLASIA WITH WEAK URINARY STREAM: ICD-10-CM

## 2023-08-07 DIAGNOSIS — G89.29 CHRONIC PAIN OF RIGHT KNEE: ICD-10-CM

## 2023-08-07 DIAGNOSIS — E53.8 B12 DEFICIENCY: ICD-10-CM

## 2023-08-07 DIAGNOSIS — E78.5 HYPERLIPIDEMIA, UNSPECIFIED HYPERLIPIDEMIA TYPE: ICD-10-CM

## 2023-08-07 DIAGNOSIS — N40.1 BENIGN PROSTATIC HYPERPLASIA WITH WEAK URINARY STREAM: ICD-10-CM

## 2023-08-07 DIAGNOSIS — K22.70 BARRETT'S ESOPHAGUS WITHOUT DYSPLASIA: ICD-10-CM

## 2023-08-07 DIAGNOSIS — R97.20 ELEVATED PSA: ICD-10-CM

## 2023-08-07 DIAGNOSIS — M25.561 CHRONIC PAIN OF RIGHT KNEE: ICD-10-CM

## 2023-08-07 DIAGNOSIS — R74.8 ELEVATED ALKALINE PHOSPHATASE LEVEL: ICD-10-CM

## 2023-08-07 DIAGNOSIS — I65.23 BILATERAL CAROTID ARTERY STENOSIS: ICD-10-CM

## 2023-08-07 DIAGNOSIS — I10 PRIMARY HYPERTENSION: Primary | ICD-10-CM

## 2023-08-07 DIAGNOSIS — E03.9 HYPOTHYROIDISM, UNSPECIFIED TYPE: ICD-10-CM

## 2023-08-07 DIAGNOSIS — Z51.81 MEDICATION MONITORING ENCOUNTER: ICD-10-CM

## 2023-08-07 DIAGNOSIS — R25.1 TREMOR: ICD-10-CM

## 2023-08-07 PROCEDURE — 99214 OFFICE O/P EST MOD 30 MIN: CPT | Performed by: FAMILY MEDICINE

## 2023-08-07 RX ORDER — PROPRANOLOL HYDROCHLORIDE 20 MG/1
20 TABLET ORAL 2 TIMES DAILY
Qty: 60 TABLET | Refills: 2 | Status: SHIPPED | OUTPATIENT
Start: 2023-08-07

## 2023-08-07 RX ORDER — AMIODARONE HYDROCHLORIDE 200 MG/1
200 TABLET ORAL DAILY
Qty: 30 TABLET | Refills: 11 | Status: SHIPPED
Start: 2023-08-07

## 2023-08-07 NOTE — PROGRESS NOTES
Chief Complaint  Peripheral arterial disease    Subjective        Familia Thompson presents to [facility]  History of Present Illness  Patient presents today to follow-up for peripheral arterial disease.  He is being followed by Dr. Burger.  He has circulation issues in the right lower extremity as well as carotid artery stenosis.  He does have imaging coming up including a carotid Doppler as well as a Doppler of the lower extremities.  He does have ongoing issues with chronic right knee pain.  He did see orthopedics and received an injection.  He does have follow-up with orthopedics on 8/16/2023.  He is being followed by GI as well for an elevated alkaline phosphatase level as well as Collins's esophagus.  He does have a follow-up EGD coming up in September.  His blood pressure is elevated today at 148/72.  He is currently taking amiodarone 200 mg daily as well as metoprolol 25 mg twice daily.  He takes this as well for atrial fibrillation.  He has had fluctuations between levels that are slightly elevated as well as normal levels.  I did discuss with patient continuing to monitor at this time.  He is encouraged to check his blood pressure at home.  He is due for labs.  He was previously noted to have an elevated TSH however his repeat TSH and free T4 level was normal.  He is not currently on thyroid supplementation.  He continues to take B12.  I plan to check these levels today as well.  He was previously noted to have an elevated PSA but most recently his PSA level came back down to normal at 1.320.  It was as high as 4.200.  He is interested in coming off of some of his medications.  He has not been taking Singulair regularly.  He is interested in coming off of his allergy medications as he is not sure he needs them anymore.  I did discuss with him a trial off of Singulair as well as Zyrtec and Flonase.  Should he have any further issues or recurrence of symptoms he is instructed to  "let us know.  His tremor has become more bothersome lately.  I had previously prescribed him primidone back in February 2023.  He is unsure if it is helped out or not.  Primidone does carry drug interaction with Eliquis so we did discuss stopping the primidone and I will place him on propranolol and stop the metoprolol to avoid any bradycardia and the propranolol will also help treat the atrial fibrillation.  Objective   Vital Signs:  /72   Pulse 58   Temp 98.2 øF (36.8 øC)   Ht 177.8 cm (70\")   Wt 67.7 kg (149 lb 4.8 oz)   SpO2 99%   BMI 21.42 kg/mý   Estimated body mass index is 21.42 kg/mý as calculated from the following:    Height as of this encounter: 177.8 cm (70\").    Weight as of this encounter: 67.7 kg (149 lb 4.8 oz).       BMI is within normal parameters. No other follow-up for BMI required.      Physical Exam   General: AAO x3, no acute distress, pleasant  HEENT: Normocephalic, atraumatic  Cardiovascular: Regular rate and rhythm without appreciable murmur  Respiratory: Clear to auscultation bilaterally no RRW  Gastrointestinal: Soft nontender nondistended with bowel sounds present  extremities: No edema  Neurologic: CN II through XII grossly intact. Intention tremor noted in the upper extremities that improves with rest.   Psychiatric: Normal mood and affect  Result Review :                   Assessment and Plan   Diagnoses and all orders for this visit:    1. Primary hypertension (Primary)    2. Peripheral arterial disease    3. Bilateral carotid artery stenosis    4. Chronic pain of right knee    5. Elevated TSH  -     Thyroid Antibodies  -     TSH+Free T4    6. B12 deficiency  -     Vitamin B12    7. Collins's esophagus without dysplasia    8. Elevated PSA  -     PSA DIAGNOSTIC    9. Benign prostatic hyperplasia with weak urinary stream    10. Tremor    11. Elevated alkaline phosphatase level  -     Comprehensive Metabolic Panel  -     CBC & Differential    12. Medication monitoring " encounter  -     TSH+Free T4    13. Hypothyroidism, unspecified type  -     TSH+Free T4    14. Hyperlipidemia, unspecified hyperlipidemia type  -     Lipid Panel    Other orders  -     propranolol (INDERAL) 20 MG tablet; Take 1 tablet by mouth 2 (Two) Times a Day.  Dispense: 60 tablet; Refill: 2  -     amiodarone (PACERONE) 200 MG tablet; Take 1 tablet by mouth Daily.  Dispense: 30 tablet; Refill: 11      Plan as documented above.  I discussed with patient seeing him back in 3 months.  We discussed having labs done today with further recommendations to follow once results return.  I plan to check a PSA again given previously noted elevation.  He is encouraged to take pantoprazole given previously noted Collins's esophagus.       Follow Up   Return in about 3 months (around 11/7/2023) for PAD.  Patient was given instructions and counseling regarding his condition or for health maintenance advice. Please see specific information pulled into the AVS if appropriate.

## 2023-08-08 ENCOUNTER — CLINICAL SUPPORT (OUTPATIENT)
Dept: FAMILY MEDICINE CLINIC | Facility: CLINIC | Age: 78
End: 2023-08-08
Payer: MEDICARE

## 2023-08-08 DIAGNOSIS — Z51.81 MEDICATION MONITORING ENCOUNTER: Primary | ICD-10-CM

## 2023-08-08 LAB
ALBUMIN SERPL-MCNC: 4.1 G/DL (ref 3.5–5.2)
ALBUMIN/GLOB SERPL: 2.1 G/DL
ALP SERPL-CCNC: 112 U/L (ref 39–117)
ALT SERPL W P-5'-P-CCNC: 29 U/L (ref 1–41)
ANION GAP SERPL CALCULATED.3IONS-SCNC: 14 MMOL/L (ref 5–15)
AST SERPL-CCNC: 36 U/L (ref 1–40)
BASOPHILS # BLD AUTO: 0.05 10*3/MM3 (ref 0–0.2)
BASOPHILS NFR BLD AUTO: 1.3 % (ref 0–1.5)
BILIRUB SERPL-MCNC: 0.7 MG/DL (ref 0–1.2)
BUN SERPL-MCNC: 10 MG/DL (ref 8–23)
BUN/CREAT SERPL: 10.9 (ref 7–25)
CALCIUM SPEC-SCNC: 9.3 MG/DL (ref 8.6–10.5)
CHLORIDE SERPL-SCNC: 101 MMOL/L (ref 98–107)
CHOLEST SERPL-MCNC: 143 MG/DL (ref 0–200)
CO2 SERPL-SCNC: 23 MMOL/L (ref 22–29)
CREAT SERPL-MCNC: 0.92 MG/DL (ref 0.76–1.27)
DEPRECATED RDW RBC AUTO: 44.4 FL (ref 37–54)
EGFRCR SERPLBLD CKD-EPI 2021: 85.1 ML/MIN/1.73
EOSINOPHIL # BLD AUTO: 0.07 10*3/MM3 (ref 0–0.4)
EOSINOPHIL NFR BLD AUTO: 1.8 % (ref 0.3–6.2)
ERYTHROCYTE [DISTWIDTH] IN BLOOD BY AUTOMATED COUNT: 13 % (ref 12.3–15.4)
GLOBULIN UR ELPH-MCNC: 2 GM/DL
GLUCOSE SERPL-MCNC: 81 MG/DL (ref 65–99)
HCT VFR BLD AUTO: 40.8 % (ref 37.5–51)
HDLC SERPL-MCNC: 79 MG/DL (ref 40–60)
HGB BLD-MCNC: 13.9 G/DL (ref 13–17.7)
IMM GRANULOCYTES # BLD AUTO: 0.01 10*3/MM3 (ref 0–0.05)
IMM GRANULOCYTES NFR BLD AUTO: 0.3 % (ref 0–0.5)
LDLC SERPL CALC-MCNC: 52 MG/DL (ref 0–100)
LDLC/HDLC SERPL: 0.67 {RATIO}
LYMPHOCYTES # BLD AUTO: 1.04 10*3/MM3 (ref 0.7–3.1)
LYMPHOCYTES NFR BLD AUTO: 27.2 % (ref 19.6–45.3)
MCH RBC QN AUTO: 31.5 PG (ref 26.6–33)
MCHC RBC AUTO-ENTMCNC: 34.1 G/DL (ref 31.5–35.7)
MCV RBC AUTO: 92.5 FL (ref 79–97)
MONOCYTES # BLD AUTO: 0.45 10*3/MM3 (ref 0.1–0.9)
MONOCYTES NFR BLD AUTO: 11.7 % (ref 5–12)
NEUTROPHILS NFR BLD AUTO: 2.21 10*3/MM3 (ref 1.7–7)
NEUTROPHILS NFR BLD AUTO: 57.7 % (ref 42.7–76)
NRBC BLD AUTO-RTO: 0 /100 WBC (ref 0–0.2)
PLATELET # BLD AUTO: 143 10*3/MM3 (ref 140–450)
PMV BLD AUTO: 11.2 FL (ref 6–12)
POTASSIUM SERPL-SCNC: 4.7 MMOL/L (ref 3.5–5.2)
PROT SERPL-MCNC: 6.1 G/DL (ref 6–8.5)
PSA SERPL-MCNC: 1.64 NG/ML (ref 0–4)
RBC # BLD AUTO: 4.41 10*6/MM3 (ref 4.14–5.8)
SODIUM SERPL-SCNC: 138 MMOL/L (ref 136–145)
T4 FREE SERPL-MCNC: 1.5 NG/DL (ref 0.93–1.7)
TRIGL SERPL-MCNC: 57 MG/DL (ref 0–150)
TSH SERPL DL<=0.05 MIU/L-ACNC: 3.04 UIU/ML (ref 0.27–4.2)
VIT B12 BLD-MCNC: 617 PG/ML (ref 211–946)
VLDLC SERPL-MCNC: 12 MG/DL (ref 5–40)
WBC NRBC COR # BLD: 3.83 10*3/MM3 (ref 3.4–10.8)

## 2023-08-08 PROCEDURE — 85025 COMPLETE CBC W/AUTO DIFF WBC: CPT | Performed by: FAMILY MEDICINE

## 2023-08-08 PROCEDURE — 86376 MICROSOMAL ANTIBODY EACH: CPT | Performed by: FAMILY MEDICINE

## 2023-08-08 PROCEDURE — 82607 VITAMIN B-12: CPT | Performed by: FAMILY MEDICINE

## 2023-08-08 PROCEDURE — 80061 LIPID PANEL: CPT | Performed by: FAMILY MEDICINE

## 2023-08-08 PROCEDURE — 84443 ASSAY THYROID STIM HORMONE: CPT | Performed by: FAMILY MEDICINE

## 2023-08-08 PROCEDURE — 84439 ASSAY OF FREE THYROXINE: CPT | Performed by: FAMILY MEDICINE

## 2023-08-08 PROCEDURE — 80053 COMPREHEN METABOLIC PANEL: CPT | Performed by: FAMILY MEDICINE

## 2023-08-08 PROCEDURE — 86800 THYROGLOBULIN ANTIBODY: CPT | Performed by: FAMILY MEDICINE

## 2023-08-08 PROCEDURE — 84153 ASSAY OF PSA TOTAL: CPT | Performed by: FAMILY MEDICINE

## 2023-08-09 LAB
THYROGLOB AB SERPL-ACNC: <1 IU/ML (ref 0–0.9)
THYROPEROXIDASE AB SERPL-ACNC: <9 IU/ML (ref 0–34)

## 2023-08-16 ENCOUNTER — OFFICE VISIT (OUTPATIENT)
Dept: ORTHOPEDIC SURGERY | Facility: CLINIC | Age: 78
End: 2023-08-16
Payer: MEDICARE

## 2023-08-16 VITALS
DIASTOLIC BLOOD PRESSURE: 58 MMHG | HEART RATE: 52 BPM | HEIGHT: 70 IN | WEIGHT: 149 LBS | OXYGEN SATURATION: 93 % | BODY MASS INDEX: 21.33 KG/M2 | SYSTOLIC BLOOD PRESSURE: 98 MMHG

## 2023-08-16 DIAGNOSIS — M17.11 PRIMARY OSTEOARTHRITIS OF RIGHT KNEE: Primary | ICD-10-CM

## 2023-08-16 DIAGNOSIS — S83.281A TEAR OF LATERAL MENISCUS OF RIGHT KNEE, UNSPECIFIED TEAR TYPE, UNSPECIFIED WHETHER OLD OR CURRENT TEAR, INITIAL ENCOUNTER: ICD-10-CM

## 2023-08-16 DIAGNOSIS — S83.241A ACUTE MEDIAL MENISCUS TEAR OF RIGHT KNEE, INITIAL ENCOUNTER: ICD-10-CM

## 2023-08-16 NOTE — PROGRESS NOTES
"Chief Complaint  Follow-up of the Right Knee    Subjective          Familia Thompson presents to [facility]   History of Present Illness    Familia Thompson presents today for a follow-up of his right knee.  Patient has right knee arthritis that we have been treating conservatively.  Today, he states that his previous right knee and steroid injection provided some relief until about last week.  He describes soreness in his knee.  He states he has been doing his home exercises.  He is not currently take any medications for his knee.  He denies new injuries.      No Known Allergies     Social History     Socioeconomic History    Marital status:    Tobacco Use    Smoking status: Former     Packs/day: 1.00     Years: 30.00     Pack years: 30.00     Types: Cigarettes     Quit date:      Years since quittin.6     Passive exposure: Past    Smokeless tobacco: Never    Tobacco comments:     Quit smoking in 2023    Vaping Use    Vaping Use: Never used   Substance and Sexual Activity    Alcohol use: Yes     Alcohol/week: 12.0 standard drinks     Types: 12 Cans of beer per week     Comment: 6 beers a day    Drug use: Not Currently     Comment: 0cc    Sexual activity: Defer        I reviewed the patient's chief complaint, history of present illness, review of systems, past medical history, surgical history, family history, social history, medications, and allergy list.     REVIEW OF SYSTEMS    Constitutional: Denies fevers, chills, weight loss  Cardiovascular: Denies chest pain, shortness of breath  Skin: Denies rashes, acute skin changes  Neurologic: Denies headache, loss of consciousness  MSK: Right knee pain      Objective   Vital Signs:   BP 98/58   Pulse 52   Ht 177.8 cm (70\")   Wt 67.6 kg (149 lb)   SpO2 93%   BMI 21.38 kg/mý     Body mass index is 21.38 kg/mý.    Physical Exam    General: Alert. No acute distress.   Right lower extremity: Tenderness to the medial joint " line.  Nontender to lateral joint line.  Full knee extension.  Knee flexion 110.  Knee extensor mechanism intact.  Patella crepitus with motion.  Knee stable to varus and valgus stress.  Calf soft, nontender.  Demonstrates active ankle plantarflexion and dorsiflexion.  Sensation intact over dorsal and plantar foot.  Palpable pedal pulses.    Large Joint Arthrocentesis  Date/Time: 8/16/2023 11:03 AM  Consent given by: patient  Site marked: site marked  Timeout: Immediately prior to procedure a time out was called to verify the correct patient, procedure, equipment, support staff and site/side marked as required   Supporting Documentation  Indications: pain   Procedure Details  Location: -   Location: RIGHT KNEE.Needle gauge: 21G.  Medications administered: 48 mg hylan 48 MG/6ML  Patient tolerance: patient tolerated the procedure well with no immediate complications      Imaging Results (Most Recent)       None                   Assessment and Plan    Diagnoses and all orders for this visit:    1. Primary osteoarthritis of right knee (Primary)    2. Acute medial meniscus tear of right knee, initial encounter    3. Tear of lateral meniscus of right knee, unspecified tear type, unspecified whether old or current tear, initial encounter    Other orders  -     Large Joint Arthrocentesis        Familia Thmopson presents today for follow-up of his right knee arthritis that we are treating conservatively.  Patient instructed to continue with his home exercises. We discussed the risks and benefits with the patient regarding right knee Synvisc injection. Patient understood and elected to proceed. Patient tolerated injection well with no complications.  Continue with over-the-counter medications as needed.      Patient will follow up in 3 months for reevaluation.        Call or return if symptoms worsen or patient has any concerns.       Follow Up   Return in about 3 months (around 11/16/2023).  Patient was given instructions and  counseling regarding his condition or for health maintenance advice. Please see specific information pulled into the AVS if appropriate.     Fatoumata Mcadams PA-C  08/16/23  13:19 EDT

## 2023-08-22 ENCOUNTER — HOSPITAL ENCOUNTER (OUTPATIENT)
Dept: NUCLEAR MEDICINE | Facility: HOSPITAL | Age: 78
Discharge: HOME | End: 2023-08-22
Admitting: SPECIALIST
Payer: MEDICARE

## 2023-08-22 DIAGNOSIS — R07.9 CHEST PAIN, UNSPECIFIED TYPE: ICD-10-CM

## 2023-08-22 LAB
BH CV IMMEDIATE POST TECH DATA BLOOD PRESSURE: NORMAL MMHG
BH CV IMMEDIATE POST TECH DATA HEART RATE: 65 BPM
BH CV IMMEDIATE POST TECH DATA OXYGEN SATS: 98 %
BH CV REST NUCLEAR ISOTOPE DOSE: 10 MCI
BH CV SIX MINUTE RECOVERY TECH DATA BLOOD PRESSURE: NORMAL
BH CV SIX MINUTE RECOVERY TECH DATA HEART RATE: 60 BPM
BH CV SIX MINUTE RECOVERY TECH DATA OXYGEN SATURATION: 98 %
BH CV STRESS BP STAGE 1: NORMAL
BH CV STRESS COMMENTS STAGE 1: NORMAL
BH CV STRESS DOSE REGADENOSON STAGE 1: 0.4
BH CV STRESS DURATION MIN STAGE 1: 0
BH CV STRESS DURATION SEC STAGE 1: 10
BH CV STRESS HR STAGE 1: 60
BH CV STRESS NUCLEAR ISOTOPE DOSE: 37 MCI
BH CV STRESS O2 STAGE 1: 97
BH CV STRESS PROTOCOL 1: NORMAL
BH CV STRESS RECOVERY BP: NORMAL MMHG
BH CV STRESS RECOVERY HR: 60 BPM
BH CV STRESS RECOVERY O2: 98 %
BH CV STRESS STAGE 1: 1
BH CV THREE MINUTE POST TECH DATA BLOOD PRESSURE: NORMAL MMHG
BH CV THREE MINUTE POST TECH DATA HEART RATE: 61 BPM
BH CV THREE MINUTE POST TECH DATA OXYGEN SATURATION: 98 %
LV EF NUC BP: 32 %
MAXIMAL PREDICTED HEART RATE: 142 BPM
PERCENT MAX PREDICTED HR: 45.77 %
STRESS BASELINE BP: NORMAL MMHG
STRESS BASELINE HR: 55 BPM
STRESS O2 SAT REST: 95 %
STRESS PERCENT HR: 54 %
STRESS POST O2 SAT PEAK: 98 %
STRESS POST PEAK BP: NORMAL MMHG
STRESS POST PEAK HR: 65 BPM
STRESS TARGET HR: 121 BPM

## 2023-08-22 PROCEDURE — 93017 CV STRESS TEST TRACING ONLY: CPT

## 2023-08-22 PROCEDURE — A9502 TC99M TETROFOSMIN: HCPCS | Performed by: SPECIALIST

## 2023-08-22 PROCEDURE — 78452 HT MUSCLE IMAGE SPECT MULT: CPT

## 2023-08-22 PROCEDURE — 0 TECHNETIUM TETROFOSMIN KIT: Performed by: SPECIALIST

## 2023-08-22 PROCEDURE — 25010000002 REGADENOSON 0.4 MG/5ML SOLUTION: Performed by: SPECIALIST

## 2023-08-22 RX ORDER — REGADENOSON 0.08 MG/ML
0.4 INJECTION, SOLUTION INTRAVENOUS
Status: COMPLETED | OUTPATIENT
Start: 2023-08-22 | End: 2023-08-22

## 2023-08-22 RX ADMIN — TETROFOSMIN 1 DOSE: 1.38 INJECTION, POWDER, LYOPHILIZED, FOR SOLUTION INTRAVENOUS at 07:05

## 2023-08-22 RX ADMIN — REGADENOSON 0.4 MG: 0.08 INJECTION, SOLUTION INTRAVENOUS at 08:15

## 2023-08-22 RX ADMIN — TETROFOSMIN 1 DOSE: 1.38 INJECTION, POWDER, LYOPHILIZED, FOR SOLUTION INTRAVENOUS at 08:15

## 2023-08-29 ENCOUNTER — OFFICE VISIT (OUTPATIENT)
Dept: CARDIOLOGY | Facility: CLINIC | Age: 78
End: 2023-08-29
Payer: MEDICARE

## 2023-08-29 VITALS
HEART RATE: 57 BPM | SYSTOLIC BLOOD PRESSURE: 124 MMHG | BODY MASS INDEX: 21.33 KG/M2 | WEIGHT: 149 LBS | HEIGHT: 70 IN | DIASTOLIC BLOOD PRESSURE: 61 MMHG

## 2023-08-29 DIAGNOSIS — I25.709 CORONARY ARTERY DISEASE INVOLVING CORONARY BYPASS GRAFT OF NATIVE HEART WITH ANGINA PECTORIS: Primary | ICD-10-CM

## 2023-08-29 PROBLEM — I21.4 NSTEMI (NON-ST ELEVATED MYOCARDIAL INFARCTION): Status: RESOLVED | Noted: 2022-04-27 | Resolved: 2023-08-29

## 2023-08-29 PROCEDURE — 1160F RVW MEDS BY RX/DR IN RCRD: CPT | Performed by: INTERNAL MEDICINE

## 2023-08-29 PROCEDURE — 1159F MED LIST DOCD IN RCRD: CPT | Performed by: INTERNAL MEDICINE

## 2023-08-29 PROCEDURE — 99213 OFFICE O/P EST LOW 20 MIN: CPT | Performed by: INTERNAL MEDICINE

## 2023-08-29 NOTE — ASSESSMENT & PLAN NOTE
He had remote CABG.  Cardiac cath in 4/2022 showed patent WARE to LAD.  Left main/ostial left circumflex, had a critical calcified lesion.  RCA had no significant lesions.  He underwent high risk angioplasty of the left main into LCx artery.  He reports fatigue but no significant chest pain.  He also has significant peripheral artery disease and had aortofemoral bypass in the past.  Recent SPECT stress test showed ischemia of the anterior wall.    It is reasonable to proceed with Cardiac catheterization to delineate the coronary anatomy and further angioplasty if indicated.  Because of the complex coronary anatomy, will discuss with interventional cardiologist at [facility] to have the procedure done there with surgical backup.

## 2023-08-29 NOTE — PROGRESS NOTES
CARDIOLOGY FOLLOW-UP PROGRESS NOTE        Chief Complaint  Coronary Artery Disease    Referring provider  Dr. Alejandro Dixon     Reason for consultation  Abnormal stress test, evaluate for Cardiac catheterization    Subjective            Familia Thompson presents to [facility]  History of Present Illness    This is a 77 y.o. male with coronary disease, previous CABG, paroxysmal atrial fibrillation on Eliquis, peripheral artery disease status post bilateral lower extremity bypass surgery, hearing impairment and COPD.  He was admitted to the hospital on 4/28/2022 with shortness of breath.  In the ER, he had A-fib with RVR, followed by V. tach.  He required cardioversion.  Subsequent evaluation showed elevated troponin.  Interventional cardiology was consulted at that point and patient underwent Cardiac catheterization which showed critical stenosis of the left main and ostial left circumflex artery which was not bypassed.  He was transferred to [facility], where he underwent high risk angioplasty with atherectomy of the ostial left main into LAD artery.  He was discharged home in stable condition.    He has been following up with Dr. Dixon, his primary cardiologist since then.  Recently he underwent a SPECT stress test which showed anterior wall infarct with ischemia.  He was referred back for possible Cardiac catheterization.  He still takes Plavix along with statins.  He is also on amiodarone and Eliquis for atrial fibrillation    Past History:        Medical History:  Past Medical History:   Diagnosis Date    Arthritis     Chronic systolic CHF (congestive heart failure)     COPD (chronic obstructive pulmonary disease)     Coronary artery disease     Hemorrhoids     HL (hearing loss)     Hyperlipidemia     Hypertension     Myocardial infarction     NSTEMI (non-ST elevated myocardial infarction) 04/27/2022    Added automatically from request for surgery [id number]    PVD  (peripheral vascular disease)     Sinus disorder        Surgical History: has a past surgical history that includes Vein ligation and stripping; Coronary angioplasty with stent; Wrist fracture surgery; Cardiac catheterization (N/A, 04/29/2022); Cardiac catheterization (N/A, 05/02/2022); Cardiac catheterization (N/A, 05/02/2022); Cardiac catheterization (N/A, 05/02/2022); Colonoscopy; Upper gastrointestinal endoscopy; Eye surgery (Bilateral); Cardiac surgery; Esophagogastroduodenoscopy (N/A, 9/15/2022); and Colonoscopy (N/A, 9/15/2022).     Social History: reports that he quit smoking about 2 years ago. His smoking use included cigarettes. He has a 30.00 pack-year smoking history. He has been exposed to tobacco smoke. He has never used smokeless tobacco. He reports current alcohol use of about 12.0 standard drinks per week. He reports that he does not currently use drugs.    Allergies: Patient has no known allergies.    Current Outpatient Medications on File Prior to Visit   Medication Sig    albuterol sulfate  (90 Base) MCG/ACT inhaler Inhale 2 puffs Every 4 (Four) Hours As Needed for Wheezing or Shortness of Air.    amiodarone (PACERONE) 200 MG tablet Take 1 tablet by mouth Daily.    atorvastatin (LIPITOR) 80 MG tablet Take 1 tablet by mouth Every Night.    clopidogrel (PLAVIX) 75 MG tablet Take 1 tablet by mouth Daily.    Eliquis 5 MG tablet tablet Take 1 tablet by mouth 2 (Two) Times a Day. Last dose 9/11/22    pantoprazole (PROTONIX) 40 MG EC tablet Take 1 tablet by mouth Daily.    propranolol (INDERAL) 20 MG tablet Take 1 tablet by mouth 2 (Two) Times a Day.    tamsulosin (FLOMAX) 0.4 MG capsule 24 hr capsule TAKE 1 CAPSULE BY MOUTH DAILY    vitamin B-12 (CYANOCOBALAMIN) 1000 MCG tablet TAKE 1 TABLET BY MOUTH EVERY DAY     No current facility-administered medications on file prior to visit.          Review of Systems   Constitutional:  Positive for fatigue.   Respiratory:  Negative for cough, shortness  "of breath and wheezing.    Cardiovascular:  Negative for chest pain, palpitations and leg swelling.   Gastrointestinal:  Negative for nausea and vomiting.   Neurological:  Negative for dizziness and syncope.      Objective     /61   Pulse 57   Ht 177.8 cm (70\")   Wt 67.6 kg (149 lb)   BMI 21.38 kg/mý       Physical Exam    General : Alert, awake, no acute distress  Neck : Supple, no carotid bruit, no jugular venous distention  CVS : RRR, no murmur, rubs or gallops  Lungs: Clear to auscultation bilaterally, no crackles or rhonchi  Abdomen: Soft, nontender, bowel sounds heard in all 4 quadrants  Extremities: Warm, well-perfused, no pedal edema    Result Review :     The following data was reviewed by: Zaki Díaz MD on 08/29/2023:    CMP          10/25/2022    10:52 2/15/2023    17:03 8/8/2023    09:15   CMP   Glucose 82  75  81    BUN 9  6  10    Creatinine 0.99  0.97  0.92    EGFR 78.5  80.4  85.1    Sodium 133  136  138    Potassium 4.3  4.2  4.7    Chloride 101  99  101    Calcium 8.9  9.0  9.3    Total Protein 6.2  6.5  6.1    Albumin 4.10  4.2  4.1    Globulin 2.1  2.3  2.0    Total Bilirubin 0.5  0.2  0.7    Alkaline Phosphatase 121  142  112    AST (SGOT) 69  52  36    ALT (SGPT) 66  51  29    Albumin/Globulin Ratio 2.0  1.8  2.1    BUN/Creatinine Ratio 9.1  6.2  10.9    Anion Gap 13.3  12.1  14.0      CBC          10/3/2022    13:00 2/15/2023    17:03 8/8/2023    09:15   CBC   WBC 4.89  4.48  3.83    RBC 4.93  4.54  4.41    Hemoglobin 14.4  13.6  13.9    Hematocrit 43.0  41.7  40.8    MCV 87.2  91.9  92.5    MCH 29.2  30.0  31.5    MCHC 33.5  32.6  34.1    RDW 13.6  12.8  13.0    Platelets 167  167  143      TSH          10/3/2022    13:00 10/25/2022    10:52 8/8/2023    09:15   TSH   TSH 5.160  4.120  3.040      Lipid Panel          8/8/2023    09:15   Lipid Panel   Total Cholesterol 143    Triglycerides 57    HDL Cholesterol 79    VLDL Cholesterol 12    LDL Cholesterol  52    LDL/HDL Ratio " 0.67           Data reviewed: Cardiology studies        Results for orders placed during the hospital encounter of 04/27/22    Adult Transthoracic Echo Complete w/ Color, Spectral and Contrast if necessary per protocol    Interpretation Summary  ú Estimated right ventricular systolic pressure from tricuspid regurgitation is moderately elevated (45-55 mmHg).  ú Calculated left ventricular EF = 40% Estimated left ventricular EF was in agreement with the calculated left ventricular EF.  ú Left ventricular diastolic function is consistent with (grade II w/high LAP) pseudonormalization.  ú There is moderate calcification of the aortic valve.      Results for orders placed during the hospital encounter of 08/22/23    Stress test with myocardial perfusion one day    Interpretation Summary    Left ventricular ejection fraction is moderately reduced (Calculated EF = 32%).    Impressions are consistent with an intermediate risk study.    This study showed evidence of anterolateral ischemia and  apical infarct.               Assessment and Plan        Diagnoses and all orders for this visit:    1. Coronary artery disease involving coronary bypass graft of native heart with angina pectoris (Primary)  Assessment & Plan:  He had remote CABG.  Cardiac cath in 4/2022 showed patent WARE to LAD.  Left main/ostial left circumflex, had a critical calcified lesion.  RCA had no significant lesions.  He underwent high risk angioplasty of the left main into LCx artery.  He reports fatigue but no significant chest pain.  He also has significant peripheral artery disease and had aortofemoral bypass in the past.  Recent SPECT stress test showed ischemia of the anterior wall.    It is reasonable to proceed with Cardiac catheterization to delineate the coronary anatomy and further angioplasty if indicated.  Because of the complex coronary anatomy, will discuss with interventional cardiologist at [facility] to have the procedure  done there with surgical backup.        Management of atrial fibrillation, paroxysmal ventricular tachycardia per Dr. Dixon, primary cardiologist        Follow Up     No follow-ups on file.    Patient was given instructions and counseling regarding his condition or for health maintenance advice. Please see specific information pulled into the AVS if appropriate.

## 2023-08-30 ENCOUNTER — TELEPHONE (OUTPATIENT)
Dept: CARDIOLOGY | Facility: CLINIC | Age: 78
End: 2023-08-30
Payer: MEDICARE

## 2023-08-30 DIAGNOSIS — I25.5 ISCHEMIC CARDIOMYOPATHY: ICD-10-CM

## 2023-08-30 DIAGNOSIS — I25.709 CORONARY ARTERY DISEASE INVOLVING CORONARY BYPASS GRAFT OF NATIVE HEART WITH ANGINA PECTORIS: Primary | ICD-10-CM

## 2023-08-30 NOTE — TELEPHONE ENCOUNTER
Receive call from his cardiologist that he has more chest pain and recent stress test with ischemia and know high risk PCI needing atherectomy and lithotrispy.  Will arrange for [facility] here to evaluate.

## 2023-09-01 PROBLEM — I25.5 ISCHEMIC CARDIOMYOPATHY: Status: ACTIVE | Noted: 2023-09-01

## 2023-09-08 ENCOUNTER — HOSPITAL ENCOUNTER (OUTPATIENT)
Facility: HOSPITAL | Age: 78
Setting detail: HOSPITAL OUTPATIENT SURGERY
Discharge: HOME | End: 2023-09-08
Attending: INTERNAL MEDICINE | Admitting: INTERNAL MEDICINE
Payer: MEDICARE

## 2023-09-08 VITALS
HEIGHT: 70 IN | HEART RATE: 52 BPM | BODY MASS INDEX: 20.62 KG/M2 | DIASTOLIC BLOOD PRESSURE: 60 MMHG | WEIGHT: 144 LBS | SYSTOLIC BLOOD PRESSURE: 120 MMHG | RESPIRATION RATE: 16 BRPM | OXYGEN SATURATION: 94 % | TEMPERATURE: 97.9 F

## 2023-09-08 DIAGNOSIS — I25.5 ISCHEMIC CARDIOMYOPATHY: ICD-10-CM

## 2023-09-08 DIAGNOSIS — I25.709 CORONARY ARTERY DISEASE INVOLVING CORONARY BYPASS GRAFT OF NATIVE HEART WITH ANGINA PECTORIS: ICD-10-CM

## 2023-09-08 PROCEDURE — 25010000002 HEPARIN (PORCINE) PER 1000 UNITS: Performed by: INTERNAL MEDICINE

## 2023-09-08 PROCEDURE — C1894 INTRO/SHEATH, NON-LASER: HCPCS | Performed by: INTERNAL MEDICINE

## 2023-09-08 PROCEDURE — 25510000001 IOPAMIDOL PER 1 ML: Performed by: INTERNAL MEDICINE

## 2023-09-08 PROCEDURE — 25010000002 FENTANYL CITRATE (PF) 50 MCG/ML SOLUTION: Performed by: INTERNAL MEDICINE

## 2023-09-08 PROCEDURE — 93459 L HRT ART/GRFT ANGIO: CPT | Performed by: INTERNAL MEDICINE

## 2023-09-08 PROCEDURE — 25010000002 MIDAZOLAM PER 1 MG: Performed by: INTERNAL MEDICINE

## 2023-09-08 PROCEDURE — C1769 GUIDE WIRE: HCPCS | Performed by: INTERNAL MEDICINE

## 2023-09-08 RX ORDER — SODIUM CHLORIDE 0.9 % (FLUSH) 0.9 %
10 SYRINGE (ML) INJECTION AS NEEDED
Status: DISCONTINUED | OUTPATIENT
Start: 2023-09-08 | End: 2023-09-08 | Stop reason: HOSPADM

## 2023-09-08 RX ORDER — SODIUM CHLORIDE 9 MG/ML
40 INJECTION, SOLUTION INTRAVENOUS AS NEEDED
Status: DISCONTINUED | OUTPATIENT
Start: 2023-09-08 | End: 2023-09-08 | Stop reason: HOSPADM

## 2023-09-08 RX ORDER — FENTANYL CITRATE 50 UG/ML
INJECTION, SOLUTION INTRAMUSCULAR; INTRAVENOUS
Status: DISCONTINUED | OUTPATIENT
Start: 2023-09-08 | End: 2023-09-08 | Stop reason: HOSPADM

## 2023-09-08 RX ORDER — VERAPAMIL HYDROCHLORIDE 2.5 MG/ML
INJECTION, SOLUTION INTRAVENOUS
Status: DISCONTINUED | OUTPATIENT
Start: 2023-09-08 | End: 2023-09-08 | Stop reason: HOSPADM

## 2023-09-08 RX ORDER — MIDAZOLAM HYDROCHLORIDE 1 MG/ML
INJECTION INTRAMUSCULAR; INTRAVENOUS
Status: DISCONTINUED | OUTPATIENT
Start: 2023-09-08 | End: 2023-09-08 | Stop reason: HOSPADM

## 2023-09-08 RX ORDER — ACETAMINOPHEN 325 MG/1
650 TABLET ORAL EVERY 4 HOURS PRN
Status: DISCONTINUED | OUTPATIENT
Start: 2023-09-08 | End: 2023-09-08 | Stop reason: HOSPADM

## 2023-09-08 RX ORDER — SODIUM CHLORIDE 0.9 % (FLUSH) 0.9 %
10 SYRINGE (ML) INJECTION EVERY 12 HOURS SCHEDULED
Status: DISCONTINUED | OUTPATIENT
Start: 2023-09-08 | End: 2023-09-08 | Stop reason: HOSPADM

## 2023-09-08 RX ORDER — SODIUM CHLORIDE 9 MG/ML
100 INJECTION, SOLUTION INTRAVENOUS CONTINUOUS
Status: ACTIVE | OUTPATIENT
Start: 2023-09-08 | End: 2023-09-08

## 2023-09-08 RX ORDER — SODIUM CHLORIDE 9 MG/ML
75 INJECTION, SOLUTION INTRAVENOUS CONTINUOUS
Status: DISCONTINUED | OUTPATIENT
Start: 2023-09-09 | End: 2023-09-08 | Stop reason: HOSPADM

## 2023-09-08 RX ORDER — SODIUM CHLORIDE 0.9 % (FLUSH) 0.9 %
3 SYRINGE (ML) INJECTION EVERY 12 HOURS SCHEDULED
Status: DISCONTINUED | OUTPATIENT
Start: 2023-09-08 | End: 2023-09-08 | Stop reason: HOSPADM

## 2023-09-08 RX ORDER — LIDOCAINE HYDROCHLORIDE 20 MG/ML
INJECTION, SOLUTION INFILTRATION; PERINEURAL
Status: DISCONTINUED | OUTPATIENT
Start: 2023-09-08 | End: 2023-09-08 | Stop reason: HOSPADM

## 2023-09-08 RX ORDER — HEPARIN SODIUM 1000 [USP'U]/ML
INJECTION, SOLUTION INTRAVENOUS; SUBCUTANEOUS
Status: DISCONTINUED | OUTPATIENT
Start: 2023-09-08 | End: 2023-09-08 | Stop reason: HOSPADM

## 2023-09-08 RX ADMIN — SODIUM CHLORIDE 75 ML/HR: 9 INJECTION, SOLUTION INTRAVENOUS at 08:23

## 2023-09-08 NOTE — DISCHARGE INSTRUCTIONS
[facility]  [address] [place] [address]    Coronary Angiogram (Radial/Ulnar Approach) After Care    Refer to this sheet in the next few weeks. These instructions provide you with information on caring for yourself after your procedure. Your caregiver may also give you more specific instructions. Your treatment has been planned according to current medical practices, but problems sometimes occur. Call your caregiver if you have any problems or questions after your procedure.    Home Care Instructions:  You may shower the day after the procedure. Remove the bandage (dressing) and gently wash the site with plain soap and water. Gently pat the site dry. You may apply a band aid daily for 2 days if desired.    Do not apply powder or lotion to the site.  Do not submerge the affected site in water for 3 to 5 days or until the site is completely healed.   Do not lift, push or pull anything over 5 pounds for 5 days after your procedure or as directed by your physician.  As a reference, a gallon of milk weighs 8 pounds.   Inspect the site at least twice daily. You may notice some bruising at the site and it may be tender for 1 to 2 weeks.     Increase your fluid intake for the next 2 days.    Keep arm elevated for 24 hours. For the remainder of the day, keep your arm in “Pledge of Allegiance” position when up and about.     You may drive 24 hours after the procedure unless otherwise instructed by your caregiver.  Do not operate machinery or power tools for 24 hours.  A responsible adult should be with you for the first 24 hours after you arrive home. Do not make any important legal decisions or sign legal papers for 24 hours.  Do not drink alcohol for 24 hours.    Metformin or any medications containing Metformin should not be taken for 48 hours after your procedure.      Call Your Doctor if:   You have unusual pain at the radial/ulnar (wrist) site.  You have redness, warmth, swelling, or pain at the  Yes may have letter   radial/ulnar (wrist) site.  You have drainage (other than a small amount of blood on the dressing).  `You have chills or a fever > 101.  Your arm becomes pale or dark, cool, tingly, or numb.  You develop chest pain, shortness of breath, feel faint or pass out.    You have heavy bleeding from the site, hold pressure on the site for 20 minutes.  If the bleeding stops, apply a fresh bandage and call your cardiologist.  However, if you        continue to have bleeding, call 911 and continue to apply pressure to the site.   You have any symptoms of a stroke.  Remember BE FAST  B-balance. Sudden trouble walking or loss of balance.  E-eyes.  Sudden changes in how you see or a sudden onset of a very bad headache.   F-face. Sudden weakness or loss of feeling of the face or facial droop on one side.   A-arms Sudden weakness or numbness in one arm.  One arm drifts down if they are both held out in front of you. This happens suddenly and usually on one side of the body.   S-speech.  Sudden trouble speaking, slurred speech or trouble understanding what are saying.   T-time  Time to call emergency services.  Write down the symptoms and the time they started.

## 2023-09-18 ENCOUNTER — TELEPHONE (OUTPATIENT)
Dept: GASTROENTEROLOGY | Facility: CLINIC | Age: 78
End: 2023-09-18
Payer: MEDICARE

## 2023-09-22 ENCOUNTER — TELEPHONE (OUTPATIENT)
Dept: ORTHOPEDIC SURGERY | Facility: CLINIC | Age: 78
End: 2023-09-22
Payer: MEDICARE

## 2023-10-02 ENCOUNTER — TELEPHONE (OUTPATIENT)
Dept: GASTROENTEROLOGY | Facility: CLINIC | Age: 78
End: 2023-10-02
Payer: MEDICARE

## 2023-10-02 NOTE — TELEPHONE ENCOUNTER
Rec'd notification from Ada VICENT[facility] that pt's No-Showed EGD on 9/26/23.    Regular & Certified Letters sent to patient.    CERTIFIED LETTER NUMBER: [phone]    EGD

## 2023-10-09 ENCOUNTER — TELEPHONE (OUTPATIENT)
Dept: GASTROENTEROLOGY | Facility: CLINIC | Age: 78
End: 2023-10-09

## 2023-10-09 NOTE — TELEPHONE ENCOUNTER
Attempted to contact/Contacted Familia Thompson 1945 regarding the appointment no show with ISIDRO Akins on 10/09/23 @ 8:30. Patient is aware that there is a 24-hour cancellation policy and understands that a no-show letter will be mailed to them at the address on file.The patient has 3 rescheduled/declined at this time.

## 2023-10-25 ENCOUNTER — TRANSCRIBE ORDERS (OUTPATIENT)
Dept: CARDIOLOGY | Facility: HOSPITAL | Age: 78
End: 2023-10-25
Payer: MEDICARE

## 2023-10-25 ENCOUNTER — LAB (OUTPATIENT)
Dept: LAB | Facility: HOSPITAL | Age: 78
End: 2023-10-25
Payer: MEDICARE

## 2023-10-25 DIAGNOSIS — E78.5 HYPERLIPIDEMIA, UNSPECIFIED HYPERLIPIDEMIA TYPE: ICD-10-CM

## 2023-10-25 DIAGNOSIS — R79.89 ELEVATED LFTS: ICD-10-CM

## 2023-10-25 DIAGNOSIS — R94.5 ABNORMAL RESULTS OF LIVER FUNCTION STUDIES: ICD-10-CM

## 2023-10-25 DIAGNOSIS — E03.9 HYPOTHYROIDISM, UNSPECIFIED TYPE: ICD-10-CM

## 2023-10-25 DIAGNOSIS — E78.5 HYPERLIPIDEMIA, UNSPECIFIED HYPERLIPIDEMIA TYPE: Primary | ICD-10-CM

## 2023-10-25 LAB
BILIRUB CONJ SERPL-MCNC: <0.2 MG/DL (ref 0–0.3)
INR PPP: 1.03 (ref 0.86–1.15)
PROTHROMBIN TIME: 13.6 SECONDS (ref 11.8–14.9)

## 2023-10-25 PROCEDURE — 84443 ASSAY THYROID STIM HORMONE: CPT

## 2023-10-25 PROCEDURE — 36415 COLL VENOUS BLD VENIPUNCTURE: CPT

## 2023-10-25 PROCEDURE — 80061 LIPID PANEL: CPT

## 2023-10-25 PROCEDURE — 85610 PROTHROMBIN TIME: CPT

## 2023-10-25 PROCEDURE — 82248 BILIRUBIN DIRECT: CPT

## 2023-10-26 LAB
CHOLEST SERPL-MCNC: 150 MG/DL (ref 0–200)
HDLC SERPL-MCNC: 67 MG/DL (ref 40–60)
LDLC SERPL CALC-MCNC: 67 MG/DL (ref 0–100)
LDLC/HDLC SERPL: 0.98 {RATIO}
TRIGL SERPL-MCNC: 88 MG/DL (ref 0–150)
TSH SERPL DL<=0.05 MIU/L-ACNC: 3.42 UIU/ML (ref 0.27–4.2)
VLDLC SERPL-MCNC: 16 MG/DL (ref 5–40)

## 2023-10-26 NOTE — PROGRESS NOTES
Pt did not complete EGD or f/u here, does pt plan to continue care with us? Please call pt to see if wants to r/s

## 2023-10-30 RX ORDER — TAMSULOSIN HYDROCHLORIDE 0.4 MG/1
1 CAPSULE ORAL DAILY
Qty: 60 CAPSULE | Refills: 1 | Status: SHIPPED | OUTPATIENT
Start: 2023-10-30

## 2023-11-08 ENCOUNTER — OFFICE VISIT (OUTPATIENT)
Dept: FAMILY MEDICINE CLINIC | Facility: CLINIC | Age: 78
End: 2023-11-08
Payer: MEDICARE

## 2023-11-08 VITALS
SYSTOLIC BLOOD PRESSURE: 120 MMHG | DIASTOLIC BLOOD PRESSURE: 58 MMHG | BODY MASS INDEX: 19.91 KG/M2 | WEIGHT: 139.1 LBS | HEIGHT: 70 IN | OXYGEN SATURATION: 99 % | TEMPERATURE: 98.1 F | HEART RATE: 55 BPM

## 2023-11-08 DIAGNOSIS — R05.3 CHRONIC COUGH: ICD-10-CM

## 2023-11-08 DIAGNOSIS — J30.9 ALLERGIC RHINITIS, UNSPECIFIED SEASONALITY, UNSPECIFIED TRIGGER: ICD-10-CM

## 2023-11-08 DIAGNOSIS — Z51.81 MEDICATION MONITORING ENCOUNTER: ICD-10-CM

## 2023-11-08 DIAGNOSIS — I65.23 BILATERAL CAROTID ARTERY STENOSIS: ICD-10-CM

## 2023-11-08 DIAGNOSIS — I25.709 CORONARY ARTERY DISEASE INVOLVING CORONARY BYPASS GRAFT OF NATIVE HEART WITH ANGINA PECTORIS: ICD-10-CM

## 2023-11-08 DIAGNOSIS — I73.9 PERIPHERAL ARTERIAL DISEASE: ICD-10-CM

## 2023-11-08 DIAGNOSIS — Z23 NEED FOR INFLUENZA VACCINATION: ICD-10-CM

## 2023-11-08 DIAGNOSIS — I10 PRIMARY HYPERTENSION: ICD-10-CM

## 2023-11-08 DIAGNOSIS — R97.20 ELEVATED PSA: ICD-10-CM

## 2023-11-08 DIAGNOSIS — I48.91 ATRIAL FIBRILLATION, UNSPECIFIED TYPE: ICD-10-CM

## 2023-11-08 DIAGNOSIS — K22.70 BARRETT'S ESOPHAGUS WITHOUT DYSPLASIA: Primary | ICD-10-CM

## 2023-11-08 DIAGNOSIS — E78.5 HYPERLIPIDEMIA, UNSPECIFIED HYPERLIPIDEMIA TYPE: ICD-10-CM

## 2023-11-08 DIAGNOSIS — R25.1 TREMOR: ICD-10-CM

## 2023-11-08 RX ORDER — CETIRIZINE HYDROCHLORIDE 10 MG/1
10 TABLET ORAL DAILY
Qty: 90 TABLET | Refills: 3 | Status: SHIPPED | OUTPATIENT
Start: 2023-11-08

## 2023-11-08 RX ORDER — MONTELUKAST SODIUM 10 MG/1
10 TABLET ORAL NIGHTLY
Qty: 90 TABLET | Refills: 3 | Status: SHIPPED | OUTPATIENT
Start: 2023-11-08

## 2023-11-08 RX ORDER — FLUTICASONE PROPIONATE 50 MCG
2 SPRAY, SUSPENSION (ML) NASAL DAILY
Qty: 16 G | Refills: 3 | Status: SHIPPED | OUTPATIENT
Start: 2023-11-08

## 2023-11-08 NOTE — PROGRESS NOTES
Chief Complaint  Follow-up for Collins's esophagus    Subjective        Familia Thompson presents to [facility]  History of Present Illness  Patient presents today to follow-up for Collins's esophagus.  He was recently seen with GI and was scheduled to have an EGD completed.  He did not keep the appointment for the EGD as he did not remember what he needed this for.  I discussed with him the importance of keeping follow-up in regards to Collins's esophagus.  He did have a EGD done on 9/15/2023.  This showed that he had esophageal mucosal changes suggestive of short segment Collins's esophagus as well as gastritis.  He does report compliance taking pantoprazole.  I did encourage him to call and get his EGD rescheduled.  He has had a cardiac catheterization done on 9/8/2023.  No stent intervention was performed at that time with recommendation to continue with medical management.  The last time I saw the patient for an appointment was on 8/7/2023.  At that time we discussed that his tremor has become more bothersome lately.  It appears that his tremor is worse with movement but does also occur at rest.  I previously gave him primidone but he is unsure if this is helped out or not.  Primidone does carry drug interaction with Eliquis which she is taking.  We discussed stopping the primidone and placing him on propranolol.  Propranolol appears to be discontinued by cardiology.  We had previously stopped the metoprolol when starting the propranolol.  His heart rate today is at 55.  I discussed with him holding off on adding propranolol again.  We did discuss a referral to neurology to further evaluate his tremor as a clear source has not been identified.  He does continue to be followed by vascular, Dr. Burger for peripheral arterial disease in the right leg.  The plan will be for follow-up in 6 months with repeat imaging.  His carotid arteries will also be evaluated.  He continues to take  "amiodarone as prescribed by Dr. cooper for atrial fibrillation.  Blood pressure has been adequately controlled as well.  He is due for flu vaccination so we discussed getting this done.  He does have a lot of issues with nasal congestion for the past couple months.  When I last saw him for an appointment patient was eager to try coming off of some of his medications.  We had discussed at that time discontinuing his allergy medications as patient was unsure if he needed to take these anymore.  He does appear to be having more issues again with nasal congestion.  He is also had some chest congestion for the past couple months.  He does report sometimes coughing up a white mucous plug.  Objective   Vital Signs:  /58 (BP Location: Left arm, Patient Position: Sitting)   Pulse 55   Temp 98.1 °F (36.7 °C) (Oral)   Ht 177.8 cm (70\")   Wt 63.1 kg (139 lb 1.6 oz)   SpO2 99%   BMI 19.96 kg/m²   Estimated body mass index is 19.96 kg/m² as calculated from the following:    Height as of this encounter: 177.8 cm (70\").    Weight as of this encounter: 63.1 kg (139 lb 1.6 oz).       BMI is within normal parameters. No other follow-up for BMI required.      Physical Exam  Vitals reviewed.   Constitutional:       Appearance: Normal appearance.   HENT:      Head: Normocephalic and atraumatic.      Right Ear: External ear normal.      Left Ear: External ear normal.      Nose:      Comments: Nasal congestion noted.  No maxillary or frontal sinus tenderness to palpation.     Mouth/Throat:      Mouth: Mucous membranes are moist.      Pharynx: Oropharynx is clear. No oropharyngeal exudate.   Eyes:      Conjunctiva/sclera: Conjunctivae normal.   Cardiovascular:      Rate and Rhythm: Normal rate. Rhythm irregular.      Heart sounds: No murmur heard.     No friction rub. No gallop.   Pulmonary:      Effort: Pulmonary effort is normal.      Breath sounds: Normal breath sounds. No wheezing or rhonchi.   Abdominal:      General: Bowel " sounds are normal. There is no distension.      Palpations: Abdomen is soft.      Tenderness: There is no abdominal tenderness.   Skin:     General: Skin is warm and dry.   Neurological:      Mental Status: He is alert and oriented to person, place, and time.      Cranial Nerves: No cranial nerve deficit.   Psychiatric:         Mood and Affect: Mood and affect normal.         Behavior: Behavior normal.         Thought Content: Thought content normal.         Judgment: Judgment normal.        Result Review :                   Assessment and Plan   Diagnoses and all orders for this visit:    1. Collins's esophagus without dysplasia (Primary)    2. Coronary artery disease involving coronary bypass graft of native heart with angina pectoris    3. Medication monitoring encounter    4. Peripheral arterial disease    5. Bilateral carotid artery stenosis    6. Elevated PSA  -     PSA DIAGNOSTIC; Future    7. Tremor  -     Ambulatory Referral to Neurology    8. Primary hypertension  -     CBC & Differential; Future  -     Comprehensive Metabolic Panel; Future    9. Hyperlipidemia, unspecified hyperlipidemia type  -     Lipid Panel; Future    10. Atrial fibrillation, unspecified type    11. Chronic cough  -     XR Chest PA & Lateral; Future    12. Allergic rhinitis, unspecified seasonality, unspecified trigger    13. Need for influenza vaccination  -     Fluzone High-Dose 65+yrs ([id number])    Other orders  -     cetirizine (zyrTEC) 10 MG tablet; Take 1 tablet by mouth Daily.  Dispense: 90 tablet; Refill: 3  -     fluticasone (FLONASE) 50 MCG/ACT nasal spray; 2 sprays into the nostril(s) as directed by provider Daily.  Dispense: 16 g; Refill: 3  -     montelukast (Singulair) 10 MG tablet; Take 1 tablet by mouth Every Night.  Dispense: 90 tablet; Refill: 3    I discussed with patient referral to neurology to further evaluate his ongoing issues with a tremor.  We also discussed having labs repeated prior to next appointment.   We had discussed his previously noted elevated PSA however most recently his PSA level has been normal in the past 2 occasions.  His PSA was elevated back on 2/15/2023.  The repeat level came back normal.  I did discuss with patient starting back on his allergy medications.  We discussed getting an x-ray of the chest for further evaluation for his cough.  Further recommendations to follow once results return.         Follow Up   Return in about 3 months (around 2/8/2024) for atrial fibrillation.  Patient was given instructions and counseling regarding his condition or for health maintenance advice. Please see specific information pulled into the AVS if appropriate.

## 2023-11-09 ENCOUNTER — TELEPHONE (OUTPATIENT)
Dept: GASTROENTEROLOGY | Facility: CLINIC | Age: 78
End: 2023-11-09
Payer: MEDICARE

## 2023-11-09 NOTE — TELEPHONE ENCOUNTER
Patient is scheduled for EGD on 12.22.23 at 11am    Eliquis is prescribed by  ([facility])  Plavix is prescribed by

## 2023-11-09 NOTE — TELEPHONE ENCOUNTER
Blood Thinner Clearance Request                  2023      Dear ,    Patient Name: Familia Thompson  : 1945    This patient is waiting to have a Colonoscopy and/or Esophagogastroduodenoscopy which I will perform at [facility] on _23_. Our records indicate this patient is currently taking __Plavix_. This procedure requires the patient to suspend their Anticoagulant medication prior to surgery. Please respond to this request noting your recommendations. You may contact our office at [phone] Option 2 with any questions. I appreciate your prompt response in this matter. Please return this form to our office as soon as possible to [phone].    ____ I approve my patient to stop taking their Anticoagulant Therapy medication _5-7____ days prior to the scheduled      procedure.    ____ I do NOT approve my patient to stop taking their Anticoagulant Therapy medication at this time.    ____ I approve my patient from a cardiac standpoint.    ____ I do NOT approve my patient from a cardiac standpoint at this time.        Approving physician name (please print): _____________________________________________      Approving physician signature: ________________________________ Date:________________    Sincerely,    [facility]  Dr. Saavedra's Office    *Please fax approval or denial to our office as soon as possible.

## 2023-11-17 RX ORDER — PROPRANOLOL HYDROCHLORIDE 20 MG/1
20 TABLET ORAL 2 TIMES DAILY
Qty: 60 TABLET | Refills: 2 | OUTPATIENT
Start: 2023-11-17

## 2023-11-28 NOTE — PROGRESS NOTES
"Chief Complaint  Follow-up of the Right Knee    Subjective      Familia Thompson presents to [facility] for follow up of his right knee.  He has right knee osteoarthritis that we are treating conservatively with intermittent injections.  He was last seen in office on 8/16/2023 and received a right knee Synvisc injection. Prior to that, he had a right knee steroid injection on 7/5/2023.    Today,    No Known Allergies    Objective     Vital Signs:   Vitals:    11/29/23 1033   Weight: 63 kg (139 lb)   Height: 177.8 cm (70\")   PainSc: 0-No pain     Body mass index is 19.94 kg/m².    I reviewed the patient's chief complaint, history of present illness, review of systems, past medical history, surgical history, family history, social history, medications, and allergy list.     REVIEW OF SYSTEMS    Constitutional: Denies fevers, chills, weight loss  Cardiovascular: Denies chest pain, shortness of breath  Skin: Denies rashes, acute skin changes  Neurologic: Denies headache, loss of consciousness  MSK: Knee pain.     Ortho Exam  Knee General: Alert, no acute distress.   Right knee:  *** knee effusion.  Sensation and distal neurovascularly intact.  *** knee extension. Flexion to *** degrees. Calf soft, non-tender.  Full ankle range of motion. Palpable pedal pulses.           Imaging Results (Most Recent)       None                Assessment and Plan   There are no diagnoses linked to this encounter.     Familia Thompson presents today following up on his right knee.  He has a previous diagnosis of right knee osteoarthritis that we are treating conservatively with intermittent directions.  He was last received a right knee Synvisc injection in office on 8/16/2023. Prior to that, he had a right knee steroid injection on 7/5/2023.      Tobacco Use: Medium Risk (11/29/2023)    Patient History     Smoking Tobacco Use: Former     Smokeless Tobacco Use: Never     Passive Exposure: Past "     {Tobaccouse:05132}    BMI is within normal parameters. No other follow-up for BMI required.      Follow Up   No follow-ups on file.  There are no Patient Instructions on file for this visit.  Patient was given instructions and counseling regarding his condition or for health maintenance advice. Please see specific information pulled into the AVS if appropriate.

## 2023-11-29 ENCOUNTER — OFFICE VISIT (OUTPATIENT)
Dept: ORTHOPEDIC SURGERY | Facility: CLINIC | Age: 78
End: 2023-11-29
Payer: MEDICARE

## 2023-11-29 VITALS — BODY MASS INDEX: 19.9 KG/M2 | WEIGHT: 139 LBS | HEIGHT: 70 IN

## 2023-11-29 DIAGNOSIS — S83.281A TEAR OF LATERAL MENISCUS OF RIGHT KNEE, UNSPECIFIED TEAR TYPE, UNSPECIFIED WHETHER OLD OR CURRENT TEAR, INITIAL ENCOUNTER: ICD-10-CM

## 2023-11-29 DIAGNOSIS — M17.11 PRIMARY OSTEOARTHRITIS OF RIGHT KNEE: Primary | ICD-10-CM

## 2023-11-29 DIAGNOSIS — S83.241A ACUTE MEDIAL MENISCUS TEAR OF RIGHT KNEE, INITIAL ENCOUNTER: ICD-10-CM

## 2023-11-29 NOTE — PROGRESS NOTES
"Chief Complaint  Follow-up of the Right Knee    Subjective          Familia Thompson presents to [facility]   History of Present Illness    Familia Thompson presents today for a follow-up of his right knee.  Patient has right knee arthritis that we are treating conservatively.  Today, patient states that his right knee is doing well.  He states that he has no pain to his right knee.  He reports good knee range of motion.  He states that his swelling has resolved.  Patient reports significant relief from his previous right knee Synvisc injection.      No Known Allergies     Social History     Socioeconomic History    Marital status:    Tobacco Use    Smoking status: Former     Packs/day: 1.00     Years: 30.00     Additional pack years: 0.00     Total pack years: 30.00     Types: Cigarettes     Quit date:      Years since quittin.9     Passive exposure: Past    Smokeless tobacco: Never    Tobacco comments:     Quit smoking in 2023    Vaping Use    Vaping Use: Never used   Substance and Sexual Activity    Alcohol use: Yes     Alcohol/week: 12.0 standard drinks of alcohol     Types: 12 Cans of beer per week     Comment: 6 beers a day    Drug use: Not Currently     Comment: 0cc    Sexual activity: Defer        I reviewed the patient's chief complaint, history of present illness, review of systems, past medical history, surgical history, family history, social history, medications, and allergy list.     REVIEW OF SYSTEMS    Constitutional: Denies fevers, chills, weight loss  Cardiovascular: Denies chest pain, shortness of breath  Skin: Denies rashes, acute skin changes  Neurologic: Denies headache, loss of consciousness  MSK: Right knee pain      Objective   Vital Signs:   Ht 177.8 cm (70\")   Wt 63 kg (139 lb)   BMI 19.94 kg/m²     Body mass index is 19.94 kg/m².    Physical Exam    General: Alert. No acute distress.   Right lower extremity: Nontender to the medial or " lateral joint lines.  No knee effusion.  Full knee extension.  Knee flexion 120.  Knee extensor mechanism intact.  Knee stable to varus and valgus stress.  Patellar crepitus with motion.  Calf soft, nontender.  Demonstrates active ankle plantarflexion and dorsiflexion.  Sensation intact over dorsal and plantar foot.  Palpable pedal pulses.    Procedures    Imaging Results (Most Recent)       None                   Assessment and Plan    Diagnoses and all orders for this visit:    1. Primary osteoarthritis of right knee (Primary)    2. Acute medial meniscus tear of right knee, initial encounter    3. Tear of lateral meniscus of right knee, unspecified tear type, unspecified whether old or current tear, initial encounter        Familia Thompson presents today for follow-up of his right knee arthritis that we are treating conservatively.  Patient reports significant relief from his previous right knee Synvisc injection.  He is instructed to continue with home exercises.  Continue with activities as tolerated.  We discussed repeat right knee injections in the future as necessary.      Patient will follow up as needed.      Call or return if symptoms worsen or patient has any concerns.       Follow Up   Return if symptoms worsen or fail to improve.  Patient was given instructions and counseling regarding his condition or for health maintenance advice. Please see specific information pulled into the AVS if appropriate.     Fatoumata Mcadams PA-C  11/29/23  12:14 EST

## 2023-12-12 ENCOUNTER — TELEPHONE (OUTPATIENT)
Dept: GASTROENTEROLOGY | Facility: CLINIC | Age: 78
End: 2023-12-12
Payer: MEDICARE

## 2023-12-12 NOTE — TELEPHONE ENCOUNTER
"'s office called on 12.12.23 about 's blood thinner clearance. Dr. Dixon said,\" that he could come off of his Eliquis for 48 hrs but does not want him to come off his plavix.\"I S/W with giovanny FELIX she advised to cancel his EGD for 12.22.23 at this time.   "

## 2023-12-14 NOTE — TELEPHONE ENCOUNTER
I have reviewed with Dr. Saavedra, he is okay with doing EGD on Plavix, states he can do small biopsies, still continue with plan to stop Eliquis as below, is the case request still good?

## 2023-12-15 ENCOUNTER — TELEPHONE (OUTPATIENT)
Dept: FAMILY MEDICINE CLINIC | Facility: CLINIC | Age: 78
End: 2023-12-15

## 2023-12-18 NOTE — TELEPHONE ENCOUNTER
Called pt. No answer. Left message for pt to call back.     Postponing Patient Call until tomorrow, 12.19.23

## 2023-12-27 ENCOUNTER — TRANSCRIBE ORDERS (OUTPATIENT)
Dept: ADMINISTRATIVE | Facility: HOSPITAL | Age: 78
End: 2023-12-27
Payer: MEDICARE

## 2023-12-27 DIAGNOSIS — I73.9 PERIPHERAL VASCULAR DISEASE, UNSPECIFIED: Primary | ICD-10-CM

## 2024-01-04 ENCOUNTER — TRANSCRIBE ORDERS (OUTPATIENT)
Dept: ADMINISTRATIVE | Facility: HOSPITAL | Age: 79
End: 2024-01-04
Payer: MEDICARE

## 2024-01-04 DIAGNOSIS — I73.9 PAD (PERIPHERAL ARTERY DISEASE): Primary | ICD-10-CM

## 2024-01-04 DIAGNOSIS — T82.898D OCCLUSION OF BYPASS GRAFT, SUBSEQUENT ENCOUNTER: ICD-10-CM

## 2024-01-04 DIAGNOSIS — I65.23 BILATERAL CAROTID ARTERY STENOSIS: ICD-10-CM

## 2024-01-23 RX ORDER — FLUTICASONE PROPIONATE 50 MCG
SPRAY, SUSPENSION (ML) NASAL
Qty: 16 G | Refills: 3 | Status: SHIPPED | OUTPATIENT
Start: 2024-01-23 | End: 2024-01-29

## 2024-01-23 RX ORDER — ATORVASTATIN CALCIUM 80 MG/1
80 TABLET, FILM COATED ORAL NIGHTLY
Qty: 90 TABLET | Refills: 3 | Status: SHIPPED | OUTPATIENT
Start: 2024-01-23

## 2024-01-29 ENCOUNTER — PATIENT ROUNDING (BHMG ONLY) (OUTPATIENT)
Dept: NEUROLOGY | Facility: CLINIC | Age: 79
End: 2024-01-29
Payer: MEDICARE

## 2024-01-29 ENCOUNTER — OFFICE VISIT (OUTPATIENT)
Dept: NEUROLOGY | Facility: CLINIC | Age: 79
End: 2024-01-29
Payer: MEDICARE

## 2024-01-29 VITALS
WEIGHT: 144 LBS | HEIGHT: 70 IN | SYSTOLIC BLOOD PRESSURE: 146 MMHG | HEART RATE: 53 BPM | DIASTOLIC BLOOD PRESSURE: 70 MMHG | BODY MASS INDEX: 20.62 KG/M2

## 2024-01-29 DIAGNOSIS — G25.0 ESSENTIAL TREMOR: Primary | ICD-10-CM

## 2024-01-29 PROCEDURE — 99203 OFFICE O/P NEW LOW 30 MIN: CPT | Performed by: PSYCHIATRY & NEUROLOGY

## 2024-01-29 RX ORDER — OMEPRAZOLE 40 MG/1
40 CAPSULE, DELAYED RELEASE ORAL DAILY
COMMUNITY

## 2024-01-29 NOTE — ASSESSMENT & PLAN NOTE
I discussed with them that the tremor looks like an essential tremor however I will make sure by doing a DaTscan to see if he has Parkinson's disease.  I discussed with him and his family that he should not be driving.  I will see him again after the DaTscan is completed.  Thank you for letting me participate in his care

## 2024-01-29 NOTE — PROGRESS NOTES
"Chief Complaint  Tremors (RUE)    Subjective          Familia Thompson is a 78 y.o. male who presents to [facility]  History of Present Illness  78-year-old man evaluated for new onset tremor.  The wife states that the right hand tremor is worse in the left hand tremor and has been going on for about 6 months.  The patient states 3 months.  It interferes with his eating, writing.  He did not have this tremor before.  He has no problems with being slow other than his knees are bothering him.  He has no problems with activities of daily living.  He lives with his wife.  He is driving.  His wife and his daughter-in-law are with him today.  They told him that he should not be driving.    Objective   Vital Signs:   /70 (BP Location: Left arm, Patient Position: Sitting, Cuff Size: Small Adult)   Pulse 53   Ht 177.8 cm (70\")   Wt 65.3 kg (144 lb)   BMI 20.66 kg/m²     Physical Exam   Alert, fluent, phasic, follows commands well.  He could not tell me his age immediately.  He said 89 initially then he changed it to 78 or 79.    EOMs full directions gaze, facial strength is full.  There is resting tremor noted in the right hand.  The tremor is worse with Archimedes spiral as well as transferring water from 1 cup to the next.  The left hand is not as tremulous.  There is mild tremor noted hands extended and finger-to-nose testing on the right hand but in the left hand.  There is mild resting tremor noted on the right fingers with his hands on his lap.  There is no weakness.  And Station gait he is able to move around with some limited difficulty getting up but armswing is normal in the right arm.  He is unable to tiptoe and heel walk without me holding onto him.         Assessment and Plan  Diagnoses and all orders for this visit:    1. Essential tremor (Primary)  Assessment & Plan:  I discussed with them that the tremor looks like an essential tremor however I will make sure " by doing a DaTscan to see if he has Parkinson's disease.  I discussed with him and his family that he should not be driving.  I will see him again after the DaTscan is completed.  Thank you for letting me participate in his care    Orders:  -     NM Brain DaTScan; Future         Total time spent with the patient and coordinating patient care was 30 minutes.    Follow Up  No follow-ups on file.  Patient was given instructions and counseling regarding his condition or for health maintenance advice. Please see specific information pulled into the AVS if appropriate.

## 2024-02-02 NOTE — TELEPHONE ENCOUNTER
Caller: Familia Thompson    Relationship to patient: Self    Best call back number: [phone]    Patient is needing: PATIENT CALLED IN AND SAID HE RECEIVED A CALL FROM OFFICE AND IS REQUESTING A CALL BACK. PATIENT SAID IT IS OKAY TO LEAVE MESSAGE ON PHONE        
  Caller: Familia Thompson    Relationship to patient: Self    Best call back number: [phone]     Patient is needing: PATIENT WOULD LIKE TO BE ADVISED TO WHERE TO HAVE CHEST X RAY DONE        
Attempted to call PT, left VM for call back  HUB TO RELAY:  PT can have Xrays completed at [facility]   
normal...

## 2024-02-12 ENCOUNTER — OFFICE VISIT (OUTPATIENT)
Dept: FAMILY MEDICINE CLINIC | Facility: CLINIC | Age: 79
End: 2024-02-12
Payer: MEDICARE

## 2024-02-12 VITALS
HEART RATE: 66 BPM | DIASTOLIC BLOOD PRESSURE: 60 MMHG | BODY MASS INDEX: 20.73 KG/M2 | SYSTOLIC BLOOD PRESSURE: 110 MMHG | TEMPERATURE: 97.8 F | HEIGHT: 70 IN | WEIGHT: 144.8 LBS | OXYGEN SATURATION: 97 % | RESPIRATION RATE: 20 BRPM

## 2024-02-12 DIAGNOSIS — I48.91 ATRIAL FIBRILLATION, UNSPECIFIED TYPE: ICD-10-CM

## 2024-02-12 DIAGNOSIS — K22.70 BARRETT'S ESOPHAGUS WITHOUT DYSPLASIA: ICD-10-CM

## 2024-02-12 DIAGNOSIS — G25.0 ESSENTIAL TREMOR: ICD-10-CM

## 2024-02-12 DIAGNOSIS — M25.561 CHRONIC PAIN OF RIGHT KNEE: ICD-10-CM

## 2024-02-12 DIAGNOSIS — G89.29 CHRONIC PAIN OF RIGHT KNEE: ICD-10-CM

## 2024-02-12 DIAGNOSIS — R39.12 BENIGN PROSTATIC HYPERPLASIA WITH WEAK URINARY STREAM: ICD-10-CM

## 2024-02-12 DIAGNOSIS — I65.23 BILATERAL CAROTID ARTERY STENOSIS: ICD-10-CM

## 2024-02-12 DIAGNOSIS — E03.9 HYPOTHYROIDISM, UNSPECIFIED TYPE: ICD-10-CM

## 2024-02-12 DIAGNOSIS — R94.5 ABNORMAL RESULTS OF LIVER FUNCTION STUDIES: ICD-10-CM

## 2024-02-12 DIAGNOSIS — E78.5 HYPERLIPIDEMIA, UNSPECIFIED HYPERLIPIDEMIA TYPE: ICD-10-CM

## 2024-02-12 DIAGNOSIS — Z78.9 ALCOHOL USE: ICD-10-CM

## 2024-02-12 DIAGNOSIS — Z51.81 MEDICATION MONITORING ENCOUNTER: ICD-10-CM

## 2024-02-12 DIAGNOSIS — I10 PRIMARY HYPERTENSION: Primary | ICD-10-CM

## 2024-02-12 DIAGNOSIS — N40.1 BENIGN PROSTATIC HYPERPLASIA WITH WEAK URINARY STREAM: ICD-10-CM

## 2024-02-12 DIAGNOSIS — I73.9 PERIPHERAL ARTERIAL DISEASE: ICD-10-CM

## 2024-02-12 DIAGNOSIS — T14.8XXA SKIN ABRASION: ICD-10-CM

## 2024-02-12 DIAGNOSIS — R97.20 ELEVATED PSA: ICD-10-CM

## 2024-02-12 DIAGNOSIS — R79.89 ELEVATED LFTS: ICD-10-CM

## 2024-02-12 LAB
ALBUMIN SERPL-MCNC: 4 G/DL (ref 3.5–5.2)
ALBUMIN/GLOB SERPL: 2 G/DL
ALP SERPL-CCNC: 126 U/L (ref 39–117)
ALT SERPL W P-5'-P-CCNC: 28 U/L (ref 1–41)
ANION GAP SERPL CALCULATED.3IONS-SCNC: 13.1 MMOL/L (ref 5–15)
AST SERPL-CCNC: 41 U/L (ref 1–40)
BASOPHILS # BLD AUTO: 0.07 10*3/MM3 (ref 0–0.2)
BASOPHILS NFR BLD AUTO: 1.6 % (ref 0–1.5)
BILIRUB SERPL-MCNC: 0.6 MG/DL (ref 0–1.2)
BUN SERPL-MCNC: 8 MG/DL (ref 8–23)
BUN/CREAT SERPL: 7.8 (ref 7–25)
CALCIUM SPEC-SCNC: 9 MG/DL (ref 8.6–10.5)
CHLORIDE SERPL-SCNC: 103 MMOL/L (ref 98–107)
CHOLEST SERPL-MCNC: 143 MG/DL (ref 0–200)
CO2 SERPL-SCNC: 19.9 MMOL/L (ref 22–29)
CREAT SERPL-MCNC: 1.03 MG/DL (ref 0.76–1.27)
DEPRECATED RDW RBC AUTO: 42.9 FL (ref 37–54)
EGFRCR SERPLBLD CKD-EPI 2021: 74.4 ML/MIN/1.73
EOSINOPHIL # BLD AUTO: 0.06 10*3/MM3 (ref 0–0.4)
EOSINOPHIL NFR BLD AUTO: 1.3 % (ref 0.3–6.2)
ERYTHROCYTE [DISTWIDTH] IN BLOOD BY AUTOMATED COUNT: 12.6 % (ref 12.3–15.4)
GLOBULIN UR ELPH-MCNC: 2 GM/DL
GLUCOSE SERPL-MCNC: 59 MG/DL (ref 65–99)
HCT VFR BLD AUTO: 40.9 % (ref 37.5–51)
HDLC SERPL-MCNC: 73 MG/DL (ref 40–60)
HGB BLD-MCNC: 13.9 G/DL (ref 13–17.7)
IMM GRANULOCYTES # BLD AUTO: 0.01 10*3/MM3 (ref 0–0.05)
IMM GRANULOCYTES NFR BLD AUTO: 0.2 % (ref 0–0.5)
LDLC SERPL CALC-MCNC: 57 MG/DL (ref 0–100)
LDLC/HDLC SERPL: 0.78 {RATIO}
LYMPHOCYTES # BLD AUTO: 1.36 10*3/MM3 (ref 0.7–3.1)
LYMPHOCYTES NFR BLD AUTO: 30.2 % (ref 19.6–45.3)
MCH RBC QN AUTO: 32 PG (ref 26.6–33)
MCHC RBC AUTO-ENTMCNC: 34 G/DL (ref 31.5–35.7)
MCV RBC AUTO: 94 FL (ref 79–97)
MONOCYTES # BLD AUTO: 0.52 10*3/MM3 (ref 0.1–0.9)
MONOCYTES NFR BLD AUTO: 11.5 % (ref 5–12)
NEUTROPHILS NFR BLD AUTO: 2.49 10*3/MM3 (ref 1.7–7)
NEUTROPHILS NFR BLD AUTO: 55.2 % (ref 42.7–76)
NRBC BLD AUTO-RTO: 0 /100 WBC (ref 0–0.2)
PLATELET # BLD AUTO: 152 10*3/MM3 (ref 140–450)
PMV BLD AUTO: 11.4 FL (ref 6–12)
POTASSIUM SERPL-SCNC: 4.3 MMOL/L (ref 3.5–5.2)
PROT SERPL-MCNC: 6 G/DL (ref 6–8.5)
PSA SERPL-MCNC: 1.71 NG/ML (ref 0–4)
RBC # BLD AUTO: 4.35 10*6/MM3 (ref 4.14–5.8)
SODIUM SERPL-SCNC: 136 MMOL/L (ref 136–145)
TRIGL SERPL-MCNC: 65 MG/DL (ref 0–150)
VLDLC SERPL-MCNC: 13 MG/DL (ref 5–40)
WBC NRBC COR # BLD AUTO: 4.51 10*3/MM3 (ref 3.4–10.8)

## 2024-02-12 PROCEDURE — 80061 LIPID PANEL: CPT | Performed by: SPECIALIST

## 2024-02-12 PROCEDURE — 80053 COMPREHEN METABOLIC PANEL: CPT | Performed by: SPECIALIST

## 2024-02-12 PROCEDURE — 85025 COMPLETE CBC W/AUTO DIFF WBC: CPT | Performed by: FAMILY MEDICINE

## 2024-02-12 PROCEDURE — 84153 ASSAY OF PSA TOTAL: CPT | Performed by: SPECIALIST

## 2024-02-12 RX ORDER — TAMSULOSIN HYDROCHLORIDE 0.4 MG/1
2 CAPSULE ORAL DAILY
Qty: 180 CAPSULE | Refills: 1 | Status: SHIPPED | OUTPATIENT
Start: 2024-02-12

## 2024-02-13 DIAGNOSIS — R79.89 ELEVATED LFTS: Primary | ICD-10-CM

## 2024-02-26 ENCOUNTER — HOSPITAL ENCOUNTER (OUTPATIENT)
Dept: ULTRASOUND IMAGING | Facility: HOSPITAL | Age: 79
Discharge: HOME | End: 2024-02-26
Admitting: FAMILY MEDICINE
Payer: MEDICARE

## 2024-02-26 DIAGNOSIS — R79.89 ELEVATED LFTS: ICD-10-CM

## 2024-02-26 PROCEDURE — 76705 ECHO EXAM OF ABDOMEN: CPT

## 2024-02-27 DIAGNOSIS — R79.89 ELEVATED LFTS: ICD-10-CM

## 2024-02-27 DIAGNOSIS — R19.01 RIGHT UPPER QUADRANT ABDOMINAL MASS: Primary | ICD-10-CM

## 2024-02-27 RX ORDER — PANTOPRAZOLE SODIUM 40 MG/1
40 TABLET, DELAYED RELEASE ORAL DAILY
Qty: 30 TABLET | Refills: 5 | OUTPATIENT
Start: 2024-02-27

## 2024-03-14 ENCOUNTER — HOSPITAL ENCOUNTER (OUTPATIENT)
Dept: CARDIOLOGY | Facility: HOSPITAL | Age: 79
Discharge: HOME | End: 2024-03-14
Payer: MEDICARE

## 2024-03-14 DIAGNOSIS — I73.9 PERIPHERAL VASCULAR DISEASE, UNSPECIFIED: ICD-10-CM

## 2024-03-14 DIAGNOSIS — I73.9 PAD (PERIPHERAL ARTERY DISEASE): ICD-10-CM

## 2024-03-14 DIAGNOSIS — I65.23 BILATERAL CAROTID ARTERY STENOSIS: ICD-10-CM

## 2024-03-14 DIAGNOSIS — T82.898D OCCLUSION OF BYPASS GRAFT, SUBSEQUENT ENCOUNTER: ICD-10-CM

## 2024-03-14 LAB
BH CV GRAFT 1 - DISTAL GRAFT PSV-RIGHT: 0 CM/S
BH CV GRAFT 1 - INFLOW ARTERY PSV-RIGHT: 0 CM/S
BH CV GRAFT 1 - MID GRAFT PSV-RIGHT: 0 CM/S
BH CV GRAFT 1 - PROX GRAFT PSV-RIGHT: 0 CM/S
BH CV GRAFT 1 - PROXIMAL ANASTAMOSIS PSV-RIGHT: 0 CM/S
BH CV GRAFT 1- DISTAL GRAFT EDV-RIGHT: 0 CM/S
BH CV GRAFT 1- INFLOW ARTERY EDV-RIGHT: 0 CM/S
BH CV GRAFT 1- MID GRAFT EDV-RIGHT: 0 CM/S
BH CV GRAFT 1- PROX GRAFT EDV-RIGHT: 0 CM/S
BH CV GRAFT 1- PROXIMAL ANASTAMOSIS EDV-RIGHT: 0 CM/S
BH CV GRAFT 2 - DISTAL GRAFT PSV-RIGHT: 0 CM/S
BH CV GRAFT 2 - MID GRAFT PSV-RIGHT: 0 CM/S
BH CV GRAFT 2 - PROX GRAFT PSV-RIGHT: 0 CM/S
BH CV GRAFT BRACHIAL PRESSURE LEFT: 171 MMHG
BH CV GRAFT BRACHIAL PRESSURE RIGHT: 160 MMHG
BH CV LEA LEFT ANT TIBIAL A DISTAL EDV: 0 CM/S
BH CV LEA LEFT ANT TIBIAL A DISTAL PSV: -68.3 CM/S
BH CV LEA LEFT ANT TIBIAL A MID EDV: 0 CM/S
BH CV LEA LEFT ANT TIBIAL A MID PSV: -39.1 CM/S
BH CV LEA LEFT ANT TIBIAL A PROX EDV: 0 CM/S
BH CV LEA LEFT ANT TIBIAL A PROX PSV: -68.2 CM/S
BH CV LEA LEFT CFA DISTAL EDV: 0 CM/S
BH CV LEA LEFT CFA DISTAL PSV: 153 CM/S
BH CV LEA LEFT DFA PROX EDV: 0 CM/S
BH CV LEA LEFT DFA PROX PSV: 63.1 CM/S
BH CV LEA LEFT DPA PRESSURE: NORMAL MMHG
BH CV LEA LEFT PERONEAL  DISTAL EDV: 0 CM/S
BH CV LEA LEFT PERONEAL  DISTAL PSV: -38.5 CM/S
BH CV LEA LEFT PERONEAL  MID EDV: 0 CM/S
BH CV LEA LEFT PERONEAL  MID PSV: 42.9 CM/S
BH CV LEA LEFT PERONEAL  PROX EDV: 0 CM/S
BH CV LEA LEFT PERONEAL  PROX PSV: -52.8 CM/S
BH CV LEA LEFT POPITEAL A  DISTAL EDV: 0 CM/S
BH CV LEA LEFT POPITEAL A  DISTAL PSV: 155.9 CM/S
BH CV LEA LEFT POPITEAL A  MID EDV: 8.78 CM/S
BH CV LEA LEFT POPITEAL A  MID PSV: 76.8 CM/S
BH CV LEA LEFT POPITEAL A  PROX EDV: 0 CM/S
BH CV LEA LEFT POPITEAL A  PROX PSV: -24.7 CM/S
BH CV LEA LEFT PTA DISTAL EDV: 0 CM/S
BH CV LEA LEFT PTA DISTAL PSV: -55.9 CM/S
BH CV LEA LEFT PTA MID EDV: 0 CM/S
BH CV LEA LEFT PTA MID PSV: 69.6 CM/S
BH CV LEA LEFT PTA PRESSURE: 240 MMHG
BH CV LEA LEFT PTA PROX EDV: 0 CM/S
BH CV LEA LEFT PTA PROX PSV: -78.9 CM/S
BH CV LEA LEFT SFA DISTAL EDV: 0 CM/S
BH CV LEA LEFT SFA DISTAL PSV: 0 CM/S
BH CV LEA LEFT SFA MID EDV: 0 CM/S
BH CV LEA LEFT SFA MID PSV: 0 CM/S
BH CV LEA LEFT SFA PROX EDV: 0 CM/S
BH CV LEA LEFT SFA PROX PSV: 0 CM/S
BH CV LEA LEFT TIBEOPERONEAL EDV: 0 CM/S
BH CV LEA LEFT TIBEOPERONEAL PSV: 86.2 CM/S
BH CV LEA RIGHT ANT TIBIAL A DISTAL EDV: 7.2 CM/S
BH CV LEA RIGHT ANT TIBIAL A DISTAL PSV: 21.6 CM/S
BH CV LEA RIGHT ANT TIBIAL A MID EDV: 6 CM/S
BH CV LEA RIGHT ANT TIBIAL A MID PSV: 20.1 CM/S
BH CV LEA RIGHT ANT TIBIAL A PROX EDV: -6.6 CM/S
BH CV LEA RIGHT ANT TIBIAL A PROX PSV: -18.2 CM/S
BH CV LEA RIGHT CFA DISTAL PSV: 50 CM/S
BH CV LEA RIGHT DFA PROX EDV: 16.2 CM/S
BH CV LEA RIGHT DFA PROX PSV: 96.9 CM/S
BH CV LEA RIGHT DPA PRESSURE: NORMAL MMHG
BH CV LEA RIGHT PERONEAL  DISTAL EDV: -11.6 CM/S
BH CV LEA RIGHT PERONEAL  DISTAL PSV: -26.7 CM/S
BH CV LEA RIGHT PERONEAL  MID EDV: 11.6 CM/S
BH CV LEA RIGHT PERONEAL  MID PSV: 25.7 CM/S
BH CV LEA RIGHT PERONEAL  PROX EDV: 20.4 CM/S
BH CV LEA RIGHT PERONEAL  PROX PSV: 50.8 CM/S
BH CV LEA RIGHT POPITEAL A  DISTAL EDV: 0 CM/S
BH CV LEA RIGHT POPITEAL A  DISTAL PSV: 0 CM/S
BH CV LEA RIGHT POPITEAL A  MID EDV: 0 CM/S
BH CV LEA RIGHT POPITEAL A  MID PSV: 0 CM/S
BH CV LEA RIGHT POPITEAL A  PROX EDV: 0 CM/S
BH CV LEA RIGHT POPITEAL A  PROX PSV: 0 CM/S
BH CV LEA RIGHT PTA DISTAL EDV: -7.8 CM/S
BH CV LEA RIGHT PTA DISTAL PSV: -20.1 CM/S
BH CV LEA RIGHT PTA MID EDV: 0 CM/S
BH CV LEA RIGHT PTA MID PSV: 0 CM/S
BH CV LEA RIGHT PTA PRESSURE: 93 MMHG
BH CV LEA RIGHT PTA PROX EDV: 0 CM/S
BH CV LEA RIGHT PTA PROX PSV: 0 CM/S
BH CV LEA RIGHT SFA DISTAL EDV: 0 CM/S
BH CV LEA RIGHT SFA DISTAL PSV: 0 CM/S
BH CV LEA RIGHT SFA MID EDV: -3.8 CM/S
BH CV LEA RIGHT SFA MID PSV: -15.4 CM/S
BH CV LEA RIGHT SFA PROX PSV: 49.2 CM/S
BH CV LEA RIGHT TIBEOPERONEAL EDV: 0 CM/S
BH CV LEA RIGHT TIBEOPERONEAL PSV: 0 CM/S
BH CV LOWER ARTERIAL LEFT ABI RATIO: NORMAL
BH CV LOWER ARTERIAL RIGHT ABI RATIO: NORMAL
BH CV VAS LEA RIGHT HIDDEN GRAFT ALERT 2: 44
BH CV VAS LEA RIGHT HIDDEN GRAFT ALERT: 11
BH CV XLRA MEAS LEFT CAROTID BULB EDV: 12.2 CM/SEC
BH CV XLRA MEAS LEFT CAROTID BULB PSV: 47.2 CM/SEC
BH CV XLRA MEAS LEFT DIST CCA EDV: 12.7 CM/SEC
BH CV XLRA MEAS LEFT DIST CCA PSV: 78.4 CM/SEC
BH CV XLRA MEAS LEFT DIST ICA EDV: -18.6 CM/SEC
BH CV XLRA MEAS LEFT DIST ICA PSV: -62 CM/SEC
BH CV XLRA MEAS LEFT ICA/CCA RATIO: -0.78
BH CV XLRA MEAS LEFT MID ICA EDV: -20.5 CM/SEC
BH CV XLRA MEAS LEFT MID ICA PSV: -78.2 CM/SEC
BH CV XLRA MEAS LEFT PROX CCA EDV: 9.5 CM/SEC
BH CV XLRA MEAS LEFT PROX CCA PSV: 62.9 CM/SEC
BH CV XLRA MEAS LEFT PROX ECA EDV: -9.9 CM/SEC
BH CV XLRA MEAS LEFT PROX ECA PSV: -93.4 CM/SEC
BH CV XLRA MEAS LEFT PROX ICA EDV: -12.6 CM/SEC
BH CV XLRA MEAS LEFT PROX ICA PSV: -60.9 CM/SEC
BH CV XLRA MEAS LEFT VERTEBRAL A EDV: -9.9 CM/SEC
BH CV XLRA MEAS LEFT VERTEBRAL A PSV: -38.3 CM/SEC
BH CV XLRA MEAS RIGHT CAROTID BULB EDV: 11.2 CM/SEC
BH CV XLRA MEAS RIGHT CAROTID BULB PSV: 70.2 CM/SEC
BH CV XLRA MEAS RIGHT DIST CCA EDV: 10.6 CM/SEC
BH CV XLRA MEAS RIGHT DIST CCA PSV: 57.2 CM/SEC
BH CV XLRA MEAS RIGHT DIST ICA EDV: -12.7 CM/SEC
BH CV XLRA MEAS RIGHT DIST ICA PSV: -74.2 CM/SEC
BH CV XLRA MEAS RIGHT ICA/CCA RATIO: -1.02
BH CV XLRA MEAS RIGHT MID ICA EDV: -14.2 CM/SEC
BH CV XLRA MEAS RIGHT MID ICA PSV: -57 CM/SEC
BH CV XLRA MEAS RIGHT PROX CCA EDV: 9.9 CM/SEC
BH CV XLRA MEAS RIGHT PROX CCA PSV: 59 CM/SEC
BH CV XLRA MEAS RIGHT PROX ECA EDV: -8.2 CM/SEC
BH CV XLRA MEAS RIGHT PROX ECA PSV: -102.7 CM/SEC
BH CV XLRA MEAS RIGHT PROX ICA EDV: -9.3 CM/SEC
BH CV XLRA MEAS RIGHT PROX ICA PSV: -58.4 CM/SEC
BH CV XLRA MEAS RIGHT VERTEBRAL A EDV: 0 CM/SEC
BH CV XLRA MEAS RIGHT VERTEBRAL A PSV: 15.3 CM/SEC
LEFT ARM BP: NORMAL MMHG
LEFT GROIN CFA SYS: 152.7 CM/SEC
Lab: 0 CM/S
RIGHT ARM BP: NORMAL MMHG
RIGHT GROIN CFA SYS: 50.1 CM/SEC

## 2024-03-14 PROCEDURE — 93880 EXTRACRANIAL BILAT STUDY: CPT

## 2024-03-14 PROCEDURE — 93925 LOWER EXTREMITY STUDY: CPT

## 2024-03-22 ENCOUNTER — TELEPHONE (OUTPATIENT)
Dept: NEUROLOGY | Facility: CLINIC | Age: 79
End: 2024-03-22
Payer: MEDICARE

## 2024-03-22 NOTE — TELEPHONE ENCOUNTER
Provider: DR MARIEE    Caller: MAYO    Relationship to Patient: DAUGHTER    Phone Number: [phone]    Reason for Call: CALLED REGARDING DATSCAN COMING UP ON 3/27/24 AT [facility]. WAS ADVISED BY [place] TO CALL TO DR MARIEE AND GET THE PRE APPT INSTRUCTIONS. PLEASE REVIEW AND ADVISE, THANK YOU.

## 2024-04-10 ENCOUNTER — HOSPITAL ENCOUNTER (OUTPATIENT)
Dept: CT IMAGING | Facility: HOSPITAL | Age: 79
Discharge: HOME | End: 2024-04-10
Admitting: FAMILY MEDICINE
Payer: MEDICARE

## 2024-04-10 DIAGNOSIS — R79.89 ELEVATED LFTS: ICD-10-CM

## 2024-04-10 DIAGNOSIS — R19.01 RIGHT UPPER QUADRANT ABDOMINAL MASS: ICD-10-CM

## 2024-04-10 LAB
CREAT BLDA-MCNC: 1 MG/DL (ref 0.6–1.3)
EGFRCR SERPLBLD CKD-EPI 2021: 76.6 ML/MIN/1.73

## 2024-04-10 PROCEDURE — 25510000001 IOPAMIDOL PER 1 ML: Performed by: FAMILY MEDICINE

## 2024-04-10 PROCEDURE — 82565 ASSAY OF CREATININE: CPT

## 2024-04-10 PROCEDURE — 74178 CT ABD&PLV WO CNTR FLWD CNTR: CPT

## 2024-04-10 RX ADMIN — IOPAMIDOL 100 ML: 755 INJECTION, SOLUTION INTRAVENOUS at 10:55

## 2024-04-11 ENCOUNTER — OFFICE VISIT (OUTPATIENT)
Dept: NEUROLOGY | Facility: CLINIC | Age: 79
End: 2024-04-11
Payer: MEDICARE

## 2024-04-11 ENCOUNTER — TELEPHONE (OUTPATIENT)
Dept: NEUROLOGY | Facility: CLINIC | Age: 79
End: 2024-04-11

## 2024-04-11 VITALS
WEIGHT: 144 LBS | HEART RATE: 57 BPM | DIASTOLIC BLOOD PRESSURE: 46 MMHG | HEIGHT: 70 IN | SYSTOLIC BLOOD PRESSURE: 111 MMHG | BODY MASS INDEX: 20.62 KG/M2

## 2024-04-11 DIAGNOSIS — F01.A0 MILD MIXED VASCULAR AND NEURODEGENERATIVE DEMENTIA WITHOUT BEHAVIORAL DISTURBANCE, PSYCHOTIC DISTURBANCE, MOOD DISTURBANCE, OR ANXIETY: Primary | ICD-10-CM

## 2024-04-11 DIAGNOSIS — G25.0 ESSENTIAL TREMOR: ICD-10-CM

## 2024-04-11 PROCEDURE — 1159F MED LIST DOCD IN RCRD: CPT | Performed by: PSYCHIATRY & NEUROLOGY

## 2024-04-11 PROCEDURE — 1160F RVW MEDS BY RX/DR IN RCRD: CPT | Performed by: PSYCHIATRY & NEUROLOGY

## 2024-04-11 PROCEDURE — 99214 OFFICE O/P EST MOD 30 MIN: CPT | Performed by: PSYCHIATRY & NEUROLOGY

## 2024-04-11 RX ORDER — TOPIRAMATE 25 MG/1
TABLET ORAL
Qty: 120 TABLET | Refills: 3 | Status: SHIPPED | OUTPATIENT
Start: 2024-04-11 | End: 2024-04-11 | Stop reason: SDUPTHER

## 2024-04-11 RX ORDER — TOPIRAMATE 50 MG/1
TABLET, FILM COATED ORAL
Qty: 120 TABLET | Refills: 3 | Status: SHIPPED | OUTPATIENT
Start: 2024-04-11

## 2024-04-11 NOTE — ASSESSMENT & PLAN NOTE
I will order an MRI of the brain since he has not had any studies of his brain.  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  I discussed with him that he should consider giving up driving.  I will leave that up to his family.  I may try him on memantine on his next week visit.  Thank you for letting me participate in his care.

## 2024-04-11 NOTE — ASSESSMENT & PLAN NOTE
Discussed with him that I will give him topiramate off label since he is unable to take primidone.  He is also already on a beta-blocker.  She will take topiramate 25 mg initially and increase the dose by 25 mg weekly until is taken up to 50 mg twice a day.  I discussed with him and his daughter that medicine can cause him to become feeling like intoxicated, sleepy, tingly and rarely kidney stones, mood swings and weight loss.  I will see him again in 8 weeks time for follow-up.

## 2024-04-11 NOTE — PROGRESS NOTES
"Chief Complaint  Results (Review Datscan)    Subjective          Familia Thompson is a 79 y.o. male who presents to [facility]  History of Present Illness  79-year-old man here for follow-up of his essential tremor and memory loss.  His daughter is with him.  He states that the tremor bothers him when he is eating and doing activities.  It is mainly the right hand.  He had a DaTscan which is negative.  His daughter also tells me that he has memory loss and is driving even though she states that he should not be driving anymore.  He is taking Plavix and Eliquis and he wants to know if he should be taking both.  He has a history of stents and heart disease.  He is seeing cardiology and vascular surgery.    Objective   Vital Signs:   /46   Pulse 57   Ht 177.8 cm (70\")   Wt 65.3 kg (144 lb)   BMI 20.66 kg/m²     Physical Exam   He is alert, fluent, phasic, follows commands well.  He can tell me it is 2024 but got the month wrong initially as March instead of April.  He also could not tell me the the date and he was of 1 day on the day of the week.  He is able to tell me that he is in a doctor's office in [place].  Clock drawing the spacing is impaired and he was unable to draw the correct time and he placed 10 minutes still 11 instead of 11:10.  There is a tremor noted in the right hand at rest as well as mild tremor on finger-to-nose testing.  Archimedes spiral shows a significant tremor.  Heart is regular and rhythm normal in rate.        Assessment and Plan  Diagnoses and all orders for this visit:    1. 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  I discussed with " him that he should consider giving up driving.  I will leave that up to his family.  I may try him on memantine on his next week visit.  Thank you for letting me participate in his care.    Orders:  -     MRI Brain Without Contrast; Future    2. Essential tremor  Assessment & Plan:  Discussed with him that I will give him topiramate off label since he is unable to take primidone.  He is also already on a beta-blocker.  She will take topiramate 25 mg initially and increase the dose by 25 mg weekly until is taken up to 50 mg twice a day.  I discussed with him and his daughter that medicine can cause him to become feeling like intoxicated, sleepy, tingly and rarely kidney stones, mood swings and weight loss.  I will see him again in 8 weeks time for follow-up.      Other orders  -     topiramate (TOPAMAX) 25 MG tablet; 1 tablet daily x 1 week, 1 tablet twice a day the second week, 1 in the morning and 2 in the evening the third week and then 2 twice a day thereafter.  Dispense: 120 tablet; Refill: 3         Total time spent with the patient and coordinating patient care was 35 minutes.    Follow Up  No follow-ups on file.  Patient was given instructions and counseling regarding his condition or for health maintenance advice. Please see specific information pulled into the AVS if appropriate.

## 2024-04-12 DIAGNOSIS — D13.5 ADENOMYOMATOSIS OF GALLBLADDER: Primary | ICD-10-CM

## 2024-04-13 ENCOUNTER — APPOINTMENT (OUTPATIENT)
Dept: GENERAL RADIOLOGY | Facility: HOSPITAL | Age: 79
End: 2024-04-13
Payer: MEDICARE

## 2024-04-13 ENCOUNTER — HOSPITAL ENCOUNTER (EMERGENCY)
Facility: HOSPITAL | Age: 79
Discharge: HOME | End: 2024-04-13
Attending: EMERGENCY MEDICINE
Payer: MEDICARE

## 2024-04-13 VITALS
RESPIRATION RATE: 18 BRPM | TEMPERATURE: 98.6 F | OXYGEN SATURATION: 95 % | DIASTOLIC BLOOD PRESSURE: 57 MMHG | WEIGHT: 147.27 LBS | BODY MASS INDEX: 21.08 KG/M2 | SYSTOLIC BLOOD PRESSURE: 115 MMHG | HEART RATE: 53 BPM | HEIGHT: 70 IN

## 2024-04-13 DIAGNOSIS — S42.201A CLOSED FRACTURE OF PROXIMAL END OF RIGHT HUMERUS, UNSPECIFIED FRACTURE MORPHOLOGY, INITIAL ENCOUNTER: ICD-10-CM
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
## 2024-04-13 DIAGNOSIS — F10.920 ALCOHOLIC INTOXICATION WITHOUT COMPLICATION: ICD-10-CM

## 2024-04-13 LAB
ALBUMIN SERPL-MCNC: 4.1 G/DL (ref 3.5–5.2)
ALBUMIN/GLOB SERPL: 2.4 G/DL
ALP SERPL-CCNC: 99 U/L (ref 39–117)
ALT SERPL W P-5'-P-CCNC: 29 U/L (ref 1–41)
ANION GAP SERPL CALCULATED.3IONS-SCNC: 14.1 MMOL/L (ref 5–15)
AST SERPL-CCNC: 40 U/L (ref 1–40)
BASOPHILS # BLD AUTO: 0.04 10*3/MM3 (ref 0–0.2)
BASOPHILS NFR BLD AUTO: 1.1 % (ref 0–1.5)
BILIRUB SERPL-MCNC: 0.7 MG/DL (ref 0–1.2)
BUN SERPL-MCNC: 10 MG/DL (ref 8–23)
BUN/CREAT SERPL: 10.6 (ref 7–25)
CALCIUM SPEC-SCNC: 8.5 MG/DL (ref 8.6–10.5)
CHLORIDE SERPL-SCNC: 97 MMOL/L (ref 98–107)
CO2 SERPL-SCNC: 20.9 MMOL/L (ref 22–29)
CREAT SERPL-MCNC: 0.94 MG/DL (ref 0.76–1.27)
DEPRECATED RDW RBC AUTO: 47.1 FL (ref 37–54)
EGFRCR SERPLBLD CKD-EPI 2021: 82.5 ML/MIN/1.73
EOSINOPHIL # BLD AUTO: 0.03 10*3/MM3 (ref 0–0.4)
EOSINOPHIL NFR BLD AUTO: 0.8 % (ref 0.3–6.2)
ERYTHROCYTE [DISTWIDTH] IN BLOOD BY AUTOMATED COUNT: 13.7 % (ref 12.3–15.4)
ETHANOL BLD-MCNC: 160 MG/DL (ref 0–10)
ETHANOL UR QL: 0.16 %
GLOBULIN UR ELPH-MCNC: 1.7 GM/DL
GLUCOSE SERPL-MCNC: 80 MG/DL (ref 65–99)
HCT VFR BLD AUTO: 37.6 % (ref 37.5–51)
HGB BLD-MCNC: 12.4 G/DL (ref 13–17.7)
IMM GRANULOCYTES # BLD AUTO: 0.02 10*3/MM3 (ref 0–0.05)
IMM GRANULOCYTES NFR BLD AUTO: 0.5 % (ref 0–0.5)
LYMPHOCYTES # BLD AUTO: 1.03 10*3/MM3 (ref 0.7–3.1)
LYMPHOCYTES NFR BLD AUTO: 27.8 % (ref 19.6–45.3)
MCH RBC QN AUTO: 31 PG (ref 26.6–33)
MCHC RBC AUTO-ENTMCNC: 33 G/DL (ref 31.5–35.7)
MCV RBC AUTO: 94 FL (ref 79–97)
MONOCYTES # BLD AUTO: 0.39 10*3/MM3 (ref 0.1–0.9)
MONOCYTES NFR BLD AUTO: 10.5 % (ref 5–12)
NEUTROPHILS NFR BLD AUTO: 2.2 10*3/MM3 (ref 1.7–7)
NEUTROPHILS NFR BLD AUTO: 59.3 % (ref 42.7–76)
NRBC BLD AUTO-RTO: 0 /100 WBC (ref 0–0.2)
PLATELET # BLD AUTO: 133 10*3/MM3 (ref 140–450)
PMV BLD AUTO: 11 FL (ref 6–12)
POTASSIUM SERPL-SCNC: 4.1 MMOL/L (ref 3.5–5.2)
PROT SERPL-MCNC: 5.8 G/DL (ref 6–8.5)
RBC # BLD AUTO: 4 10*6/MM3 (ref 4.14–5.8)
RBC MORPH BLD: NORMAL
SMALL PLATELETS BLD QL SMEAR: NORMAL
SMUDGE CELLS BLD QL SMEAR: NORMAL
SODIUM SERPL-SCNC: 132 MMOL/L (ref 136–145)
WBC NRBC COR # BLD AUTO: 3.71 10*3/MM3 (ref 3.4–10.8)

## 2024-04-13 PROCEDURE — 80053 COMPREHEN METABOLIC PANEL: CPT | Performed by: EMERGENCY MEDICINE

## 2024-04-13 PROCEDURE — 85007 BL SMEAR W/DIFF WBC COUNT: CPT | Performed by: EMERGENCY MEDICINE

## 2024-04-13 PROCEDURE — 82077 ASSAY SPEC XCP UR&BREATH IA: CPT | Performed by: EMERGENCY MEDICINE

## 2024-04-13 PROCEDURE — 99283 EMERGENCY DEPT VISIT LOW MDM: CPT

## 2024-04-13 PROCEDURE — 73030 X-RAY EXAM OF SHOULDER: CPT

## 2024-04-13 PROCEDURE — 85025 COMPLETE CBC W/AUTO DIFF WBC: CPT | Performed by: EMERGENCY MEDICINE

## 2024-04-13 RX ORDER — HYDROCODONE BITARTRATE AND ACETAMINOPHEN 5; 325 MG/1; MG/1
1 TABLET ORAL EVERY 6 HOURS PRN
Qty: 12 TABLET | Refills: 0 | Status: SHIPPED | OUTPATIENT
Start: 2024-04-13 | End: 2024-04-18

## 2024-04-13 RX ORDER — SODIUM CHLORIDE 0.9 % (FLUSH) 0.9 %
10 SYRINGE (ML) INJECTION AS NEEDED
Status: DISCONTINUED | OUTPATIENT
Start: 2024-04-13 | End: 2024-04-13 | Stop reason: HOSPADM

## 2024-04-13 NOTE — ED PROVIDER NOTES
Time: 7:12 PM EDT  Date of encounter:  4/13/2024  Independent Historian/Clinical History and Information was obtained by:   Patient    History is limited by: N/A    Chief Complaint: Right shoulder pain after a fall      History of Present Illness:  Patient is a 79 y.o. year old male who presents to the emergency department for evaluation of right shoulder pain after a fall    HPI    Patient Care Team  Primary Care Provider: Robb Gómez DO    Past Medical History:     No Known Allergies  Past Medical History:   Diagnosis Date    Arthritis     Chronic systolic CHF (congestive heart failure)     COPD (chronic obstructive pulmonary disease)     Coronary artery disease     Hemorrhoids     HL (hearing loss)     Hyperlipidemia     Hypertension     Myocardial infarction     NSTEMI (non-ST elevated myocardial infarction) 04/27/2022    Added automatically from request for surgery [id number]    PVD (peripheral vascular disease)     Sinus disorder      Past Surgical History:   Procedure Laterality Date    CARDIAC CATHETERIZATION N/A 04/29/2022    Procedure: Left Heart Cath, possible angioplasty;  Surgeon: Zaki Díaz MD;  [facility]VAS[facility] LOC[facility];  Service: Cardiovascular;  Laterality: N/A;    CARDIAC CATHETERIZATION N/A 05/02/2022    Procedure: Percutaneous Coronary Intervention;  Surgeon: Josue Hensley MD;  Location:  INVASIVE LOCATION;  Service: Cardiovascular;  Laterality: N/A;    CARDIAC CATHETERIZATION N/A 05/02/2022    Procedure: Stent BLAIRE coronary;  Surgeon: Josue Hensley MD;  Location:  INVASIVE LOCATION;  Service: Cardiovascular;  Laterality: N/A;    CARDIAC CATHETERIZATION N/A 05/02/2022    Procedure: Atherectomy-coronary- Rotoblator;  Surgeon: Josue Hensley MD;  Location:  INVASIVE LOCATION;  Service: Cardiovascular;  Laterality: N/A;    CARDIAC CATHETERIZATION N/A 9/8/2023    Procedure: Left Heart Cath;  Surgeon: Pawan Guerrero MD;  Location:   [facility] INVASIVE LOCATION;  Service: Cardiovascular;  Laterality: N/A;    CARDIAC CATHETERIZATION N/A 9/8/2023    Procedure: Coronary angiography;  Surgeon: Pawan Guerrero MD;  Location: [facility] CATH INVASIVE LOCATION;  Service: Cardiovascular;  Laterality: N/A;    CARDIAC CATHETERIZATION  9/8/2023    Procedure: Saphenous Vein Graft;  Surgeon: Pawan Guerrero MD;  Location: [facility] CATH INVASIVE LOCATION;  Service: Cardiovascular;;    CARDIAC CATHETERIZATION N/A 9/8/2023    Procedure: Left ventriculography;  Surgeon: Pawan Guerrero MD;  Location: [facility] CATH INVASIVE LOCATION;  Service: Cardiovascular;  Laterality: N/A;    CARDIAC SURGERY      COLONOSCOPY      COLONOSCOPY N/A 9/15/2022    Procedure: COLONOSCOPY;  Surgeon: Dennis Saavedra MD;  Location: [facility] ENDOSCOPY;  Service: Gastroenterology;  Laterality: N/A;  COLON POLYP    CORONARY ANGIOPLASTY WITH STENT PLACEMENT      ENDOSCOPY N/A 9/15/2022    Procedure: ESOPHAGOGASTRODUODENOSCOPY;  Surgeon: Dennis Saavedra MD;  Location: [facility] ENDOSCOPY;  Service: Gastroenterology;  Laterality: N/A;   GASTRTIS BARRETTS ESOPH    EYE SURGERY Bilateral     UPPER GASTROINTESTINAL ENDOSCOPY      VEIN LIGATION AND STRIPPING      WRIST FRACTURE SURGERY       Family History   Problem Relation Age of Onset    Colon cancer Neg Hx        Home Medications:  Prior to Admission medications    Medication Sig Start Date End Date Taking? Authorizing Provider   albuterol sulfate  (90 Base) MCG/ACT inhaler Inhale 2 puffs Every 4 (Four) Hours As Needed for Wheezing or Shortness of Air. 6/19/23   Robb Gómez DO   amiodarone (PACERONE) 200 MG tablet Take 1 tablet by mouth Daily. 8/7/23   Robb Gómez DO   apixaban (ELIQUIS) 5 MG tablet tablet Take 1 tablet by mouth 2 (Two) Times a Day.    Provider, MD Roberta   atorvastatin (LIPITOR) 80 MG tablet TAKE 1 TABLET BY MOUTH EVERY NIGHT 1/23/24   Robb Gómez DO   clopidogrel (PLAVIX) 75 MG  "tablet Take 1 tablet by mouth Daily. 5/3/22   Josue Hensley MD   metoprolol tartrate (LOPRESSOR) 25 MG tablet TAKE 1 TABLET BY MOUTH TWICE DAILY  Patient taking differently: Take 1 tablet by mouth 2 (Two) Times a Day. TAKE 1 TABLET BY MOUTH TWICE DAILY 24   Robb Gómez DO   omeprazole (priLOSEC) 40 MG capsule Take 1 capsule by mouth Daily.    Provider, MD Roberta   tamsulosin (FLOMAX) 0.4 MG capsule 24 hr capsule Take 2 capsules by mouth Daily. 24   Robb Gómez DO   topiramate (TOPAMAX) 50 MG tablet 1/2 tablet daily x 1 week, 1/2 tablet twice a day second week, 1/2 tablet in the morning and 1 tablet in the evening the third week and then 1 tablet twice a day thereafter. 24   Zaki Sepulveda MD        Social History:   Social History     Tobacco Use    Smoking status: Some Days     Current packs/day: 0.00     Average packs/day: 1 pack/day for 30.0 years (30.0 ttl pk-yrs)     Types: Cigarettes     Start date:      Last attempt to quit:      Years since quittin.2     Passive exposure: Past    Smokeless tobacco: Never    Tobacco comments:     Quit smoking in 2023    Vaping Use    Vaping status: Never Used    Passive vaping exposure: Yes   Substance Use Topics    Alcohol use: Yes     Alcohol/week: 12.0 standard drinks of alcohol     Types: 12 Cans of beer per week     Comment: 6 beers a day    Drug use: Not Currently     Comment: 0cc         Review of Systems:  Review of Systems     Physical Exam:  /57   Pulse 53   Temp 98.6 °F (37 °C) (Oral)   Resp 18   Ht 177.8 cm (70\")   Wt 66.8 kg (147 lb 4.3 oz)   SpO2 95%   BMI 21.13 kg/m²     Physical Exam   ***          Procedures:  Procedures      Medical Decision Making:      Comorbidities that affect care:    {Comorbidities that affect care:69730}    External Notes reviewed:    {External Note review (Optional):95893}      The following orders were placed and all results were independently analyzed by " me:  Orders Placed This Encounter   Procedures    XR Shoulder 2+ View Right    Comprehensive Metabolic Panel    Ethanol    CBC Auto Differential    Scan Slide    Ambulatory Referral to Orthopedic Surgery    CBC & Differential       Medications Given in the Emergency Department:  Medications - No data to display       ED Course:         Labs:    Lab Results (last 24 hours)       ** No results found for the last 24 hours. **             Imaging:    No Radiology Exams Resulted Within Past 24 Hours      Differential Diagnosis and Discussion:    {Differentials:80959}    {Independent Review of (Optional):21317}    MDM       {Critical Care:42933}    {SEPSIS RECOGNITION:40664}    Patient Care Considerations:    {Considerations (Optional):13364}      Consultants/Shared Management Plan:    {Shared Management Plan (Optional):74272}    Social Determinants of Health:    {Social Determinants of Health (Optional):53962}      Disposition and Care Coordination:    Discharged: The patient is suitable and stable for discharge with no need for consideration of admission.    I have explained the patient´s condition, diagnoses and treatment plan based on the information available to me at this time. I have answered questions and addressed any concerns. The patient has a good  understanding of the patient´s diagnosis, condition, and treatment plan as can be expected at this point. The vital signs have been stable. The patient´s condition is stable and appropriate for discharge from the emergency department.      The patient will pursue further outpatient evaluation with the primary care physician or other designated or consulting physician as outlined in the discharge instructions. They are agreeable to this plan of care and follow-up instructions have been explained in detail. The patient has received these instructions in written format and has expressed an understanding of the discharge instructions. The patient is aware that any  significant change in condition or worsening of symptoms should prompt an immediate return to this or the closest emergency department or call to [facility].  I have explained discharge medications and the need for follow up with the patient/caretakers. This was also printed in the discharge instructions. Patient was discharged with the following medications and follow up:      Medication List        New Prescriptions      HYDROcodone-acetaminophen 5-325 MG per tablet  Commonly known as: NORCO  Take 1 tablet by mouth Every 6 (Six) Hours As Needed for Moderate Pain.            Changed      metoprolol tartrate 25 MG tablet  Commonly known as: LOPRESSOR  TAKE 1 TABLET BY MOUTH TWICE DAILY  What changed: additional instructions               Where to Get Your Medications        These medications were sent to [id number] - [place], [place]  [address] AT [facility] OF [address] & [place] - [phone] [facility] [phone]   [address] [facility] [address]      Phone: [phone]   HYDROcodone-acetaminophen 5-325 MG per tablet      Paige Webber MD  [address]  [place] [address]  [phone]      Anticipate a phone call on Monday for follow-up appointment    Paige Webber MD  [address] [facility] [address] [place] [address]  [phone]            Final diagnoses:   Fall, initial encounter   Closed fracture of proximal end of right humerus, unspecified fracture morphology, initial encounter   Alcoholic intoxication without complication        ED Disposition       ED Disposition   Discharge    Condition   Stable    Comment   --               This medical record created using voice recognition software.           Every 6 (Six) Hours As Needed for Moderate Pain.            Changed      metoprolol tartrate 25 MG tablet  Commonly known as: LOPRESSOR  TAKE 1 TABLET BY MOUTH TWICE DAILY  What changed: additional instructions               Where to Get Your Medications        These medications were sent to [id number] - [place], [place] - [address] AT [facility] OF [address] & [place] - [phone]  - [phone] [facility]  [address], [place] [address]      Phone: [phone]   HYDROcodone-acetaminophen 5-325 MG per tablet      Paige Webber MD  [address] [place] [address]  [phone]      Anticipate a phone call on Monday for follow-up appointment    Paige Webber MD  [address] [place] [address]  [phone]             Final diagnoses:   Fall, initial encounter   Closed fracture of proximal end of right humerus, unspecified fracture morphology, initial encounter   Alcoholic intoxication without complication        ED Disposition       ED Disposition   Discharge    Condition   Stable    Comment   --               This medical record created using voice recognition software.             Smooth Barrios DO  04/16/24 [id number]

## 2024-04-15 ENCOUNTER — TELEPHONE (OUTPATIENT)
Dept: ORTHOPEDIC SURGERY | Facility: CLINIC | Age: 79
End: 2024-04-15
Payer: MEDICARE

## 2024-04-15 NOTE — TELEPHONE ENCOUNTER
"HUB AGENT ATTEMPTED NON-CLINICAL WARM TRANSFER - NO ANSWER     PER ALREADY SIGNED TEL ENC FROM TODAY 04/15 @3121, HUB AGENT RELAYED NOTE FROM MYRNA @ [facility]:     \"CALLED AND LEFT A VOICEMAIL TO CALL US BACK IF NEEDS TO RESCHEDULE. DR OLIVER WANTED TO SEE PATIENT TOMORROW, IF THEY CALL BACK CAN RESCHEDULE TO ANYTIME. IF PATIENT UNABLE TO COME TUESDAY AND TRY FOR THURSDAY SINCE DR OLIVER IS NOT HERE ON FRIDAY. HUB OK TO RELAY\"     PLEASE CALL / LEAVE VMAIL TO DISCUSS RESCHEDULING FROM TOMORROW 04-16-24 @ 1400 - PATIENT's DAUGHTER MAYO STATED PATIENT HAS NO WAY TO GET TO APPT TOMORROW & SHE HAS TO WORK TILL 1700     PATIENT WOULD BE AVAILABLE TO COME IN ANYTIME ON THURS 04-18-24     BUT FIRST AVAILABLE HUB CAN SCHEDULE IS SHOWING AS NEXT THURS 04/25     THANKS       "

## 2024-04-15 NOTE — TELEPHONE ENCOUNTER
CALLED AND LEFT A VOICEMAIL TO CALL US BACK IF NEEDS TO RESCHEDULE. DR OLIVER WANTED TO SEE PATIENT TOMORROW, IF THEY CALL BACK CAN RESCHEDULE TO ANYTIME. IF PATIENT UNABLE TO COME TUESDAY CAN TRY FOR THURSDAY SINCE DR OLIVER IS NOT HERE ON FRIDAY. HUB OK TO RELAY

## 2024-04-15 NOTE — TELEPHONE ENCOUNTER
Caller: Ekle Carter    Relationship to patient: Emergency Contact    Best call back number: [phone]    Chief complaint: RT HUMERUS     Type of visit: NEW PROBLEM     Requested date: 04/17/2024     If rescheduling, when is the original appointment: 04/16/2024     Additional notes:CANNOT DUE THE 04/16/2024 DUE TO WORK SCHEDULE- IS NOT ON THE BH VERBAL

## 2024-04-18 ENCOUNTER — OFFICE VISIT (OUTPATIENT)
Dept: ORTHOPEDIC SURGERY | Facility: CLINIC | Age: 79
End: 2024-04-18
Payer: MEDICARE

## 2024-04-18 VITALS
SYSTOLIC BLOOD PRESSURE: 94 MMHG | OXYGEN SATURATION: 96 % | HEIGHT: 70 IN | BODY MASS INDEX: 21.05 KG/M2 | DIASTOLIC BLOOD PRESSURE: 55 MMHG | WEIGHT: 147 LBS | HEART RATE: 63 BPM

## 2024-04-18 DIAGNOSIS — M25.511 RIGHT SHOULDER PAIN, UNSPECIFIED CHRONICITY: Primary | ICD-10-CM

## 2024-04-18 DIAGNOSIS — S42.201A CLOSED FRACTURE OF PROXIMAL END OF RIGHT HUMERUS, UNSPECIFIED FRACTURE MORPHOLOGY, INITIAL ENCOUNTER: ICD-10-CM

## 2024-04-18 RX ORDER — HYDROCODONE BITARTRATE AND ACETAMINOPHEN 5; 325 MG/1; MG/1
1 TABLET ORAL EVERY 6 HOURS PRN
Qty: 28 TABLET | Refills: 0 | Status: SHIPPED | OUTPATIENT
Start: 2024-04-18

## 2024-04-18 NOTE — PROGRESS NOTES
"Chief Complaint  Initial Evaluation of the Right Shoulder     Subjective      Familia Thompson presents to [facility]THOPEDICS for initial evalaution of the right shoulder. He had a fall and went to the ER on 24.  He went to pick the dog up and lost his balance and fell.  He had X rays and placed in a splint.     No Known Allergies     Social History     Socioeconomic History    Marital status:    Tobacco Use    Smoking status: Some Days     Current packs/day: 0.00     Average packs/day: 1 pack/day for 30.0 years (30.0 ttl pk-yrs)     Types: Cigarettes     Start date:      Last attempt to quit:      Years since quittin.2     Passive exposure: Past    Smokeless tobacco: Never    Tobacco comments:     Quit smoking in 2023    Vaping Use    Vaping status: Never Used    Passive vaping exposure: Yes   Substance and Sexual Activity    Alcohol use: Yes     Alcohol/week: 12.0 standard drinks of alcohol     Types: 12 Cans of beer per week     Comment: 6 beers a day    Drug use: Not Currently     Comment: 0cc    Sexual activity: Defer        I reviewed the patient's chief complaint, history of present illness, review of systems, past medical history, surgical history, family history, social history, medications, and allergy list.     Review of Systems     Constitutional: Denies fevers, chills, weight loss  Cardiovascular: Denies chest pain, shortness of breath  Skin: Denies rashes, acute skin changes  Neurologic: Denies headache, loss of consciousness        Vital Signs:   BP 94/55   Pulse 63   Ht 177.8 cm (70\")   Wt 66.7 kg (147 lb)   SpO2 96%   BMI 21.09 kg/m²          Physical Exam  General: Alert. No acute distress    Ortho Exam        RIGHT SHOUDLER  Sensation intact to light touch, median, radial, ulnar nerve. Positive AIN, PIN, ulnar nerve motor. Positive pulses.  Good strength in triceps, biceps, deltoid, wrist extensors and wrist flexors. Mildly Full , thumb " opposition, MCP flexors, DIP flexors and PIP flexors. Moderate swelling and bruising.       Procedures      Imaging Results (Most Recent)       None             Result Review :         XR Shoulder 2+ View Right    Result Date: 4/13/2024  Narrative: XR SHOULDER 2+ VW RIGHT-  Date of Exam: 4/13/2024 6:49 PM  Indication: fall. Shoulder pain.  Comparison: None available.  Findings: There is a comminuted fracture of the proximal humerus that is essentially nondisplaced. The fracture extends across the surgical neck and through the greater tuberosity. No glenohumeral dislocation or AC joint separation. There is AC joint arthropathy.      Impression: Essentially nondisplaced comminuted proximal humerus fracture without dislocation.   Electronically Signed By-Dr. Fredy Damon MD On:4/13/2024 7:13 PM      CT Abdomen Pelvis With & Without Contrast    Result Date: 4/11/2024  Narrative: CT ABDOMEN PELVIS W WO CONTRAST-  Date of Exam: 4/10/2024 10:40 AM  Indication: mass next to Gallbladder. requesting further evaluation per radiology; R19.01-Right upper quadrant abdominal swelling, mass and lump; R79.89-Other specified abnormal findings of blood chemistry. Comparison: Right upper quadrant ultrasound 2/26/2024  Technique: Axial CT images were obtained of the abdomen and pelvis before and after the uneventful intravenous administration of 100 mL Isovue-370. Sagittal and coronal reconstructions were performed. Automated exposure control and iterative reconstruction methods were used.  Findings: Lung bases are without consolidation. Negative for pericardial effusion or pleural effusion.  Liver and spleen normal in size and contour. Normal adrenal glands. The pancreas is without findings of pancreatitis. Gallbladder present. There is nodular soft tissue thickening at the gallbladder fundus measuring 10 x 11 mm most suggestive of focal adenomyomatosis (3/58). Normal caliber common bile duct.  Kidneys enhance symmetrically. No  hydronephrosis or hydroureter. No obstructing ureteral calculus. Urinary bladder without acute abnormality. The prostate is enlarged.  Negative for pneumoperitoneum. Colonic diverticulosis without diverticulitis. No bowel obstruction. No findings of appendicitis, the appendix is normal. No fluid collection in the abdomen or pelvis.  Extensive atherosclerotic calcifications of the abdominal aorta and branch vasculature. The celiac axis and superior mesenteric artery are patent. Bilateral renal arteries are patent. Negative for infrarenal aortic aneurysm. Postsurgical changes at left and right groin related to SFA bypass partially imaged. Right SFA bypass occluded,. Left SFA bypass patent proximally. Multilevel disc narrowing throughout the lumbar spine. No aggressive osseous lesion or acute fracture. Mild convex right lumbar dextrocurvature centered at L2.      Impression: Impression: 1. Area of nodular soft tissue thickening at gallbladder fundus most suggestive of focal adenomyomatosis, consider ultrasound follow-up in 6 months to document sonographic stability. 2. No acute abnormality in the abdomen or pelvis. 3. Extensive aortoiliac atherosclerosis with postsurgical changes of bilateral SFA bypass partially imaged, right SFA bypass occluded. 4. Prostatomegaly and additional chronic findings above.    Electronically Signed By-Zack Valdovinos MD On:2024 9:49 AM      NM I-123 DaTscan Brain    Result Date: 3/27/2024  Narrative: REVIEWING YOUR TEST RESULTS IN [facility] IS NOT A SUBSTITUTE FOR DISCUSSING THOSE RESULTS WITH YOUR HEALTH CARE PROVIDER. PLEASE CONTACT YOUR PROVIDER [facility]IA [facility] TO DISCUSS ANY QUESTIONS OR CONCERNS YOU MAY HAVE REGARDING THESE TEST RESULTS.  NUCLEAR MEDICINE REPORT FACILITY:  [facility] UNIT/AGE/GENDER: ZOYA  OP      AGE:79 Y          SEX:M PATIENT NAME/:  ODELL, GERALDO, E    1945 UNIT NUMBER:  [id number] ACCOUNT NUMBER:  [id number] ACCESSION  NUMBER:  [id number] NUCLEAR MEDICINE I-123 GOYO SCAN BRAIN, 03/27/2024 at 1430 hours HISTORY: Tremor. TECHNIQUE: The patient received 0.13 ml oral solution SSKI in 8 ounces of Sprite. This is equivalent to 100 mg of iodide. One hour post SSKI the patient received 5.2 mCi I123 goyo scan intravenously. Imaging was performed. COMPARISON: No FINDINGS: There is normal comma shaped uptake in the bilateral basal ganglia. This is consistent with normal striatal uptake. IMPRESSION: 1. Normal GOYO scan. Dictated by: Corby Costa M.D. Images and Report reviewed and interpreted by: Corby Costa M.D. <PS><Electronically signed by: Corby Costa M.D.> 03/27/2024 1802 D: 03/27/2024 1802 T: 03/27/2024 1802            Assessment and Plan     Diagnoses and all orders for this visit:    1. Right shoulder pain, unspecified chronicity (Primary)    2. Closed fracture of proximal end of right humerus, unspecified fracture morphology, initial encounter        Discussed the treatment plan with the patient. I reviewed the X-rays that were obtained 4/13/24 with the patient.     Continue sling.  Work on ROM of the hand.     Refilled pain medication.     Will X ray at the next visit.      Educated on risk of smoking/nicotine. Discussed options for smoking cessation. and Call or return if worsening symptoms.    Follow Up     10-14 days for new X ray and discuss continue conservative measures. Vs surgical intervention.       Patient was given instructions and counseling regarding his condition or for health maintenance advice. Please see specific information pulled into the AVS if appropriate.     Scribed for Paige Webber MD by Lara Castaneda MA.  04/18/24   09:59 EDT    I have personally performed the services described in this document as scribed by the above individual and it is both accurate and complete. Paige Webber MD 04/18/24

## 2024-05-03 ENCOUNTER — OFFICE VISIT (OUTPATIENT)
Dept: ORTHOPEDIC SURGERY | Facility: CLINIC | Age: 79
End: 2024-05-03
Payer: MEDICARE

## 2024-05-03 VITALS
HEART RATE: 70 BPM | WEIGHT: 147 LBS | SYSTOLIC BLOOD PRESSURE: 103 MMHG | DIASTOLIC BLOOD PRESSURE: 63 MMHG | OXYGEN SATURATION: 92 % | HEIGHT: 70 IN | BODY MASS INDEX: 21.05 KG/M2

## 2024-05-03 DIAGNOSIS — S42.201D CLOSED FRACTURE OF PROXIMAL END OF RIGHT HUMERUS WITH ROUTINE HEALING, UNSPECIFIED FRACTURE MORPHOLOGY, SUBSEQUENT ENCOUNTER: ICD-10-CM

## 2024-05-03 DIAGNOSIS — M25.511 RIGHT SHOULDER PAIN, UNSPECIFIED CHRONICITY: ICD-10-CM

## 2024-05-03 DIAGNOSIS — S42.201A CLOSED FRACTURE OF PROXIMAL END OF RIGHT HUMERUS, UNSPECIFIED FRACTURE MORPHOLOGY, INITIAL ENCOUNTER: ICD-10-CM

## 2024-05-03 DIAGNOSIS — M25.511 RIGHT SHOULDER PAIN, UNSPECIFIED CHRONICITY: Primary | ICD-10-CM

## 2024-05-03 PROCEDURE — 1159F MED LIST DOCD IN RCRD: CPT

## 2024-05-03 PROCEDURE — 99213 OFFICE O/P EST LOW 20 MIN: CPT

## 2024-05-03 PROCEDURE — 1160F RVW MEDS BY RX/DR IN RCRD: CPT

## 2024-05-03 RX ORDER — HYDROCODONE BITARTRATE AND ACETAMINOPHEN 5; 325 MG/1; MG/1
1 TABLET ORAL EVERY 4 HOURS PRN
Qty: 42 TABLET | Refills: 0 | Status: SHIPPED | OUTPATIENT
Start: 2024-05-03

## 2024-05-13 ENCOUNTER — OFFICE VISIT (OUTPATIENT)
Dept: FAMILY MEDICINE CLINIC | Facility: CLINIC | Age: 79
End: 2024-05-13
Payer: MEDICARE

## 2024-05-13 VITALS
WEIGHT: 133.3 LBS | RESPIRATION RATE: 18 BRPM | DIASTOLIC BLOOD PRESSURE: 60 MMHG | OXYGEN SATURATION: 97 % | HEART RATE: 97 BPM | TEMPERATURE: 97.9 F | BODY MASS INDEX: 19.08 KG/M2 | SYSTOLIC BLOOD PRESSURE: 110 MMHG | HEIGHT: 70 IN

## 2024-05-13 DIAGNOSIS — D13.5 ADENOMYOMATOSIS OF GALLBLADDER: ICD-10-CM

## 2024-05-13 DIAGNOSIS — F01.A0 MILD MIXED VASCULAR AND NEURODEGENERATIVE DEMENTIA WITHOUT BEHAVIORAL DISTURBANCE, PSYCHOTIC DISTURBANCE, MOOD DISTURBANCE, OR ANXIETY: ICD-10-CM

## 2024-05-13 DIAGNOSIS — G25.0 ESSENTIAL TREMOR: ICD-10-CM

## 2024-05-13 DIAGNOSIS — I10 PRIMARY HYPERTENSION: ICD-10-CM

## 2024-05-13 DIAGNOSIS — K22.70 BARRETT'S ESOPHAGUS WITHOUT DYSPLASIA: ICD-10-CM

## 2024-05-13 DIAGNOSIS — R19.01 RIGHT UPPER QUADRANT ABDOMINAL MASS: ICD-10-CM

## 2024-05-13 DIAGNOSIS — M25.511 CHRONIC RIGHT SHOULDER PAIN: Primary | ICD-10-CM

## 2024-05-13 DIAGNOSIS — Z78.9 ALCOHOL USE: ICD-10-CM

## 2024-05-13 DIAGNOSIS — E78.5 HYPERLIPIDEMIA, UNSPECIFIED HYPERLIPIDEMIA TYPE: ICD-10-CM

## 2024-05-13 DIAGNOSIS — I48.91 ATRIAL FIBRILLATION, UNSPECIFIED TYPE: ICD-10-CM

## 2024-05-13 DIAGNOSIS — I73.9 PERIPHERAL ARTERIAL DISEASE: ICD-10-CM

## 2024-05-13 DIAGNOSIS — S42.201S CLOSED FRACTURE OF PROXIMAL END OF RIGHT HUMERUS, UNSPECIFIED FRACTURE MORPHOLOGY, SEQUELA: ICD-10-CM

## 2024-05-13 DIAGNOSIS — G89.29 CHRONIC RIGHT SHOULDER PAIN: Primary | ICD-10-CM
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
## 2024-05-13 PROCEDURE — 1126F AMNT PAIN NOTED NONE PRSNT: CPT | Performed by: FAMILY MEDICINE

## 2024-05-13 PROCEDURE — 99214 OFFICE O/P EST MOD 30 MIN: CPT | Performed by: FAMILY MEDICINE

## 2024-05-13 RX ORDER — PANTOPRAZOLE SODIUM 40 MG/1
40 TABLET, DELAYED RELEASE ORAL DAILY
Qty: 90 TABLET | Refills: 3 | Status: SHIPPED | OUTPATIENT
Start: 2024-05-13

## 2024-05-13 NOTE — PROGRESS NOTES
Chief Complaint  Fall     Subjective          Familia Thompson presents to [facility]  History of Present Illness  Patient presents today to discuss a recent fall.  He is accompanied by his daughter, Elke as well as daughter.  He went to the emergency room on 4/13/2024 after having shoulder pain after a fall.  His alcohol level was elevated.  I did discuss with him alcohol use previously.  He admits he has been sober since this most recent fall.  He had a comminuted fracture of the proximal humerus that was essentially nondisplaced however the scapula x-ray follow-up with orthopedics showed a displaced fracture which I reviewed myself as well.  He does have a sling in place right now.  They are considering surgical options.  He will likely need to have evaluation with cardiology as well prior to undergoing surgery given history.  He did see neurology.  He does have dementia as well as tremors.  A MRI of the brain has been ordered to further evaluate.  Patient also has a previously noted area of nodular soft tissue thickening at the gallbladder fundus most suggestive of a focal adenomyomatosis.  I did discuss having a follow-up ultrasound in 6 months to document sonographic stability.  He previous had an ultrasound done on 2/26/2024 showing a 1.6 cm hypoechoic structure or lesion adjacent to the gallbladder of uncertain significance requiring a CT for further evaluation.  He has not seen Dr. Dixon in about a year.  He is being followed for atrial fibrillation as well as hypertension.  Blood pressure has been adequately controlled taking metoprolol 25 mg twice daily as well as amiodarone 200 mg daily.  For atrial fibrillation he does take Eliquis 5 mg twice a day.  He did miss an appointment see GI for the EGD.  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  I did talk with patient as well as daughter to the importance about keeping follow-up appointments.  He is also not seen Dr. Burger in regards  "to peripheral vascular disease.  He missed a follow-up appointment in February.    Current Outpatient Medications   Medication Instructions    albuterol sulfate  (90 Base) MCG/ACT inhaler 2 puffs, Inhalation, Every 4 Hours PRN    amiodarone (PACERONE) 200 mg, Oral, Daily    apixaban (ELIQUIS) 5 mg, Oral, 2 Times Daily    atorvastatin (LIPITOR) 80 mg, Oral, Nightly    clopidogrel (PLAVIX) 75 mg, Oral, Daily    HYDROcodone-acetaminophen (NORCO) 5-325 MG per tablet 1 tablet, Oral, Every 4 Hours PRN    metoprolol tartrate (LOPRESSOR) 25 MG tablet TAKE 1 TABLET BY MOUTH TWICE DAILY    pantoprazole (PROTONIX) 40 mg, Oral, Daily    tamsulosin (FLOMAX) 0.8 mg, Oral, Daily    topiramate (TOPAMAX) 50 MG tablet 1/2 tablet daily x 1 week, 1/2 tablet twice a day second week, 1/2 tablet in the morning and 1 tablet in the evening the third week and then 1 tablet twice a day thereafter.       The following portions of the patient's history were reviewed and updated as appropriate: allergies, current medications, past family history, past medical history, past social history, past surgical history, and problem list.    Objective   Vital Signs:   /60 (BP Location: Left arm, Patient Position: Sitting, Cuff Size: Adult)   Pulse 97   Temp 97.9 °F (36.6 °C) (Oral)   Resp 18   Ht 177.8 cm (70\")   Wt 60.5 kg (133 lb 4.8 oz)   SpO2 97%   BMI 19.13 kg/m²     BP Readings from Last 3 Encounters:   05/13/24 110/60   05/03/24 103/63   04/18/24 94/55     Wt Readings from Last 3 Encounters:   05/13/24 60.5 kg (133 lb 4.8 oz)   05/03/24 66.7 kg (147 lb)   04/18/24 66.7 kg (147 lb)     BMI is within normal parameters. No other follow-up for BMI required.     Physical Exam  Vitals reviewed.   Constitutional:       Appearance: Normal appearance.   HENT:      Head: Normocephalic and atraumatic.      Right Ear: External ear normal.      Left Ear: External ear normal.   Eyes:      Conjunctiva/sclera: Conjunctivae normal. "   Cardiovascular:      Rate and Rhythm: Normal rate and regular rhythm.      Heart sounds: No murmur heard.     No friction rub. No gallop.   Pulmonary:      Effort: Pulmonary effort is normal.      Breath sounds: Normal breath sounds. No wheezing or rhonchi.   Abdominal:      General: Bowel sounds are normal. There is no distension.      Palpations: Abdomen is soft.      Tenderness: There is no abdominal tenderness.   Musculoskeletal:      Comments: Sling in place with indentation noted just below the right shoulder joint where the humerus is displaced medially.   Skin:     General: Skin is warm and dry.   Neurological:      Mental Status: He is alert and oriented to person, place, and time.      Cranial Nerves: No cranial nerve deficit.   Psychiatric:         Mood and Affect: Mood and affect normal.         Behavior: Behavior normal.         Thought Content: Thought content normal.         Judgment: Judgment normal.            Result Review :   The following data was reviewed by: Robb Gómez DO on 05/13/2024:  Common labs          2/12/2024    12:58 4/10/2024    10:22 4/13/2024    18:33   Common Labs   Glucose 59   80    BUN 8   10    Creatinine 1.03  1.00  0.94    Sodium 136   132    Potassium 4.3   4.1    Chloride 103   97    Calcium 9.0   8.5    Albumin 4.0   4.1    Total Bilirubin 0.6   0.7    Alkaline Phosphatase 126   99    AST (SGOT) 41   40    ALT (SGPT) 28   29    WBC 4.51   3.71    Hemoglobin 13.9   12.4    Hematocrit 40.9   37.6    Platelets 152   133    Total Cholesterol 143      Triglycerides 65      HDL Cholesterol 73      LDL Cholesterol  57      PSA 1.710               Lab Results   Component Value Date    COVID19 Not Detected 04/27/2022    FLU Negative 04/27/2022    FLU Negative 04/27/2022    INR 1.03 10/25/2023       Procedures        Assessment and Plan    Diagnoses and all orders for this visit:    1. Chronic right shoulder pain (Primary)    2. Closed fracture of proximal end of right  humerus, unspecified fracture morphology, sequela    3. 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 Essential tremor    5. Adenomyomatosis of gallbladder    6. Right upper quadrant abdominal mass    7. Peripheral arterial disease    8. Atrial fibrillation, unspecified type    9. Primary hypertension  -     CBC & Differential; Future  -     Comprehensive Metabolic Panel; Future    10. Collins's esophagus without dysplasia  Overview:  Added automatically from request for surgery [id number]      11. Hyperlipidemia, unspecified hyperlipidemia type  -     Lipid Panel; Future    12. Fall, sequela    13. Alcohol use    Other orders  -     pantoprazole (Protonix) 40 MG EC tablet; Take 1 tablet by mouth Daily.  Dispense: 90 tablet; Refill: 3    I discussed with patient, daughter as well as spouse the importance of him keeping follow-up appointments.  He does need to get back into see cardiology as well as gastroenterology.  I will place him on Protonix that he does need to be on treatment for Collins's esophagus.  There is an interaction with omeprazole and Plavix so I will switch him.  Plan to continue current management for hypertension.  He is encouraged to follow-up with orthopedics.  I will see him back in 3 months or sooner if needed.  We did discuss that he should not be driving especially given issues with his right shoulder as well as dementia.  Patient is directed to call with any questions or concerns.      Medications Discontinued During This Encounter   Medication Reason    omeprazole (priLOSEC) 40 MG capsule           Follow Up   Return in about 3 months (around 8/13/2024) for hypertension.  Patient was given instructions and counseling regarding his condition or for health maintenance advice. Please see specific information pulled into the AVS if appropriate.       Robb Gómez DO  05/13/24  13:44 EDT

## 2024-05-14 NOTE — PATIENT INSTRUCTIONS
X-rays taken and reviewed with patient.  Fracture is displaced in comparison to previous x-rays.    Dr. Webber is out of office, Dr. Dougherty consulted regarding patient x-rays.  Dr. Dougherty discussed shoulder surgery versus conservative measures with patient.  Patient decided to repeat x-rays in 2 weeks and evaluate status of fracture.    Continue sling and gentle range of motion exercises at home for the elbow.    Follow-up in 2 weeks with repeat x-rays.  Call for questions, concerns or worsening symptoms.

## 2024-05-14 NOTE — PROGRESS NOTES
"Chief Complaint  Follow-up of the Right Shoulder    Subjective      Familia Thompson presents to [facility] for follow-up of right proximal humerus fracture that occurred on 4/13/2024.  Patient went to  the dog and lost his balance and had a fall.  He is here today in a sling.  Reports that he is having a substantial amount of pain from the shoulder.    Objective   No Known Allergies    Vital Signs:   /63   Pulse 70   Ht 177.8 cm (70\")   Wt 66.7 kg (147 lb)   SpO2 92%   BMI 21.09 kg/m²       Physical Exam    Constitutional: Awake, alert. Well nourished appearance.    Integumentary: Warm, dry, intact. No obvious rashes.    HENT: Atraumatic, normocephalic.   Respiratory: Non labored respirations .   Cardiovascular: Intact peripheral pulses.    Psychiatric: Normal mood and affect. A&O X3    Ortho Exam  Right shoulder: Tender to palpation throughout the upper arm and shoulder.  Bruising noted.  Elbow extension lacks 10 degrees.  Full range of motion of the wrist.  Neurovascular intact.  Sensation is intact.    Imaging Results (Most Recent)       Procedure Component Value Units Date/Time    XR Scapula Right [[id number]] Resulted: 05/14/24 0900     Updated: 05/14/24 0900    Narrative:      X-Ray Report:  Study: X-rays ordered, taken in the office, and reviewed today.   Site: Right scapula xray  Indication: Proximal humerus fracture  View: AP/Lateral view(s)  Findings: Proximal humerus fracture is displaced in comparison to previous   x-rays.  No clinically significant evidence of healing noted.  Prior studies available for comparison: yes                       Assessment and Plan   Problem List Items Addressed This Visit    None  Visit Diagnoses       Right shoulder pain, unspecified chronicity    -  Primary    Relevant Orders    XR Scapula Right (Completed)    Closed fracture of proximal end of right humerus with routine healing, unspecified fracture morphology, subsequent " encounter                Follow Up   Return in about 2 weeks (around 2024).    Patient is a smoker, please discuss smoking cessation options with your PCP.    Social History     Socioeconomic History    Marital status:    Tobacco Use    Smoking status: Some Days     Current packs/day: 0.00     Average packs/day: 1 pack/day for 30.0 years (30.0 ttl pk-yrs)     Types: Cigarettes     Start date:      Last attempt to quit:      Years since quittin.3     Passive exposure: Past    Smokeless tobacco: Never    Tobacco comments:     Quit smoking in 2023    Vaping Use    Vaping status: Never Used    Passive vaping exposure: Yes   Substance and Sexual Activity    Alcohol use: Not Currently     Alcohol/week: 12.0 standard drinks of alcohol     Types: 12 Cans of beer per week     Comment: 6 beers a day    Drug use: Not Currently     Comment: 0cc    Sexual activity: Defer       Patient Instructions   X-rays taken and reviewed with patient.  Fracture is displaced in comparison to previous x-rays.    Dr. Webber is out of office, Dr. Dougherty consulted regarding patient x-rays.  Dr. Dougherty discussed shoulder surgery versus conservative measures with patient.  Patient decided to repeat x-rays in 2 weeks and evaluate status of fracture.    Continue sling and gentle range of motion exercises at home for the elbow.    Follow-up in 2 weeks with repeat x-rays.  Call for questions, concerns or worsening symptoms.  Patient was given instructions and counseling regarding his condition or for health maintenance advice. Please see specific information pulled into the AVS if appropriate.

## 2024-05-17 ENCOUNTER — OFFICE VISIT (OUTPATIENT)
Dept: ORTHOPEDIC SURGERY | Facility: CLINIC | Age: 79
End: 2024-05-17
Payer: MEDICARE

## 2024-05-17 VITALS — WEIGHT: 133 LBS | BODY MASS INDEX: 19.04 KG/M2 | HEIGHT: 70 IN

## 2024-05-17 DIAGNOSIS — S42.201A CLOSED FRACTURE OF PROXIMAL END OF RIGHT HUMERUS, UNSPECIFIED FRACTURE MORPHOLOGY, INITIAL ENCOUNTER: ICD-10-CM

## 2024-05-17 DIAGNOSIS — M25.511 RIGHT SHOULDER PAIN, UNSPECIFIED CHRONICITY: ICD-10-CM

## 2024-05-17 DIAGNOSIS — M25.511 RIGHT SHOULDER PAIN, UNSPECIFIED CHRONICITY: Primary | ICD-10-CM

## 2024-05-17 DIAGNOSIS — S42.201D CLOSED FRACTURE OF PROXIMAL END OF RIGHT HUMERUS WITH ROUTINE HEALING, UNSPECIFIED FRACTURE MORPHOLOGY, SUBSEQUENT ENCOUNTER: ICD-10-CM

## 2024-05-17 RX ORDER — HYDROCODONE BITARTRATE AND ACETAMINOPHEN 5; 325 MG/1; MG/1
1 TABLET ORAL EVERY 4 HOURS PRN
Qty: 42 TABLET | Refills: 0 | Status: SHIPPED | OUTPATIENT
Start: 2024-05-17

## 2024-05-22 ENCOUNTER — HOSPITAL ENCOUNTER (OUTPATIENT)
Dept: MRI IMAGING | Facility: HOSPITAL | Age: 79
Discharge: HOME | End: 2024-05-22
Admitting: PSYCHIATRY & NEUROLOGY
Payer: MEDICARE

## 2024-05-22 DIAGNOSIS — F01.A0 MILD MIXED VASCULAR AND NEURODEGENERATIVE DEMENTIA WITHOUT BEHAVIORAL DISTURBANCE, PSYCHOTIC DISTURBANCE, MOOD DISTURBANCE, OR ANXIETY: ICD-10-CM

## 2024-05-22 PROCEDURE — 70551 MRI BRAIN STEM W/O DYE: CPT

## 2024-05-23 ENCOUNTER — OFFICE VISIT (OUTPATIENT)
Dept: NEUROLOGY | Facility: CLINIC | Age: 79
End: 2024-05-23
Payer: MEDICARE

## 2024-05-23 VITALS
BODY MASS INDEX: 19.18 KG/M2 | WEIGHT: 134 LBS | DIASTOLIC BLOOD PRESSURE: 50 MMHG | HEART RATE: 59 BPM | HEIGHT: 70 IN | SYSTOLIC BLOOD PRESSURE: 102 MMHG

## 2024-05-23 DIAGNOSIS — F01.A0 MILD MIXED VASCULAR AND NEURODEGENERATIVE DEMENTIA WITHOUT BEHAVIORAL DISTURBANCE, PSYCHOTIC DISTURBANCE, MOOD DISTURBANCE, OR ANXIETY: Primary | ICD-10-CM

## 2024-05-23 PROCEDURE — 1159F MED LIST DOCD IN RCRD: CPT | Performed by: PSYCHIATRY & NEUROLOGY

## 2024-05-23 PROCEDURE — 1160F RVW MEDS BY RX/DR IN RCRD: CPT | Performed by: PSYCHIATRY & NEUROLOGY

## 2024-05-23 PROCEDURE — 99213 OFFICE O/P EST LOW 20 MIN: CPT | Performed by: PSYCHIATRY & NEUROLOGY

## 2024-05-23 NOTE — PROGRESS NOTES
"Chief Complaint  Follow-up of the Right Shoulder    Subjective      Familia Thompson presents to [facility] for follow-up of right proximal humerus fracture that occurred on 4/13/2024.  Patient went to  the dog and lost his balance and had a fall.  Patient presents today in the sling.  Reports that he has not been coming out of the sling very much.  Continues to have pain.  At his last appointment, we discussed surgical intervention versus continued conservative measures and patient was agreeable to continue conservative measures upon discussion with Dr. Dougherty as Dr. Webber was out of office.  He is here for repeat x-rays.    Objective   No Known Allergies    Vital Signs:   Ht 177.8 cm (70\")   Wt 60.3 kg (133 lb)   BMI 19.08 kg/m²       Physical Exam    Constitutional: Awake, alert. Well nourished appearance.    Integumentary: Warm, dry, intact. No obvious rashes.    HENT: Atraumatic, normocephalic.   Respiratory: Non labored respirations .   Cardiovascular: Intact peripheral pulses.    Psychiatric: Normal mood and affect. A&O X3    Ortho Exam  Right shoulder: Tender to palpation over the entire shoulder.  Resolving ecchymosis noted.  Elbow range of motion is extremely limited, extension lacks 20 degrees, flexion to 90 degrees.  Decreased motion with supination and pronation.  He is able to make a loose fist.  Capillary refill time is brisk.  Neurovascular intact.  Sensation is intact.  Mild to moderate edema noted throughout the extremity.      Imaging Results (Most Recent)       Procedure Component Value Units Date/Time    XR Scapula Right [[id number]] Resulted: 05/21/24 0931     Updated: 05/21/24 0931    Narrative:      X-Ray Report:  Study: X-rays ordered, taken in the office, and reviewed today.   Site: Right scapula xray  Indication: Right proximal humerus fracture  View: AP/Lateral view(s)  Findings: Right proximal humerus fracture was stable alignment and callus "   formation in comparison to previous x-ray.  Prior studies available for comparison: yes                       Assessment and Plan   Problem List Items Addressed This Visit    None  Visit Diagnoses       Right shoulder pain, unspecified chronicity    -  Primary    Relevant Orders    XR Scapula Right (Completed)    Closed fracture of proximal end of right humerus with routine healing, unspecified fracture morphology, subsequent encounter                Follow Up   Return in about 4 weeks (around 2024).    Patient is a Smoker, please discuss smoking cessation options with your PCP.    Social History     Socioeconomic History    Marital status:    Tobacco Use    Smoking status: Some Days     Current packs/day: 0.00     Average packs/day: 1 pack/day for 30.0 years (30.0 ttl pk-yrs)     Types: Cigarettes     Start date:      Last attempt to quit:      Years since quittin.3     Passive exposure: Past    Smokeless tobacco: Never    Tobacco comments:     Quit smoking in 2023    Vaping Use    Vaping status: Never Used    Passive vaping exposure: Yes   Substance and Sexual Activity    Alcohol use: Not Currently     Alcohol/week: 12.0 standard drinks of alcohol     Types: 12 Cans of beer per week     Comment: 6 beers a day    Drug use: Not Currently     Comment: 0cc    Sexual activity: Defer       Patient Instructions   X-rays taken and reviewed with patient.  Fracture is stable and healing routinely in comparison to previous x-ray.    Discussed with patient continued use of sling.  Advised him to come out of the sling multiple times daily for range of motion exercises.  Advised on gentle pendulum swings and elbow range of motion.  Patient verbalized understanding.    Pain medication refill ordered today.    Follow-up in 4 weeks with repeat x-rays.  May consider PT.  Call for questions, concerns or worsening symptoms.  Patient was given instructions and counseling regarding his condition or for  health maintenance advice. Please see specific information pulled into the AVS if appropriate.

## 2024-05-23 NOTE — PROGRESS NOTES
"Chief Complaint  Results (MRI results) and Med Check  (Unsure if topamax is helping- has hurt arm since.)    Subjective          Familia Thompson is a 79 y.o. male who presents to [facility]  History of Present Illness  79-year-old man here for follow-up of his MRI of the brain and his right arm tremor.  He fractured his right arm and therefore he he is not doing much in his right arm.  He is taking topiramate 50 mg twice a day.  He has no adverse effects.  His wife and daughter are with him today.  I reviewed with him the MRI of the brain showing atrophy, enlarged ventricles, white matter changes.  I discussed with them that he has mixed vascular and neurodegenerative dementia and that I will see him again in the next 4 months and hopefully at that time the FDA will approve the blood testing for Alzheimer's dementia.    He is independent with activities of daily living but his wife helps him at times but is close on and his daughter is helping him with the finances.    Objective   Vital Signs:   /50   Pulse 59   Ht 177.8 cm (70\")   Wt 60.8 kg (134 lb)   BMI 19.23 kg/m²     Physical Exam   Alert, fluent, phasic, follows commands well.  He has pain moving his right arm.        Assessment and Plan  Diagnoses and all orders for this visit:    1. 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  I will see him again in 4 months time for follow-up.  Thank you for letting me participate in his care.           Total time spent with the patient and coordinating patient care was 25 minutes.    Follow Up  No follow-ups on file.  Patient was given instructions and counseling regarding his condition or for health maintenance " advice. Please see specific information pulled into the AVS if appropriate.

## 2024-05-23 NOTE — PATIENT INSTRUCTIONS
X-rays taken and reviewed with patient.  Fracture is stable and healing routinely in comparison to previous x-ray.    Discussed with patient continued use of sling.  Advised him to come out of the sling multiple times daily for range of motion exercises.  Advised on gentle pendulum swings and elbow range of motion.  Patient verbalized understanding.    Pain medication refill ordered today.    Follow-up in 4 weeks with repeat x-rays.  May consider PT.  Call for questions, concerns or worsening symptoms.

## 2024-05-23 NOTE — ASSESSMENT & PLAN NOTE
Discussed with the family that I can start him on memantine however he is on so many other medications at this time that they want to wait.  I will see him again in 4 months time for follow-up.  Thank you for letting me participate in his care.

## 2024-05-24 ENCOUNTER — OFFICE VISIT (OUTPATIENT)
Dept: GASTROENTEROLOGY | Facility: CLINIC | Age: 79
End: 2024-05-24
Payer: MEDICARE

## 2024-05-24 VITALS
OXYGEN SATURATION: 100 % | HEART RATE: 53 BPM | WEIGHT: 138 LBS | SYSTOLIC BLOOD PRESSURE: 80 MMHG | HEIGHT: 70 IN | BODY MASS INDEX: 19.76 KG/M2 | DIASTOLIC BLOOD PRESSURE: 41 MMHG

## 2024-05-24 DIAGNOSIS — Z87.898 HISTORY OF ALCOHOL USE: ICD-10-CM

## 2024-05-24 DIAGNOSIS — K22.70 BARRETT'S ESOPHAGUS WITHOUT DYSPLASIA: Primary | ICD-10-CM

## 2024-05-24 NOTE — PROGRESS NOTES
Chief Complaint  Elevated LFTs (Primary) and EGD    Familia Thompson is a 79 y.o. male who presents to [facility] for follow-up.    History of present Illness  Initially presented 6/22/2022 to review CT of the chest that showed wall thickening of the stomach.  Only complaint was constipation.  Linzess was prescribed, this was too expensive and Amitiza was sent in which was also too expensive and then Trulance.  Iron deficiency anemia was noted  CT of the chest done with contrast 4/27/2022, apparent wall thickening of the stomach, may be due to incomplete distention, inflammation, or less likely malignancy per radiologist Dr. Aguila     EGD/Colonoscopy 9/15/22: Short segment Collins's noted 1 cm in length-biopsy with intestinal metaplasia, negative for dysplasia, erosions noted in the stomach-biopsy with gastropathy otherwise negative, normal duodenum; good bowel prep, small polyp in the descending colon-adenomatous, 14 mm polyp in the ascending colon removed-adenomatous/sessile serrated lesion, internal hemorrhoids noted  Repeat colonoscopy 3 years    Last office visit 7/10/2023 - some constipation, has a bowel movement every 2 days. Taking stool softeners. Liver enzymes elevated - reported alcohol use 12 pack every other day.   EGD scheduled - unable to come off of Plavix per cardiology, plan was to proceed but patient cancelled.   Liver work up 2/2023 - negative AMA and hep C antibody.  US Liver 2/26/24 - normal liver, 1.6cm hypoechoic structure or lesion adjacent to the gallbladder of uncertain significance.  This did not appear to demonstrate peristalsis such as with bowel.  Recommend CT to further evaluate  CT abdomen/pelvis w/ and w/o contrast 4/10/24 -   1. Area of nodular soft tissue thickening at gallbladder fundus most  suggestive of focal adenomyomatosis, consider ultrasound follow-up in 6  months to document sonographic stability.  2. No acute abnormality in the  abdomen or pelvis.  3. Extensive aortoiliac atherosclerosis with postsurgical changes of  bilateral SFA bypass partially imaged, right SFA bypass occluded.  4. Prostatomegaly and additional chronic findings above.    Patient presents to the office for follow up. Patient is scheduled for follow up imaging of the liver 10/12/24. He has not had any alcohol in about 1 month, stopped drinking after shoulder injury. He continues Protonix 40mg daily. Denies nausea, vomiting, epigastric pain, and dysphagia. Denies lower GI symptoms    Past Medical History:   Diagnosis Date    Arthritis     Chronic systolic CHF (congestive heart failure)     COPD (chronic obstructive pulmonary disease)     Coronary artery disease     Hemorrhoids     HL (hearing loss)     Hyperlipidemia     Hypertension     Myocardial infarction     NSTEMI (non-ST elevated myocardial infarction) 04/27/2022    Added automatically from request for surgery [id number]    PVD (peripheral vascular disease)     Sinus disorder        Past Surgical History:   Procedure Laterality Date    CARDIAC CATHETERIZATION N/A 04/29/2022    Procedure: Left Heart Cath, possible angioplasty;  Surgeon: Zaki Díaz MD;  Location: [facility] CATH INVASIVE LOCATION;  Service: Cardiovascular;  Laterality: N/A;    CARDIAC CATHETERIZATION N/A 05/02/2022    Procedure: Percutaneous Coronary Intervention;  Surgeon: Josue Hensley MD;  Location: [facility] CATH INVASIVE LOCATION;  Service: Cardiovascular;  Laterality: N/A;    CARDIAC CATHETERIZATION N/A 05/02/2022    Procedure: Stent BLAIRE coronary;  Surgeon: Josue Hensley MD;  Location: [facility] CATH INVASIVE LOCATION;  Service: Cardiovascular;  Laterality: N/A;    CARDIAC CATHETERIZATION N/A 05/02/2022    Procedure: Atherectomy-coronary- Rotoblator;  Surgeon: Josue Hensley MD;  Location: [facility] CATH INVASIVE LOCATION;  Service: Cardiovascular;  Laterality: N/A;    CARDIAC CATHETERIZATION N/A 9/8/2023    Procedure: Left Heart Cath;  Surgeon:  Pawan Guerrero MD;  Location: [facility] CATH INVASIVE LOCATION;  Service: Cardiovascular;  Laterality: N/A;    CARDIAC CATHETERIZATION N/A 9/8/2023    Procedure: Coronary angiography;  Surgeon: Pawan Guerrero MD;  Location: [facility] CATH INVASIVE LOCATION;  Service: Cardiovascular;  Laterality: N/A;    CARDIAC CATHETERIZATION  9/8/2023    Procedure: Saphenous Vein Graft;  Surgeon: Pawan Guerrero MD;  Location: [facility] CATH INVASIVE LOCATION;  Service: Cardiovascular;;    CARDIAC CATHETERIZATION N/A 9/8/2023    Procedure: Left ventriculography;  Surgeon: Pawan Guerrero MD;  Location: [facility] CATH INVASIVE LOCATION;  Service: Cardiovascular;  Laterality: N/A;    CARDIAC SURGERY      COLONOSCOPY      COLONOSCOPY N/A 9/15/2022    Procedure: COLONOSCOPY;  Surgeon: Dennis Saavedra MD;  Location: [facility] ENDOSCOPY;  Service: Gastroenterology;  Laterality: N/A;  COLON POLYP    CORONARY ANGIOPLASTY WITH STENT PLACEMENT      ENDOSCOPY N/A 9/15/2022    Procedure: ESOPHAGOGASTRODUODENOSCOPY;  Surgeon: Dennis Saavedra MD;  Location: [facility] ENDOSCOPY;  Service: Gastroenterology;  Laterality: N/A;   GASTRTIS BARRETTS ESOPH    EYE SURGERY Bilateral     UPPER GASTROINTESTINAL ENDOSCOPY      VEIN LIGATION AND STRIPPING      WRIST FRACTURE SURGERY           Current Outpatient Medications:     albuterol sulfate  (90 Base) MCG/ACT inhaler, Inhale 2 puffs Every 4 (Four) Hours As Needed for Wheezing or Shortness of Air., Disp: 8 g, Rfl: 3    amiodarone (PACERONE) 200 MG tablet, Take 1 tablet by mouth Daily., Disp: 30 tablet, Rfl: 11    apixaban (ELIQUIS) 5 MG tablet tablet, Take 1 tablet by mouth 2 (Two) Times a Day., Disp: , Rfl:     atorvastatin (LIPITOR) 80 MG tablet, TAKE 1 TABLET BY MOUTH EVERY NIGHT, Disp: 90 tablet, Rfl: 3    clopidogrel (PLAVIX) 75 MG tablet, Take 1 tablet by mouth Daily., Disp: 30 tablet, Rfl: 11    HYDROcodone-acetaminophen (NORCO) 5-325 MG per tablet, Take 1 tablet by mouth Every 4  "(Four) Hours As Needed for Moderate Pain., Disp: 42 tablet, Rfl: 0    metoprolol tartrate (LOPRESSOR) 25 MG tablet, TAKE 1 TABLET BY MOUTH TWICE DAILY (Patient taking differently: Take 1 tablet by mouth 2 (Two) Times a Day. TAKE 1 TABLET BY MOUTH TWICE DAILY), Disp: 180 tablet, Rfl: 1    pantoprazole (Protonix) 40 MG EC tablet, Take 1 tablet by mouth Daily., Disp: 90 tablet, Rfl: 3    tamsulosin (FLOMAX) 0.4 MG capsule 24 hr capsule, Take 2 capsules by mouth Daily., Disp: 180 capsule, Rfl: 1    topiramate (TOPAMAX) 50 MG tablet, 1/2 tablet daily x 1 week, 1/2 tablet twice a day second week, 1/2 tablet in the morning and 1 tablet in the evening the third week and then 1 tablet twice a day thereafter. (Patient taking differently: Take 1 tablet by mouth 2 (Two) Times a Day.), Disp: 120 tablet, Rfl: 3     No Known Allergies    Family History   Problem Relation Age of Onset    Colon cancer Neg Hx         Social History     Social History Narrative    Not on file       Objective       Vital Signs:   BP (!) 80/41 (BP Location: Left arm, Patient Position: Sitting, Cuff Size: Adult)   Pulse 53   Ht 177.8 cm (70\")   Wt 62.6 kg (138 lb)   SpO2 100%   BMI 19.80 kg/m²       Physical Exam  Constitutional:       Appearance: Normal appearance. He is normal weight.   HENT:      Head: Normocephalic and atraumatic.      Nose: Nose normal.   Pulmonary:      Effort: Pulmonary effort is normal.   Skin:     General: Skin is warm and dry.   Neurological:      Mental Status: He is alert and oriented to person, place, and time. Mental status is at baseline.   Psychiatric:         Mood and Affect: Mood normal.         Behavior: Behavior normal.         Thought Content: Thought content normal.         Judgment: Judgment normal.         Result Review :       CBC w/diff          8/8/2023    09:15 2/12/2024    12:58 4/13/2024    18:33   CBC w/Diff   WBC 3.83  4.51  3.71    RBC 4.41  4.35  4.00    Hemoglobin 13.9  13.9  12.4    Hematocrit " 40.8  40.9  37.6    MCV 92.5  94.0  94.0    MCH 31.5  32.0  31.0    MCHC 34.1  34.0  33.0    RDW 13.0  12.6  13.7    Platelets 143  152  133    Neutrophil Rel % 57.7  55.2  59.3    Immature Granulocyte Rel % 0.3  0.2  0.5    Lymphocyte Rel % 27.2  30.2  27.8    Monocyte Rel % 11.7  11.5  10.5    Eosinophil Rel % 1.8  1.3  0.8    Basophil Rel % 1.3  1.6  1.1      CMP          2/12/2024    12:58 4/10/2024    10:22 4/13/2024    18:33   CMP   Glucose 59   80    BUN 8   10    Creatinine 1.03  1.00  0.94    EGFR 74.4  76.6  82.5    Sodium 136   132    Potassium 4.3   4.1    Chloride 103   97    Calcium 9.0   8.5    Total Protein 6.0   5.8    Albumin 4.0   4.1    Globulin 2.0   1.7    Total Bilirubin 0.6   0.7    Alkaline Phosphatase 126   99    AST (SGOT) 41   40    ALT (SGPT) 28   29    Albumin/Globulin Ratio 2.0   2.4    BUN/Creatinine Ratio 7.8   10.6    Anion Gap 13.1   14.1        Liver Workup   Iron   Date Value Ref Range Status   10/03/2022 82 59 - 158 mcg/dL Final     TIBC   Date Value Ref Range Status   10/03/2022 367 298 - 536 mcg/dL Final     Iron Saturation (TSAT)   Date Value Ref Range Status   10/03/2022 22 20 - 50 % Final     Transferrin   Date Value Ref Range Status   10/03/2022 246 200 - 360 mg/dL Final     Mitochondrial Ab   Date Value Ref Range Status   02/15/2023 <20.0 0.0 - 20.0 Units Final     Comment:                                     Negative    0.0 - 20.0                                  Equivocal  20.1 - 24.9                                  Positive         >24.9  Mitochondrial (M2) Antibodies are found in 90-96% of  patients with primary biliary cirrhosis.     Protime   Date Value Ref Range Status   10/25/2023 13.6 11.8 - 14.9 Seconds Final   05/20/2022 21.1 (H) 10.3 - 13.3 s Final     Comment:     (note)  Anticoagulants may alter the results of Laboratory   Coagulation assays. New-generation anticoagulants such as   direct Thrombin inhibitors (Dabigatran/Pradaxa, Argatroban,   Bivalrudin)  and direct/indirect factor Xa inhibitors   (Rivaroxaban/Xarelto,Apixaban/Eliquis, Danaparoid/Orgaran,   Fondaparinux/Arixtra) have been shown to affect the results   of Laboratory Coagulation assays, creating falsely elevated   or decreased values. Correlation with medication history is   advised.     INR   Date Value Ref Range Status   10/25/2023 1.03 0.86 - 1.15 Final   05/20/2022 1.8 INR Final     Comment:     INR Therapeutic Ranges:  Low Intensity Range: 2.0-3.0  High Intensity Range:  3.0-4.5               Assessment and Plan    Diagnoses and all orders for this visit:    1. 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 History of alcohol use    Emphasized the importance of sobriety. Most recent CBC shows decreased platelets, he has plans for repeat labs with PCP in a few months. If platelets remain decreased may consider Fibroscan.    Continue Protonix 40mg daily  Cardiac and blood thinner (Plavix and Eliquis) from Dr. Dixon -- patient plans to make follow up appointment.   EGD Surgical Risk and Benefits: Possible risk/complications, benefits, and alternatives to surgical or invasive procedure have been explained to patient and/or legal guardian. Risks include bleeding, infection, and perforation. Patient has been evaluated and can tolerate anesthesia and/or sedation. Risk, benefits, and alternatives to anesthesia and sedation have been explained to patient and/or legal guardian.     Follow Up   Return for follow up after procedure.  Patient was given instructions and counseling regarding his condition or for health maintenance advice. Please see specific information pulled into the AVS if appropriate.

## 2024-05-31 DIAGNOSIS — M25.511 RIGHT SHOULDER PAIN, UNSPECIFIED CHRONICITY: ICD-10-CM

## 2024-05-31 DIAGNOSIS — S42.201A CLOSED FRACTURE OF PROXIMAL END OF RIGHT HUMERUS, UNSPECIFIED FRACTURE MORPHOLOGY, INITIAL ENCOUNTER: ICD-10-CM

## 2024-05-31 NOTE — TELEPHONE ENCOUNTER
PATIENT CALLED AND IS REQUESTING REFILL ON PAIN MEDICATION.    [facility]LLISSA [facility]

## 2024-06-01 RX ORDER — HYDROCODONE BITARTRATE AND ACETAMINOPHEN 5; 325 MG/1; MG/1
1 TABLET ORAL EVERY 6 HOURS PRN
Qty: 28 TABLET | Refills: 0 | Status: SHIPPED | OUTPATIENT
Start: 2024-06-01

## 2024-06-14 ENCOUNTER — OFFICE VISIT (OUTPATIENT)
Dept: ORTHOPEDIC SURGERY | Facility: CLINIC | Age: 79
End: 2024-06-14
Payer: MEDICARE

## 2024-06-14 VITALS
OXYGEN SATURATION: 92 % | BODY MASS INDEX: 19.76 KG/M2 | WEIGHT: 138 LBS | SYSTOLIC BLOOD PRESSURE: 111 MMHG | HEART RATE: 54 BPM | DIASTOLIC BLOOD PRESSURE: 62 MMHG | HEIGHT: 70 IN

## 2024-06-14 DIAGNOSIS — M25.511 RIGHT SHOULDER PAIN, UNSPECIFIED CHRONICITY: Primary | ICD-10-CM

## 2024-06-14 DIAGNOSIS — S42.201D CLOSED FRACTURE OF PROXIMAL END OF RIGHT HUMERUS WITH ROUTINE HEALING, UNSPECIFIED FRACTURE MORPHOLOGY, SUBSEQUENT ENCOUNTER: ICD-10-CM

## 2024-06-14 RX ORDER — HYDROCODONE BITARTRATE AND ACETAMINOPHEN 5; 325 MG/1; MG/1
1 TABLET ORAL EVERY 8 HOURS PRN
Qty: 21 TABLET | Refills: 0 | Status: SHIPPED | OUTPATIENT
Start: 2024-06-14

## 2024-06-14 NOTE — PROGRESS NOTES
"Chief Complaint  Follow-up and Pain of the Right Shoulder    Subjective      Familia Thompson presents to [facility] for follow up of his right shoulder.  Patient sustained a right proximal humerus fracture that occurred on 4/13/2024 during a fall.  During his last appointment on 5/17/2024 he was recommended to continue the sling but to come out multiple times a day for range of motion exercises.     Today he states, he is doing okay.  He did not bring his sling with him today.  He states \"I am not exactly sure how to put it back on after I took it off to shower the last time.  He states that he will occasionally try to put it on but he has found himself not wearing it as often.  He has been doing his shoulder pendulums and elbow range of motion exercises.  He states that he still having pretty significant amount of pain and is requesting a pain med refill today.    No Known Allergies    Objective     Vital Signs:   Vitals:    06/14/24 0828   BP: 111/62   Pulse: 54   SpO2: 92%   Weight: 62.6 kg (138 lb)   Height: 177.8 cm (70\")     Body mass index is 19.8 kg/m².    I reviewed the patient's chief complaint, history of present illness, review of systems, past medical history, surgical history, family history, social history, medications, and allergy list.     REVIEW OF SYSTEMS    Constitutional: Denies fevers, chills, weight loss  Cardiovascular: Denies chest pain, shortness of breath  Skin: Denies rashes, acute skin changes  Neurologic: Denies headache, loss of consciousness  MSK: Right shoulder pain.     Ortho Exam  Right upper extremity: Active forward shoulder elevation 90 degrees.  Passive forward shoulder elevation 110 degrees.  Pain with movement of the shoulder.  Tender palpation over humeral head.  Demonstrates active elbow flexion and extension with associated stiffness.  Sensation intact.  Neurovascular intact.  Palpable radial pulse.          Imaging Results (Most Recent)       " Procedure Component Value Units Date/Time    XR Scapula Right [[id number]] Resulted: 06/14/24 0851     Updated: 06/14/24 0851    Narrative:      X-Ray Report:  Study: X-rays ordered, taken in the office, and reviewed today  Site: Right scapula xray  Indication: Right proximal humerus fracture follow-up  View: AP, lateral right shoulder view(s)  Findings: Displaced right proximal humeral head fracture is seen.    Alignment is stable compared to previous films.  Continued callus   formation is seen indicating interval healing.   Prior studies available for comparison: yes                 Assessment and Plan   Diagnoses and all orders for this visit:    1. Right shoulder pain, unspecified chronicity (Primary)  -     XR Scapula Right  -     Ambulatory Referral to Home Health (Outpatient)    2. Closed fracture of proximal end of right humerus with routine healing, unspecified fracture morphology, subsequent encounter  -     Ambulatory Referral to Home Health (Outpatient)         Familia Thompson presents to [facility] for his right shoulder.  Patient sustained a right proximal humerus fracture that occurred during a fall on 4/13/2024.  X-rays were obtained and reviewed with the patient today.  Advised patient to continue to wear the sling while up and active but to come out of the sling multiple times a day for range of motion exercises.  Discussed gentle pendulum swings and elbow range of motion.  Recommended physical therapy patient agreed.  Home health physical therapy was ordered today for the patient due to transportation issues and him not being able to drive.  Discussed caution with ambulating if he is not going to wear his sling, however it was strongly recommended.    Patient will follow-up in 6 weeks.  We will reevaluate his shoulder range of motion and obtain x-rays of the right shoulder at that time.      Tobacco Use: Medium Risk (6/14/2024)    Patient History     Smoking Tobacco  Use: Former     Smokeless Tobacco Use: Never     Passive Exposure: Past     Patient reports they have a history of tobacco use; encouraged continued tobacco cessation for further health benefits.     BMI is within normal parameters. No other follow-up for BMI required.      Follow Up   Return in about 6 weeks (around 7/26/2024).  There are no Patient Instructions on file for this visit.  Patient was given instructions and counseling regarding his condition or for health maintenance advice. Please see specific information pulled into the AVS if appropriate.       Dictated Utilizing Dragon Dictation. Please note that portions of this note were completed with a voice recognition program. Part of this note may be an electronic transcription/translation of spoken language to printed text using the Dragon Dictation System.

## 2024-06-19 ENCOUNTER — TELEPHONE (OUTPATIENT)
Dept: ORTHOPEDIC SURGERY | Facility: CLINIC | Age: 79
End: 2024-06-19
Payer: MEDICARE

## 2024-06-19 NOTE — TELEPHONE ENCOUNTER
SHEKHAR JONES FROM [facility] CALLED TO GET VERBAL ORDERS FOR START OF CARE FOR HOME  HEALTH.  PLAN TO TREAT ONE TIME A WEEK FOR 6 WEEKS.

## 2024-07-26 ENCOUNTER — TELEPHONE (OUTPATIENT)
Dept: GASTROENTEROLOGY | Facility: CLINIC | Age: 79
End: 2024-07-26
Payer: MEDICARE

## 2024-07-26 ENCOUNTER — OFFICE VISIT (OUTPATIENT)
Dept: ORTHOPEDIC SURGERY | Facility: CLINIC | Age: 79
End: 2024-07-26
Payer: MEDICARE

## 2024-07-26 VITALS
DIASTOLIC BLOOD PRESSURE: 73 MMHG | HEIGHT: 70 IN | BODY MASS INDEX: 19.76 KG/M2 | HEART RATE: 56 BPM | SYSTOLIC BLOOD PRESSURE: 132 MMHG | WEIGHT: 138 LBS | OXYGEN SATURATION: 97 %

## 2024-07-26 DIAGNOSIS — S42.201D CLOSED FRACTURE OF PROXIMAL END OF RIGHT HUMERUS WITH ROUTINE HEALING, UNSPECIFIED FRACTURE MORPHOLOGY, SUBSEQUENT ENCOUNTER: Primary | ICD-10-CM

## 2024-07-26 DIAGNOSIS — M25.511 RIGHT SHOULDER PAIN, UNSPECIFIED CHRONICITY: ICD-10-CM

## 2024-07-26 RX ORDER — OMEPRAZOLE 40 MG/1
CAPSULE, DELAYED RELEASE ORAL EVERY 24 HOURS
COMMUNITY

## 2024-07-26 RX ORDER — CETIRIZINE HYDROCHLORIDE 10 MG/1
1 TABLET ORAL DAILY
COMMUNITY
Start: 2024-07-02

## 2024-07-26 RX ORDER — ROSUVASTATIN CALCIUM 40 MG/1
1 TABLET, COATED ORAL DAILY
COMMUNITY

## 2024-07-26 NOTE — TELEPHONE ENCOUNTER
Blood Thinner/Cardiac CLEARANCES rec'd.     I will call patient closer to his appt date.     Postponing Patient Call until 7.29.24

## 2024-07-26 NOTE — PROGRESS NOTES
"Chief Complaint  Follow-up of the Right Shoulder    Subjective      Familia Thompson presents to [facility] for follow up of his right shoulder.  Patient had a right proximal humerus fracture that occurred on 4/13/2024 during a fall.  We have been treating this conservatively.  Above physical therapy was ordered during her last office visit to begin further shoulder and elbow range of motion exercises.  Today he states, he is doing okay.  He has not had home health physical therapy coming out and working with him and he is also been doing his home exercises.  He does wear his sling when he is out of the house but he does not wear it while he is at home.  He states that his pain is tolerable and he has seen improvement in his range of motion.    No Known Allergies    Objective     Vital Signs:   Vitals:    07/26/24 0919   BP: 132/73   Pulse: 56   SpO2: 97%   Weight: 62.6 kg (138 lb)   Height: 177.8 cm (70\")     Body mass index is 19.8 kg/m².    I reviewed the patient's chief complaint, history of present illness, review of systems, past medical history, surgical history, family history, social history, medications, and allergy list.     REVIEW OF SYSTEMS    Constitutional: Denies fevers, chills, weight loss  Cardiovascular: Denies chest pain, shortness of breath  Skin: Denies rashes, acute skin changes  Neurologic: Denies headache, loss of consciousness  MSK: Right shoulder pain.     Ortho Exam  Shoulder   General: Alert. No acute distress.  Right upper Extremity: 110 degrees active elevation.  Forward shoulder elevation 140 degrees.  External rotation to 55  degrees. Internal rotation to side pocket.  Demonstrates intact active elbow ROM. Demonstrates intact active wrist ROM. Sensation intact. Palpable radial pulse. Neurovascularly intact.            Imaging Results (Most Recent)       Procedure Component Value Units Date/Time    XR Scapula Right [[id number]] Resulted: 07/26/24 1200     " Updated: 07/26/24 1200    Narrative:      X-Ray Report:  Study: X-rays ordered, taken in the office, and reviewed today  Site: Right shoulder xray  Indication: Right proximal humerus fracture follow-up  View: AP, lateral right shoulder view(s)  Findings: Displaced right proximal humeral head fracture.  Alignment is   stable when compared to previous films.  Callus formation is seen   indicating interval healing.  Prior studies available for comparison: yes                 Assessment and Plan   Diagnoses and all orders for this visit:    1. Closed fracture of proximal end of right humerus with routine healing, unspecified fracture morphology, subsequent encounter (Primary)  -     Ambulatory Referral to Physical Therapy    2. Right shoulder pain, unspecified chronicity  -     XR Scapula Right  -     Ambulatory Referral to Physical Therapy         Familia Thompson presents to [facility] for his right shoulder.  Patient sustained a right proximal humerus fracture that occurred during a fall on 4/13/2024.  X-rays were obtained and reviewed with the patient today.  Fracture stable and well-healing.  Continue to work on home exercises.  Outpatient physical therapy was ordered today for him to begin as I think that he would benefit from utilizing their strengthening machines.    Patient will follow-up in 6 to 8 weeks for reevaluation.  Obtain x-rays of the right shoulder at that time.      Tobacco Use: Medium Risk (7/26/2024)    Patient History     Smoking Tobacco Use: Former     Smokeless Tobacco Use: Never     Passive Exposure: Past     Patient reports they have a history of tobacco use; encouraged continued tobacco cessation for further health benefits.     BMI is within normal parameters. No other follow-up for BMI required.      Follow Up   Return in about 6 weeks (around 9/6/2024).  There are no Patient Instructions on file for this visit.  Patient was given instructions and counseling  regarding his condition or for health maintenance advice. Please see specific information pulled into the AVS if appropriate.       Dictated Utilizing Dragon Dictation. Please note that portions of this note were completed with a voice recognition program. Part of this note may be an electronic transcription/translation of spoken language to printed text using the Dragon Dictation System.

## 2024-07-29 NOTE — TELEPHONE ENCOUNTER
Called pt. No answer. Left message for pt to call back.     Postponing Patient Call until tomorrow, 7.29.24

## 2024-07-30 ENCOUNTER — TELEPHONE (OUTPATIENT)
Dept: GASTROENTEROLOGY | Facility: CLINIC | Age: 79
End: 2024-07-30
Payer: MEDICARE

## 2024-07-30 NOTE — TELEPHONE ENCOUNTER
Spoke with Elke Carter, Pt daughter, okay per patient  -advised pt to hold plavix for 5 days and Eliquis 2 days prior to procedure  - Reviewed EGD instructions   -Verbalized UNderstanding

## 2024-07-30 NOTE — TELEPHONE ENCOUNTER
Called pt to advise to HOLD Plavix 5 days prior to procedure.     Pt was unavailable to take my call. I will try again tomorrow to reach pt.     Postponing Patient Call until tomorrow, 7.31.24

## 2024-07-30 NOTE — TELEPHONE ENCOUNTER
Hub staff attempted to follow warm transfer process and was unsuccessful     Caller: Familia Thompson    Relationship to patient: Self    Best call back number: [phone]    Patient is needing: PT RETURNING MISSED CALL FROM APRIL, UNABLE TO WT, PLEASE CALL BACK.

## 2024-07-30 NOTE — TELEPHONE ENCOUNTER
Hub staff attempted to follow warm transfer process and was unsuccessful     Caller: Mayo Carter    Relationship to patient: Emergency Contact    Best call back number: [phone]      Patient is needing: RETURNING CALL FOR FATHER, PLEASE CALL MAYO WITH PRE PROCEDURE INFORMATION. OK TO LEAVE DETAILED VM

## 2024-07-31 DIAGNOSIS — S42.201D CLOSED FRACTURE OF PROXIMAL END OF RIGHT HUMERUS WITH ROUTINE HEALING, UNSPECIFIED FRACTURE MORPHOLOGY, SUBSEQUENT ENCOUNTER: Primary | ICD-10-CM

## 2024-07-31 DIAGNOSIS — S42.201A CLOSED FRACTURE OF PROXIMAL END OF RIGHT HUMERUS, UNSPECIFIED FRACTURE MORPHOLOGY, INITIAL ENCOUNTER: ICD-10-CM

## 2024-07-31 DIAGNOSIS — M25.511 RIGHT SHOULDER PAIN, UNSPECIFIED CHRONICITY: ICD-10-CM

## 2024-08-06 NOTE — PRE-PROCEDURE INSTRUCTIONS
"PAT call attempted.  No answer.  Detailed message with date and arrival time of 0700 given.  Instructed that arrival time is not procedure time but allows time to prepare for procedure. Come to entrance \"C\"; must have adult  for transportation home; may have two visitors; however, children under 12 must remain in waiting area; instructed on diet/clear liquids/NPO/bowel prep, if needed; may take normal meds two hours prior to arrival time except for blood thinners, antidiabetics, diuretics, and weight loss meds.  Instructed to return call to confirm receipt of instructions and for any questions.  Hold Eliquis for 2 days prior to procedure.  Hold Plavix for 5 days prior to procedure.  Cardiac and blood thinner clearances noted in chart.  "

## 2024-08-09 ENCOUNTER — HOSPITAL ENCOUNTER (OUTPATIENT)
Facility: HOSPITAL | Age: 79
Setting detail: HOSPITAL OUTPATIENT SURGERY
Discharge: HOME | End: 2024-08-09
Attending: INTERNAL MEDICINE | Admitting: INTERNAL MEDICINE
Payer: MEDICARE

## 2024-08-09 VITALS
BODY MASS INDEX: 18.47 KG/M2 | OXYGEN SATURATION: 95 % | SYSTOLIC BLOOD PRESSURE: 159 MMHG | HEART RATE: 48 BPM | WEIGHT: 128.75 LBS | TEMPERATURE: 97.2 F | RESPIRATION RATE: 16 BRPM | DIASTOLIC BLOOD PRESSURE: 64 MMHG

## 2024-08-09 DIAGNOSIS — K22.70 BARRETT'S ESOPHAGUS WITHOUT DYSPLASIA: ICD-10-CM

## 2024-08-09 PROCEDURE — G0463 HOSPITAL OUTPT CLINIC VISIT: HCPCS | Performed by: INTERNAL MEDICINE

## 2024-08-09 PROCEDURE — 93005 ELECTROCARDIOGRAM TRACING: CPT

## 2024-08-09 RX ORDER — IPRATROPIUM BROMIDE AND ALBUTEROL SULFATE 2.5; .5 MG/3ML; MG/3ML
3 SOLUTION RESPIRATORY (INHALATION) ONCE
Status: DISCONTINUED | OUTPATIENT
Start: 2024-08-09 | End: 2024-08-09 | Stop reason: HOSPADM

## 2024-08-09 RX ORDER — SODIUM CHLORIDE, SODIUM LACTATE, POTASSIUM CHLORIDE, CALCIUM CHLORIDE 600; 310; 30; 20 MG/100ML; MG/100ML; MG/100ML; MG/100ML
30 INJECTION, SOLUTION INTRAVENOUS CONTINUOUS
Status: DISCONTINUED | OUTPATIENT
Start: 2024-08-09 | End: 2024-08-09 | Stop reason: HOSPADM

## 2024-08-09 NOTE — H&P
Heartburn pre Procedure History & Physical    Chief Complaint:   Heartburn    Subjective     HPI:   Heartburn    Past Medical History:   Past Medical History:   Diagnosis Date    Arthritis     Chronic systolic CHF (congestive heart failure)     COPD (chronic obstructive pulmonary disease)     Coronary artery disease     Hemorrhoids     HL (hearing loss)     Hyperlipidemia     Hypertension     Myocardial infarction     NSTEMI (non-ST elevated myocardial infarction) 04/27/2022    Added automatically from request for surgery [id number]    PVD (peripheral vascular disease)     Sinus disorder        Past Surgical History:  Past Surgical History:   Procedure Laterality Date    CARDIAC CATHETERIZATION N/A 04/29/2022    Procedure: Left Heart Cath, possible angioplasty;  Surgeon: Zaki Díaz MD;  Location: [facility] CATH INVASIVE LOCATION;  Service: Cardiovascular;  Laterality: N/A;    CARDIAC CATHETERIZATION N/A 05/02/2022    Procedure: Percutaneous Coronary Intervention;  Surgeon: Josue Hensley MD;  Location: [facility] CATH INVASIVE LOCATION;  Service: Cardiovascular;  Laterality: N/A;    CARDIAC CATHETERIZATION N/A 05/02/2022    Procedure: Stent BLAIRE coronary;  Surgeon: Josue Hensley MD;  Location: [facility] CATH INVASIVE LOCATION;  Service: Cardiovascular;  Laterality: N/A;    CARDIAC CATHETERIZATION N/A 05/02/2022    Procedure: Atherectomy-coronary- Rotoblator;  Surgeon: Josue Hensley MD;  Location: [facility] CATH INVASIVE LOCATION;  Service: Cardiovascular;  Laterality: N/A;    CARDIAC CATHETERIZATION N/A 9/8/2023    Procedure: Left Heart Cath;  Surgeon: Pawan Guerrero MD;  Location: [facility] CATH INVASIVE LOCATION;  Service: Cardiovascular;  Laterality: N/A;    CARDIAC CATHETERIZATION N/A 9/8/2023    Procedure: Coronary angiography;  Surgeon: Pawan Guerrero MD;  Location: [facility] CATH INVASIVE LOCATION;  Service: Cardiovascular;  Laterality: N/A;    CARDIAC CATHETERIZATION  9/8/2023    Procedure: Saphenous Vein  Graft;  Surgeon: Pawan Guerrero MD;  Location: [facility] CATH INVASIVE LOCATION;  Service: Cardiovascular;;    CARDIAC CATHETERIZATION N/A 9/8/2023    Procedure: Left ventriculography;  Surgeon: Pawan Guerrero MD;  Location: [facility] CATH INVASIVE LOCATION;  Service: Cardiovascular;  Laterality: N/A;    CARDIAC SURGERY      COLONOSCOPY      COLONOSCOPY N/A 9/15/2022    Procedure: COLONOSCOPY;  Surgeon: Dennis Saavedra MD;  Location: [facility] ENDOSCOPY;  Service: Gastroenterology;  Laterality: N/A;  COLON POLYP    CORONARY ANGIOPLASTY WITH STENT PLACEMENT      ENDOSCOPY N/A 9/15/2022    Procedure: ESOPHAGOGASTRODUODENOSCOPY;  Surgeon: Dennis Saavedra MD;  Location: [facility] ENDOSCOPY;  Service: Gastroenterology;  Laterality: N/A;   GASTRTIS BARRETTS ESOPH    EYE SURGERY Bilateral     UPPER GASTROINTESTINAL ENDOSCOPY      VEIN LIGATION AND STRIPPING      WRIST FRACTURE SURGERY         Family History:  Family History   Problem Relation Age of Onset    Colon cancer Neg Hx     Malig Hyperthermia Neg Hx        Social History:   reports that he quit smoking about 19 months ago. His smoking use included cigarettes. He started smoking about 31 years ago. He has a 30 pack-year smoking history. He has been exposed to tobacco smoke. He has never used smokeless tobacco. He reports that he does not currently use alcohol after a past usage of about 12.0 standard drinks of alcohol per week. He reports that he does not currently use drugs.    Medications:   No medications prior to admission.       Allergies:  Patient has no known allergies.        Objective     Blood pressure 159/64, pulse (!) 48, temperature 97.2 °F (36.2 °C), temperature source Temporal, resp. rate 16, weight 58.4 kg (128 lb 12 oz), SpO2 95%.    Physical Exam   Constitutional: Pt is oriented to person, place, and time and well-developed, well-nourished, and in no distress.   Mouth/Throat: Oropharynx is clear and moist.   Neck: Normal range of motion.    Cardiovascular: Normal rate, regular rhythm and normal heart sounds.    Pulmonary/Chest: Effort normal and breath sounds normal.   Abdominal: Soft. Nontender  Skin: Skin is warm and dry.   Psychiatric: Mood, memory, affect and judgment normal.     Assessment & Plan     Diagnosis:  Heartburn    Anticipated Surgical Procedure:  Patient is here to have an EGD but patient was having some cardiac issues and based on the anesthesiologist recommendation we canceled the procedure and advised the patient see the cardiologist before rescheduling the procedure    The risks, benefits, and alternatives of this procedure have been discussed with the patient or the responsible party- the patient understands and agrees to proceed.

## 2024-08-09 NOTE — H&P
Pre Procedure History & Physical    Chief Complaint:   Collins's    Subjective     HPI:   History of Collins's    Past Medical History:   Past Medical History:   Diagnosis Date    Arthritis     Chronic systolic CHF (congestive heart failure)     COPD (chronic obstructive pulmonary disease)     Coronary artery disease     Hemorrhoids     HL (hearing loss)     Hyperlipidemia     Hypertension     Myocardial infarction     NSTEMI (non-ST elevated myocardial infarction) 04/27/2022    Added automatically from request for surgery [id number]    PVD (peripheral vascular disease)     Sinus disorder        Past Surgical History:  Past Surgical History:   Procedure Laterality Date    CARDIAC CATHETERIZATION N/A 04/29/2022    Procedure: Left Heart Cath, possible angioplasty;  Surgeon: Zaki Díaz MD;  Location: [facility] CATH INVASIVE LOCATION;  Service: Cardiovascular;  Laterality: N/A;    CARDIAC CATHETERIZATION N/A 05/02/2022    Procedure: Percutaneous Coronary Intervention;  Surgeon: Josue Hensley MD;  Location: [facility] CATH INVASIVE LOCATION;  Service: Cardiovascular;  Laterality: N/A;    CARDIAC CATHETERIZATION N/A 05/02/2022    Procedure: Stent BLAIRE coronary;  Surgeon: Josue Hensley MD;  Location: [facility] CATH INVASIVE LOCATION;  Service: Cardiovascular;  Laterality: N/A;    CARDIAC CATHETERIZATION N/A 05/02/2022    Procedure: Atherectomy-coronary- Rotoblator;  Surgeon: Josue Hensley MD;  Location: [facility] CATH INVASIVE LOCATION;  Service: Cardiovascular;  Laterality: N/A;    CARDIAC CATHETERIZATION N/A 9/8/2023    Procedure: Left Heart Cath;  Surgeon: Pawan Guerrero MD;  Location: [facility] CATH INVASIVE LOCATION;  Service: Cardiovascular;  Laterality: N/A;    CARDIAC CATHETERIZATION N/A 9/8/2023    Procedure: Coronary angiography;  Surgeon: Pawan Guerrero MD;  Location: [facility] CATH INVASIVE LOCATION;  Service: Cardiovascular;  Laterality: N/A;    CARDIAC CATHETERIZATION  9/8/2023    Procedure: Saphenous Vein  Graft;  Surgeon: Pawan Guerrero MD;  Location:  [facility] CATH INVASIVE LOCATION;  Service: Cardiovascular;;    CARDIAC CATHETERIZATION N/A 9/8/2023    Procedure: Left ventriculography;  Surgeon: Pawan Guerrero MD;  Location: [facility] CATH INVASIVE LOC[facility];  Service: Cardiovascular;  Laterality: N/A;    CARDIAC SURGERY      COLONOSCOPY      COLONOSCOPY N/A 9/15/2022    Procedure: COLONOSCOPY;  Surgeon: Dennis Saavedra MD;  Location: [facility] ENDOSCOPY;  Service: Gastroenterology;  Laterality: N/A;  COLON POLYP    CORONARY ANGIOPLASTY WITH STENT PLACEMENT      ENDOSCOPY N/A 9/15/2022    Procedure: ESOPHAGOGASTRODUODENOSCOPY;  Surgeon: Dennis Saavedra MD;  Location: [facility] ENDOSCOPY;  Service: Gastroenterology;  Laterality: N/A;   GASTRTIS BARRETTS ESOPH    EYE SURGERY Bilateral     UPPER GASTROINTESTINAL ENDOSCOPY      VEIN LIGATION AND STRIPPING      WRIST FRACTURE SURGERY         Family History:  Family History   Problem Relation Age of Onset    Colon cancer Neg Hx        Social History:   reports that he quit smoking about 19 months ago. His smoking use included cigarettes. He started smoking about 31 years ago. He has a 30 pack-year smoking history. He has been exposed to tobacco smoke. He has never used smokeless tobacco. He reports that he does not currently use alcohol after a past usage of about 12.0 standard drinks of alcohol per week. He reports that he does not currently use drugs.    Medications:   Medications Prior to Admission   Medication Sig Dispense Refill Last Dose    albuterol sulfate  (90 Base) MCG/ACT inhaler Inhale 2 puffs Every 4 (Four) Hours As Needed for Wheezing or Shortness of Air. 8 g 3     amiodarone (PACERONE) 200 MG tablet Take 1 tablet by mouth Daily. 30 tablet 11     apixaban (ELIQUIS) 5 MG tablet tablet Take 1 tablet by mouth 2 (Two) Times a Day.       atorvastatin (LIPITOR) 80 MG tablet TAKE 1 TABLET BY MOUTH EVERY NIGHT 90 tablet 3     cetirizine (zyrTEC) 10 MG  tablet Take 1 tablet by mouth Daily.       clopidogrel (PLAVIX) 75 MG tablet Take 1 tablet by mouth Daily. 30 tablet 11     HYDROcodone-acetaminophen (NORCO) 5-325 MG per tablet Take 1 tablet by mouth Every 6 (Six) Hours As Needed for Moderate Pain. 28 tablet 0     HYDROcodone-acetaminophen (NORCO) 5-325 MG per tablet Take 1 tablet by mouth Every 8 (Eight) Hours As Needed for Moderate Pain. 21 tablet 0     metoprolol tartrate (LOPRESSOR) 25 MG tablet TAKE 1 TABLET BY MOUTH TWICE DAILY (Patient taking differently: Take 1 tablet by mouth 2 (Two) Times a Day. TAKE 1 TABLET BY MOUTH TWICE DAILY) 180 tablet 1     omeprazole (priLOSEC) 40 MG capsule Daily.       pantoprazole (Protonix) 40 MG EC tablet Take 1 tablet by mouth Daily. 90 tablet 3     rosuvastatin (Crestor) 40 MG tablet Take 1 tablet by mouth Daily.       tamsulosin (FLOMAX) 0.4 MG capsule 24 hr capsule Take 2 capsules by mouth Daily. 180 capsule 1     topiramate (TOPAMAX) 50 MG tablet 1/2 tablet daily x 1 week, 1/2 tablet twice a day second week, 1/2 tablet in the morning and 1 tablet in the evening the third week and then 1 tablet twice a day thereafter. (Patient taking differently: Take 1 tablet by mouth 2 (Two) Times a Day.) 120 tablet 3        Allergies:  Patient has no known allergies.        Objective     Weight 58.4 kg (128 lb 12 oz).    Physical Exam   Constitutional: Pt is oriented to person, place, and time and well-developed, well-nourished, and in no distress.   Mouth/Throat: Oropharynx is clear and moist.   Neck: Normal range of motion.   Cardiovascular: Normal rate, regular rhythm and normal heart sounds.    Pulmonary/Chest: Effort normal and breath sounds normal.   Abdominal: Soft. Nontender  Skin: Skin is warm and dry.   Psychiatric: Mood, memory, affect and judgment normal.     Assessment & Plan     Diagnosis:  Collins's    Anticipated Surgical Procedure:  EGD    The risks, benefits, and alternatives of this procedure have been discussed with  the patient or the responsible party- the patient understands and agrees to proceed.

## 2024-08-12 LAB
QT INTERVAL: 497 MS
QTC INTERVAL: 489 MS

## 2024-08-13 ENCOUNTER — TELEPHONE (OUTPATIENT)
Dept: GASTROENTEROLOGY | Facility: CLINIC | Age: 79
End: 2024-08-13
Payer: MEDICARE

## 2024-08-13 NOTE — TELEPHONE ENCOUNTER
EGD was Scheduled: 8.9.24  Last Office Visit: 5.24.24    Per Ada in ENDO: Patient had rhythm changes on their EKG so anesthesia cancelled procedure.     Please advise.

## 2024-08-15 ENCOUNTER — TREATMENT (OUTPATIENT)
Dept: PHYSICAL THERAPY | Facility: CLINIC | Age: 79
End: 2024-08-15
Payer: MEDICARE

## 2024-08-15 DIAGNOSIS — M25.611 DECREASED ROM OF RIGHT SHOULDER: ICD-10-CM

## 2024-08-15 DIAGNOSIS — S42.201A CLOSED FRACTURE OF PROXIMAL END OF RIGHT HUMERUS, UNSPECIFIED FRACTURE MORPHOLOGY, INITIAL ENCOUNTER: Primary | ICD-10-CM

## 2024-08-15 DIAGNOSIS — M25.511 ACUTE PAIN OF RIGHT SHOULDER: ICD-10-CM

## 2024-08-15 NOTE — PROGRESS NOTES
"Occupational Therapy Initial Evaluation and Plan of Care  [place]  OT: [address] [facility] [place] [address]    Patient: Familia Thompson   : 1945  Diagnosis/ICD-10 Code:  Closed fracture of proximal end of right humerus, unspecified fracture morphology, initial encounter [S42.201A]  Referring practitioner: ISIDRO Sorensen  Date of Initial Visit: 8/15/2024  Today's Date: 8/15/2024  Patient seen for 1 sessions           Subjective Questionnaire: QuickDASH: 36/55      Subjective Evaluation    History of Present Illness  Date of onset: 2024  Mechanism of injury: Pt is a RHD 78 y/o male who presents to therapy with c/o R shoulder pain and decreased ROM. Pt reports on  he was chasing his dog in the backyard when he fell landing on his R arm. Pt was taken to the ED where xrays revealed: a comminuted fracture of the proximal humerus that is essentially nondisplaced. The fracture extends across the surgical neck and through the greater tuberosity. No glenohumeral dislocation or AC joint separation. There is AC joint arthropathy. Pt is attempting conservative treatment at this time. Pt was receiving HH therapy until last week.   Pt is retired.  PMHx: arthritis, COPD, HTN, MI.      Pain  Current pain ratin  At best pain ratin  At worst pain ratin  Location: R shoulder  Quality: \"like a knife jabbing into my shoulder\"  Relieving factors: support and medications  Aggravating factors: lifting, movement and outstretched reach  Progression: improved    Social Support  Lives with: spouse    Hand dominance: right    Diagnostic Tests  X-ray: abnormal    Treatments  Previous treatment: physical therapy  Discharged from (in last 30 days): home health care  Patient Goals  Patient goals for therapy: decreased pain, increased motion, increased strength, independence with ADLs/IADLs and return to sport/leisure activities  Patient goal: \"Straighten this arm out\"         Objective          Observations "     Additional Shoulder Observation Details  Pt presents with sling to R UE.    Tenderness     Right Shoulder  Tenderness in the infraspinatus tendon and supraspinatus tendon.     Additional Tenderness Details  Moderate to anterior R shoulder. Mild at infraspinatus and supraspinatus.    Cervical/Thoracic Screen   Cervical range of motion within normal limits with the following exceptions: Pt is limited in L lateral flexion, rotation to the R and L secondary to pain in R side of neck. R lateral flexion WNL.     Neurological Testing     Additional Neurological Details  Pt reports no numbness/tingling.    Active Range of Motion     Right Shoulder   Flexion: 90 degrees with pain  Abduction: 65 degrees with pain    Additional Active Range of Motion Details  ER to behind ear with pain.  IR to side of hip with pain.    Passive Range of Motion     Right Shoulder   Flexion: 130 degrees   Abduction: 100 degrees   External rotation 45°: 20 degrees   Internal rotation 45°: 40 degrees           Assessment & Plan       Assessment  Impairments: abnormal or restricted ROM, activity intolerance, impaired physical strength, lacks appropriate home exercise program and pain with function   Functional limitations: carrying objects, lifting, sleeping, pulling, pushing, uncomfortable because of pain, moving in bed, reaching behind back and reaching overhead   Assessment details: Pt will benefit from skilled OT secondary to decreased strength/ROM and increased pain that limits pt ability to complete ADLs. Treatment limited this date by R sided neck pain. OT discussed using thermal modalities on R side of neck 10-15 minutes followed by gentle neck stretches. Pt verbalizes understanding.   Prognosis: good    Goals  Plan Goals: SHOULDER  PROBLEMS:     1. The patient has limited ROM of the R shoulder.    LTG 1: 12 weeks:  The patient will demonstrate 140 degrees of active shoulder flexion, 140 degrees of shoulder abduction, external rotation  to behind head, and internal rotation to lumbar spine to allow the patient to reach into upper kitchen cabinets and manipulate clothing behind the back with greater ease.    STATUS:  New   STG 1a: 8 weeks:  The patient will demonstrate 140 degrees of passive shoulder flexion, 140 degrees of passive shoulder abduction, 40 degrees of passive shoulder external rotation, and 40 degrees of passive shoulder internal rotation.    STATUS:  New   TREATMENT: Manual therapy, therapeutic exercise, home exercise instruction, and modalities as needed to include: electrical stimulation, ultrasound, moist heat, and ice.    2. The patient has limited strength of the R shoulder.   LTG 2: 12 weeks:  The patient will demonstrate 5 /5 strength for R shoulder flexion, abduction, external rotation, and internal rotation in order to demonstrate improved shoulder stability.    STATUS:  New   STG 2a: 8 weeks:  The patient will demonstrate ability to tolerate MMT for R shoulder flexion, abduction, external rotation, and internal rotation.    STATUS:  New   STG2b:  8 weeks:  The patient will be independent with home exercises.     STATUS:  New   TREATMENT: Manual therapy, therapeutic exercise, home exercise instruction, and modalities as needed to include: electrical stimulation, ultrasound, moist heat, and ice.     3. The patient complains of pain to the R shoulder.   LTG 3: 12 weeks:  The patient will report a pain rating of 1 /10 or better with fxl use in order to improve sleep quality and tolerance to performance of activities of daily living.    STATUS:  New   STG 3a: 8 weeks:  The patient will report a pain rating of 4 /10 at worst.     STATUS:  New   TREATMENT: Manual therapy, therapeutic exercise, home exercise instruction, and modalities as needed to include: electrical stimulation, ultrasound, moist heat, and ice.    4. Carrying, Moving, and Handling Objects Functional Limitation     LTG 4: 12 weeks:  The patient will demonstrate  1-19 % limitation by achieving a score of 19 on the QuickDASH.    STATUS:  New   STG 4a: 8 weeks:  The patient will demonstrate 20-39 % limitation by achieving a score of 27 on the QuickDASH.      STATUS:  New   TREATMENT:  Manual therapy, therapeutic exercise, home exercise instruction, and modalities as needed to include: moist heat, electrical stimulation, and ultrasound.     Plan  Planned modality interventions: cryotherapy and thermotherapy (hydrocollator packs)  Planned therapy interventions: body mechanics training, fine motor coordination training, flexibility, functional ROM exercises, home exercise program, joint mobilization, manual therapy, neuromuscular re-education, postural training, soft tissue mobilization, strengthening, stretching and therapeutic activities  Frequency: 2x week  Duration in weeks: 12  Treatment plan discussed with: patient      Pt is indicated for skilled occupational therapy services.  Timed:         Manual Therapy:    3     mins  [id number];     Therapeutic Exercise:    20     mins  [id number];       Un-Timed:  Low Eval     25     Mins  [id number]      Timed Treatment:   23   mins   Total Treatment:     48   mins    OT SIGNATURE: [address] [facility]   DATE TREATMENT INITIATED: 8/15/2024  [place] License: [id number]    Initial Certification  Certification Period: 11/12/2024  I certify that the therapy services are furnished while this patient is under my care.  The services outlined above are required by this patient, and will be reviewed every 90 days.     PHYSICIAN: Beverley Mehta APRN      DATE:   MD NPI: [id number]  Please sign and return via fax to   [phone].. Thank you, [facility] Occupational Therapy.

## 2024-08-22 ENCOUNTER — OFFICE VISIT (OUTPATIENT)
Dept: FAMILY MEDICINE CLINIC | Facility: CLINIC | Age: 79
End: 2024-08-22
Payer: MEDICARE

## 2024-08-22 VITALS
HEIGHT: 70 IN | TEMPERATURE: 97.4 F | SYSTOLIC BLOOD PRESSURE: 110 MMHG | BODY MASS INDEX: 19.39 KG/M2 | DIASTOLIC BLOOD PRESSURE: 62 MMHG | OXYGEN SATURATION: 97 % | WEIGHT: 135.4 LBS | HEART RATE: 56 BPM

## 2024-08-22 DIAGNOSIS — I10 PRIMARY HYPERTENSION: Primary | ICD-10-CM

## 2024-08-22 DIAGNOSIS — Z78.9 ALCOHOL USE: ICD-10-CM

## 2024-08-22 DIAGNOSIS — G89.29 CHRONIC PAIN OF RIGHT KNEE: ICD-10-CM

## 2024-08-22 DIAGNOSIS — G89.29 CHRONIC RIGHT SHOULDER PAIN: ICD-10-CM

## 2024-08-22 DIAGNOSIS — I25.709 CORONARY ARTERY DISEASE INVOLVING CORONARY BYPASS GRAFT OF NATIVE HEART WITH ANGINA PECTORIS: ICD-10-CM

## 2024-08-22 DIAGNOSIS — I48.91 ATRIAL FIBRILLATION, UNSPECIFIED TYPE: ICD-10-CM

## 2024-08-22 DIAGNOSIS — M25.561 CHRONIC PAIN OF RIGHT KNEE: ICD-10-CM

## 2024-08-22 DIAGNOSIS — D13.5 ADENOMYOMATOSIS OF GALLBLADDER: ICD-10-CM

## 2024-08-22 DIAGNOSIS — F01.A0 MILD MIXED VASCULAR AND NEURODEGENERATIVE DEMENTIA WITHOUT BEHAVIORAL DISTURBANCE, PSYCHOTIC DISTURBANCE, MOOD DISTURBANCE, OR ANXIETY: ICD-10-CM

## 2024-08-22 DIAGNOSIS — M25.511 CHRONIC RIGHT SHOULDER PAIN: ICD-10-CM

## 2024-08-22 DIAGNOSIS — S42.201S CLOSED FRACTURE OF PROXIMAL END OF RIGHT HUMERUS, UNSPECIFIED FRACTURE MORPHOLOGY, SEQUELA: ICD-10-CM

## 2024-08-22 DIAGNOSIS — E78.5 HYPERLIPIDEMIA, UNSPECIFIED HYPERLIPIDEMIA TYPE: ICD-10-CM

## 2024-08-22 DIAGNOSIS — R94.31 ABNORMAL EKG: ICD-10-CM

## 2024-08-22 DIAGNOSIS — K22.70 BARRETT'S ESOPHAGUS WITHOUT DYSPLASIA: ICD-10-CM

## 2024-08-22 LAB
ALBUMIN SERPL-MCNC: 4.1 G/DL (ref 3.5–5.2)
ALBUMIN/GLOB SERPL: 2 G/DL
ALP SERPL-CCNC: 134 U/L (ref 39–117)
ALT SERPL W P-5'-P-CCNC: 16 U/L (ref 1–41)
ANION GAP SERPL CALCULATED.3IONS-SCNC: 11 MMOL/L (ref 5–15)
AST SERPL-CCNC: 27 U/L (ref 1–40)
BASOPHILS # BLD MANUAL: 0.1 10*3/MM3 (ref 0–0.2)
BASOPHILS NFR BLD MANUAL: 2 % (ref 0–1.5)
BILIRUB SERPL-MCNC: 0.5 MG/DL (ref 0–1.2)
BUN SERPL-MCNC: 5 MG/DL (ref 8–23)
BUN/CREAT SERPL: 5.6 (ref 7–25)
BURR CELLS BLD QL SMEAR: ABNORMAL
CALCIUM SPEC-SCNC: 9.1 MG/DL (ref 8.6–10.5)
CHLORIDE SERPL-SCNC: 98 MMOL/L (ref 98–107)
CHOLEST SERPL-MCNC: 124 MG/DL (ref 0–200)
CO2 SERPL-SCNC: 23 MMOL/L (ref 22–29)
CREAT SERPL-MCNC: 0.89 MG/DL (ref 0.76–1.27)
DEPRECATED RDW RBC AUTO: 43.3 FL (ref 37–54)
EGFRCR SERPLBLD CKD-EPI 2021: 87.2 ML/MIN/1.73
EOSINOPHIL # BLD MANUAL: 0.1 10*3/MM3 (ref 0–0.4)
EOSINOPHIL NFR BLD MANUAL: 2 % (ref 0.3–6.2)
ERYTHROCYTE [DISTWIDTH] IN BLOOD BY AUTOMATED COUNT: 13 % (ref 12.3–15.4)
GLOBULIN UR ELPH-MCNC: 2.1 GM/DL
GLUCOSE SERPL-MCNC: 83 MG/DL (ref 65–99)
HCT VFR BLD AUTO: 41.3 % (ref 37.5–51)
HDLC SERPL-MCNC: 65 MG/DL (ref 40–60)
HGB BLD-MCNC: 13.6 G/DL (ref 13–17.7)
LDLC SERPL CALC-MCNC: 44 MG/DL (ref 0–100)
LDLC/HDLC SERPL: 0.67 {RATIO}
LYMPHOCYTES # BLD MANUAL: 1.54 10*3/MM3 (ref 0.7–3.1)
LYMPHOCYTES NFR BLD MANUAL: 10.1 % (ref 5–12)
MCH RBC QN AUTO: 30.3 PG (ref 26.6–33)
MCHC RBC AUTO-ENTMCNC: 32.9 G/DL (ref 31.5–35.7)
MCV RBC AUTO: 92 FL (ref 79–97)
MONOCYTES # BLD: 0.48 10*3/MM3 (ref 0.1–0.9)
NEUTROPHILS # BLD AUTO: 2.55 10*3/MM3 (ref 1.7–7)
NEUTROPHILS NFR BLD MANUAL: 53.5 % (ref 42.7–76)
PLAT MORPH BLD: NORMAL
PLATELET # BLD AUTO: 153 10*3/MM3 (ref 140–450)
PMV BLD AUTO: 12.2 FL (ref 6–12)
POTASSIUM SERPL-SCNC: 3.8 MMOL/L (ref 3.5–5.2)
PROT SERPL-MCNC: 6.2 G/DL (ref 6–8.5)
RBC # BLD AUTO: 4.49 10*6/MM3 (ref 4.14–5.8)
SODIUM SERPL-SCNC: 132 MMOL/L (ref 136–145)
TRIGL SERPL-MCNC: 76 MG/DL (ref 0–150)
VARIANT LYMPHS NFR BLD MANUAL: 32.3 % (ref 19.6–45.3)
VLDLC SERPL-MCNC: 15 MG/DL (ref 5–40)
WBC MORPH BLD: NORMAL
WBC NRBC COR # BLD AUTO: 4.76 10*3/MM3 (ref 3.4–10.8)

## 2024-08-22 PROCEDURE — 85007 BL SMEAR W/DIFF WBC COUNT: CPT | Performed by: FAMILY MEDICINE

## 2024-08-22 PROCEDURE — 85025 COMPLETE CBC W/AUTO DIFF WBC: CPT | Performed by: FAMILY MEDICINE

## 2024-08-22 PROCEDURE — 80061 LIPID PANEL: CPT | Performed by: FAMILY MEDICINE

## 2024-08-22 PROCEDURE — 80053 COMPREHEN METABOLIC PANEL: CPT | Performed by: FAMILY MEDICINE

## 2024-08-22 RX ORDER — ACAMPROSATE CALCIUM 333 MG/1
666 TABLET, DELAYED RELEASE ORAL 3 TIMES DAILY
Qty: 180 TABLET | Refills: 2 | Status: SHIPPED | OUTPATIENT
Start: 2024-08-22

## 2024-08-22 RX ORDER — AMIODARONE HYDROCHLORIDE 200 MG/1
200 TABLET ORAL DAILY
Qty: 90 TABLET | Refills: 1 | Status: SHIPPED | OUTPATIENT
Start: 2024-08-22

## 2024-08-22 NOTE — PROGRESS NOTES
Chief Complaint  Hypertension  Alcohol use    Subjective          Familia Thompson presents to [facility]  Hypertension    Follow-up    Patient presents today accompanied by his daughter, Carolyn who helps with transportation and history.  Patient was supposed to have an EGD completed for Collins's esophagus recently on 8/9/2024 but did have an EKG done which showed an abnormal rhythm.  He had supraventricular bigeminy.  He was supposed to follow-up with his cardiologist but they did not have a good visit and they are requesting to see different cardiologist and now they have an appointment to see Dr. Lani Wood in [place].  To have an appointment in December.  Patient was most recently seen by Dr. Dixon.  Patient continues to take amiodarone as well as metoprolol 25 mg twice a day.  He was previously diagnosed with atrial fibrillation.  He is noted to be in sinus rhythm during his most recent EKG.  I last saw him for an appointment on 5/13/2024.  He was following up for a fall.  He continues to see orthopedics in this regard. He had a comminuted fracture of the proximal humerus that was essentially nondisplaced however the scapula x-ray follow-up with orthopedics showed a displaced fracture which I reviewed myself as well.  He is no longer in a sling but is doing physical therapy.  He has not had any surgery for this.  He is being evaluated by neurology and was diagnosed with dementia.  Did discuss adding Namenda however they wanted to hold off at this time but will follow-up again with Dr. Sepulveda in September.  Patient also has a previously noted area of nodular soft tissue thickening at the gallbladder fundus most suggestive of a focal adenomyomatosis.  I did discuss having a follow-up ultrasound in 6 months to document sonographic stability.  He previous had an ultrasound done on 2/26/2024 showing a 1.6 cm hypoechoic structure or lesion adjacent to the gallbladder of  "uncertain significance requiring a CT for further evaluation.  His next ultrasound will be in October.  Patient does have ongoing issues with right knee pain.  We have previously ordered an MRI of the right knee back in June 2023 showing tears in the medial and lateral menisci as well as arthritis noted and a 5.9 cm popliteal cyst.  He does need to get back into see orthopedics as his right knee still bothers him.  He appears to been lost to follow-up in this regard.  He still drinking alcohol most every day.  I did discuss with him options.  He is interested in quitting.  I will start him on acamprosate.    Current Outpatient Medications   Medication Instructions   • acamprosate (CAMPRAL) 666 mg, Oral, 3 Times Daily   • albuterol sulfate  (90 Base) MCG/ACT inhaler 2 puffs, Inhalation, Every 4 Hours PRN   • amiodarone (PACERONE) 200 mg, Oral, Daily   • apixaban (ELIQUIS) 5 mg, Oral, 2 Times Daily   • atorvastatin (LIPITOR) 80 mg, Oral, Nightly   • cetirizine (zyrTEC) 10 MG tablet 1 tablet, Oral, Daily   • clopidogrel (PLAVIX) 75 mg, Oral, Daily   • HYDROcodone-acetaminophen (NORCO) 5-325 MG per tablet 1 tablet, Oral, Every 6 Hours PRN   • metoprolol tartrate (LOPRESSOR) 25 MG tablet TAKE 1 TABLET BY MOUTH TWICE DAILY   • omeprazole (priLOSEC) 40 MG capsule Every 24 Hours   • pantoprazole (PROTONIX) 40 mg, Oral, Daily   • tamsulosin (FLOMAX) 0.8 mg, Oral, Daily   • topiramate (TOPAMAX) 50 MG tablet 1/2 tablet daily x 1 week, 1/2 tablet twice a day second week, 1/2 tablet in the morning and 1 tablet in the evening the third week and then 1 tablet twice a day thereafter.       The following portions of the patient's history were reviewed and updated as appropriate: allergies, current medications, past family history, past medical history, past social history, past surgical history, and problem list.    Objective   Vital Signs:   /62   Pulse 56   Temp 97.4 °F (36.3 °C)   Ht 177.8 cm (70\")   Wt 61.4 kg " (135 lb 6.4 oz)   SpO2 97%   BMI 19.43 kg/m²     BP Readings from Last 3 Encounters:   08/22/24 110/62   08/09/24 159/64   07/26/24 132/73     Wt Readings from Last 3 Encounters:   08/22/24 61.4 kg (135 lb 6.4 oz)   08/09/24 58.4 kg (128 lb 12 oz)   07/26/24 62.6 kg (138 lb)     BMI is within normal parameters. No other follow-up for BMI required.     Physical Exam  Vitals reviewed.   Constitutional:       Appearance: Normal appearance.   HENT:      Head: Normocephalic and atraumatic.      Right Ear: External ear normal.      Left Ear: External ear normal.   Eyes:      Conjunctiva/sclera: Conjunctivae normal.   Cardiovascular:      Rate and Rhythm: Normal rate and regular rhythm.      Heart sounds: No murmur heard.     No friction rub. No gallop.   Pulmonary:      Effort: Pulmonary effort is normal.      Breath sounds: Normal breath sounds. No wheezing or rhonchi.   Abdominal:      General: Bowel sounds are normal. There is no distension.      Palpations: Abdomen is soft.      Tenderness: There is no abdominal tenderness.   Skin:     General: Skin is warm and dry.   Neurological:      Mental Status: He is alert and oriented to person, place, and time.      Cranial Nerves: No cranial nerve deficit.   Psychiatric:         Mood and Affect: Mood and affect normal.         Behavior: Behavior normal.         Thought Content: Thought content normal.         Judgment: Judgment normal.          Result Review :   The following data was reviewed by: Robb Gómez DO on 08/22/2024:  Common labs          2/12/2024    12:58 4/10/2024    10:22 4/13/2024    18:33   Common Labs   Glucose 59   80    BUN 8   10    Creatinine 1.03  1.00  0.94    Sodium 136   132    Potassium 4.3   4.1    Chloride 103   97    Calcium 9.0   8.5    Albumin 4.0   4.1    Total Bilirubin 0.6   0.7    Alkaline Phosphatase 126   99    AST (SGOT) 41   40    ALT (SGPT) 28   29    WBC 4.51   3.71    Hemoglobin 13.9   12.4    Hematocrit 40.9   37.6     Platelets 152   133    Total Cholesterol 143      Triglycerides 65      HDL Cholesterol 73      LDL Cholesterol  57      PSA 1.710               Lab Results   Component Value Date    COVID19 Not Detected 04/27/2022    FLU Negative 04/27/2022    FLU Negative 04/27/2022    INR 1.03 10/25/2023       Procedures        Assessment and Plan    Diagnoses and all orders for this visit:    1. Primary hypertension (Primary)  -     CBC & Differential  -     Comprehensive Metabolic Panel    2. Abnormal EKG    3. 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 Closed fracture of proximal end of right humerus, unspecified fracture morphology, sequela    5. Chronic right shoulder pain    6. Atrial fibrillation, unspecified type    7. Coronary artery disease involving coronary bypass graft of native heart with angina pectoris    8. 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 Adenomyomatosis of gallbladder    10. Alcohol use    11. Chronic pain of right knee  -     Ambulatory Referral to Orthopedic Surgery    12. Hyperlipidemia, unspecified hyperlipidemia type  -     Lipid Panel    Other orders  -     amiodarone (PACERONE) 200 MG tablet; Take 1 tablet by mouth Daily.  Dispense: 90 tablet; Refill: 1  -     acamprosate (CAMPRAL) 333 MG EC tablet; Take 2 tablets by mouth 3 (Three) Times a Day.  Dispense: 180 tablet; Refill: 2    Plan as documented above.  I did discuss with patient seeing him back in 3 months for follow-up.  I did discuss with him keeping his appointment to establish with cardiology.  He will need to have evaluation by cardiology prior to proceeding with the EGD.  Patient encouraged to continue taking his medications as prescribed.  Blood pressure has been adequately controlled so we did discuss continuing current management as well.  Patient has been  encouraged to quit drinking alcohol.  I will order labs today.  Acamprosate has been prescribed.      Medications Discontinued During This Encounter   Medication Reason   • amiodarone (PACERONE) 200 MG tablet Reorder          Follow Up   Return in about 3 months (around 11/22/2024) for Hypertension.  Patient was given instructions and counseling regarding his condition or for health maintenance advice. Please see specific information pulled into the AVS if appropriate.       Robb Gómez DO  08/22/24  13:34 EDT

## 2024-08-23 ENCOUNTER — TREATMENT (OUTPATIENT)
Dept: PHYSICAL THERAPY | Facility: CLINIC | Age: 79
End: 2024-08-23
Payer: MEDICARE

## 2024-08-23 DIAGNOSIS — M25.611 DECREASED ROM OF RIGHT SHOULDER: ICD-10-CM

## 2024-08-23 DIAGNOSIS — S42.201A CLOSED FRACTURE OF PROXIMAL END OF RIGHT HUMERUS, UNSPECIFIED FRACTURE MORPHOLOGY, INITIAL ENCOUNTER: Primary | ICD-10-CM

## 2024-08-23 DIAGNOSIS — M25.511 ACUTE PAIN OF RIGHT SHOULDER: ICD-10-CM

## 2024-08-23 NOTE — TELEPHONE ENCOUNTER
Called Dr. Dixon's office to advise. Dr. Dixon will be notified and his office will call us back for f/u.     Pt is not yet rescheduled for procedure. Called pt to follow-up. No answer. Left message.     Pt is scheduled for f/u with Dr. Dixon on 10.18.24.    Postponing Patient Call until Monday, 8.26.24

## 2024-08-23 NOTE — PROGRESS NOTES
"Occupational Therapy Daily Treatment Note  Asad OT: [address] [place] [address]      Patient: Familia Thompson   : 1945  Diagnosis/ICD-10 Code:  Closed fracture of proximal end of right humerus, unspecified fracture morphology, initial encounter [S42.201A]  Referring practitioner: ISIDRO Sorensen  Date of Initial Visit: Type: THERAPY  Noted: 8/15/2024  Today's Date: 2024  Patient seen for 2 sessions             Subjective   Familia Thompson reports: \"It only hurts when I try to move it\"    Objective     See Exercise, Manual, and Modality Logs for complete treatment.       Assessment/Plan  Pt reports tightness in neck with AA flexion and pain with lateral neck flexion (ended) but tolerated remainder of treatment well. OT attempted moist heat to B sides of neck before completion of neck stretches but pt continues with pain.  Progress per Plan of Care           Timed:  Therapeutic Exercise:    8     mins  97572;     Therapeutic Activity:     15     mins  47754;       Timed Treatment:   23   mins   Total Treatment:     30   mins        Justin Street OT  Occupational Therapist  [place] License: [id number]  NPI: [id number]  "

## 2024-08-26 RX ORDER — METOPROLOL TARTRATE 25 MG/1
TABLET, FILM COATED ORAL
Qty: 180 TABLET | Refills: 1 | Status: SHIPPED | OUTPATIENT
Start: 2024-08-26

## 2024-08-26 RX ORDER — LANOLIN ALCOHOL/MO/W.PET/CERES
CREAM (GRAM) TOPICAL
Qty: 90 TABLET | Refills: 3 | Status: SHIPPED | OUTPATIENT
Start: 2024-08-26

## 2024-08-26 NOTE — TELEPHONE ENCOUNTER
We've not rec'd update from Dr. Dixon's office yet. If I don't hear back today, I will call office tomorrow so we can go ahead and get pt's procedure rescheduled.     Postponing Patient Call until tomorrow, 8.27.24

## 2024-08-29 RX ORDER — TAMSULOSIN HYDROCHLORIDE 0.4 MG/1
2 CAPSULE ORAL DAILY
Qty: 180 CAPSULE | Refills: 1 | Status: SHIPPED | OUTPATIENT
Start: 2024-08-29

## 2024-08-30 ENCOUNTER — TREATMENT (OUTPATIENT)
Dept: PHYSICAL THERAPY | Facility: CLINIC | Age: 79
End: 2024-08-30
Payer: MEDICARE

## 2024-08-30 DIAGNOSIS — S42.201A CLOSED FRACTURE OF PROXIMAL END OF RIGHT HUMERUS, UNSPECIFIED FRACTURE MORPHOLOGY, INITIAL ENCOUNTER: Primary | ICD-10-CM

## 2024-08-30 DIAGNOSIS — M25.511 ACUTE PAIN OF RIGHT SHOULDER: ICD-10-CM

## 2024-08-30 DIAGNOSIS — M25.611 DECREASED ROM OF RIGHT SHOULDER: ICD-10-CM

## 2024-08-30 NOTE — PROGRESS NOTES
"Occupational Therapy Daily Treatment Note  Asad OT: [address] [place] [address]      Patient: Familia Thompson   : 1945  Diagnosis/ICD-10 Code:  Closed fracture of proximal end of right humerus, unspecified fracture morphology, initial encounter [S42.201A]  Referring practitioner: ISIDRO Sorensen  Date of Initial Visit: Type: THERAPY  Noted: 8/15/2024  Today's Date: 2024  Patient seen for 3 sessions           Subjective   Familia Thompson reports: 2-3/10 pain R upper arm. \"It's doing a little better\"    Objective     See Exercise, Manual, and Modality Logs for complete treatment.       Assessment/Plan  OT graded up duration on UBE. OT provided seated AA shoulder flexion HEP. Teachback successful. OT graded up reps with wall ladder. Improved tolerance to scap squeezes this date but pt reports pain in neck at 12 reps (activity ended). Pt also reports pain with handrail wash activity this date (activity ended). Pt tolerated remainder of treatment well. Pt continues with decreased fxl strength/ROM that limit ability to complete ADLs/IADLs.  Progress per Plan of Care           Timed:  Therapeutic Exercise:    15     mins  12892;      Therapeutic Activity:     25     mins  19861;       Timed Treatment:   40   mins   Total Treatment:     40   mins    Start time: 0700  End time: 07    Justin Street OT  Occupational Therapist  [place] License: [id number]  NPI: [id number]  "

## 2024-09-06 ENCOUNTER — PREP FOR SURGERY (OUTPATIENT)
Dept: OTHER | Facility: HOSPITAL | Age: 79
End: 2024-09-06
Payer: MEDICARE

## 2024-09-06 ENCOUNTER — TREATMENT (OUTPATIENT)
Dept: PHYSICAL THERAPY | Facility: CLINIC | Age: 79
End: 2024-09-06
Payer: MEDICARE

## 2024-09-06 DIAGNOSIS — M25.511 ACUTE PAIN OF RIGHT SHOULDER: ICD-10-CM

## 2024-09-06 DIAGNOSIS — S42.201A CLOSED FRACTURE OF PROXIMAL END OF RIGHT HUMERUS, UNSPECIFIED FRACTURE MORPHOLOGY, INITIAL ENCOUNTER: Primary | ICD-10-CM

## 2024-09-06 DIAGNOSIS — M25.611 DECREASED ROM OF RIGHT SHOULDER: ICD-10-CM

## 2024-09-06 DIAGNOSIS — K22.70 BARRETT'S ESOPHAGUS WITHOUT DYSPLASIA: Primary | ICD-10-CM

## 2024-09-06 NOTE — PROGRESS NOTES
"OT Re-evaluation/Progress Note  Asad  OT: [address]  [place] [address]    Patient: Familia Thompson   : 1945  Diagnosis/ICD-10 Code:  Closed fracture of proximal end of right humerus, unspecified fracture morphology, initial encounter [S42.201A]  Referring practitioner: ISIDRO Sorensen  Date of Initial Visit: Type: THERAPY  Noted: 8/15/2024  Today's Date: 2024  Patient seen for 4 sessions      Subjective:   Subjective Questionnaire: QuickDASH:   Clinical Progress: improved  Home Program Compliance: Yes  Treatment has included: therapeutic exercise, manual therapy, and therapeutic activity    Subjective Evaluation    History of Present Illness    Subjective comment: \"I'm able to reach my back pocket now which I couldn't do before\" Pt reports position dependent pain in R shoulder but none at rest.Pain  Current pain ratin  Location: R elbow         Objective          Tenderness     Right Shoulder  Tenderness in the infraspinatus tendon. No tenderness in the supraspinatus tendon.     Additional Tenderness Details  Severe to lateral R shoulder. Mild at infraspinatus    Cervical/Thoracic Screen   Cervical range of motion within normal limits with the following exceptions: Pt is limited in L lateral flexion, rotation to the R and L secondary to pain in R side of neck. R lateral flexion WNL.     Neurological Testing     Additional Neurological Details  Pt reports no numbness/tingling.    Active Range of Motion     Right Shoulder   Flexion: 105 degrees   Abduction: 70 degrees with pain  External rotation BTH: C7   Internal rotation BTB: sacrum with pain    Passive Range of Motion     Right Shoulder   Flexion: 140 degrees   Abduction: WFL  External rotation 90°: 50 degrees   Internal rotation 90°: 40 degrees       Assessment & Plan       Assessment  Impairments: abnormal or restricted ROM, activity intolerance, impaired physical strength, lacks appropriate home exercise program and pain with " function   Functional limitations: carrying objects, lifting, sleeping, pulling, pushing, uncomfortable because of pain, moving in bed, reaching behind back and reaching overhead   Assessment details: Pt displays improved P/AROM. Pt reports decreased disability as shown in improved QuickDASH score. Pt met 3/5 STGs. Pt continues with pain/decreased ROM that limits ability to complete  Prognosis: good    Goals  Plan Goals: SHOULDER  PROBLEMS:     1. The patient has limited ROM of the R shoulder.    LTG 1: 12 weeks:  The patient will demonstrate 140 degrees of active shoulder flexion, 140 degrees of shoulder abduction, external rotation to behind head, and internal rotation to lumbar spine to allow the patient to reach into upper kitchen cabinets and manipulate clothing behind the back with greater ease.    STATUS:  New   STG 1a: 8 weeks:  The patient will demonstrate 140 degrees of passive shoulder flexion, 140 degrees of passive shoulder abduction, 40 degrees of passive shoulder external rotation, and 40 degrees of passive shoulder internal rotation.    STATUS:  Met   TREATMENT: Manual therapy, therapeutic exercise, home exercise instruction, and modalities as needed to include: electrical stimulation, ultrasound, moist heat, and ice.    2. The patient has limited strength of the R shoulder.   LTG 2: 12 weeks:  The patient will demonstrate 5 /5 strength for R shoulder flexion, abduction, external rotation, and internal rotation in order to demonstrate improved shoulder stability.    STATUS:  New   STG 2a: 8 weeks:  The patient will demonstrate ability to tolerate MMT for R shoulder flexion, abduction, external rotation, and internal rotation.    STATUS:  New   STG2b:  8 weeks:  The patient will be independent with home exercises.     STATUS:  Met   TREATMENT: Manual therapy, therapeutic exercise, home exercise instruction, and modalities as needed to include: electrical stimulation, ultrasound, moist heat, and  ice.     3. The patient complains of pain to the R shoulder.   LTG 3: 12 weeks:  The patient will report a pain rating of 1 /10 or better with fxl use in order to improve sleep quality and tolerance to performance of activities of daily living.    STATUS:  New   STG 3a: 8 weeks:  The patient will report a pain rating of 4 /10 at worst.     STATUS:  New   TREATMENT: Manual therapy, therapeutic exercise, home exercise instruction, and modalities as needed to include: electrical stimulation, ultrasound, moist heat, and ice.    4. Carrying, Moving, and Handling Objects Functional Limitation     LTG 4: 12 weeks:  The patient will demonstrate 1-19 % limitation by achieving a score of 19 on the QuickDASH.    STATUS:  New   STG 4a: 8 weeks:  The patient will demonstrate 20-39 % limitation by achieving a score of 27 on the QuickDASH.      STATUS:  Met   TREATMENT:  Manual therapy, therapeutic exercise, home exercise instruction, and modalities as needed to include: moist heat, electrical stimulation, and ultrasound.     Plan  Planned modality interventions: cryotherapy and thermotherapy (hydrocollator packs)  Planned therapy interventions: body mechanics training, fine motor coordination training, flexibility, functional ROM exercises, home exercise program, joint mobilization, manual therapy, neuromuscular re-education, postural training, soft tissue mobilization, strengthening, stretching and therapeutic activities  Frequency: 2x week  Duration in weeks: 12  Treatment plan discussed with: patient      Progress toward previous goals: Partially Met      Recommendations: Continue as planned  Timeframe: 2 months  Prognosis to achieve goals: good  Date of last progress/eval note: 8/15/24  OT SIGNATURE: Justin Street OT   DATE TREATMENT INITIATED: Type: THERAPY  Noted: 8/15/2024  [place] License: [id number]    Based upon review of the patient's progress and continued therapy plan, it is my medical opinion that Familia Thompson should  continue occupational [place] [facility]  [address]  [facility] [address] [phone].    Signature: __________________________________  Beverley Mehta APRN MD NPI: [id number]  Timed:    Therapeutic Exercise:    17     mins  64631;     Therapeutic Activity:     11     mins  48885;       Timed Treatment:   28   mins   Total Treatment:     28   mins    Start time: 0704  End time: 0732    Please sign and return via fax to [phone]  . Thank you, [facility].

## 2024-09-13 ENCOUNTER — OFFICE VISIT (OUTPATIENT)
Dept: ORTHOPEDIC SURGERY | Facility: CLINIC | Age: 79
End: 2024-09-13
Payer: MEDICARE

## 2024-09-13 ENCOUNTER — TREATMENT (OUTPATIENT)
Dept: PHYSICAL THERAPY | Facility: CLINIC | Age: 79
End: 2024-09-13
Payer: MEDICARE

## 2024-09-13 VITALS
BODY MASS INDEX: 19.33 KG/M2 | WEIGHT: 135 LBS | OXYGEN SATURATION: 93 % | SYSTOLIC BLOOD PRESSURE: 127 MMHG | HEART RATE: 53 BPM | HEIGHT: 70 IN | DIASTOLIC BLOOD PRESSURE: 73 MMHG

## 2024-09-13 DIAGNOSIS — S42.201A CLOSED FRACTURE OF PROXIMAL END OF RIGHT HUMERUS, UNSPECIFIED FRACTURE MORPHOLOGY, INITIAL ENCOUNTER: Primary | ICD-10-CM

## 2024-09-13 DIAGNOSIS — M25.561 RIGHT KNEE PAIN, UNSPECIFIED CHRONICITY: Primary | ICD-10-CM

## 2024-09-13 DIAGNOSIS — M25.611 DECREASED ROM OF RIGHT SHOULDER: ICD-10-CM

## 2024-09-13 DIAGNOSIS — M25.511 ACUTE PAIN OF RIGHT SHOULDER: ICD-10-CM

## 2024-09-13 DIAGNOSIS — M17.11 PRIMARY OSTEOARTHRITIS OF RIGHT KNEE: ICD-10-CM

## 2024-09-13 RX ORDER — LIDOCAINE HYDROCHLORIDE 10 MG/ML
5 INJECTION, SOLUTION INFILTRATION; PERINEURAL
Status: COMPLETED | OUTPATIENT
Start: 2024-09-13 | End: 2024-09-13

## 2024-09-13 RX ORDER — TRIAMCINOLONE ACETONIDE 40 MG/ML
40 INJECTION, SUSPENSION INTRA-ARTICULAR; INTRAMUSCULAR
Status: COMPLETED | OUTPATIENT
Start: 2024-09-13 | End: 2024-09-13

## 2024-09-13 RX ADMIN — LIDOCAINE HYDROCHLORIDE 5 ML: 10 INJECTION, SOLUTION INFILTRATION; PERINEURAL at 10:04

## 2024-09-13 RX ADMIN — TRIAMCINOLONE ACETONIDE 40 MG: 40 INJECTION, SUSPENSION INTRA-ARTICULAR; INTRAMUSCULAR at 10:04

## 2024-09-13 NOTE — PROGRESS NOTES
"Chief Complaint  Follow-up and Pain of the Right Knee    Subjective          Familia Thompson presents to [facility] for   Follow-up  Pain      Familia returns for follow-up of his right knee.  He has right knee arthritis we are treating nonoperatively.  He has done well with injections in the past.  He is interested in a repeat injection today.  He denies any recent injuries.    No Known Allergies     Social History     Socioeconomic History    Marital status:    Tobacco Use    Smoking status: Every Day     Current packs/day: 0.75     Average packs/day: 1 pack/day for 30.1 years (30.1 ttl pk-yrs)     Types: Cigarettes     Start date: 1993     Last attempt to quit: 2023     Passive exposure: Past    Smokeless tobacco: Never    Tobacco comments:     Quit smoking in march 2023    Vaping Use    Vaping status: Never Used    Passive vaping exposure: Yes   Substance and Sexual Activity    Alcohol use: Not Currently     Alcohol/week: 12.0 standard drinks of alcohol     Types: 12 Cans of beer per week     Comment: 6 beers a day    Drug use: Not Currently     Comment: 0cc    Sexual activity: Defer        I reviewed the patient's chief complaint, history of present illness, review of systems, past medical history, surgical history, family history, social history, medications, and allergy list.     REVIEW OF SYSTEMS    Constitutional: Denies fevers, chills, weight loss  Cardiovascular: Denies chest pain, shortness of breath  Skin: Denies rashes, acute skin changes  Neurologic: Denies headache, loss of consciousness  MSK: Right knee pain      Objective   Vital Signs:   /73   Pulse 53   Ht 177.8 cm (70\")   Wt 61.2 kg (135 lb)   SpO2 93%   BMI 19.37 kg/m²     Body mass index is 19.37 kg/m².    Physical Exam    General: Alert. No acute distress.   Right lower extremity: well healed medial incision from previous vascular work. Medial joint line tenderness. Full extension. Flexion 110 " degrees. Positive crepitus. Stable to varus/valgus stress. Stable to anterior/posterior drawer. Smooth hip motion. Negative Lachman's. Paresthesia in the toes.     Large Joint Arthrocentesis: R knee  Date/Time: 9/13/2024 10:04 AM  Consent given by: patient  Site marked: site marked  Timeout: Immediately prior to procedure a time out was called to verify the correct patient, procedure, equipment, support staff and site/side marked as required   Supporting Documentation  Indications: pain   Procedure Details  Location: knee - R knee  Preparation: Patient was prepped and draped in the usual sterile fashion  Needle gauge: 21 G.  Approach: lateral  Medications administered: 5 mL lidocaine 1 %; 40 mg triamcinolone acetonide 40 MG/ML  Patient tolerance: patient tolerated the procedure well with no immediate complications      This injection documentation was Scribed for David Mckinney MD by Fatoumata Foster.  09/13/24   10:05 EDT    Imaging Results (Most Recent)       Procedure Component Value Units Date/Time    XR Knee 4+ View Right [[id number]] Resulted: 09/13/24 0959     Updated: 09/13/24 1000    Narrative:      Indications: Right knee pain    Views: Weightbearing AP, PA flexion, lateral, sunrise right    Findings: Tricompartmental degenerative changes are present.    Chondrocalcinosis noted.  Advanced atherosclerotic disease.  No fracture   seen.    Comparative Data: Comparative data found and reviewed today                     Assessment and Plan        XR Knee 4+ View Right    Result Date: 9/13/2024  Narrative: Indications: Right knee pain Views: Weightbearing AP, PA flexion, lateral, sunrise right Findings: Tricompartmental degenerative changes are present.  Chondrocalcinosis noted.  Advanced atherosclerotic disease.  No fracture seen. Comparative Data: Comparative data found and reviewed today      Diagnoses and all orders for this visit:    1. Right knee pain, unspecified chronicity (Primary)  -     XR Knee 4+ View  Right    2. Primary osteoarthritis of right knee    Other orders  -     Large Joint Arthrocentesis: R knee        We reviewed his x-rays.  We discussed nonoperative management.  We discussed the risks, benefits, indications, alternatives to a right knee steroid injection.  He elected to proceed and tolerated the injections well.  He will continue home exercise and activity modification.  He will follow-up in 3 months for reevaluation.  We may consider Visco supplement injection if needed.      Call or return if worsening symptoms.    Scribed for David Mckinney MD by Fatoumata Foster  09/13/2024   10:04 EDT         Follow Up       3 months    Patient was given instructions and counseling regarding his condition or for health maintenance advice. Please see specific information pulled into the AVS if appropriate.       I have personally performed the services described in this document as scribed by the above individual and it is both accurate and complete. David Mckinney MD 09/13/24 14:40 EDT

## 2024-09-13 NOTE — Clinical Note
Prepped: right groin and Left Wrist. Prepped with: ChloraPrep. The site was clipped. The patient was draped in a sterile fashion. no abdominal pain/not acting differently/no anxiety/no arthralgias/no blurred vision/no body aches/no chest pain/no chills/no crying/no decreased eating/drinking/no diaphoresis/no difficulty breathing/no dizziness/no fever/no fussiness/no sleeping issues/no pain/no rash/no red streaks/no sadness/no swelling of face, tongue/no tingling/no vomiting/no wheezing

## 2024-09-20 ENCOUNTER — TREATMENT (OUTPATIENT)
Dept: PHYSICAL THERAPY | Facility: CLINIC | Age: 79
End: 2024-09-20
Payer: MEDICARE

## 2024-09-20 DIAGNOSIS — S42.201A CLOSED FRACTURE OF PROXIMAL END OF RIGHT HUMERUS, UNSPECIFIED FRACTURE MORPHOLOGY, INITIAL ENCOUNTER: Primary | ICD-10-CM

## 2024-09-20 DIAGNOSIS — M25.511 ACUTE PAIN OF RIGHT SHOULDER: ICD-10-CM

## 2024-09-20 DIAGNOSIS — M25.611 DECREASED ROM OF RIGHT SHOULDER: ICD-10-CM

## 2024-09-26 ENCOUNTER — OFFICE VISIT (OUTPATIENT)
Dept: ORTHOPEDIC SURGERY | Facility: CLINIC | Age: 79
End: 2024-09-26
Payer: MEDICARE

## 2024-09-26 VITALS
SYSTOLIC BLOOD PRESSURE: 114 MMHG | HEIGHT: 70 IN | WEIGHT: 132 LBS | DIASTOLIC BLOOD PRESSURE: 65 MMHG | BODY MASS INDEX: 18.9 KG/M2 | HEART RATE: 53 BPM | OXYGEN SATURATION: 96 %

## 2024-09-26 DIAGNOSIS — S42.201D CLOSED FRACTURE OF PROXIMAL END OF RIGHT HUMERUS WITH ROUTINE HEALING, UNSPECIFIED FRACTURE MORPHOLOGY, SUBSEQUENT ENCOUNTER: ICD-10-CM

## 2024-09-26 DIAGNOSIS — M25.511 RIGHT SHOULDER PAIN, UNSPECIFIED CHRONICITY: Primary | ICD-10-CM

## 2024-09-27 ENCOUNTER — OFFICE VISIT (OUTPATIENT)
Dept: NEUROLOGY | Facility: CLINIC | Age: 79
End: 2024-09-27
Payer: MEDICARE

## 2024-09-27 VITALS
WEIGHT: 134.6 LBS | DIASTOLIC BLOOD PRESSURE: 59 MMHG | HEIGHT: 70 IN | SYSTOLIC BLOOD PRESSURE: 126 MMHG | HEART RATE: 53 BPM | BODY MASS INDEX: 19.27 KG/M2

## 2024-09-27 DIAGNOSIS — G20.C PARKINSONIAN TREMOR: Primary | ICD-10-CM

## 2024-09-27 DIAGNOSIS — F01.A0 MILD MIXED VASCULAR AND NEURODEGENERATIVE DEMENTIA WITHOUT BEHAVIORAL DISTURBANCE, PSYCHOTIC DISTURBANCE, MOOD DISTURBANCE, OR ANXIETY: ICD-10-CM

## 2024-09-27 PROBLEM — G25.0 ESSENTIAL TREMOR: Status: RESOLVED | Noted: 2024-01-29 | Resolved: 2024-09-27

## 2024-09-27 PROCEDURE — 1160F RVW MEDS BY RX/DR IN RCRD: CPT | Performed by: PSYCHIATRY & NEUROLOGY

## 2024-09-27 PROCEDURE — 1159F MED LIST DOCD IN RCRD: CPT | Performed by: PSYCHIATRY & NEUROLOGY

## 2024-09-27 PROCEDURE — 99214 OFFICE O/P EST MOD 30 MIN: CPT | Performed by: PSYCHIATRY & NEUROLOGY

## 2024-10-12 ENCOUNTER — HOSPITAL ENCOUNTER (OUTPATIENT)
Dept: ULTRASOUND IMAGING | Facility: HOSPITAL | Age: 79
Discharge: HOME | End: 2024-10-12
Payer: MEDICARE

## 2024-10-12 DIAGNOSIS — D13.5 ADENOMYOMATOSIS OF GALLBLADDER: ICD-10-CM

## 2024-10-12 PROCEDURE — 76705 ECHO EXAM OF ABDOMEN: CPT

## 2024-10-12 NOTE — H&P (VIEW-ONLY)
CARDIOLOGY FOLLOW-UP PROGRESS NOTE        Chief Complaint  Coronary Artery Disease    Referring provider  Dr. Alejandro Dixon     Reason for consultation  Abnormal stress test, evaluate for Cardiac catheterization    Subjective            Familia Thompson presents to [facility] CARDIOLOGY  History of Present Illness    This is a 77 y.o. male with coronary disease, previous CABG, paroxysmal atrial fibrillation on Eliquis, peripheral artery disease status post bilateral lower extremity bypass surgery, hearing impairment and COPD.  He was admitted to the hospital on 4/28/2022 with shortness of breath.  In the ER, he had A-fib with RVR, followed by V. tach.  He required cardioversion.  Subsequent evaluation showed elevated troponin.  Interventional cardiology was consulted at that point and patient underwent Cardiac catheterization which showed critical stenosis of the left main and ostial left circumflex artery which was not bypassed.  He was transferred to [facility], where he underwent high risk angioplasty with atherectomy of the ostial left main into LAD artery.  He was discharged home in stable condition.    He has been following up with Dr. Dixon, his primary cardiologist since then.  Recently he underwent a SPECT stress test which showed anterior wall infarct with ischemia.  He was referred back for possible Cardiac catheterization.  He still takes Plavix along with statins.  He is also on amiodarone and Eliquis for atrial fibrillation    Past History:        Medical History:  Past Medical History:   Diagnosis Date    Arthritis     Chronic systolic CHF (congestive heart failure)     COPD (chronic obstructive pulmonary disease)     Coronary artery disease     Hemorrhoids     HL (hearing loss)     Hyperlipidemia     Hypertension     Myocardial infarction     NSTEMI (non-ST elevated myocardial infarction) 04/27/2022    Added automatically from request for surgery [id number]    PVD  (peripheral vascular disease)     Sinus disorder        Surgical History: has a past surgical history that includes Vein ligation and stripping; Coronary angioplasty with stent; Wrist fracture surgery; Cardiac catheterization (N/A, 04/29/2022); Cardiac catheterization (N/A, 05/02/2022); Cardiac catheterization (N/A, 05/02/2022); Cardiac catheterization (N/A, 05/02/2022); Colonoscopy; Upper gastrointestinal endoscopy; Eye surgery (Bilateral); Cardiac surgery; Esophagogastroduodenoscopy (N/A, 9/15/2022); and Colonoscopy (N/A, 9/15/2022).     Social History: reports that he quit smoking about 2 years ago. His smoking use included cigarettes. He has a 30.00 pack-year smoking history. He has been exposed to tobacco smoke. He has never used smokeless tobacco. He reports current alcohol use of about 12.0 standard drinks per week. He reports that he does not currently use drugs.    Allergies: Patient has no known allergies.    Current Outpatient Medications on File Prior to Visit   Medication Sig    albuterol sulfate  (90 Base) MCG/ACT inhaler Inhale 2 puffs Every 4 (Four) Hours As Needed for Wheezing or Shortness of Air.    amiodarone (PACERONE) 200 MG tablet Take 1 tablet by mouth Daily.    atorvastatin (LIPITOR) 80 MG tablet Take 1 tablet by mouth Every Night.    clopidogrel (PLAVIX) 75 MG tablet Take 1 tablet by mouth Daily.    Eliquis 5 MG tablet tablet Take 1 tablet by mouth 2 (Two) Times a Day. Last dose 9/11/22    pantoprazole (PROTONIX) 40 MG EC tablet Take 1 tablet by mouth Daily.    propranolol (INDERAL) 20 MG tablet Take 1 tablet by mouth 2 (Two) Times a Day.    tamsulosin (FLOMAX) 0.4 MG capsule 24 hr capsule TAKE 1 CAPSULE BY MOUTH DAILY    vitamin B-12 (CYANOCOBALAMIN) 1000 MCG tablet TAKE 1 TABLET BY MOUTH EVERY DAY     No current facility-administered medications on file prior to visit.          Review of Systems   Constitutional:  Positive for fatigue.   Respiratory:  Negative for cough, shortness  "of breath and wheezing.    Cardiovascular:  Negative for chest pain, palpitations and leg swelling.   Gastrointestinal:  Negative for nausea and vomiting.   Neurological:  Negative for dizziness and syncope.      Objective     /61   Pulse 57   Ht 177.8 cm (70\")   Wt 67.6 kg (149 lb)   BMI 21.38 kg/m²       Physical Exam    General : Alert, awake, no acute distress  Neck : Supple, no carotid bruit, no jugular venous distention  CVS : RRR, no murmur, rubs or gallops  Lungs: Clear to auscultation bilaterally, no crackles or rhonchi  Abdomen: Soft, nontender, bowel sounds heard in all 4 quadrants  Extremities: Warm, well-perfused, no pedal edema    Result Review :     The following data was reviewed by: Zaki Díaz MD on 08/29/2023:    CMP          10/25/2022    10:52 2/15/2023    17:03 8/8/2023    09:15   CMP   Glucose 82  75  81    BUN 9  6  10    Creatinine 0.99  0.97  0.92    EGFR 78.5  80.4  85.1    Sodium 133  136  138    Potassium 4.3  4.2  4.7    Chloride 101  99  101    Calcium 8.9  9.0  9.3    Total Protein 6.2  6.5  6.1    Albumin 4.10  4.2  4.1    Globulin 2.1  2.3  2.0    Total Bilirubin 0.5  0.2  0.7    Alkaline Phosphatase 121  142  112    AST (SGOT) 69  52  36    ALT (SGPT) 66  51  29    Albumin/Globulin Ratio 2.0  1.8  2.1    BUN/Creatinine Ratio 9.1  6.2  10.9    Anion Gap 13.3  12.1  14.0      CBC          10/3/2022    13:00 2/15/2023    17:03 8/8/2023    09:15   CBC   WBC 4.89  4.48  3.83    RBC 4.93  4.54  4.41    Hemoglobin 14.4  13.6  13.9    Hematocrit 43.0  41.7  40.8    MCV 87.2  91.9  92.5    MCH 29.2  30.0  31.5    MCHC 33.5  32.6  34.1    RDW 13.6  12.8  13.0    Platelets 167  167  143      TSH          10/3/2022    13:00 10/25/2022    10:52 8/8/2023    09:15   TSH   TSH 5.160  4.120  3.040      Lipid Panel          8/8/2023    09:15   Lipid Panel   Total Cholesterol 143    Triglycerides 57    HDL Cholesterol 79    VLDL Cholesterol 12    LDL Cholesterol  52    LDL/HDL Ratio " 0.67           Data reviewed: Cardiology studies        Results for orders placed during the hospital encounter of 04/27/22    Adult Transthoracic Echo Complete w/ Color, Spectral and Contrast if necessary per protocol    Interpretation Summary  · Estimated right ventricular systolic pressure from tricuspid regurgitation is moderately elevated (45-55 mmHg).  · Calculated left ventricular EF = 40% Estimated left ventricular EF was in agreement with the calculated left ventricular EF.  · Left ventricular diastolic function is consistent with (grade II w/high LAP) pseudonormalization.  · There is moderate calcification of the aortic valve.      Results for orders placed during the hospital encounter of 08/22/23    Stress test with myocardial perfusion one day    Interpretation Summary    Left ventricular ejection fraction is moderately reduced (Calculated EF = 32%).    Impressions are consistent with an intermediate risk study.    This study showed evidence of anterolateral ischemia and  apical infarct.               Assessment and Plan        Diagnoses and all orders for this visit:    1. Coronary artery disease involving coronary bypass graft of native heart with angina pectoris (Primary)  Assessment & Plan:  He had remote CABG.  Cardiac cath in 4/2022 showed patent WARE to LAD.  Left main/ostial left circumflex, had a critical calcified lesion.  RCA had no significant lesions.  He underwent high risk angioplasty of the left main into LCx artery.  He reports fatigue but no significant chest pain.  He also has significant peripheral artery disease and had aortofemoral bypass in the past.  Recent SPECT stress test showed ischemia of the anterior wall.    It is reasonable to proceed with Cardiac catheterization to delineate the coronary anatomy and further angioplasty if indicated.  Because of the complex coronary anatomy, will discuss with interventional cardiologist at [facility] to have the procedure  done there with surgical backup.        Management of atrial fibrillation, paroxysmal ventricular tachycardia per Dr. Dixon, primary cardiologist        Follow Up     No follow-ups on file.    Patient was given instructions and counseling regarding his condition or for health maintenance advice. Please see specific information pulled into the AVS if appropriate.        No

## 2024-10-23 ENCOUNTER — DOCUMENTATION (OUTPATIENT)
Dept: PHYSICAL THERAPY | Facility: CLINIC | Age: 79
End: 2024-10-23
Payer: MEDICARE

## 2024-10-23 NOTE — PROGRESS NOTES
Discharge Summary  Discharge Summary from Occupational Therapy Report  Asad  OT: [address] [place] [address]    Dates  OT visit: 8/15/24 - 9/20/24  Number of Visits: 6     Discharge Status of Patient: See Progress Note dated 9/6/24    Goals: Partially Met    Discharge Plan: Continue with current home exercise program as instructed    Comments Pt did not return to therapy.    Date of Discharge 9/20/24        Justin Street OT  Occupational Therapist  [place] License:[id number]

## 2024-11-04 RX ORDER — TAMSULOSIN HYDROCHLORIDE 0.4 MG/1
1 CAPSULE ORAL DAILY
Qty: 90 CAPSULE | Refills: 3 | Status: SHIPPED | OUTPATIENT
Start: 2024-11-04

## 2024-12-17 RX ORDER — AMIODARONE HYDROCHLORIDE 200 MG/1
200 TABLET ORAL DAILY
Qty: 90 TABLET | Refills: 0 | Status: SHIPPED | OUTPATIENT
Start: 2024-12-17

## 2024-12-19 ENCOUNTER — OFFICE VISIT (OUTPATIENT)
Dept: CARDIOLOGY | Facility: CLINIC | Age: 79
End: 2024-12-19
Payer: MEDICARE

## 2024-12-19 ENCOUNTER — TELEPHONE (OUTPATIENT)
Dept: CARDIOLOGY | Facility: CLINIC | Age: 79
End: 2024-12-19

## 2024-12-19 VITALS
SYSTOLIC BLOOD PRESSURE: 102 MMHG | DIASTOLIC BLOOD PRESSURE: 69 MMHG | HEIGHT: 69 IN | HEART RATE: 56 BPM | BODY MASS INDEX: 19.91 KG/M2 | WEIGHT: 134.4 LBS

## 2024-12-19 DIAGNOSIS — I50.22 CHRONIC SYSTOLIC HEART FAILURE: Primary | ICD-10-CM

## 2024-12-19 DIAGNOSIS — Z72.0 TOBACCO USE: ICD-10-CM

## 2024-12-19 RX ORDER — SACUBITRIL AND VALSARTAN 24; 26 MG/1; MG/1
1 TABLET, FILM COATED ORAL 2 TIMES DAILY
Qty: 90 TABLET | Refills: 3 | Status: SHIPPED | OUTPATIENT
Start: 2024-12-19

## 2024-12-19 RX ORDER — FUROSEMIDE 40 MG/1
40 TABLET ORAL DAILY PRN
Qty: 90 TABLET | Refills: 3 | Status: SHIPPED | OUTPATIENT
Start: 2024-12-19

## 2024-12-19 RX ORDER — NICOTINE 21 MG/24HR
1 PATCH, TRANSDERMAL 24 HOURS TRANSDERMAL EVERY 24 HOURS
Qty: 60 PATCH | Refills: 2 | Status: SHIPPED | OUTPATIENT
Start: 2024-12-19

## 2024-12-19 NOTE — TELEPHONE ENCOUNTER
[facility] received a fax that requires your attention. The document has been indexed to the patient’s chart for your review.      Reason for sending: EXTERNAL MEDICAL RECORD NOTIFICATION     Documents Description: NICOTINE [id number]    Name of Sender: BACILIO     Date Indexed: 12.19.24

## 2024-12-19 NOTE — TELEPHONE ENCOUNTER
The medication you have requested is not covered by Medicare Part D Law. If you believe the medication is being used for a medically accepted or compendia supported indication approved by CMS, please contact your patient's plan.

## 2024-12-19 NOTE — PROGRESS NOTES
Interventional Cardiology Patient Visit Follow Up Note      History of Presenting Illness:  History of Present Illness  The patient is a 79-year-old male who presents today for a routine follow-up and requires clearance for an esophagogastroduodenoscopy (EGD). He has a prior history of  percutaneous coronary intervention (PCI) of his LM, LCX, CABG with left internal mammary artery (LIMA) to left anterior descending (LAD) artery and a history of Collins's esophagus.    He reports that the upcoming EGD is a surveillance procedure to ensure there are no abnormalities in his esophagus. He experiences occasional chest discomfort, described as a pinching sensation, which occurs more frequently than once a month. This discomfort is not associated with meals and does not result in shortness of breath. He also reports experiencing pain in his side approximately once a month, which can occur even when lying down. He does not monitor his blood pressure at home.    He continues to smoke three-quarters of a pack of cigarettes daily and has expressed a desire to quit. He has previously attempted to quit cold turkey but was only successful for a day. He has requested assistance from his doctor to quit smoking. He has tried nicotine patches in the past but found them ineffective.    His physical activity is limited, with most of his time spent watching television. He enjoys woodworking but has not engaged in this activity for over a year due to knee issues and a fractured arm that limits his range of motion. He occasionally assists with household chores. He experiences shortness of breath after walking half a mile and reports swelling in his legs from the ankles up, more pronounced on one side. He does not experience shortness of breath at night. He does not experience cramping or claudication in the back of his calves, buttock pain, or foot ulcers.    SOCIAL HISTORY  The patient admits to smoking about three-quarters of a  pack per day.    MEDICATIONS  Current: cetirizine, metoprolol tartrate, Eliquis, Plavix, albuterol inhaler solution             Past Medical History  Past Medical History:   Diagnosis Date    Arthritis     Chronic systolic CHF (congestive heart failure)     COPD (chronic obstructive pulmonary disease)     Coronary artery disease     Hemorrhoids     HL (hearing loss)     Hyperlipidemia     Hypertension     Myocardial infarction     NSTEMI (non-ST elevated myocardial infarction) 04/27/2022    Added automatically from request for surgery [id number]    PVD (peripheral vascular disease)     Sinus disorder          Current Outpatient Medications:     acamprosate (CAMPRAL) 333 MG EC tablet, Take 2 tablets by mouth 3 (Three) Times a Day., Disp: 180 tablet, Rfl: 2    albuterol sulfate  (90 Base) MCG/ACT inhaler, Inhale 2 puffs Every 4 (Four) Hours As Needed for Wheezing or Shortness of Air., Disp: 8 g, Rfl: 3    amiodarone (PACERONE) 200 MG tablet, TAKE 1 TABLET BY MOUTH DAILY, Disp: 90 tablet, Rfl: 0    apixaban (ELIQUIS) 5 MG tablet tablet, Take 1 tablet by mouth 2 (Two) Times a Day., Disp: , Rfl:     atorvastatin (LIPITOR) 80 MG tablet, TAKE 1 TABLET BY MOUTH EVERY NIGHT, Disp: 90 tablet, Rfl: 3    cetirizine (zyrTEC) 10 MG tablet, Take 1 tablet by mouth Daily., Disp: , Rfl:     clopidogrel (PLAVIX) 75 MG tablet, Take 1 tablet by mouth Daily., Disp: 30 tablet, Rfl: 11    metoprolol tartrate (LOPRESSOR) 25 MG tablet, TAKE 1 TABLET BY MOUTH TWICE DAILY, Disp: 180 tablet, Rfl: 1    pantoprazole (Protonix) 40 MG EC tablet, Take 1 tablet by mouth Daily., Disp: 90 tablet, Rfl: 3    tamsulosin (FLOMAX) 0.4 MG capsule 24 hr capsule, TAKE 1 CAPSULE BY MOUTH DAILY, Disp: 90 capsule, Rfl: 3    topiramate (TOPAMAX) 50 MG tablet, 1/2 tablet daily x 1 week, 1/2 tablet twice a day second week, 1/2 tablet in the morning and 1 tablet in the evening the third week and then 1 tablet twice a day thereafter. (Patient taking differently:  "Take 1 tablet by mouth 2 (Two) Times a Day.), Disp: 120 tablet, Rfl: 3    omeprazole (priLOSEC) 40 MG capsule, Daily. (Patient not taking: Reported on 12/19/2024), Disp: , Rfl:     vitamin B-12 (CYANOCOBALAMIN) 1000 MCG tablet, TAKE 1 TABLET BY MOUTH EVERY DAY (Patient not taking: Reported on 12/19/2024), Disp: 90 tablet, Rfl: 3  Current outpatient and discharge medications have been reconciled for the patient.  Reviewed by: Nirmal Betts MD     There are no discontinued medications.    No Known Allergies     Social History     Tobacco Use    Smoking status: Every Day     Current packs/day: 0.75     Average packs/day: 1 pack/day for 30.4 years (30.3 ttl pk-yrs)     Types: Cigarettes     Start date: 1993     Last attempt to quit: 2023     Passive exposure: Past    Smokeless tobacco: Never    Tobacco comments:     Quit smoking in march 2023    Vaping Use    Vaping status: Never Used    Passive vaping exposure: Yes   Substance Use Topics    Alcohol use: Not Currently     Alcohol/week: 12.0 standard drinks of alcohol     Types: 12 Cans of beer per week     Comment: 6 beers a day    Drug use: Not Currently     Comment: 0cc       Family History   Problem Relation Age of Onset    Colon cancer Neg Hx     Malig Hyperthermia Neg Hx           Objective   /69 (BP Location: Right arm, Patient Position: Sitting, Cuff Size: Adult)   Pulse 56   Ht 175.3 cm (69\")   Wt 61 kg (134 lb 6.4 oz)   BMI 19.85 kg/m²     Wt Readings from Last 3 Encounters:   12/19/24 61 kg (134 lb 6.4 oz)   09/27/24 61.1 kg (134 lb 9.6 oz)   09/26/24 59.9 kg (132 lb)     BP Readings from Last 3 Encounters:   12/19/24 102/69   09/27/24 126/59   09/26/24 114/65       Physical Exam  Constitutional:  Awake. Not in acute distress. Normal appearance.   Neck: No carotid bruit, hepatojugular reflux or JVD.   Cardiovascular:      Rate and Rhythm: Normal rate and regular rhythm.      Chest Wall: PMI is not displaced.      Pulmonary: Pulmonary effort is " "normal. Normal breath sounds. No wheezing, rhonchi or rales.   Extremities: No Bilateral edema is noted.   Skin: Warm and dry. Non cyanotic, No petechiae or rash.   Neurological: Alert and oriented x 3  Psychiatric:  Behavior is cooperative.       Result Review :   The following data was reviewed by Nirmal Betts MD on 12/19/2024   proBNP   Date Value Ref Range Status   04/27/2022 1,186.0 0.0 - 1,800.0 pg/mL Final     CMP          4/10/2024    10:22 4/13/2024    18:33 8/22/2024    09:03   CMP   Glucose  80  83    BUN  10  5    Creatinine 1.00  0.94  0.89    EGFR 76.6  82.5  87.2    Sodium  132  132    Potassium  4.1  3.8    Chloride  97  98    Calcium  8.5  9.1    Total Protein  5.8  6.2    Albumin  4.1  4.1    Globulin  1.7  2.1    Total Bilirubin  0.7  0.5    Alkaline Phosphatase  99  134    AST (SGOT)  40  27    ALT (SGPT)  29  16    Albumin/Globulin Ratio  2.4  2.0    BUN/Creatinine Ratio  10.6  5.6    Anion Gap  14.1  11.0      CBC w/diff          2/12/2024    12:58 4/13/2024    18:33 8/22/2024    09:03   CBC w/Diff   WBC 4.51  3.71  4.76    RBC 4.35  4.00  4.49    Hemoglobin 13.9  12.4  13.6    Hematocrit 40.9  37.6  41.3    MCV 94.0  94.0  92.0    MCH 32.0  31.0  30.3    MCHC 34.0  33.0  32.9    RDW 12.6  13.7  13.0    Platelets 152  133  153    Neutrophil Rel % 55.2  59.3     Immature Granulocyte Rel % 0.2  0.5     Lymphocyte Rel % 30.2  27.8     Monocyte Rel % 11.5  10.5     Eosinophil Rel % 1.3  0.8     Basophil Rel % 1.6  1.1        Lab Results   Component Value Date    TSH 3.420 10/25/2023      Lab Results   Component Value Date    FREET4 1.50 08/08/2023      D-Dimer, Quantitative   Date Value Ref Range Status   04/27/2022 2.39 (H) 0.00 - 0.57 MCGFEU/mL Final     Comment:     Note new Reference Range     Magnesium   Date Value Ref Range Status   04/29/2022 2.1 1.6 - 2.4 mg/dL Final      No results found for: \"DIGOXIN\"   Lab Results   Component Value Date    TROPONINT 0.323 (C) 04/29/2022      Lab Results "   Component Value Date    POCTROP 0.02 2022            Lipid Panel          2024    12:58 2024    09:03   Lipid Panel   Total Cholesterol 143  124    Triglycerides 65  76    HDL Cholesterol 73  65    VLDL Cholesterol 13  15    LDL Cholesterol  57  44    LDL/HDL Ratio 0.78  0.67      Results for orders placed during the hospital encounter of 22    Adult Transthoracic Echo Complete w/ Color, Spectral and Contrast if necessary per protocol    Interpretation Summary  · Estimated right ventricular systolic pressure from tricuspid regurgitation is moderately elevated (45-55 mmHg).  · Calculated left ventricular EF = 40% Estimated left ventricular EF was in agreement with the calculated left ventricular EF.  · Left ventricular diastolic function is consistent with (grade II w/high LAP) pseudonormalization.  · There is moderate calcification of the aortic valve.     Results for orders placed during the hospital encounter of 22    Adult Transthoracic Echo Complete w/ Color, Spectral and Contrast if necessary per protocol    Interpretation Summary  · Estimated right ventricular systolic pressure from tricuspid regurgitation is moderately elevated (45-55 mmHg).  · Calculated left ventricular EF = 40% Estimated left ventricular EF was in agreement with the calculated left ventricular EF.  · Left ventricular diastolic function is consistent with (grade II w/high LAP) pseudonormalization.  · There is moderate calcification of the aortic valve.     Results for orders placed during the hospital encounter of 23    Cardiac Catheterization/Vascular Study    Conclusion  [facility]  CARDIAC CATHETERIZATION PROCEDURE REPORT    Patient: Familia Thompson  : 1945  MRN: [id number]    Procedure Date:  23    Referring Physician:  Alejandro Dixon MD    Interventional Cardiologist:  Pawan Guerrero MD    Indication:  Coronary artery disease with stable angina  Cardiomyopathy,  ischemic  Abnormal stress test    Clinical Presentation:  Mr. Thompson is a 78-year-old gentleman past medical history notable for coronary artery disease status post bypass surgery and percutaneous intervention, ischemic cardiomyopathy, hypertension, mixed hyperlipidemia who presents to the Cath Lab for further evaluation of his abnormal stress test and decline in LV function.  He underwent complex PCI about a year ago from his left main into the circumflex as a protected PCI did well with that procedure but recent stress testing showed anterior wall ischemia for this reason he was referred back for diagnostic cardiac catheterization.  Furthermore he had a decline in his ejection fraction more in the 30% range.    Procedure performed:  Coronary angiography  Left heart catheterization  Left ventriculogram    Access Sites:  Left radial artery    Findings:  1. Coronary Artery Anatomy:  Dominance: Left  Left Main: Patent stent extending into the circumflex.  Left Anterior Descendin% occluded at its ostium  Circumflex Artery: Patent stent extending from the left main into the circumflex with no significant in-stent stenoses within the proximal portion.  There is a mid 30% segment stenoses in between 2 stents.  There is stents extending from the distal portion into the posterior lateral circulation which is patent containing luminal regularities.  Does supply a moderate size inferior marginal branch as well as a PDA.  The PDA is small has a focal 60% proximal segment stenoses.  Right Coronary Artery: Nondominant right coronary artery not injected on current study    LIMA to LAD: Graft is patent supplies the mid LAD backfills the diagonal    2. Hemodynamics:  Left Ventricle: 118/7/10 mmHg  Aorta: 118/44/78 mmHg    3. Left Ventriculogram:  Ejection Fraction: 45-50%  Wall Motion: Mild hypokinesis of the apical and basal inferior wall  Mitral Regurgitation: None    Conclusions:  Severe native vessel coronary artery  disease with 100% occluded ostial LAD, patent stent from the left main into the proximal circumflex as well as patent stent within the distal circumflex extending into the left-sided PDA.  30% mid circumflex segment stenoses.  Nondominant right coronary artery.  Patent LIMA to mid LAD.  Normal LVEDP of 10 mmHg  Hand injected left ventriculogram identifies an ejection fraction in the 45 to 50% range    Recommendations:  No significant revascularization targets noted on current study would continue medical management of patient's coronary disease and cardiomyopathy  He may restart anticoagulation tomorrow    Procedure Details:  Informed consent was obtained with an explanation of the risk and benefits of the procedure. The patient was brought to the Cardiac Catheterization Laboratory and was prepped and draped in a standard sterile fashion. Moderate sedation with Fentanyl and Versed was administered by the circulating nurse. Lidocaine 2% was used to anesthetize the left radial artery and a 5/6 Slender sheath was placed.  An IMT catheter was used to engage the LIMA graft with angiographic images obtained.  We then exchanged for a JL 4 diagnostic catheter this entered into the left ventricle to measure and LVEDP and perform a pullback across the aortic valve.  We then engaged the left main with angiographic images obtained.  We then exchanged for an angled pigtail catheter and enter the left ventricle to measure hemodynamics and perform a left ventriculogram.  The catheter was then removed over a guidewire. The radial artery sheath was removed without difficulty and TR Band was placed over the access site with excellent hemostasis.    Patient tolerated the procedure well without any complications, and transferred to the post procedure area for recovery in a stable condition.    Complications:  None.    Estimated Blood Loss:  Minimal.      Pawan Guerrero MD  [facility]  09/08/23  10:07 EDT      Results for orders placed during the hospital encounter of 08/22/23    Stress test with myocardial perfusion one day    Interpretation Summary    Left ventricular ejection fraction is moderately reduced (Calculated EF = 32%).    Impressions are consistent with an intermediate risk study.    This study showed evidence of anterolateral ischemia and  apical infarct.       The ASCVD Risk score (Gopal ZUÑIGA, et al., 2019) failed to calculate for the following reasons:    The valid total cholesterol range is 130 to 320 mg/dL          No diagnosis found.    There are no diagnoses linked to this encounter.          Assessment & Plan  1. Collins's esophagus.  The patient has a history of Collins's esophagus and is scheduled for an EGD to ensure there are no cancerous changes. He has been educated about the condition and the need for surveillance endoscopies.  Considered as moderate risk for major adverse cardiac events for EGD    2. Coronary artery disease.      S/p CABG LIMA to LAD      PCI of LM, LCX      History of VT (angiogram significant for LM stenosis)   His cholesterol molecules are at desired level.  Continue Lipitor 80 mg p.o. daily.  Continue lifelong Plavix.  No angina is reported by patient today.  Management of secondary close was reinforced in this visit including smoking cessation and optimal blood pressure control.      3. Heart failure with reduced EF 2/2 ICM,    His heart function is currently less than 40%, necessitating heart failure therapy. His current medications do not reflect this requirement. His previous echocardiograms indicate a heart function of 36% to 40%.  A repeat echocardiogram has been ordered, as he has not had one in the past 2 years.   Entresto will be initiated, with a dosage of one tablet in the morning and one in the evening.   He is advised to monitor his blood pressure daily while on this medication.   If his blood pressure remains below 100, he can hold the medication. If it  exceeds 100, he can start with half a tablet and increase to a full tablet within a week if his blood pressure remains stable. He is also advised to monitor his weight daily and take a diuretic if he notices an overnight weight gain of 2 pounds.  Based on echocardiogram results, we will consider reinitiation of beta-blocker therapy    3. Smoking cessation.  The patient smokes about three-quarters of a pack per day and has expressed a desire to quit. Nicotine patches have been prescribed to aid in smoking cessation. He is advised to use the patches when he feels the craving to smoke. The prescription for 60 patches will be sent to [facility]. If the patches do not work, he is instructed to inform the clinic.    4.  Peripheral arterial disease status post right profundofemoral artery thrombus endarterectomy  Continue same management as CAD.  No claudication reported.     5.  History of paroxysmal atrial fibrillation  Continue Eliquis 5 mg p.o. twice daily.  This was noticed around profundofemoral artery thromboendarterectomy.  Cardiac monitor should be considered for quantification of A-fib burden.  Defer to Dr. Díaz.           Follow Up     No follow-ups on file.      Nirmal Betts MD  Interventional Cardiology  12/19/2024  11:08 EST      Patient was given instructions and counseling regarding his condition or for health maintenance advice. Please see specific information pulled into the AVS if appropriate.     Please note that portions of this document were completed using a voice recognition program.     Patient or patient representative verbalized consent for the use of Ambient Listening during the visit with  Nirmal Betts MD for chart documentation. 12/19/2024  12:46 EST

## 2024-12-20 RX ORDER — CETIRIZINE HYDROCHLORIDE 10 MG/1
10 TABLET ORAL DAILY
Qty: 90 TABLET | Refills: 3 | OUTPATIENT
Start: 2024-12-20

## 2024-12-27 ENCOUNTER — TELEPHONE (OUTPATIENT)
Dept: GASTROENTEROLOGY | Facility: CLINIC | Age: 79
End: 2024-12-27

## 2024-12-27 NOTE — TELEPHONE ENCOUNTER
Patient is scheduled for Cardiac testing on 1/16/25. I will send blood thinner/cardiac clearance request after this date.     Postponing Patient Call until 1/17/25

## 2025-01-09 ENCOUNTER — TELEPHONE (OUTPATIENT)
Dept: CARDIOLOGY | Facility: CLINIC | Age: 80
End: 2025-01-09

## 2025-01-09 NOTE — TELEPHONE ENCOUNTER
Caller: Mayo Carter    Relationship: Emergency Contact    Best call back number: [phone]     Which medication are you concerned about: UNSURE     Who prescribed you this medication: DR. KATZ     When did you start taking this medication: HASN'T STARTED YET    What are your concerns: MAYO CALLED IN STATING [facility] IS NOT WANTING TO PAY FOR THESE MEDICATIONS. MAYO ALSO STATES THEY WON'T COVER THE NICOTINE PATCHES EITHER.     How long have you had these concerns: RECENTLY.

## 2025-01-10 ENCOUNTER — OFFICE VISIT (OUTPATIENT)
Dept: ORTHOPEDIC SURGERY | Facility: CLINIC | Age: 80
End: 2025-01-10
Payer: MEDICARE

## 2025-01-10 VITALS — DIASTOLIC BLOOD PRESSURE: 75 MMHG | OXYGEN SATURATION: 95 % | HEART RATE: 78 BPM | SYSTOLIC BLOOD PRESSURE: 131 MMHG

## 2025-01-10 DIAGNOSIS — M25.561 RIGHT KNEE PAIN, UNSPECIFIED CHRONICITY: ICD-10-CM

## 2025-01-10 DIAGNOSIS — M17.11 PRIMARY OSTEOARTHRITIS OF RIGHT KNEE: Primary | ICD-10-CM

## 2025-01-10 RX ORDER — LIDOCAINE HYDROCHLORIDE 10 MG/ML
5 INJECTION, SOLUTION INFILTRATION; PERINEURAL
Status: COMPLETED | OUTPATIENT
Start: 2025-01-10 | End: 2025-01-10

## 2025-01-10 RX ORDER — TRIAMCINOLONE ACETONIDE 40 MG/ML
40 INJECTION, SUSPENSION INTRA-ARTICULAR; INTRAMUSCULAR
Status: COMPLETED | OUTPATIENT
Start: 2025-01-10 | End: 2025-01-10

## 2025-01-10 RX ADMIN — LIDOCAINE HYDROCHLORIDE 5 ML: 10 INJECTION, SOLUTION INFILTRATION; PERINEURAL at 09:59

## 2025-01-10 RX ADMIN — TRIAMCINOLONE ACETONIDE 40 MG: 40 INJECTION, SUSPENSION INTRA-ARTICULAR; INTRAMUSCULAR at 09:59

## 2025-01-10 NOTE — PROGRESS NOTES
Chief Complaint  Follow-up of the Right Knee    Subjective          Familia Thompson presents to [facility]   History of Present Illness    Familia Thompson presents today for a follow-up of his right knee.  Patient has right knee arthritis that we are treating conservatively.  Today, he states that his last injection provide him with relief for only about 1 week.  He reports continued swelling to his right knee.  He denies any new injuries.      No Known Allergies     Social History     Socioeconomic History    Marital status:    Tobacco Use    Smoking status: Every Day     Current packs/day: 0.75     Average packs/day: 1 pack/day for 30.4 years (30.3 ttl pk-yrs)     Types: Cigarettes     Start date: 1993     Last attempt to quit: 2023     Passive exposure: Past    Smokeless tobacco: Never    Tobacco comments:     Quit smoking in march 2023    Vaping Use    Vaping status: Never Used    Passive vaping exposure: Yes   Substance and Sexual Activity    Alcohol use: Not Currently     Alcohol/week: 12.0 standard drinks of alcohol     Types: 12 Cans of beer per week     Comment: 6 beers a day    Drug use: Not Currently     Comment: 0cc    Sexual activity: Defer        I reviewed the patient's chief complaint, history of present illness, review of systems, past medical history, surgical history, family history, social history, medications, and allergy list.     REVIEW OF SYSTEMS    Constitutional: Denies fevers, chills, weight loss  Cardiovascular: Denies chest pain, shortness of breath  Skin: Denies rashes, acute skin changes  Neurologic: Denies headache, loss of consciousness  MSK: Right knee pain      Objective   Vital Signs:   /75   Pulse 78   SpO2 95%     There is no height or weight on file to calculate BMI.    Physical Exam    General: Alert. No acute distress.   Right lower extremity: Tenderness to medial joint line.  Nontender to lateral joint line.  Full knee extension.   Knee flexion 110.  Knee extensor mechanism intact.  Knee stable varus valgus stress.  Patellar crepitus with motion.  Calf soft, nontender.  Demonstrates active ankle plantarflexion and dorsiflexion.  Palpable pedal pulses.    Large Joint Arthrocentesis  Date/Time: 1/10/2025 9:59 AM  Consent given by: patient  Site marked: site marked  Timeout: Immediately prior to procedure a time out was called to verify the correct patient, procedure, equipment, support staff and site/side marked as required   Supporting Documentation  Indications: pain   Procedure Details  Location: -   Location: right knee.Needle gauge: 21 G.  Medications administered: 5 mL lidocaine 1 %; 40 mg triamcinolone acetonide 40 MG/ML  Patient tolerance: patient tolerated the procedure well with no immediate complications      This injection documentation was Scribed for Fatoumata Mcadams PA-C by Talia Lang MA.  01/10/25   09:59 EST    Imaging Results (Most Recent)       None                   Assessment and Plan    Diagnoses and all orders for this visit:    1. Primary osteoarthritis of right knee (Primary)    2. Right knee pain, unspecified chronicity    Other orders  -     Large Joint Arthrocentesis        Familia Thompson presents today to follow-up with his right knee arthritis that we are treating conservatively.  He is instructed to continue with home exercises. We discussed the risks and benefits with the patient regarding right knee steroid injection. Patient understood and elected to proceed. Patient tolerated injection well with no complications.         Patient will follow up as needed.      Call or return if symptoms worsen or patient has any concerns.       Follow Up   Return if symptoms worsen or fail to improve.  Patient was given instructions and counseling regarding his condition or for health maintenance advice. Please see specific information pulled into the AVS if appropriate.     Fatoumata Mcadams PA-C  01/10/25  10:21  EST

## 2025-01-13 ENCOUNTER — TELEPHONE (OUTPATIENT)
Dept: CARDIOLOGY | Facility: CLINIC | Age: 80
End: 2025-01-13
Payer: MEDICARE

## 2025-01-13 NOTE — TELEPHONE ENCOUNTER
Received VM from patient daughter    SW daughter. Daughter states that patient has not picked up Entresto as it cost $500. Daughter states patient did not stop medications that were discussed at OV.  Daughter to call back to let me know exactly what patient is taking.

## 2025-01-15 ENCOUNTER — OFFICE VISIT (OUTPATIENT)
Dept: FAMILY MEDICINE CLINIC | Facility: CLINIC | Age: 80
End: 2025-01-15
Payer: MEDICARE

## 2025-01-15 VITALS
BODY MASS INDEX: 18.8 KG/M2 | OXYGEN SATURATION: 96 % | TEMPERATURE: 97.8 F | WEIGHT: 131.3 LBS | SYSTOLIC BLOOD PRESSURE: 130 MMHG | DIASTOLIC BLOOD PRESSURE: 78 MMHG | HEART RATE: 73 BPM | HEIGHT: 70 IN

## 2025-01-15 DIAGNOSIS — R79.89 ELEVATED TSH: ICD-10-CM

## 2025-01-15 DIAGNOSIS — K21.9 GASTROESOPHAGEAL REFLUX DISEASE, UNSPECIFIED WHETHER ESOPHAGITIS PRESENT: ICD-10-CM

## 2025-01-15 DIAGNOSIS — E87.1 HYPONATREMIA: ICD-10-CM

## 2025-01-15 DIAGNOSIS — F17.219 CIGARETTE NICOTINE DEPENDENCE WITH NICOTINE-INDUCED DISORDER: ICD-10-CM

## 2025-01-15 DIAGNOSIS — R73.09 ABNORMAL GLUCOSE: ICD-10-CM

## 2025-01-15 DIAGNOSIS — E78.5 HYPERLIPIDEMIA, UNSPECIFIED HYPERLIPIDEMIA TYPE: ICD-10-CM

## 2025-01-15 DIAGNOSIS — I50.22 CHRONIC SYSTOLIC HEART FAILURE: ICD-10-CM

## 2025-01-15 DIAGNOSIS — M25.511 CHRONIC RIGHT SHOULDER PAIN: ICD-10-CM

## 2025-01-15 DIAGNOSIS — I10 PRIMARY HYPERTENSION: Primary | ICD-10-CM

## 2025-01-15 DIAGNOSIS — G89.29 CHRONIC PAIN OF RIGHT KNEE: ICD-10-CM

## 2025-01-15 DIAGNOSIS — Z78.9 ALCOHOL USE: ICD-10-CM

## 2025-01-15 DIAGNOSIS — R74.8 ELEVATED ALKALINE PHOSPHATASE LEVEL: ICD-10-CM

## 2025-01-15 DIAGNOSIS — M25.561 CHRONIC PAIN OF RIGHT KNEE: ICD-10-CM

## 2025-01-15 DIAGNOSIS — Z12.2 SCREENING FOR LUNG CANCER: ICD-10-CM

## 2025-01-15 DIAGNOSIS — E03.9 HYPOTHYROIDISM, UNSPECIFIED TYPE: ICD-10-CM

## 2025-01-15 DIAGNOSIS — G89.29 CHRONIC RIGHT SHOULDER PAIN: ICD-10-CM

## 2025-01-15 DIAGNOSIS — I48.91 ATRIAL FIBRILLATION, UNSPECIFIED TYPE: ICD-10-CM

## 2025-01-15 DIAGNOSIS — R97.20 ELEVATED PSA: ICD-10-CM

## 2025-01-15 DIAGNOSIS — D13.5 ADENOMYOMATOSIS OF GALLBLADDER: ICD-10-CM

## 2025-01-15 DIAGNOSIS — K22.70 BARRETT'S ESOPHAGUS WITHOUT DYSPLASIA: ICD-10-CM

## 2025-01-15 DIAGNOSIS — J30.9 ALLERGIC RHINITIS, UNSPECIFIED SEASONALITY, UNSPECIFIED TRIGGER: ICD-10-CM

## 2025-01-15 DIAGNOSIS — Z87.891 FORMER SMOKER: ICD-10-CM

## 2025-01-15 LAB
ALBUMIN SERPL-MCNC: 4.4 G/DL (ref 3.5–5.2)
ALBUMIN/GLOB SERPL: 1.6 G/DL
ALP SERPL-CCNC: 103 U/L (ref 39–117)
ALT SERPL W P-5'-P-CCNC: 38 U/L (ref 1–41)
ANION GAP SERPL CALCULATED.3IONS-SCNC: 15 MMOL/L (ref 5–15)
AST SERPL-CCNC: 59 U/L (ref 1–40)
BASOPHILS # BLD AUTO: 0.02 10*3/MM3 (ref 0–0.2)
BASOPHILS NFR BLD AUTO: 0.6 % (ref 0–1.5)
BILIRUB SERPL-MCNC: 0.4 MG/DL (ref 0–1.2)
BUN SERPL-MCNC: 9 MG/DL (ref 8–23)
BUN/CREAT SERPL: 8.9 (ref 7–25)
CALCIUM SPEC-SCNC: 9.1 MG/DL (ref 8.6–10.5)
CHLORIDE SERPL-SCNC: 95 MMOL/L (ref 98–107)
CHOLEST SERPL-MCNC: 160 MG/DL (ref 0–200)
CO2 SERPL-SCNC: 20 MMOL/L (ref 22–29)
CREAT SERPL-MCNC: 1.01 MG/DL (ref 0.76–1.27)
DEPRECATED RDW RBC AUTO: 41.9 FL (ref 37–54)
EGFRCR SERPLBLD CKD-EPI 2021: 75.7 ML/MIN/1.73
EOSINOPHIL # BLD AUTO: 0 10*3/MM3 (ref 0–0.4)
EOSINOPHIL NFR BLD AUTO: 0 % (ref 0.3–6.2)
ERYTHROCYTE [DISTWIDTH] IN BLOOD BY AUTOMATED COUNT: 12.5 % (ref 12.3–15.4)
GGT SERPL-CCNC: 35 U/L (ref 8–61)
GLOBULIN UR ELPH-MCNC: 2.7 GM/DL
GLUCOSE SERPL-MCNC: 69 MG/DL (ref 65–99)
HBA1C MFR BLD: 5.3 % (ref 4.8–5.6)
HCT VFR BLD AUTO: 47.6 % (ref 37.5–51)
HDLC SERPL-MCNC: 75 MG/DL (ref 40–60)
HGB BLD-MCNC: 16.6 G/DL (ref 13–17.7)
IMM GRANULOCYTES # BLD AUTO: 0.01 10*3/MM3 (ref 0–0.05)
IMM GRANULOCYTES NFR BLD AUTO: 0.3 % (ref 0–0.5)
LDLC SERPL CALC-MCNC: 70 MG/DL (ref 0–100)
LDLC/HDLC SERPL: 0.92 {RATIO}
LYMPHOCYTES # BLD AUTO: 0.99 10*3/MM3 (ref 0.7–3.1)
LYMPHOCYTES NFR BLD AUTO: 29.9 % (ref 19.6–45.3)
MCH RBC QN AUTO: 31.7 PG (ref 26.6–33)
MCHC RBC AUTO-ENTMCNC: 34.9 G/DL (ref 31.5–35.7)
MCV RBC AUTO: 91 FL (ref 79–97)
MONOCYTES # BLD AUTO: 0.4 10*3/MM3 (ref 0.1–0.9)
MONOCYTES NFR BLD AUTO: 12.1 % (ref 5–12)
NEUTROPHILS NFR BLD AUTO: 1.89 10*3/MM3 (ref 1.7–7)
NEUTROPHILS NFR BLD AUTO: 57.1 % (ref 42.7–76)
PLATELET # BLD AUTO: 128 10*3/MM3 (ref 140–450)
PMV BLD AUTO: 11.6 FL (ref 6–12)
POTASSIUM SERPL-SCNC: 4.1 MMOL/L (ref 3.5–5.2)
PROT SERPL-MCNC: 7.1 G/DL (ref 6–8.5)
PSA SERPL-MCNC: 2.02 NG/ML (ref 0–4)
RBC # BLD AUTO: 5.23 10*6/MM3 (ref 4.14–5.8)
SODIUM SERPL-SCNC: 130 MMOL/L (ref 136–145)
T4 FREE SERPL-MCNC: 1.27 NG/DL (ref 0.92–1.68)
TRIGL SERPL-MCNC: 80 MG/DL (ref 0–150)
TSH SERPL DL<=0.05 MIU/L-ACNC: 2.85 UIU/ML (ref 0.27–4.2)
VLDLC SERPL-MCNC: 15 MG/DL (ref 5–40)
WBC NRBC COR # BLD AUTO: 3.31 10*3/MM3 (ref 3.4–10.8)

## 2025-01-15 PROCEDURE — 86381 MITOCHONDRIAL ANTIBODY EACH: CPT | Performed by: FAMILY MEDICINE

## 2025-01-15 PROCEDURE — 83036 HEMOGLOBIN GLYCOSYLATED A1C: CPT | Performed by: FAMILY MEDICINE

## 2025-01-15 PROCEDURE — 84153 ASSAY OF PSA TOTAL: CPT | Performed by: FAMILY MEDICINE

## 2025-01-15 PROCEDURE — 84443 ASSAY THYROID STIM HORMONE: CPT | Performed by: FAMILY MEDICINE

## 2025-01-15 PROCEDURE — 99214 OFFICE O/P EST MOD 30 MIN: CPT | Performed by: FAMILY MEDICINE

## 2025-01-15 PROCEDURE — 80061 LIPID PANEL: CPT | Performed by: FAMILY MEDICINE

## 2025-01-15 PROCEDURE — 80053 COMPREHEN METABOLIC PANEL: CPT | Performed by: FAMILY MEDICINE

## 2025-01-15 PROCEDURE — 36415 COLL VENOUS BLD VENIPUNCTURE: CPT | Performed by: FAMILY MEDICINE

## 2025-01-15 PROCEDURE — 1125F AMNT PAIN NOTED PAIN PRSNT: CPT | Performed by: FAMILY MEDICINE

## 2025-01-15 PROCEDURE — 82977 ASSAY OF GGT: CPT | Performed by: FAMILY MEDICINE

## 2025-01-15 PROCEDURE — 85025 COMPLETE CBC W/AUTO DIFF WBC: CPT | Performed by: FAMILY MEDICINE

## 2025-01-15 PROCEDURE — 84439 ASSAY OF FREE THYROXINE: CPT | Performed by: FAMILY MEDICINE

## 2025-01-15 RX ORDER — METOPROLOL TARTRATE 25 MG/1
25 TABLET, FILM COATED ORAL 2 TIMES DAILY
Qty: 180 TABLET | Refills: 3 | Status: SHIPPED | OUTPATIENT
Start: 2025-01-15

## 2025-01-15 NOTE — PROGRESS NOTES
Chief Complaint  Hypertension    Subjective          Familia Thompson presents to [facility]    History of Present Illness     History of Present Illness  The patient is a 79-year-old male who presents today for follow-up. He is accompanied by his wife and daughter.    He does not monitor his blood pressure at home. He has an upcoming appointment with Dr. Díaz, a cardiologist, on 02/13/2025, following a transition from Dr. Dixon. An echocardiogram is scheduled for tomorrow. He was previously seen by Dr. Betts, who was acting as a substitute. He has been prescribed Entresto but has expressed concerns about the cost of the medication. He is currently on amiodarone, Lasix, atorvastatin, Flomax, topiramate, Eliquis, and albuterol. He is uncertain about the necessity of Plavix and another unspecified medication. He is not on any thyroid medication. He has not consulted a urologist and has no history of prostate cancer.    He continues to consume alcohol and smokes approximately one pack per day, a habit he has maintained for 50 years. He reports no shortness of breath.    He received an injection for his right knee on 01/10/2025, which unfortunately did not provide any relief. He was advised to return for a follow-up if necessary.    He has a history of a right arm fracture, which has resulted in limited mobility, particularly in moving the arm behind his back. He was informed that the arm would not regain its previous functionality. He underwent physical therapy, which did not yield significant improvement. He did not undergo surgical intervention for the fracture.    He has an EGD scheduled at the end of the month for Collins's esophagus.    SOCIAL HISTORY  The patient admits to smoking about 1 pack per day for approximately 50 years. The patient admits to drinking alcohol and does not want to be on medication to quit drinking.    MEDICATIONS  Current: Lipitor, Plavix, Lasix,  "atorvastatin, Flomax, topiramate, Eliquis, albuterol, pantoprazole, metoprolol, Zyrtec (as needed).  Discontinued: amiodarone, acamprosate         Current Outpatient Medications   Medication Instructions    albuterol sulfate  (90 Base) MCG/ACT inhaler 2 puffs, Inhalation, Every 4 Hours PRN    apixaban (ELIQUIS) 5 mg, 2 Times Daily    atorvastatin (LIPITOR) 80 mg, Oral, Nightly    clopidogrel (PLAVIX) 75 mg, Oral, Daily    furosemide (LASIX) 40 mg, Oral, Daily PRN    metoprolol tartrate (LOPRESSOR) 25 mg, Oral, 2 Times Daily    pantoprazole (PROTONIX) 40 mg, Oral, Daily    sacubitril-valsartan (Entresto) 24-26 MG tablet 1 tablet, Oral, 2 Times Daily    tamsulosin (FLOMAX) 0.4 mg, Oral, Daily    topiramate (TOPAMAX) 50 MG tablet 1/2 tablet daily x 1 week, 1/2 tablet twice a day second week, 1/2 tablet in the morning and 1 tablet in the evening the third week and then 1 tablet twice a day thereafter.       The following portions of the patient's history were reviewed and updated as appropriate: allergies, current medications, past family history, past medical history, past social history, past surgical history, and problem list.    Objective   Vital Signs:   /78   Pulse 73   Temp 97.8 °F (36.6 °C) (Oral)   Ht 177.8 cm (70\")   Wt 59.6 kg (131 lb 4.8 oz)   SpO2 96%   BMI 18.84 kg/m²     BP Readings from Last 3 Encounters:   01/15/25 130/78   01/10/25 131/75   12/19/24 102/69     Wt Readings from Last 3 Encounters:   01/15/25 59.6 kg (131 lb 4.8 oz)   12/19/24 61 kg (134 lb 6.4 oz)   09/27/24 61.1 kg (134 lb 9.6 oz)     BMI is within normal parameters. No other follow-up for BMI required.     Physical Exam  Vitals reviewed.   Constitutional:       Appearance: Normal appearance.   HENT:      Head: Normocephalic and atraumatic.      Right Ear: External ear normal.      Left Ear: External ear normal.   Eyes:      Conjunctiva/sclera: Conjunctivae normal.   Cardiovascular:      Rate and Rhythm: Normal rate " and regular rhythm.      Heart sounds: No murmur heard.     No friction rub. No gallop.   Pulmonary:      Effort: Pulmonary effort is normal.      Breath sounds: Normal breath sounds. No wheezing or rhonchi.   Abdominal:      General: Bowel sounds are normal. There is no distension.      Palpations: Abdomen is soft.      Tenderness: There is no abdominal tenderness.   Skin:     General: Skin is warm and dry.   Neurological:      Mental Status: He is alert and oriented to person, place, and time.      Cranial Nerves: No cranial nerve deficit.   Psychiatric:         Mood and Affect: Mood and affect normal.         Behavior: Behavior normal.         Thought Content: Thought content normal.         Judgment: Judgment normal.            Result Review :   The following data was reviewed by: Robb Gómez DO on 01/15/2025:  Common labs          4/10/2024    10:22 4/13/2024    18:33 8/22/2024    09:03   Common Labs   Glucose  80  83    BUN  10  5    Creatinine 1.00  0.94  0.89    Sodium  132  132    Potassium  4.1  3.8    Chloride  97  98    Calcium  8.5  9.1    Albumin  4.1  4.1    Total Bilirubin  0.7  0.5    Alkaline Phosphatase  99  134    AST (SGOT)  40  27    ALT (SGPT)  29  16    WBC  3.71  4.76    Hemoglobin  12.4  13.6    Hematocrit  37.6  41.3    Platelets  133  153    Total Cholesterol   124    Triglycerides   76    HDL Cholesterol   65    LDL Cholesterol    44             Lab Results   Component Value Date    COVID19 Not Detected 04/27/2022    FLU Negative 04/27/2022    FLU Negative 04/27/2022    INR 1.03 10/25/2023       Results  Laboratory Studies  CBC showed no anemia, normal white blood cell count and platelets. Kidney function looked good. Sodium level is a little bit low. Cholesterol levels look pretty good. Last TSH level was normal. Last PSA level was normal. Alkaline phosphatase level was elevated.    Imaging  Ultrasound on 1/12/2024 showed stable nodular soft tissue thickening on the  gallbladder.    Testing  EKG done on 08/09/2024 showed supraventricular bigeminy.    Procedures        Assessment and Plan    Diagnoses and all orders for this visit:    1. Primary hypertension (Primary)  -     Comprehensive Metabolic Panel  -     CBC & Differential    2. Chronic pain of right knee    3. 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 Chronic systolic heart failure    5. Atrial fibrillation, unspecified type    6. Gastroesophageal reflux disease, unspecified whether esophagitis present    7. Alcohol use    8. Allergic rhinitis, unspecified seasonality, unspecified trigger    9. Adenomyomatosis of gallbladder  -     US Gallbladder; Future    10. Hyponatremia    11. Elevated alkaline phosphatase level  -     Gamma GT  -     Mitochondrial Antibodies, M2    12. Elevated TSH  -     TSH+Free T4    13. Elevated PSA  -     PSA DIAGNOSTIC    14. Cigarette nicotine dependence with nicotine-induced disorder    15. Screening for lung cancer    16. Abnormal glucose  -     Hemoglobin A1c    17. Chronic right shoulder pain    18. Hyperlipidemia, unspecified hyperlipidemia type  -     Lipid Panel    19. Hypothyroidism, unspecified type  -     TSH+Free T4    20. Former smoker    Other orders  -     metoprolol tartrate (LOPRESSOR) 25 MG tablet; Take 1 tablet by mouth 2 (Two) Times a Day.  Dispense: 180 tablet; Refill: 3        Assessment & Plan  1. Hypertension.  His blood pressure readings have been within acceptable ranges. He will continue his current medication regimen, including Lipitor 80 mg daily and lifelong Plavix. Entresto will be initiated with a dosage of one tablet in the morning and one in the evening. He will also start taking metoprolol 25 mg twice daily. Amiodarone will be discontinued for now.    2. Coronary artery disease.  He will continue Lipitor 80 mg daily and lifelong Plavix. Entresto will be initiated with a dosage of one tablet  in the morning and one in the evening.    3. Atrial fibrillation.  He will start taking metoprolol 25 mg twice daily. Amiodarone will be discontinued for now.    4. Collins's esophagus.  He will continue taking pantoprazole to prevent the development of esophageal cancer. An EGD is scheduled at the end of the month.    5. Right knee pain.  He received an injection on 07/10/2024, which did not provide relief. He is advised to contact his orthopedic specialist to inform them of the lack of improvement.    6. Right proximal humeral head fracture.  He has been informed that his arm may not regain its previous functionality due to the fracture. Physical therapy can be considered, but it may be challenging to coordinate.    7. Alcohol use.  His sodium levels are slightly low, likely due to alcohol consumption. He is advised to reduce his alcohol intake.    8. Smoking.  He continues to smoke about one pack per day. A low-dose chest CT for lung cancer screening was discussed but will be held off for now due to insurance coverage issues.    9. Health maintenance.  A repeat ultrasound will be scheduled for October 2025. Laboratory tests, including GGT, CBC, CMP, and mitochondrial antibodies, will be conducted today. His PSA levels will continue to be monitored.    Follow-up  The patient will follow up in 3 months.    PROCEDURE  The patient received an injection for his right knee on 01/10/2025, which unfortunately did not provide any relief.       Medications Discontinued During This Encounter   Medication Reason    amiodarone (PACERONE) 200 MG tablet     acamprosate (CAMPRAL) 333 MG EC tablet     nicotine (NICODERM CQ) 21 MG/24HR patch           Follow Up   Return in about 3 months (around 4/15/2025) for hypertension.  Patient was given instructions and counseling regarding his condition or for health maintenance advice. Please see specific information pulled into the AVS if appropriate.     Patient or patient representative  verbalized consent for the use of Ambient Listening during the visit with  Robb Gómez DO for chart documentation. 1/15/2025  10:26 EST    Robb Gómez DO  01/15/25  10:51 EST

## 2025-01-16 ENCOUNTER — HOSPITAL ENCOUNTER (OUTPATIENT)
Facility: HOSPITAL | Age: 80
Discharge: HOME | End: 2025-01-16
Admitting: STUDENT IN AN ORGANIZED HEALTH CARE EDUCATION/TRAINING PROGRAM
Payer: MEDICARE

## 2025-01-16 DIAGNOSIS — I50.22 CHRONIC SYSTOLIC HEART FAILURE: ICD-10-CM

## 2025-01-16 LAB — MITOCHONDRIA M2 IGG SER-ACNC: <20 UNITS (ref 0–20)

## 2025-01-16 PROCEDURE — 93306 TTE W/DOPPLER COMPLETE: CPT

## 2025-01-17 ENCOUNTER — TELEPHONE (OUTPATIENT)
Dept: FAMILY MEDICINE CLINIC | Facility: CLINIC | Age: 80
End: 2025-01-17
Payer: MEDICARE

## 2025-01-17 NOTE — TELEPHONE ENCOUNTER
Patient was given a new medication by his cardiologist, Entresto, but it is much too expensive. Patient's daughter, Elke, would like to know if they can switch back to old medications, amiodarone. He is also out of Atorvastatin and Clopidogrel and daughter is concerned that he should be taking them. Please advise.

## 2025-01-19 ENCOUNTER — TELEPHONE (OUTPATIENT)
Dept: FAMILY MEDICINE CLINIC | Facility: CLINIC | Age: 80
End: 2025-01-19
Payer: MEDICARE

## 2025-01-19 LAB
AORTIC DIMENSIONLESS INDEX: 0.77 (DI)
ASCENDING AORTA: 3.5 CM
AV MEAN PRESS GRAD SYS DOP V1V2: 2 MMHG
AV VMAX SYS DOP: 89.8 CM/SEC
BH CV ECHO MEAS - ACS: 1.7 CM
BH CV ECHO MEAS - AO MAX PG: 3.2 MMHG
BH CV ECHO MEAS - AO ROOT DIAM: 3.6 CM
BH CV ECHO MEAS - AO V2 VTI: 15 CM
BH CV ECHO MEAS - AVA(I,D): 2.41 CM2
BH CV ECHO MEAS - EDV(CUBED): 85.2 ML
BH CV ECHO MEAS - EDV(MOD-SP2): 72.4 ML
BH CV ECHO MEAS - EDV(MOD-SP4): 76.9 ML
BH CV ECHO MEAS - EF(MOD-SP2): 48.6 %
BH CV ECHO MEAS - EF(MOD-SP4): 43.4 %
BH CV ECHO MEAS - ESV(CUBED): 35.9 ML
BH CV ECHO MEAS - ESV(MOD-SP2): 37.2 ML
BH CV ECHO MEAS - ESV(MOD-SP4): 43.5 ML
BH CV ECHO MEAS - FS: 25 %
BH CV ECHO MEAS - IVS/LVPW: 1.2 CM
BH CV ECHO MEAS - IVSD: 1.2 CM
BH CV ECHO MEAS - LA DIMENSION: 4.1 CM
BH CV ECHO MEAS - LAT PEAK E' VEL: 6.1 CM/SEC
BH CV ECHO MEAS - LV DIASTOLIC VOL/BSA (35-75): 44.1 CM2
BH CV ECHO MEAS - LV MASS(C)D: 168.9 GRAMS
BH CV ECHO MEAS - LV MAX PG: 1.43 MMHG
BH CV ECHO MEAS - LV MEAN PG: 1 MMHG
BH CV ECHO MEAS - LV SYSTOLIC VOL/BSA (12-30): 24.9 CM2
BH CV ECHO MEAS - LV V1 MAX: 59.7 CM/SEC
BH CV ECHO MEAS - LV V1 VTI: 11.5 CM
BH CV ECHO MEAS - LVIDD: 4.4 CM
BH CV ECHO MEAS - LVIDS: 3.3 CM
BH CV ECHO MEAS - LVOT AREA: 3.1 CM2
BH CV ECHO MEAS - LVOT DIAM: 2 CM
BH CV ECHO MEAS - LVPWD: 1 CM
BH CV ECHO MEAS - MED PEAK E' VEL: 3.8 CM/SEC
BH CV ECHO MEAS - MV A MAX VEL: 52.9 CM/SEC
BH CV ECHO MEAS - MV DEC TIME: 0.28 SEC
BH CV ECHO MEAS - MV E MAX VEL: 54 CM/SEC
BH CV ECHO MEAS - MV E/A: 1.02
BH CV ECHO MEAS - PA V2 MAX: 77 CM/SEC
BH CV ECHO MEAS - SV(LVOT): 36.1 ML
BH CV ECHO MEAS - SV(MOD-SP2): 35.2 ML
BH CV ECHO MEAS - SV(MOD-SP4): 33.4 ML
BH CV ECHO MEAS - SVI(LVOT): 20.7 ML/M2
BH CV ECHO MEAS - SVI(MOD-SP2): 20.2 ML/M2
BH CV ECHO MEAS - SVI(MOD-SP4): 19.2 ML/M2
BH CV ECHO MEAS - TAPSE (>1.6): 0.82 CM
BH CV ECHO MEASUREMENTS AVERAGE E/E' RATIO: 10.91
BH CV XLRA - TDI S': 4.7 CM/SEC
IVRT: 86 MS
LEFT ATRIUM VOLUME INDEX: 19.1 ML/M2
LV EF BIPLANE MOD: 46.2 %

## 2025-01-19 RX ORDER — CLOPIDOGREL BISULFATE 75 MG/1
75 TABLET ORAL DAILY
Qty: 90 TABLET | Refills: 3 | Status: SHIPPED | OUTPATIENT
Start: 2025-01-19

## 2025-01-19 RX ORDER — ATORVASTATIN CALCIUM 80 MG/1
80 TABLET, FILM COATED ORAL NIGHTLY
Qty: 90 TABLET | Refills: 3 | Status: SHIPPED | OUTPATIENT
Start: 2025-01-19

## 2025-01-19 NOTE — TELEPHONE ENCOUNTER
Please refer to telephone encounter from 1/17/2025.     As far as Entresto is concern, I strongly recommend checking with cardiology on this issue. They may be able to do a PA or offer alternative suggestions and I would defer recommendations to them. Patient has an appointment with Cardiology on 2/13/2025. Please have them to contact their office for recommendations.     2.   I will send in Atorvstatin and clopidogrel as he will need to continue these medications.

## 2025-01-20 ENCOUNTER — TELEPHONE (OUTPATIENT)
Dept: GASTROENTEROLOGY | Facility: CLINIC | Age: 80
End: 2025-01-20
Payer: MEDICARE

## 2025-01-20 NOTE — TELEPHONE ENCOUNTER
Procedure: Colonoscopy and/or EGD     Med Directive: Plavix, Eliquis     PMH: HFrEF, prior CABG, PAF     Last Seen: 12/19/2024    I seen in note he was moderate risk bu please advise on Plavix and ELiquis

## 2025-01-20 NOTE — TELEPHONE ENCOUNTER
Spoke with Patient's daughter, Elke. She confirmed understanding and will contact the cardiologist office to follow up with Entresto problems.

## 2025-01-20 NOTE — TELEPHONE ENCOUNTER
URGENT     BLOOD THINNER/CARDIAC Clearance REQUEST                  2025      Dear Dr. Betts,    Patient Name: Familia Thompson  : 1945    This patient is waiting to have a Colonoscopy and/or Esophagogastroduodenoscopy which I will perform at [facility] on _25_. Our records indicate this patient is currently taking _Plavix & Eliquis_. This procedure requires the patient to suspend their Anticoagulant medication prior to surgery. Please respond to this request noting your recommendations. You may contact our office at [phone] Option 2 with any questions. I appreciate your prompt response in this matter. Please return this form to our office as soon as possible to [phone].    ____ I approve my patient to stop taking their Anticoagulant Therapy medication _5 & 2_ days prior to the scheduled procedure.    ____ I do NOT approve my patient to stop taking their Anticoagulant Therapy medication at this time.    ____ I approve my patient from a cardiac standpoint.    ____ I do NOT approve my patient from a cardiac standpoint at this time.        Approving physician name (please print): _____________________________________________      Approving physician signature: ________________________________ Date:________________    Sincerely,    [facility] [place] - Gastroenterology [facility]  Dr. Saavedra's Office      *Please fax approval or denial to our office as soon as possible.

## 2025-01-21 NOTE — TELEPHONE ENCOUNTER
Moderate risk of cardiovascular events  Hold Plavix and Eliquis prior to the procedure and resume when feasible

## 2025-01-22 NOTE — PAT
Cher from [facility] called to advised that patient is having increased shortness of breath and fatigue.  Swelling of her lower extremities as well as elevated blood pressure.    Please advise.   Talked to patient letting patient know their arrival time is at 0930  Stated to bring in photo ID and Insurance Card   Have a  over the age 18 as you cannot drive for 24 hours   Went over prep instructions and when to be NPO   Take nebulizer and bring in inhalers   Hold Plavix for 5 days and eliquis 2 days prior to procedure, and take other medications before 0730  Hold any blood thinners and GLP-1  Arrive to [facility]

## 2025-01-24 ENCOUNTER — TELEPHONE (OUTPATIENT)
Dept: CARDIOLOGY | Facility: CLINIC | Age: 80
End: 2025-01-24
Payer: MEDICARE

## 2025-01-24 DIAGNOSIS — R93.1 ABNORMAL FINDINGS ON DIAGNOSTIC IMAGING OF HEART AND CORONARY CIRCULATION: ICD-10-CM

## 2025-01-24 DIAGNOSIS — I25.709 CORONARY ARTERY DISEASE INVOLVING CORONARY BYPASS GRAFT OF NATIVE HEART WITH ANGINA PECTORIS: Primary | ICD-10-CM

## 2025-01-24 NOTE — TELEPHONE ENCOUNTER
----- Message from Nirmal Betts sent at 1/24/2025 12:21 PM EST -----  Ultrasound of the heart shows mildly reduced systolic function.  Normally it is 55% yours is around 40 to 45%.  Relaxation pattern is also impaired.  You have some degenerative changes involving your aortic valve and mitral valve however no acute intervention is recommended.  For heart squeezing and relaxation function we are working on to optimizing your medications.  Coronary angiogram is recommended for evaluation of low heart function.  There is a certain area of your heart muscles that are not moving well coronary angiogram will help us identify if plaque buildup is the reason for this

## 2025-01-24 NOTE — TELEPHONE ENCOUNTER
LANRE patient and daughter regarding results and recommendations. Voiced understanding.     Cardiac cath discussed. Patient agreeable to proceed.     Please place order

## 2025-01-28 RX ORDER — ALBUTEROL SULFATE 90 UG/1
2 INHALANT RESPIRATORY (INHALATION) EVERY 4 HOURS PRN
Qty: 8 G | Refills: 3 | Status: SHIPPED | OUTPATIENT
Start: 2025-01-28

## 2025-01-30 ENCOUNTER — ANESTHESIA EVENT (OUTPATIENT)
Dept: GASTROENTEROLOGY | Facility: HOSPITAL | Age: 80
End: 2025-01-30
Payer: MEDICARE

## 2025-01-30 NOTE — ANESTHESIA PREPROCEDURE EVALUATION
Anesthesia Evaluation     Patient summary reviewed and Nursing notes reviewed   NPO Solid Status: > 8 hours  NPO Liquid Status: > 8 hours           Airway   Mallampati: II  TM distance: >3 FB  Neck ROM: full  No difficulty expected  Dental    (+) edentulous    Pulmonary    (+) a smoker (current) Current, Smoked day of surgery, cigarettes, COPD mild,decreased breath sounds  Cardiovascular - normal exam  Exercise tolerance: good (4-7 METS)    ECG reviewed  PT is on anticoagulation therapy  Patient on routine beta blocker  Rhythm: regular  Rate: normal    (+) hypertension well controlled less than 2 medications, past MI , CAD, dysrhythmias Atrial Fib, angina, CHF Systolic <55%, PVD, hyperlipidemia    ROS comment: history of  percutaneous coronary intervention (PCI) of his LM, LCX, CABG with left internal mammary artery (LIMA) to left anterior descending (LAD) artery    Neuro/Psych  (+) tremors, psychiatric history Anxiety, dementia  GI/Hepatic/Renal/Endo      Musculoskeletal     Abdominal    Substance History   (+) alcohol use (12 pk beer per day)     OB/GYN          Other   arthritis,     ROS/Med Hx Other: Cardiac Clearance- moderate risk per Dr. Martniez  on 01/22/ 25 8/9/24- EKG   ABNORMAL ECG -  Sinus rhythm  Supraventricular bigeminy  When compared with ECG of 03-May-2022 06:54:53,  No significant change  Electronically Signed By: Hubert Polanco ([facility]) 2024-08-12 11:55:22  Date and Time of Study:2024-08-09 08:28:52      1/16/24- Echo  1. Normal chamber sizes.  2. LV has normal wall thickness.  Regional wall motion abnormalities are present.  Systolic function is mildly reduced, Estimated LVEF is greater than 40-45%.  Diastolic function is abnormal, Grade II with pseudonormal filling pattern.  3. RV has reduced systolic function.  4. Trileaflet aortic valve.  There is no aortic valve stenosis. Aortic valve sclerosis is present.Mild AR is noted.   5. Mitral valve has thickened leaflets with preserved leaflet  excursion.No significant MR is noted.   6. Grossly normal tricuspid valve.  Trace TR is noted.  RVSP can not be estimated due to incomplete TR.  7. No pericardial effusion is noted.  8. Aortic root has normal dimensions.  9. IVC has normal size.  Estimated right atrial pressure is 0-5 mm Hg      Last dose Plavix 1/24    Last dose Eliquis 01/24      Phys Exam Other: Duoneb in preop   Chronic congestive cough from smoking               Anesthesia Plan    ASA 4     general   total IV anesthesia  (Patient understands anesthesia not responsible for dental damage. Risks explained including allergic reactions, BP, HR, O2 changes, aspiration, advanced airway placement. Pt verbalized understanding.)  intravenous induction     Anesthetic plan, risks, benefits, and alternatives have been provided, discussed and informed consent has been obtained with: patient and child.  Pre-procedure education provided  Plan discussed with CRNA.      CODE STATUS:

## 2025-01-31 ENCOUNTER — HOSPITAL ENCOUNTER (OUTPATIENT)
Facility: HOSPITAL | Age: 80
Setting detail: HOSPITAL OUTPATIENT SURGERY
Discharge: HOME | End: 2025-01-31
Attending: INTERNAL MEDICINE | Admitting: INTERNAL MEDICINE
Payer: MEDICARE

## 2025-01-31 ENCOUNTER — ANESTHESIA (OUTPATIENT)
Dept: GASTROENTEROLOGY | Facility: HOSPITAL | Age: 80
End: 2025-01-31
Payer: MEDICARE

## 2025-01-31 VITALS
SYSTOLIC BLOOD PRESSURE: 121 MMHG | BODY MASS INDEX: 18.98 KG/M2 | HEART RATE: 62 BPM | RESPIRATION RATE: 26 BRPM | TEMPERATURE: 97.4 F | DIASTOLIC BLOOD PRESSURE: 58 MMHG | WEIGHT: 132.28 LBS | OXYGEN SATURATION: 95 %

## 2025-01-31 DIAGNOSIS — K22.70 BARRETT'S ESOPHAGUS WITHOUT DYSPLASIA: ICD-10-CM

## 2025-01-31 PROCEDURE — 43239 EGD BIOPSY SINGLE/MULTIPLE: CPT | Performed by: INTERNAL MEDICINE

## 2025-01-31 PROCEDURE — 25010000002 PROPOFOL 10 MG/ML EMULSION

## 2025-01-31 PROCEDURE — 25010000002 LIDOCAINE PF 2% 2 % SOLUTION

## 2025-01-31 PROCEDURE — 25810000003 LACTATED RINGERS PER 1000 ML

## 2025-01-31 PROCEDURE — 88305 TISSUE EXAM BY PATHOLOGIST: CPT | Performed by: INTERNAL MEDICINE

## 2025-01-31 RX ORDER — LIDOCAINE HYDROCHLORIDE 20 MG/ML
INJECTION, SOLUTION EPIDURAL; INFILTRATION; INTRACAUDAL; PERINEURAL AS NEEDED
Status: DISCONTINUED | OUTPATIENT
Start: 2025-01-31 | End: 2025-01-31 | Stop reason: SURG

## 2025-01-31 RX ORDER — SODIUM CHLORIDE, SODIUM LACTATE, POTASSIUM CHLORIDE, CALCIUM CHLORIDE 600; 310; 30; 20 MG/100ML; MG/100ML; MG/100ML; MG/100ML
30 INJECTION, SOLUTION INTRAVENOUS CONTINUOUS
Status: DISCONTINUED | OUTPATIENT
Start: 2025-01-31 | End: 2025-01-31 | Stop reason: HOSPADM

## 2025-01-31 RX ORDER — IPRATROPIUM BROMIDE AND ALBUTEROL SULFATE 2.5; .5 MG/3ML; MG/3ML
3 SOLUTION RESPIRATORY (INHALATION) ONCE
Status: COMPLETED | OUTPATIENT
Start: 2025-01-31 | End: 2025-01-31

## 2025-01-31 RX ORDER — PROPOFOL 10 MG/ML
VIAL (ML) INTRAVENOUS AS NEEDED
Status: DISCONTINUED | OUTPATIENT
Start: 2025-01-31 | End: 2025-01-31 | Stop reason: SURG

## 2025-01-31 RX ADMIN — PROPOFOL 70 MG: 10 INJECTION, EMULSION INTRAVENOUS at 12:09

## 2025-01-31 RX ADMIN — IPRATROPIUM BROMIDE AND ALBUTEROL SULFATE 3 ML: .5; 3 SOLUTION RESPIRATORY (INHALATION) at 11:51

## 2025-01-31 RX ADMIN — SODIUM CHLORIDE, POTASSIUM CHLORIDE, SODIUM LACTATE AND CALCIUM CHLORIDE: 600; 310; 30; 20 INJECTION, SOLUTION INTRAVENOUS at 12:06

## 2025-01-31 RX ADMIN — PROPOFOL 150 MCG/KG/MIN: 10 INJECTION, EMULSION INTRAVENOUS at 12:10

## 2025-01-31 RX ADMIN — LIDOCAINE HYDROCHLORIDE 40 MG: 20 INJECTION, SOLUTION INTRAVENOUS at 12:09

## 2025-01-31 NOTE — H&P
Pre Procedure History & Physical    Chief Complaint:   GERD and history of Collins's    Subjective     HPI:   GERD and history of Collins's    Past Medical History:   Past Medical History:   Diagnosis Date    Arthritis     Chronic systolic CHF (congestive heart failure)     COPD (chronic obstructive pulmonary disease)     Coronary artery disease     Hemorrhoids     HL (hearing loss)     Hyperlipidemia     Hypertension     Myocardial infarction     NSTEMI (non-ST elevated myocardial infarction) 04/27/2022    Added automatically from request for surgery [id number]    PVD (peripheral vascular disease)     Sinus disorder        Past Surgical History:  Past Surgical History:   Procedure Laterality Date    CARDIAC CATHETERIZATION N/A 04/29/2022    Procedure: Left Heart Cath, possible angioplasty;  Surgeon: Zaki Díaz MD;  Location: [facility] CATH INVASIVE LOCATION;  Service: Cardiovascular;  Laterality: N/A;    CARDIAC CATHETERIZATION N/A 05/02/2022    Procedure: Percutaneous Coronary Intervention;  Surgeon: Josue Hensley MD;  Location: [facility] CATH INVASIVE LOCATION;  Service: Cardiovascular;  Laterality: N/A;    CARDIAC CATHETERIZATION N/A 05/02/2022    Procedure: Stent BLAIRE coronary;  Surgeon: Josue Hensley MD;  Location: [facility] CATH INVASIVE LOCATION;  Service: Cardiovascular;  Laterality: N/A;    CARDIAC CATHETERIZATION N/A 05/02/2022    Procedure: Atherectomy-coronary- Rotoblator;  Surgeon: Josue Hensley MD;  Location: [facility] CATH INVASIVE LOCATION;  Service: Cardiovascular;  Laterality: N/A;    CARDIAC CATHETERIZATION N/A 9/8/2023    Procedure: Left Heart Cath;  Surgeon: Pawan Guerrero MD;  Location: [facility] CATH INVASIVE LOCATION;  Service: Cardiovascular;  Laterality: N/A;    CARDIAC CATHETERIZATION N/A 9/8/2023    Procedure: Coronary angiography;  Surgeon: Pawan Guerrero MD;  Location: [facility] CATH INVASIVE LOCATION;  Service: Cardiovascular;  Laterality: N/A;    CARDIAC CATHETERIZATION  9/8/2023     Procedure: Saphenous Vein Graft;  Surgeon: Pawan Guerrero MD;  Location: [facility] CATH INVASIVE LOCATION;  Service: Cardiovascular;;    CARDIAC CATHETERIZATION N/A 9/8/2023    Procedure: Left ventriculography;  Surgeon: Pawan Guerrero MD;  Location: [facility] CATH INVASIVE LOCATION;  Service: Cardiovascular;  Laterality: N/A;    CARDIAC SURGERY      COLONOSCOPY      COLONOSCOPY N/A 9/15/2022    Procedure: COLONOSCOPY;  Surgeon: Dennis Saavedra MD;  Location: [facility] ENDOSCOPY;  Service: Gastroenterology;  Laterality: N/A;  COLON POLYP    CORONARY ANGIOPLASTY WITH STENT PLACEMENT      ENDOSCOPY N/A 9/15/2022    Procedure: ESOPHAGOGASTRODUODENOSCOPY;  Surgeon: Dennis Saavedra MD;  Location: [facility] ENDOSCOPY;  Service: Gastroenterology;  Laterality: N/A;   GASTRTIS BARRETTS ESOPH    EYE SURGERY Bilateral     UPPER GASTROINTESTINAL ENDOSCOPY      VEIN LIGATION AND STRIPPING      WRIST FRACTURE SURGERY         Family History:  Family History   Problem Relation Age of Onset    Colon cancer Neg Hx     Malig Hyperthermia Neg Hx        Social History:   reports that he has been smoking cigarettes. He started smoking about 32 years ago. He has a 30.4 pack-year smoking history. He has been exposed to tobacco smoke. He has never used smokeless tobacco. He reports that he does not currently use alcohol after a past usage of about 12.0 standard drinks of alcohol per week. He reports that he does not currently use drugs.    Medications:   Medications Prior to Admission   Medication Sig Dispense Refill Last Dose/Taking    albuterol sulfate  (90 Base) MCG/ACT inhaler Inhale 2 puffs Every 4 (Four) Hours As Needed for Wheezing or Shortness of Air. 8 g 3     apixaban (ELIQUIS) 5 MG tablet tablet Take 1 tablet by mouth 2 (Two) Times a Day.       atorvastatin (LIPITOR) 80 MG tablet Take 1 tablet by mouth Every Night. 90 tablet 3     clopidogrel (PLAVIX) 75 MG tablet Take 1 tablet by mouth Daily. 90 tablet 3      furosemide (LASIX) 40 MG tablet Take 1 tablet by mouth Daily As Needed (if overnoight 2 lb changes are noted.). 90 tablet 3     metoprolol tartrate (LOPRESSOR) 25 MG tablet Take 1 tablet by mouth 2 (Two) Times a Day. 180 tablet 3     pantoprazole (Protonix) 40 MG EC tablet Take 1 tablet by mouth Daily. (Patient not taking: Reported on 1/15/2025) 90 tablet 3     sacubitril-valsartan (Entresto) 24-26 MG tablet Take 1 tablet by mouth 2 (Two) Times a Day. (Patient not taking: Reported on 1/15/2025) 90 tablet 3     tamsulosin (FLOMAX) 0.4 MG capsule 24 hr capsule TAKE 1 CAPSULE BY MOUTH DAILY 90 capsule 3     topiramate (TOPAMAX) 50 MG tablet 1/2 tablet daily x 1 week, 1/2 tablet twice a day second week, 1/2 tablet in the morning and 1 tablet in the evening the third week and then 1 tablet twice a day thereafter. (Patient taking differently: Take 1 tablet by mouth 2 (Two) Times a Day.) 120 tablet 3        Allergies:  Patient has no known allergies.        Objective     Weight 60 kg (132 lb 4.4 oz).    Physical Exam   Constitutional: Pt is oriented to person, place, and time and well-developed, well-nourished, and in no distress.   Mouth/Throat: Oropharynx is clear and moist.   Neck: Normal range of motion.   Cardiovascular: Normal rate, regular rhythm and normal heart sounds.    Pulmonary/Chest: Effort normal and breath sounds normal.   Abdominal: Soft. Nontender  Skin: Skin is warm and dry.   Psychiatric: Mood, memory, affect and judgment normal.     Assessment & Plan     Diagnosis:  GERD and past history of Collins's    Anticipated Surgical Procedure:  EGD    The risks, benefits, and alternatives of this procedure have been discussed with the patient or the responsible party- the patient understands and agrees to proceed.

## 2025-01-31 NOTE — ANESTHESIA POSTPROCEDURE EVALUATION
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        Post Anesthesia Care and Evaluation    Post-procedure mental status: acceptable.  Pain management: satisfactory to patient    Airway patency: patent  Anesthetic complications: No anesthetic complications    Cardiovascular status: acceptable  Respiratory status: acceptable    Comments: Per chart review

## 2025-02-06 ENCOUNTER — TELEPHONE (OUTPATIENT)
Dept: CARDIOLOGY | Facility: CLINIC | Age: 80
End: 2025-02-06
Payer: MEDICARE

## 2025-02-06 ENCOUNTER — TELEPHONE (OUTPATIENT)
Dept: GASTROENTEROLOGY | Facility: CLINIC | Age: 80
End: 2025-02-06
Payer: MEDICARE

## 2025-02-06 NOTE — TELEPHONE ENCOUNTER
----- Message from Estrella Herndon sent at 2/5/2025  5:58 PM EST -----  EGD 1/31/2025: Short segment Collins's-biopsy with intestinal metaplasia otherwise negative, 1 cm in length, small hiatal hernia, normal stomach, normal duodenum  Patient has history of Collins's  Repeat EGD in 3 years, patient should continue PPI

## 2025-02-06 NOTE — TELEPHONE ENCOUNTER
Spoke with Ms. Elke Carter (POA and Healthcare Surrogate, paperwork on file) and informed of ISIDRO Akins result note and recommendations.  Verified understanding.     Confirmed patient is taking pantoprazole.    Advised to reach out for any GI needs.    Care Gap updated:  3 year EGD recall placed

## 2025-02-07 NOTE — PRE-PROCEDURE INSTRUCTIONS
PATIENT INSTRUCTED TO BE:    - NPO AFTER MIDNIGHT EXCEPT CAN HAVE SIPS OF WATER WITH HIS MEDICATION PRIOR TO PROCEDURE    -  INSTRUCTED NO LOTIONS, JEWELRY, PIERCINGS, OR DEODORANT DAY OF THE PROCEDURE    - INSTRUCTED TO TAKE THE FOLLOWING MEDICATIONS THE DAY OF SURGERY:       AS DIRECTED BY CARDIOLOGY    LAST DOSE OF ELIQUIS 2/7/25 PM    - INSTRUCTED PT TO FOLLOW ANY INSTRUCTIONS GIVEN BY HIS CARDIOLOGIST.    - INFORMED PT THEY ATTEMPT RADIAL APPROACH FIRST IF UNABLE TO PERFORM CATH WITH THAT APPROACH THEY WILL DO A FEMORAL APPROACH.  ALSO, INFORMED PT THEY WILL BE ON BEDREST POSTOP.  IF THE SURGEON FEELS  AN INTERVENTION OR STENTS NEEDS TO BE PLACED, HE/SHE WILL STAY OVER NIGHT FOR OBSERVATION AND MONITORING.     - INFORMED THE PATIENT HE CAN HAVE TWO VISITORS WITH HIM THE DAY OF THE PROCEDURE    - INSTRUCTED PT TO COME TO [facility] ( [address]), CAN  PARK OR SELF PARK. ENTER THE PAVILION THRU MAIN ENTRANCE, TAKE ELEVATORS TO THE FIRST FLOOR, CHECK IN AT THE DESK FOR REGISTRATION, AFTER REGISTRATION PT WILL BE TAKEN THE THE PREOP AREA FOR HIS OR HER PROCEDURE.    -ARRIVAL TIME GIVEN BY SAME DAY SURGERY, YOU WILL RECEIVE A PHONE CALL BETWEEN 1PM AND 4PM, INFORMED PT IF ARRIVAL TIME CHANGES OR ADJUSTMENTS NEED TO BE MADE IN THEIR ARRIVAL TIME, HE/SHE WOULD RECEIVE A CALL.    -INSTRUCTED PT TO COME TO [facility] TO GET THEIR LABS/ EKG DONE PRIOR TO PROCEDURE      - PATIENT VERBALIZED UNDERSTANDING

## 2025-02-07 NOTE — TELEPHONE ENCOUNTER
I spoke to patients daughter and gave an appointment on 02/10/25 for [facility]. Patient was instructed to have a  for the day of the procedure and to arrive at the main entrance registration area in the pavilion. Patient was educated about stent placement and informed that in the event of stent placement, the patient will be required to stay overnight for observation. Patient was instructed to hold Eliquis for 48 hours prior to procedure. Patient was instructed to continue all other medications as usual. Patient was instructed to be NPO after midnight with only sips of water as needed. Patient was instructed to have labs completed on the morning of the procedure. Patient is agreeable with no other questions or concerns.

## 2025-02-10 ENCOUNTER — HOSPITAL ENCOUNTER (OUTPATIENT)
Facility: HOSPITAL | Age: 80
Setting detail: HOSPITAL OUTPATIENT SURGERY
Discharge: HOME | End: 2025-02-10
Attending: STUDENT IN AN ORGANIZED HEALTH CARE EDUCATION/TRAINING PROGRAM | Admitting: STUDENT IN AN ORGANIZED HEALTH CARE EDUCATION/TRAINING PROGRAM
Payer: MEDICARE

## 2025-02-10 VITALS
OXYGEN SATURATION: 95 % | DIASTOLIC BLOOD PRESSURE: 46 MMHG | TEMPERATURE: 97.6 F | HEART RATE: 61 BPM | SYSTOLIC BLOOD PRESSURE: 85 MMHG | RESPIRATION RATE: 15 BRPM

## 2025-02-10 DIAGNOSIS — I25.709 CORONARY ARTERY DISEASE INVOLVING CORONARY BYPASS GRAFT OF NATIVE HEART WITH ANGINA PECTORIS: ICD-10-CM

## 2025-02-10 DIAGNOSIS — R93.1 ABNORMAL FINDINGS ON DIAGNOSTIC IMAGING OF HEART AND CORONARY CIRCULATION: ICD-10-CM

## 2025-02-10 LAB
ANION GAP SERPL CALCULATED.3IONS-SCNC: 9.2 MMOL/L (ref 5–15)
BUN SERPL-MCNC: 9 MG/DL (ref 8–23)
BUN/CREAT SERPL: 8.8 (ref 7–25)
CALCIUM SPEC-SCNC: 8.5 MG/DL (ref 8.6–10.5)
CHLORIDE SERPL-SCNC: 105 MMOL/L (ref 98–107)
CO2 SERPL-SCNC: 20.8 MMOL/L (ref 22–29)
CREAT SERPL-MCNC: 1.02 MG/DL (ref 0.76–1.27)
DEPRECATED RDW RBC AUTO: 47.9 FL (ref 37–54)
EGFRCR SERPLBLD CKD-EPI 2021: 74.8 ML/MIN/1.73
ERYTHROCYTE [DISTWIDTH] IN BLOOD BY AUTOMATED COUNT: 13.5 % (ref 12.3–15.4)
GLUCOSE SERPL-MCNC: 84 MG/DL (ref 65–99)
HCT VFR BLD AUTO: 41.9 % (ref 37.5–51)
HGB BLD-MCNC: 13.6 G/DL (ref 13–17.7)
MCH RBC QN AUTO: 31.1 PG (ref 26.6–33)
MCHC RBC AUTO-ENTMCNC: 32.5 G/DL (ref 31.5–35.7)
MCV RBC AUTO: 95.9 FL (ref 79–97)
PLATELET # BLD AUTO: 136 10*3/MM3 (ref 140–450)
PMV BLD AUTO: 10.7 FL (ref 6–12)
POTASSIUM SERPL-SCNC: 4.2 MMOL/L (ref 3.5–5.2)
RBC # BLD AUTO: 4.37 10*6/MM3 (ref 4.14–5.8)
SODIUM SERPL-SCNC: 135 MMOL/L (ref 136–145)
WBC NRBC COR # BLD AUTO: 3.67 10*3/MM3 (ref 3.4–10.8)

## 2025-02-10 PROCEDURE — 99153 MOD SED SAME PHYS/QHP EA: CPT | Performed by: STUDENT IN AN ORGANIZED HEALTH CARE EDUCATION/TRAINING PROGRAM

## 2025-02-10 PROCEDURE — 25010000002 LIDOCAINE 2% SOLUTION: Performed by: STUDENT IN AN ORGANIZED HEALTH CARE EDUCATION/TRAINING PROGRAM

## 2025-02-10 PROCEDURE — C1894 INTRO/SHEATH, NON-LASER: HCPCS | Performed by: STUDENT IN AN ORGANIZED HEALTH CARE EDUCATION/TRAINING PROGRAM

## 2025-02-10 PROCEDURE — 25510000001 IOPAMIDOL PER 1 ML: Performed by: STUDENT IN AN ORGANIZED HEALTH CARE EDUCATION/TRAINING PROGRAM

## 2025-02-10 PROCEDURE — 93459 L HRT ART/GRFT ANGIO: CPT

## 2025-02-10 PROCEDURE — 80048 BASIC METABOLIC PNL TOTAL CA: CPT | Performed by: STUDENT IN AN ORGANIZED HEALTH CARE EDUCATION/TRAINING PROGRAM

## 2025-02-10 PROCEDURE — 99152 MOD SED SAME PHYS/QHP 5/>YRS: CPT | Performed by: STUDENT IN AN ORGANIZED HEALTH CARE EDUCATION/TRAINING PROGRAM

## 2025-02-10 PROCEDURE — 25010000002 MIDAZOLAM PER 1MG: Performed by: STUDENT IN AN ORGANIZED HEALTH CARE EDUCATION/TRAINING PROGRAM

## 2025-02-10 PROCEDURE — 25010000002 FENTANYL CITRATE (PF) 50 MCG/ML SOLUTION: Performed by: STUDENT IN AN ORGANIZED HEALTH CARE EDUCATION/TRAINING PROGRAM

## 2025-02-10 PROCEDURE — C1769 GUIDE WIRE: HCPCS | Performed by: STUDENT IN AN ORGANIZED HEALTH CARE EDUCATION/TRAINING PROGRAM

## 2025-02-10 PROCEDURE — 85027 COMPLETE CBC AUTOMATED: CPT | Performed by: STUDENT IN AN ORGANIZED HEALTH CARE EDUCATION/TRAINING PROGRAM

## 2025-02-10 RX ORDER — FENTANYL CITRATE 50 UG/ML
INJECTION, SOLUTION INTRAMUSCULAR; INTRAVENOUS
Status: DISCONTINUED | OUTPATIENT
Start: 2025-02-10 | End: 2025-02-10 | Stop reason: HOSPADM

## 2025-02-10 RX ORDER — ACETAMINOPHEN 325 MG/1
650 TABLET ORAL EVERY 4 HOURS PRN
Status: DISCONTINUED | OUTPATIENT
Start: 2025-02-10 | End: 2025-02-10 | Stop reason: HOSPADM

## 2025-02-10 RX ORDER — IOPAMIDOL 755 MG/ML
INJECTION, SOLUTION INTRAVASCULAR
Status: DISCONTINUED | OUTPATIENT
Start: 2025-02-10 | End: 2025-02-10 | Stop reason: HOSPADM

## 2025-02-10 RX ORDER — METOPROLOL SUCCINATE 50 MG/1
50 TABLET, EXTENDED RELEASE ORAL DAILY
Qty: 60 TABLET | Refills: 3 | Status: SHIPPED | OUTPATIENT
Start: 2025-02-10

## 2025-02-10 RX ORDER — MIDAZOLAM HYDROCHLORIDE 2 MG/2ML
INJECTION, SOLUTION INTRAMUSCULAR; INTRAVENOUS
Status: DISCONTINUED | OUTPATIENT
Start: 2025-02-10 | End: 2025-02-10 | Stop reason: HOSPADM

## 2025-02-10 RX ORDER — LOSARTAN POTASSIUM 50 MG/1
50 TABLET ORAL DAILY
Qty: 60 TABLET | Refills: 5 | Status: SHIPPED | OUTPATIENT
Start: 2025-02-10

## 2025-02-10 RX ORDER — NITROGLYCERIN 0.4 MG/1
0.4 TABLET SUBLINGUAL
Status: DISCONTINUED | OUTPATIENT
Start: 2025-02-10 | End: 2025-02-10 | Stop reason: HOSPADM

## 2025-02-10 RX ORDER — LIDOCAINE HYDROCHLORIDE 20 MG/ML
INJECTION, SOLUTION INFILTRATION; PERINEURAL
Status: DISCONTINUED | OUTPATIENT
Start: 2025-02-10 | End: 2025-02-10 | Stop reason: HOSPADM

## 2025-02-10 RX ORDER — SODIUM CHLORIDE 9 MG/ML
3 INJECTION, SOLUTION INTRAVENOUS CONTINUOUS
Status: ACTIVE | OUTPATIENT
Start: 2025-02-10 | End: 2025-02-10

## 2025-02-10 NOTE — Clinical Note
Hemostasis started on the right femoral artery. Manual pressure applied to vessel. Manual pressure was held by TA. Manual pressure was held for 15 min. Hemostasis achieved successfully.

## 2025-02-10 NOTE — LETTER
February 10, 2025       To Whom It May Concern:     Elke Carter was at [facility] on the date listed above.    Sincerely,        [facility]    CC: No Recipients

## 2025-02-10 NOTE — H&P
Pre Procedural H&P  [facility] CATH LAB          Patient Identification:  Familia Thompson      [id number]  79 y.o.        male  1945        PCP: Robb Gómez DO    History of Present Illness:  Mr. Barbosa is a 79-year-old male who has a past medical history of PCIs, history of robotic CABG surgery with LIMA to LAD only.  Patient is presenting for coronary angiography due to HFmEF.       Past History:  Past Medical History:   Diagnosis Date    Arthritis     Chronic systolic CHF (congestive heart failure)     COPD (chronic obstructive pulmonary disease)     Coronary artery disease     ETOH abuse     12-18 BEERS DAILY    Hemorrhoids     HL (hearing loss)     Hyperlipidemia     Hypertension     Myocardial infarction     NSTEMI (non-ST elevated myocardial infarction) 04/27/2022    Added automatically from request for surgery [id number]    PVD (peripheral vascular disease)     Sinus disorder      Past Surgical History:   Procedure Laterality Date    CARDIAC CATHETERIZATION N/A 04/29/2022    Procedure: Left Heart Cath, possible angioplasty;  Surgeon: Zaki Díaz MD;  Location: [facility] CATH INVASIVE LOCATION;  Service: Cardiovascular;  Laterality: N/A;    CARDIAC CATHETERIZATION N/A 05/02/2022    Procedure: Percutaneous Coronary Intervention;  Surgeon: Josue Hensley MD;  Location: [facility] CATH INVASIVE LOCATION;  Service: Cardiovascular;  Laterality: N/A;    CARDIAC CATHETERIZATION N/A 05/02/2022    Procedure: Stent BLAIRE coronary;  Surgeon: Josue Hensley MD;  Location: [facility] CATH INVASIVE LOCATION;  Service: Cardiovascular;  Laterality: N/A;    CARDIAC CATHETERIZATION N/A 05/02/2022    Procedure: Atherectomy-coronary- Rotoblator;  Surgeon: Josue Hensley MD;  Location: [facility] CATH INVASIVE LOCATION;  Service: Cardiovascular;  Laterality: N/A;    CARDIAC CATHETERIZATION N/A 9/8/2023    Procedure: Left Heart Cath;  Surgeon: Pawan Guerrero MD;  Location: [facility] CATH INVASIVE LOCATION;   Service: Cardiovascular;  Laterality: N/A;    CARDIAC CATHETERIZATION N/A 9/8/2023    Procedure: Coronary angiography;  Surgeon: Pawan Guerrero MD;  Location: [facility] CATH INVASIVE LOCATION;  Service: Cardiovascular;  Laterality: N/A;    CARDIAC CATHETERIZATION  9/8/2023    Procedure: Saphenous Vein Graft;  Surgeon: Pawan Guerrero MD;  Location: [facility] CATH INVASIVE LOCATION;  Service: Cardiovascular;;    CARDIAC CATHETERIZATION N/A 9/8/2023    Procedure: Left ventriculography;  Surgeon: Pawan Guerrero MD;  Location: [facility] CATH INVASIVE LOCATION;  Service: Cardiovascular;  Laterality: N/A;    CARDIAC SURGERY      COLONOSCOPY      COLONOSCOPY N/A 9/15/2022    Procedure: COLONOSCOPY;  Surgeon: Dennis Saavedra MD;  Location: [facility] ENDOSCOPY;  Service: Gastroenterology;  Laterality: N/A;  COLON POLYP    CORONARY ANGIOPLASTY WITH STENT PLACEMENT      ENDOSCOPY N/A 9/15/2022    Procedure: ESOPHAGOGASTRODUODENOSCOPY;  Surgeon: Dennis Saavedra MD;  Location: [facility] ENDOSCOPY;  Service: Gastroenterology;  Laterality: N/A;   GASTRTIS BARRETTS ESOPH    ENDOSCOPY N/A 1/31/2025    Procedure: ESOPHAGOGASTRODUODENOSCOPY WITH BIOPSIES;  Surgeon: Dennis Saavedra MD;  Location: [facility] ENDOSCOPY;  Service: Gastroenterology;  Laterality: N/A;  HIATAL HERNIA AND BARRETTS    EYE SURGERY Bilateral     UPPER GASTROINTESTINAL ENDOSCOPY      VEIN LIGATION AND STRIPPING      WRIST FRACTURE SURGERY       No Known Allergies  Social History     Socioeconomic History    Marital status:    Tobacco Use    Smoking status: Every Day     Current packs/day: 0.75     Average packs/day: 1 pack/day for 30.5 years (30.4 ttl pk-yrs)     Types: Cigarettes     Start date: 1993     Last attempt to quit: 2023     Passive exposure: Past    Smokeless tobacco: Never    Tobacco comments:     Quit smoking in march 2023      PT INST NOT TO SMOKE 24 HOURS PRIOR TO PROCEDURE.   Vaping Use    Vaping status: Never Used    Passive vaping  exposure: Yes   Substance and Sexual Activity    Alcohol use: Yes     Alcohol/week: 12.0 standard drinks of alcohol     Types: 12 Cans of beer per week     Comment: 12 pack a day/INST TO LIMIT ETOH PRIOR TO PROCEDURE.    Drug use: Not Currently     Comment: 0cc    Sexual activity: Defer     Family History   Problem Relation Age of Onset    Colon cancer Neg Hx     Malig Hyperthermia Neg Hx      Medications:  Prior to Admission medications    Medication Sig Start Date End Date Taking? Authorizing Provider   albuterol sulfate  (90 Base) MCG/ACT inhaler Inhale 2 puffs Every 4 (Four) Hours As Needed for Wheezing or Shortness of Air. 1/28/25  Yes Robb Gómez DO   atorvastatin (LIPITOR) 80 MG tablet Take 1 tablet by mouth Every Night. 1/19/25  Yes Robb Gómez DO   clopidogrel (PLAVIX) 75 MG tablet Take 1 tablet by mouth Daily. 1/19/25  Yes Robb Gómez DO   metoprolol tartrate (LOPRESSOR) 25 MG tablet Take 1 tablet by mouth 2 (Two) Times a Day. 1/15/25  Yes Robb Gómez DO   pantoprazole (Protonix) 40 MG EC tablet Take 1 tablet by mouth Daily. 5/13/24  Yes Robb Gómez DO   tamsulosin (FLOMAX) 0.4 MG capsule 24 hr capsule TAKE 1 CAPSULE BY MOUTH DAILY 11/4/24  Yes Robb Gómez DO   topiramate (TOPAMAX) 50 MG tablet 1/2 tablet daily x 1 week, 1/2 tablet twice a day second week, 1/2 tablet in the morning and 1 tablet in the evening the third week and then 1 tablet twice a day thereafter.  Patient taking differently: Take 1 tablet by mouth 2 (Two) Times a Day. 4/11/24  Yes Zaki Sepulveda MD   apixaban (ELIQUIS) 5 MG tablet tablet Take 1 tablet by mouth 2 (Two) Times a Day.    Provider, MD Roberta   furosemide (LASIX) 40 MG tablet Take 1 tablet by mouth Daily As Needed (if overnoight 2 lb changes are noted.). 12/19/24   Nirmal Betts MD   sacubitril-valsartan (Entresto) 24-26 MG tablet Take 1 tablet by mouth 2 (Two) Times a Day.  Patient not taking: Reported on  1/10/2025 12/19/24   Nirmal Betts MD      Current medications:    Current IV drips:  No current facility-administered medications for this encounter.        Physical Exam    /90   Pulse 50   Temp 97.2 °F (36.2 °C) (Temporal)   SpO2 97%  There is no height or weight on file to calculate BMI.   Oxygen saturation   @FLOWAN(10::1)@ SpO2  Min: 97 %  Max: 97 %    Constitutional:  Awake. Not in acute distress. Normal appearance.   Neck: No carotid bruit, hepatojugular reflux or JVD.   Cardiovascular:      Rate and Rhythm: Normal rate and regular rhythm.      Chest Wall: PMI is not displaced.      Heart sounds: Normal heart sounds, S1 normal and S2 normal. No murmur heard.       No friction rub. No gallop. No S3 or S4 sounds.    Pulmonary: Pulmonary effort is normal. Normal breath sounds. No wheezing, rhonchi or rales.   Extremities: No Bilateral edema is noted.   Skin: Warm and dry. Non cyanotic, No petechiae or rash.   Neurological: Alert and oriented x 3   Results for orders placed during the hospital encounter of 01/16/25    Adult Transthoracic Echo Complete W/ Cont if Necessary Per Protocol    Interpretation Summary  Normal chamber sizes.  LV has normal wall thickness.  Regional wall motion abnormalities are present.  Systolic function is mildly reduced, Estimated LVEF is greater than 40-45%.  Diastolic function is abnormal, Grade II with pseudonormal filling pattern.  RV has reduced systolic function.  Trileaflet aortic valve.  There is no aortic valve stenosis. Aortic valve sclerosis is present.Mild AR is noted.  Mitral valve has thickened leaflets with preserved leaflet excursion.No significant MR is noted.  Grossly normal tricuspid valve.  Trace TR is noted.  RVSP can not be estimated due to incomplete TR.  No pericardial effusion is noted.  Aortic root has normal dimensions.  IVC has normal size.  Estimated right atrial pressure is 0-5 mm Hg    No prior studies available for comparison  Coronary  angiogram is recommended for evaluation of underlying CAD.     Results for orders placed during the hospital encounter of 23    Stress test with myocardial perfusion one day    Interpretation Summary    Left ventricular ejection fraction is moderately reduced (Calculated EF = 32%).    Impressions are consistent with an intermediate risk study.    This study showed evidence of anterolateral ischemia and  apical infarct.      Results for orders placed during the hospital encounter of 23    Cardiac Catheterization/Vascular Study    Conclusion  [facility]  CARDIAC CATHETERIZATION PROCEDURE REPORT    Patient: Familia Thompson  : 1945  MRN: [id number]    Procedure Date:  23    Referring Physician:  Alejandro Dixon MD    Interventional Cardiologist:  Pawan Guerrero MD    Indication:  Coronary artery disease with stable angina  Cardiomyopathy, ischemic  Abnormal stress test    Clinical Presentation:  Mr. Thompson is a 78-year-old gentleman past medical history notable for coronary artery disease status post bypass surgery and percutaneous intervention, ischemic cardiomyopathy, hypertension, mixed hyperlipidemia who presents to the Cath Lab for further evaluation of his abnormal stress test and decline in LV function.  He underwent complex PCI about a year ago from his left main into the circumflex as a protected PCI did well with that procedure but recent stress testing showed anterior wall ischemia for this reason he was referred back for diagnostic cardiac catheterization.  Furthermore he had a decline in his ejection fraction more in the 30% range.    Procedure performed:  Coronary angiography  Left heart catheterization  Left ventriculogram    Access Sites:  Left radial artery    Findings:  1. Coronary Artery Anatomy:  Dominance: Left  Left Main: Patent stent extending into the circumflex.  Left Anterior Descendin% occluded at its ostium  Circumflex Artery: Patent stent  extending from the left main into the circumflex with no significant in-stent stenoses within the proximal portion.  There is a mid 30% segment stenoses in between 2 stents.  There is stents extending from the distal portion into the posterior lateral circulation which is patent containing luminal regularities.  Does supply a moderate size inferior marginal branch as well as a PDA.  The PDA is small has a focal 60% proximal segment stenoses.  Right Coronary Artery: Nondominant right coronary artery not injected on current study    LIMA to LAD: Graft is patent supplies the mid LAD backfills the diagonal    2. Hemodynamics:  Left Ventricle: 118/7/10 mmHg  Aorta: 118/44/78 mmHg    3. Left Ventriculogram:  Ejection Fraction: 45-50%  Wall Motion: Mild hypokinesis of the apical and basal inferior wall  Mitral Regurgitation: None    Conclusions:  Severe native vessel coronary artery disease with 100% occluded ostial LAD, patent stent from the left main into the proximal circumflex as well as patent stent within the distal circumflex extending into the left-sided PDA.  30% mid circumflex segment stenoses.  Nondominant right coronary artery.  Patent LIMA to mid LAD.  Normal LVEDP of 10 mmHg  Hand injected left ventriculogram identifies an ejection fraction in the 45 to 50% range    Recommendations:  No significant revascularization targets noted on current study would continue medical management of patient's coronary disease and cardiomyopathy  He may restart anticoagulation tomorrow    Procedure Details:  Informed consent was obtained with an explanation of the risk and benefits of the procedure. The patient was brought to the Cardiac Catheterization Laboratory and was prepped and draped in a standard sterile fashion. Moderate sedation with Fentanyl and Versed was administered by the circulating nurse. Lidocaine 2% was used to anesthetize the left radial artery and a 5/6 Slender sheath was placed.  An IMT catheter was used  "to engage the LIMA graft with angiographic images obtained.  We then exchanged for a JL 4 diagnostic catheter this entered into the left ventricle to measure and LVEDP and perform a pullback across the aortic valve.  We then engaged the left main with angiographic images obtained.  We then exchanged for an angled pigtail catheter and enter the left ventricle to measure hemodynamics and perform a left ventriculogram.  The catheter was then removed over a guidewire. The radial artery sheath was removed without difficulty and TR Band was placed over the access site with excellent hemostasis.    Patient tolerated the procedure well without any complications, and transferred to the post procedure area for recovery in a stable condition.    Complications:  None.    Estimated Blood Loss:  Minimal.      Pawan Guerrero MD  [facility]  09/08/23  10:07 EDT      Cardiolite (Tc-99m Sestamibi) stress test   Lab Review:       CBC          8/22/2024    09:03 1/15/2025    11:46 2/10/2025    09:08   CBC   WBC 4.76  3.31  3.67    RBC 4.49  5.23  4.37    Hemoglobin 13.6  16.6  13.6    Hematocrit 41.3  47.6  41.9    MCV 92.0  91.0  95.9    MCH 30.3  31.7  31.1    MCHC 32.9  34.9  32.5    RDW 13.0  12.5  13.5    Platelets 153  128  136        CMP          8/22/2024    09:03 1/15/2025    11:46 2/10/2025    09:08   CMP   Glucose 83  69  84    BUN 5  9  9    Creatinine 0.89  1.01  1.02    EGFR 87.2  75.7  74.8    Sodium 132  130  135    Potassium 3.8  4.1  4.2    Chloride 98  95  105    Calcium 9.1  9.1  8.5    Total Protein 6.2  7.1     Albumin 4.1  4.4     Globulin 2.1  2.7     Total Bilirubin 0.5  0.4     Alkaline Phosphatase 134  103     AST (SGOT) 27  59     ALT (SGPT) 16  38     Albumin/Globulin Ratio 2.0  1.6     BUN/Creatinine Ratio 5.6  8.9  8.8    Anion Gap 11.0  15.0  9.2         CARDIAC LABS:       No results found for: \"DIGOXIN\"   Lab Results   Component Value Date    TSH 2.850 01/15/2025           Invalid " "input(s): \"LDLCALC\"  Lab Results   Component Value Date    POCTROP 0.02 04/28/2022     No results found for: \"DDIMERQUAN\"  Lab Results   Component Value Date    MG 2.1 04/29/2022             CARDIAC LABS:         Assessment:    Abnormal findings on diagnostic imaging of heart and coronary circulation    Coronary artery disease involving coronary bypass graft of native heart with angina pectoris        Plan:  Coronary angiogram via right femoral access.  Groin precautions.      Thank you for allowing us to share in [facility]. Please call with any questions or concerns.             Nirmal Betts MD   2/10/2025    11:58 EST  Preprocedural H&P  "

## 2025-02-10 NOTE — DISCHARGE INSTRUCTIONS
DISCHARGE INSTRUCTIONS  VASCULAR  PROCEDURES      For your surgery you had:  IV sedation.     You may experience dizziness, drowsiness, or light-headedness for several hours following surgery/procedure.  Do not stay alone today or tonight.  Limit your activity for 24 hours.  Resume your diet slowly.  Follow whatever special dietary instructions you may have been given by your doctor.  You should not drive or operate machinery, drink alcohol, or sign legally binding documents for 24 hours or while you are taking pain medication.    NOTIFY YOUR DOCTOR IF YOU EXPERIENCE ANY OF THE FOLLOWING:  Temperature greater than 101 degrees Fahrenheit  Shaking Chills  Redness or excessive drainage from incision  Nausea, vomiting and/or pain that is not controlled by prescribed medications  Increase in bleeding or bleeding that is excessive  Unable to urinate in 6 hours after surgery  If unable to reach your doctor, please go to the closest Emergency Room  May remove dressing: in 24 hours  No heavy lifting greater than 10-15lbs for 3 days.  You may shower tomorrow, no submerging of incision for 2 weeks. (Pat site dry only)      Medications per physician instructions as indicated on After Visit Summary.      Special Instructions:    Apply band-aid daily if needed for oozing or moisture, try to keep area clean and dry until scab has formed on site.  NO driving for 24 hours, avoid driving unless necessary and go slowly.  NO strenuous activity, exercise, pushing or pulling.  No heavy lifting greater than 10 lbs for 3 days.  Avoid stairs, if necessary go slowly.  While coughing, sneezing, or straining for bowel movement, apply pressure with palm of hand to site for 24 hours.  MD to release for work or sexual activity.      SPECIAL INSTRUCTIONS:    After the procedure it is common to have soreness/bruising at the site that will fade within 1-2 weeks.   Check your femoral site every day for signs of infection.  DO NOT lift anything  that is heavier than 10 lbs for 3 days.  Hold pressure at the incision site and call 911 if the incision area swells very fast, you have bleeding at the incision that does not stop, or your leg becomes pale, cool, or numb

## 2025-02-18 RX ORDER — ACAMPROSATE CALCIUM 333 MG/1
666 TABLET, DELAYED RELEASE ORAL 3 TIMES DAILY
Qty: 180 TABLET | Refills: 2 | Status: SHIPPED | OUTPATIENT
Start: 2025-02-18

## 2025-03-14 ENCOUNTER — OFFICE VISIT (OUTPATIENT)
Dept: CARDIOLOGY | Facility: CLINIC | Age: 80
End: 2025-03-14
Payer: MEDICARE

## 2025-03-14 ENCOUNTER — TELEPHONE (OUTPATIENT)
Dept: FAMILY MEDICINE CLINIC | Facility: CLINIC | Age: 80
End: 2025-03-14
Payer: MEDICARE

## 2025-03-14 VITALS
HEART RATE: 59 BPM | WEIGHT: 131 LBS | HEIGHT: 70 IN | SYSTOLIC BLOOD PRESSURE: 132 MMHG | DIASTOLIC BLOOD PRESSURE: 72 MMHG | BODY MASS INDEX: 18.75 KG/M2

## 2025-03-14 DIAGNOSIS — I25.709 CORONARY ARTERY DISEASE INVOLVING CORONARY BYPASS GRAFT OF NATIVE HEART WITH ANGINA PECTORIS: Primary | ICD-10-CM

## 2025-03-14 RX ORDER — SPIRONOLACTONE 25 MG/1
25 TABLET ORAL DAILY
Qty: 60 TABLET | Refills: 5 | Status: SHIPPED | OUTPATIENT
Start: 2025-03-14

## 2025-03-14 RX ORDER — ASPIRIN 81 MG/1
81 TABLET ORAL DAILY
Qty: 60 TABLET | Refills: 5 | Status: SHIPPED | OUTPATIENT
Start: 2025-03-14

## 2025-03-14 RX ORDER — METOPROLOL SUCCINATE 50 MG/1
50 TABLET, EXTENDED RELEASE ORAL DAILY
Qty: 60 TABLET | Refills: 3 | Status: SHIPPED | OUTPATIENT
Start: 2025-03-14

## 2025-03-14 NOTE — PROGRESS NOTES
Interventional Cardiology Patient Visit Follow Up Note      History of Presenting Illness:  History of Present Illness  The patient is a 79-year-old male who presents today for a routine follow-up and requires clearance for an esophagogastroduodenoscopy (EGD). He has a prior history of  percutaneous coronary intervention (PCI) of his LM, LCX, CABG with left internal mammary artery (LIMA) to left anterior descending (LAD) artery and a history of Collins's esophagus.      He complains of intermittent nosebleeds when he is blowing his nose.  He developed common cold few weeks ago and has been intermittently coughing since.  Today he complains of localized chest pain which was initially on the left side and now has shifted over the right side.  It is reproducible and alleviates by massaging the chest.  Patient is able to localize his pain to right subcostal margin.  It does not exacerbate with exertion however it worsens with deep breathing and coughing.    His physical activity is limited, with most of his time spent watching television. He enjoys woodworking but has not engaged in this activity for over a year due to knee issues and a fractured arm that limits his range of motion.     He occasionally assists with household chores. He experiences shortness of breath after walking half a mile and reports swelling in his legs from the ankles up, more pronounced on one side. He does not experience shortness of breath at night. He does not experience cramping or claudication in the back of his calves, buttock pain, or foot ulcers.    He denies peripheral edema presyncope syncope and palpitations to me today.      SOCIAL HISTORY  The patient admits to smoking about three-quarters of a pack per day.    MEDICATIONS  Current: cetirizine, metoprolol tartrate, Eliquis, Plavix, albuterol inhaler solution             Past Medical History  Past Medical History:   Diagnosis Date    Arthritis     Chronic systolic CHF  (congestive heart failure)     COPD (chronic obstructive pulmonary disease)     Coronary artery disease     ETOH abuse     12-18 BEERS DAILY    Hemorrhoids     HL (hearing loss)     Hyperlipidemia     Hypertension     Myocardial infarction     NSTEMI (non-ST elevated myocardial infarction) 04/27/2022    Added automatically from request for surgery [id number]    PVD (peripheral vascular disease)     Sinus disorder          Current Outpatient Medications:     albuterol sulfate  (90 Base) MCG/ACT inhaler, Inhale 2 puffs Every 4 (Four) Hours As Needed for Wheezing or Shortness of Air., Disp: 8 g, Rfl: 3    apixaban (ELIQUIS) 5 MG tablet tablet, Take 1 tablet by mouth 2 (Two) Times a Day., Disp: , Rfl:     atorvastatin (LIPITOR) 80 MG tablet, Take 1 tablet by mouth Every Night., Disp: 90 tablet, Rfl: 3    furosemide (LASIX) 40 MG tablet, Take 1 tablet by mouth Daily As Needed (if overnoight 2 lb changes are noted.)., Disp: 90 tablet, Rfl: 3    losartan (Cozaar) 50 MG tablet, Take 1 tablet by mouth Daily., Disp: 60 tablet, Rfl: 5    metoprolol succinate XL (Toprol XL) 50 MG 24 hr tablet, Take 1 tablet by mouth Daily., Disp: 60 tablet, Rfl: 3    pantoprazole (Protonix) 40 MG EC tablet, Take 1 tablet by mouth Daily., Disp: 90 tablet, Rfl: 3    tamsulosin (FLOMAX) 0.4 MG capsule 24 hr capsule, TAKE 1 CAPSULE BY MOUTH DAILY, Disp: 90 capsule, Rfl: 3    topiramate (TOPAMAX) 50 MG tablet, 1/2 tablet daily x 1 week, 1/2 tablet twice a day second week, 1/2 tablet in the morning and 1 tablet in the evening the third week and then 1 tablet twice a day thereafter. (Patient taking differently: Take 1 tablet by mouth 2 (Two) Times a Day.), Disp: 120 tablet, Rfl: 3    aspirin 81 MG EC tablet, Take 1 tablet by mouth Daily., Disp: 60 tablet, Rfl: 5    spironolactone (ALDACTONE) 25 MG tablet, Take 1 tablet by mouth Daily., Disp: 60 tablet, Rfl: 5  Current outpatient and discharge medications have been reconciled for the  "patient.  Reviewed by: Nirmal Betts MD     Medications Discontinued During This Encounter   Medication Reason    clopidogrel (PLAVIX) 75 MG tablet *Therapy completed    metoprolol succinate XL (Toprol XL) 50 MG 24 hr tablet Reorder    acamprosate (CAMPRAL) 333 MG EC tablet *Therapy completed       No Known Allergies     Social History     Tobacco Use    Smoking status: Every Day     Current packs/day: 0.75     Average packs/day: 1 pack/day for 30.6 years (30.5 ttl pk-yrs)     Types: Cigarettes     Start date: 1993     Last attempt to quit: 2023     Passive exposure: Past    Smokeless tobacco: Never    Tobacco comments:     Quit smoking in march 2023      PT INST NOT TO SMOKE 24 HOURS PRIOR TO PROCEDURE.   Vaping Use    Vaping status: Never Used    Passive vaping exposure: Yes   Substance Use Topics    Alcohol use: Yes     Alcohol/week: 12.0 standard drinks of alcohol     Types: 12 Cans of beer per week     Comment: 12 pack a day/INST TO LIMIT ETOH PRIOR TO PROCEDURE.    Drug use: Not Currently     Comment: 0cc       Family History   Problem Relation Age of Onset    Colon cancer Neg Hx     Malig Hyperthermia Neg Hx           Objective   /72 (BP Location: Right arm, Patient Position: Sitting, Cuff Size: Adult)   Pulse 59   Ht 177.8 cm (70\")   Wt 59.4 kg (131 lb)   BMI 18.80 kg/m²     Wt Readings from Last 3 Encounters:   03/14/25 59.4 kg (131 lb)   01/31/25 60 kg (132 lb 4.4 oz)   01/15/25 59.6 kg (131 lb 4.8 oz)     BP Readings from Last 3 Encounters:   03/14/25 132/72   02/10/25 (!) 85/46   01/31/25 121/58       Physical Exam  Constitutional:  Awake. Not in acute distress. Normal appearance.   Neck: No carotid bruit, hepatojugular reflux or JVD.   Cardiovascular:      Rate and Rhythm: Normal rate and regular rhythm.      Chest Wall: PMI is not displaced.      Pulmonary: Pulmonary effort is normal.  Rhonchi are auscultated on both lung fields  Extremities: No Bilateral edema is noted.   Skin: Warm and dry. " "Non cyanotic, No petechiae or rash.   Neurological: Alert and oriented x 3  Psychiatric:  Behavior is cooperative.       Result Review :   The following data was reviewed by Nirmal Betts MD on 03/14/2025   proBNP   Date Value Ref Range Status   04/27/2022 1,186.0 0.0 - 1,800.0 pg/mL Final     CMP          8/22/2024    09:03 1/15/2025    11:46 2/10/2025    09:08   CMP   Glucose 83  69  84    BUN 5  9  9    Creatinine 0.89  1.01  1.02    EGFR 87.2  75.7  74.8    Sodium 132  130  135    Potassium 3.8  4.1  4.2    Chloride 98  95  105    Calcium 9.1  9.1  8.5    Total Protein 6.2  7.1     Albumin 4.1  4.4     Globulin 2.1  2.7     Total Bilirubin 0.5  0.4     Alkaline Phosphatase 134  103     AST (SGOT) 27  59     ALT (SGPT) 16  38     Albumin/Globulin Ratio 2.0  1.6     BUN/Creatinine Ratio 5.6  8.9  8.8    Anion Gap 11.0  15.0  9.2      CBC w/diff          2/12/2024    12:58 4/13/2024    18:33 8/22/2024    09:03   CBC w/Diff   WBC 4.51  3.71  4.76    RBC 4.35  4.00  4.49    Hemoglobin 13.9  12.4  13.6    Hematocrit 40.9  37.6  41.3    MCV 94.0  94.0  92.0    MCH 32.0  31.0  30.3    MCHC 34.0  33.0  32.9    RDW 12.6  13.7  13.0    Platelets 152  133  153    Neutrophil Rel % 55.2  59.3     Immature Granulocyte Rel % 0.2  0.5     Lymphocyte Rel % 30.2  27.8     Monocyte Rel % 11.5  10.5     Eosinophil Rel % 1.3  0.8     Basophil Rel % 1.6  1.1        Lab Results   Component Value Date    TSH 2.850 01/15/2025      Lab Results   Component Value Date    FREET4 1.27 01/15/2025      D-Dimer, Quantitative   Date Value Ref Range Status   04/27/2022 2.39 (H) 0.00 - 0.57 MCGFEU/mL Final     Comment:     Note new Reference Range     Magnesium   Date Value Ref Range Status   04/29/2022 2.1 1.6 - 2.4 mg/dL Final      No results found for: \"DIGOXIN\"   Lab Results   Component Value Date    TROPONINT 0.323 (C) 04/29/2022      Lab Results   Component Value Date    POCTROP 0.02 04/28/2022          A1C Last 3 Results          1/15/2025 "    11:46   HGBA1C Last 3 Results   Hemoglobin A1C 5.30      Lipid Panel          8/22/2024    09:03 1/15/2025    11:46   Lipid Panel   Total Cholesterol 124  160    Triglycerides 76  80    HDL Cholesterol 65  75    VLDL Cholesterol 15  15    LDL Cholesterol  44  70    LDL/HDL Ratio 0.67  0.92      Results for orders placed during the hospital encounter of 01/16/25    Adult Transthoracic Echo Complete W/ Cont if Necessary Per Protocol    Interpretation Summary  Normal chamber sizes.  LV has normal wall thickness.  Regional wall motion abnormalities are present.  Systolic function is mildly reduced, Estimated LVEF is greater than 40-45%.  Diastolic function is abnormal, Grade II with pseudonormal filling pattern.  RV has reduced systolic function.  Trileaflet aortic valve.  There is no aortic valve stenosis. Aortic valve sclerosis is present.Mild AR is noted.  Mitral valve has thickened leaflets with preserved leaflet excursion.No significant MR is noted.  Grossly normal tricuspid valve.  Trace TR is noted.  RVSP can not be estimated due to incomplete TR.  No pericardial effusion is noted.  Aortic root has normal dimensions.  IVC has normal size.  Estimated right atrial pressure is 0-5 mm Hg    No prior studies available for comparison  Coronary angiogram is recommended for evaluation of underlying CAD.     Results for orders placed during the hospital encounter of 01/16/25    Adult Transthoracic Echo Complete W/ Cont if Necessary Per Protocol    Interpretation Summary  Normal chamber sizes.  LV has normal wall thickness.  Regional wall motion abnormalities are present.  Systolic function is mildly reduced, Estimated LVEF is greater than 40-45%.  Diastolic function is abnormal, Grade II with pseudonormal filling pattern.  RV has reduced systolic function.  Trileaflet aortic valve.  There is no aortic valve stenosis. Aortic valve sclerosis is present.Mild AR is noted.  Mitral valve has thickened leaflets with preserved  leaflet excursion.No significant MR is noted.  Grossly normal tricuspid valve.  Trace TR is noted.  RVSP can not be estimated due to incomplete TR.  No pericardial effusion is noted.  Aortic root has normal dimensions.  IVC has normal size.  Estimated right atrial pressure is 0-5 mm Hg    No prior studies available for comparison  Coronary angiogram is recommended for evaluation of underlying CAD.     Results for orders placed during the hospital encounter of 23    Cardiac Catheterization/Vascular Study    [facility]  CARDIAC CATHETERIZATION PROCEDURE REPORT    Patient: Familia Thompson  : 1945  MRN: [id number]    Procedure Date:  23    Referring Physician:  Alejandro Dixon MD    Interventional Cardiologist:  Pawan Guerrero MD    Indication:  Coronary artery disease with stable angina  Cardiomyopathy, ischemic  Abnormal stress test    Clinical Presentation:  Mr. Thompson is a 78-year-old gentleman past medical history notable for coronary artery disease status post bypass surgery and percutaneous intervention, ischemic cardiomyopathy, hypertension, mixed hyperlipidemia who presents to the Cath Lab for further evaluation of his abnormal stress test and decline in LV function.  He underwent complex PCI about a year ago from his left main into the circumflex as a protected PCI did well with that procedure but recent stress testing showed anterior wall ischemia for this reason he was referred back for diagnostic cardiac catheterization.  Furthermore he had a decline in his ejection fraction more in the 30% range.    Procedure performed:  Coronary angiography  Left heart catheterization  Left ventriculogram    Access Sites:  Left radial artery    Findings:  1. Coronary Artery Anatomy:  Dominance: Left  Left Main: Patent stent extending into the circumflex.  Left Anterior Descendin% occluded at its ostium  Circumflex Artery: Patent stent extending from the left main  into the circumflex with no significant in-stent stenoses within the proximal portion.  There is a mid 30% segment stenoses in between 2 stents.  There is stents extending from the distal portion into the posterior lateral circulation which is patent containing luminal regularities.  Does supply a moderate size inferior marginal branch as well as a PDA.  The PDA is small has a focal 60% proximal segment stenoses.  Right Coronary Artery: Nondominant right coronary artery not injected on current study    LIMA to LAD: Graft is patent supplies the mid LAD backfills the diagonal    2. Hemodynamics:  Left Ventricle: 118/7/10 mmHg  Aorta: 118/44/78 mmHg    3. Left Ventriculogram:  Ejection Fraction: 45-50%  Wall Motion: Mild hypokinesis of the apical and basal inferior wall  Mitral Regurgitation: None    Conclusions:  Severe native vessel coronary artery disease with 100% occluded ostial LAD, patent stent from the left main into the proximal circumflex as well as patent stent within the distal circumflex extending into the left-sided PDA.  30% mid circumflex segment stenoses.  Nondominant right coronary artery.  Patent LIMA to mid LAD.  Normal LVEDP of 10 mmHg  Hand injected left ventriculogram identifies an ejection fraction in the 45 to 50% range    Recommendations:  No significant revascularization targets noted on current study would continue medical management of patient's coronary disease and cardiomyopathy  He may restart anticoagulation tomorrow    Procedure Details:  Informed consent was obtained with an explanation of the risk and benefits of the procedure. The patient was brought to the Cardiac Catheterization Laboratory and was prepped and draped in a standard sterile fashion. Moderate sedation with Fentanyl and Versed was administered by the circulating nurse. Lidocaine 2% was used to anesthetize the left radial artery and a 5/6 Slender sheath was placed.  An IMT catheter was used to engage the LIMA graft with  angiographic images obtained.  We then exchanged for a JL 4 diagnostic catheter this entered into the left ventricle to measure and LVEDP and perform a pullback across the aortic valve.  We then engaged the left main with angiographic images obtained.  We then exchanged for an angled pigtail catheter and enter the left ventricle to measure hemodynamics and perform a left ventriculogram.  The catheter was then removed over a guidewire. The radial artery sheath was removed without difficulty and TR Band was placed over the access site with excellent hemostasis.    Patient tolerated the procedure well without any complications, and transferred to the post procedure area for recovery in a stable condition.    Complications:  None.    Estimated Blood Loss:  Minimal.      Pawan Guerrero MD  [facility]  09/08/23  10:07 EDT     Results for orders placed during the hospital encounter of 08/22/23    Stress test with myocardial perfusion one day    Interpretation Summary    Left ventricular ejection fraction is moderately reduced (Calculated EF = 32%).    Impressions are consistent with an intermediate risk study.    This study showed evidence of anterolateral ischemia and  apical infarct.       The ASCVD Risk score (Gopal DK, et al., 2019) failed to calculate for the following reasons:    Risk score cannot be calculated because patient has a medical history suggesting prior/existing ASCVD          1. Coronary artery disease involving coronary bypass graft of native heart with angina pectoris        Diagnoses and all orders for this visit:    1. Coronary artery disease involving coronary bypass graft of native heart with angina pectoris (Primary)  -     aspirin 81 MG EC tablet; Take 1 tablet by mouth Daily.  Dispense: 60 tablet; Refill: 5  -     metoprolol succinate XL (Toprol XL) 50 MG 24 hr tablet; Take 1 tablet by mouth Daily.  Dispense: 60 tablet; Refill: 3  -     spironolactone (ALDACTONE) 25 MG  tablet; Take 1 tablet by mouth Daily.  Dispense: 60 tablet; Refill: 5              Assessment & Plan  1. Coronary artery disease.  2. S/p CABG LIMA to LAD, PCI of LM, LCX,   (History of VT angiogram significant for LM stenosis)   His cholesterol molecules are at desired level.  Continue Lipitor 80 mg p.o. daily.  Continue lifelong Plavix.  No angina is reported by patient today.  Management of secondary close was reinforced in this visit including smoking cessation and optimal blood pressure control.    3. Heart failure with reduced EF 2/2 ICM,    His heart function is currently less than 40%, necessitating heart failure therapy.      He is currently on losartan 50 mg p.o. daily.  He will be enrolled in assistance program so he can be initiated on Entresto.  Continue spironolactone 25 mg p.o. daily  Continue Toprol-XL 50 mg p.o. daily.  Will give him Jardiance free samples in next visit.  Continue taking furosemide only if he notices an overnight weight gain of 2 pounds.    3. Smoking cessation.  The patient smokes about three-quarters of a pack per day and has expressed a desire to quit. Nicotine patches have been prescribed to aid in smoking cessation. He is advised to use the patches when he feels the craving to smoke.   He has significantly reduced his smoking.    4.  Peripheral arterial disease status post right profundofemoral artery thrombus endarterectomy  Continue same management as CAD.  No claudication reported.     5.  Paroxysmal atrial fibrillation  Continue Eliquis 5 mg p.o. twice daily.  This was noticed around profundofemoral artery thromboendarterectomy.    Currently on anticoagulation with Eliquis 5 mg p.o. twice daily  Continue Toprol-XL 50 mg for rate control.  Will continue with rate control strategy      Follow Up     Return in about 6 months (around 9/14/2025) for With Dr. Betts.      Nirmal Betts MD  Interventional Cardiology  03/14/2025  10:48 EDT      Patient was given instructions and  counseling regarding his condition or for health maintenance advice. Please see specific information pulled into the AVS if appropriate.     Please note that portions of this document were completed using a voice recognition program.     Patient or patient representative verbalized consent for the use of Ambient Listening during the visit with  Nirmal Betts MD for chart documentation. 3/15/2025  12:46 EST

## 2025-03-14 NOTE — TELEPHONE ENCOUNTER
Caller: EmmaConstantineElke    Relationship: Emergency Contact    Best call back number: [phone]     What medication are you requesting: MEDICATION TO HELP LOSING UP AND GET OUT MUCOUS AND PHLEGM.     What are your current symptoms: COUGH, CHEST HURT DUE TO COUGH (NOT CARDIAC RELATED) WHEEZING AND RATTLING (PER CARDIOLOGY IT IS DUE TO MUCOUS AND PHLEGM BUILD UP)    How long have you been experiencing symptoms: 1-2 WEEKS    If a prescription is needed, what is your preferred pharmacy and phone number: [id number] - [place], [place] - [address] [facility] AT [facility] OF [address] & [place] - [phone]  - [phone] FX     Additional notes: PER CALLER PATIENT SEEN CARDIOLOGY TODAY AND PER CARDIOLOGY THEY RECOMMENDED CALLER TO CONTACT PRIMARY TO PRESCRIBE SOMETHING TO HELP WITH SYMPTOMS SO IT DOES NOT SETTLE IN AND BECOME PNEUMONIA.     CONTACT CALLER TO ADVISE.

## 2025-03-17 RX ORDER — BROMPHENIRAMINE MALEATE, PSEUDOEPHEDRINE HYDROCHLORIDE, AND DEXTROMETHORPHAN HYDROBROMIDE 2; 30; 10 MG/5ML; MG/5ML; MG/5ML
5 SYRUP ORAL 4 TIMES DAILY PRN
Qty: 140 ML | Refills: 0 | Status: SHIPPED | OUTPATIENT
Start: 2025-03-17 | End: 2025-03-24

## 2025-03-17 RX ORDER — ALBUTEROL SULFATE 90 UG/1
2 INHALANT RESPIRATORY (INHALATION) EVERY 4 HOURS PRN
Qty: 8 G | Refills: 3 | Status: SHIPPED | OUTPATIENT
Start: 2025-03-17

## 2025-03-17 NOTE — TELEPHONE ENCOUNTER
Phone call placed to caregiver and patient with instructions per PCP to  scripts sent in and if worse to make an appointment or go to urgent care with voiced understanding.

## 2025-03-17 NOTE — TELEPHONE ENCOUNTER
I will send in an antibiotic as well as medication for cough and albuterol inhaler.  If symptoms continue persist he really needs to either come in for an appointment or go to urgent care.

## 2025-03-27 ENCOUNTER — OFFICE VISIT (OUTPATIENT)
Dept: NEUROLOGY | Facility: CLINIC | Age: 80
End: 2025-03-27
Payer: MEDICARE

## 2025-03-27 ENCOUNTER — LAB (OUTPATIENT)
Dept: LAB | Facility: HOSPITAL | Age: 80
End: 2025-03-27
Payer: MEDICARE

## 2025-03-27 VITALS
SYSTOLIC BLOOD PRESSURE: 110 MMHG | WEIGHT: 130 LBS | HEIGHT: 70 IN | DIASTOLIC BLOOD PRESSURE: 63 MMHG | HEART RATE: 69 BPM | BODY MASS INDEX: 18.61 KG/M2

## 2025-03-27 DIAGNOSIS — F01.A0 MILD MIXED VASCULAR AND NEURODEGENERATIVE DEMENTIA WITHOUT BEHAVIORAL DISTURBANCE, PSYCHOTIC DISTURBANCE, MOOD DISTURBANCE, OR ANXIETY: ICD-10-CM

## 2025-03-27 DIAGNOSIS — G20.C PARKINSONIAN TREMOR: ICD-10-CM

## 2025-03-27 DIAGNOSIS — F01.A0 MILD MIXED VASCULAR AND NEURODEGENERATIVE DEMENTIA WITHOUT BEHAVIORAL DISTURBANCE, PSYCHOTIC DISTURBANCE, MOOD DISTURBANCE, OR ANXIETY: Primary | ICD-10-CM

## 2025-03-27 PROCEDURE — 99214 OFFICE O/P EST MOD 30 MIN: CPT | Performed by: PSYCHIATRY & NEUROLOGY

## 2025-03-27 PROCEDURE — 36415 COLL VENOUS BLD VENIPUNCTURE: CPT

## 2025-03-27 PROCEDURE — 83520 IMMUNOASSAY QUANT NOS NONAB: CPT

## 2025-03-27 RX ORDER — TOPIRAMATE 50 MG/1
TABLET, FILM COATED ORAL
Qty: 180 TABLET | Refills: 3 | Status: SHIPPED | OUTPATIENT
Start: 2025-03-27

## 2025-03-27 NOTE — PROGRESS NOTES
"Chief Complaint  Med Refill (Topamax/)    Subjective          Familia Thompson is a 80 y.o. male who presents to [facility]  History of Present Illness      History of Present Illness  The patient is an 80-year-old male who presents for evaluation of tremors and memory issues. He is accompanied by his daughter.    He reports experiencing intermittent tremors in his right hand, which are particularly noticeable when he consumes coffee. His left hand does not exhibit any shaking. He also notes that these tremors persist during the night, even when he is at rest. Additionally, he exhibits a tendency to fidget with his feet. He has been diagnosed with essential tremor and is currently under observation to rule out the progression to Parkinson's disease. He is on a regimen of Topamax, administered twice daily.    His memory appears to be intact, and he no longer engages in driving activities. He resides with his wife and relies on his daughter for transportation. He expresses satisfaction with his current lifestyle.    SOCIAL HISTORY  He has 3 children.    MEDICATIONS  Topamax      Objective   Vital Signs:   /63 (BP Location: Left arm, Patient Position: Sitting, Cuff Size: Adult)   Pulse 69   Ht 177.8 cm (70\")   Wt 59 kg (130 lb)   BMI 18.65 kg/m²     Physical Exam   He is alert, fluent, slight difficulty following commands.  There is initially no resting tremor in the right arm however became more prominent as the interview came on.  On station gait he has decreased armswing of the right arm.  No tremors noted.  There is no significant tremor noted on holding a cup with 1 hand or transferring water from 1 cup to the next.     Results  Imaging  DaTscan shows no evidence of dopaminergic deficit.         Assessment and Plan  Diagnoses and all orders for this visit:    1. 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 Parkinsonian tremor  Assessment & Plan:  The tremor is most likely parkinsonian and the diagnosis is scans without evidence of dopaminergic deficit.(SWEDD).  I discussed with him it is separate from Parkinson's disease and probably a different entity.  It is unlikely to progress to Parkinson's disease.  No further treatment is needed at this time.  Will see him again in 8 months time for follow-up.      Other orders  -     topiramate (TOPAMAX) 50 MG tablet; 1 tablet twice a day  Dispense: 180 tablet; Refill: 3         Assessment & Plan  1. Tremor.  The DaTscan results were negative, indicating no evidence of dopaminergic deficit. The tremor is not consistent with essential tremor but rather resembles a parkinsonian tremor. However, given the negative DaTscan and his age, it is unlikely to progress to Parkinson's disease. He will continue with Topamax 1 tablet twice a day.    2. Memory issues.  His memory is deteriorating, which could be attributed to vascular issues or cerebral atrophy as indicated by MRI findings. A blood test for Alzheimer's disease will be conducted today. If the test is positive, further discussions regarding potential treatments and care plans will be held. If the test is negative, the memory issues will be attributed to vascular causes.      Total time spent with the patient and coordinating patient care was 30 minutes.    Follow Up  No follow-ups on file.  Patient was given instructions and counseling regarding his condition or for health maintenance advice. Please see specific information pulled into the AVS if appropriate.     Patient or patient representative verbalized consent for the use of Ambient Listening during the visit with  Zaki Sepulveda MD for chart documentation. 3/27/2025  11:30 EDT

## 2025-03-27 NOTE — ASSESSMENT & PLAN NOTE
The tremor is most likely parkinsonian and the diagnosis is scans without evidence of dopaminergic deficit.(SWEDD).  I discussed with him it is separate from Parkinson's disease and probably a different entity.  It is unlikely to progress to Parkinson's disease.  No further treatment is needed at this time.  Will see him again in 8 months time for follow-up.  
no fever and no chills.

## 2025-04-03 DIAGNOSIS — I25.709 CORONARY ARTERY DISEASE INVOLVING CORONARY BYPASS GRAFT OF NATIVE HEART WITH ANGINA PECTORIS: ICD-10-CM

## 2025-04-04 LAB
A -- BETA-AMYLOID 42/40 RATIO: 0.1
AL BETA40 PLAS-MCNC: 181.65 PG/ML
AL BETA42 PLAS-MCNC: 18.85 PG/ML
ATN SUMMARY1: ABNORMAL
INFORMATION:: ABNORMAL
N -- NFL, PLASMA: 4.79 PG/ML (ref 0–11.55)
T -- P-TAU181: 1.01 PG/ML (ref 0–0.97)

## 2025-04-04 RX ORDER — METOPROLOL SUCCINATE 50 MG/1
50 TABLET, EXTENDED RELEASE ORAL DAILY
Qty: 60 TABLET | Refills: 3 | Status: SHIPPED | OUTPATIENT
Start: 2025-04-04

## 2025-04-17 ENCOUNTER — OFFICE VISIT (OUTPATIENT)
Dept: FAMILY MEDICINE CLINIC | Facility: CLINIC | Age: 80
End: 2025-04-17
Payer: MEDICARE

## 2025-04-17 VITALS
WEIGHT: 127.7 LBS | HEART RATE: 99 BPM | SYSTOLIC BLOOD PRESSURE: 102 MMHG | OXYGEN SATURATION: 99 % | TEMPERATURE: 97.6 F | HEIGHT: 70 IN | BODY MASS INDEX: 18.28 KG/M2 | DIASTOLIC BLOOD PRESSURE: 62 MMHG

## 2025-04-17 DIAGNOSIS — E87.1 HYPONATREMIA: ICD-10-CM

## 2025-04-17 DIAGNOSIS — F10.90 ALCOHOL USE: ICD-10-CM

## 2025-04-17 DIAGNOSIS — R63.0 POOR APPETITE: ICD-10-CM

## 2025-04-17 DIAGNOSIS — M25.561 CHRONIC PAIN OF RIGHT KNEE: ICD-10-CM

## 2025-04-17 DIAGNOSIS — I10 PRIMARY HYPERTENSION: Primary | ICD-10-CM

## 2025-04-17 DIAGNOSIS — D69.6 THROMBOCYTOPENIA: ICD-10-CM

## 2025-04-17 DIAGNOSIS — E78.5 HYPERLIPIDEMIA, UNSPECIFIED HYPERLIPIDEMIA TYPE: ICD-10-CM

## 2025-04-17 DIAGNOSIS — G30.9 ALZHEIMER'S DEMENTIA, UNSPECIFIED DEMENTIA SEVERITY, UNSPECIFIED TIMING OF DEMENTIA ONSET, UNSPECIFIED WHETHER BEHAVIORAL, PSYCHOTIC, OR MOOD DISTURBANCE OR ANXIETY: ICD-10-CM

## 2025-04-17 DIAGNOSIS — D13.5 ADENOMYOMATOSIS OF GALLBLADDER: ICD-10-CM

## 2025-04-17 DIAGNOSIS — Z51.81 MEDICATION MONITORING ENCOUNTER: ICD-10-CM

## 2025-04-17 DIAGNOSIS — R73.03 PREDIABETES: ICD-10-CM

## 2025-04-17 DIAGNOSIS — E03.9 HYPOTHYROIDISM, UNSPECIFIED TYPE: ICD-10-CM

## 2025-04-17 DIAGNOSIS — F02.80 ALZHEIMER'S DEMENTIA, UNSPECIFIED DEMENTIA SEVERITY, UNSPECIFIED TIMING OF DEMENTIA ONSET, UNSPECIFIED WHETHER BEHAVIORAL, PSYCHOTIC, OR MOOD DISTURBANCE OR ANXIETY: ICD-10-CM

## 2025-04-17 DIAGNOSIS — G89.29 CHRONIC PAIN OF RIGHT KNEE: ICD-10-CM

## 2025-04-17 DIAGNOSIS — I50.22 CHRONIC SYSTOLIC HEART FAILURE: ICD-10-CM

## 2025-04-17 DIAGNOSIS — I25.709 CORONARY ARTERY DISEASE INVOLVING CORONARY BYPASS GRAFT OF NATIVE HEART WITH ANGINA PECTORIS: ICD-10-CM

## 2025-04-17 DIAGNOSIS — K22.70 BARRETT'S ESOPHAGUS WITHOUT DYSPLASIA: ICD-10-CM

## 2025-04-17 DIAGNOSIS — I48.91 ATRIAL FIBRILLATION, UNSPECIFIED TYPE: ICD-10-CM

## 2025-04-17 DIAGNOSIS — R63.6 UNDERWEIGHT: ICD-10-CM

## 2025-04-17 DIAGNOSIS — K44.9 HIATAL HERNIA: ICD-10-CM

## 2025-04-17 LAB
ALBUMIN SERPL-MCNC: 4.2 G/DL (ref 3.5–5.2)
ALBUMIN/GLOB SERPL: 1.9 G/DL
ALP SERPL-CCNC: 117 U/L (ref 39–117)
ALT SERPL W P-5'-P-CCNC: 16 U/L (ref 1–41)
ANION GAP SERPL CALCULATED.3IONS-SCNC: 12.5 MMOL/L (ref 5–15)
AST SERPL-CCNC: 27 U/L (ref 1–40)
BASOPHILS # BLD AUTO: 0.09 10*3/MM3 (ref 0–0.2)
BASOPHILS NFR BLD AUTO: 2 % (ref 0–1.5)
BILIRUB SERPL-MCNC: 0.6 MG/DL (ref 0–1.2)
BUN SERPL-MCNC: 13 MG/DL (ref 8–23)
BUN/CREAT SERPL: 12.9 (ref 7–25)
CALCIUM SPEC-SCNC: 9.1 MG/DL (ref 8.6–10.5)
CHLORIDE SERPL-SCNC: 102 MMOL/L (ref 98–107)
CO2 SERPL-SCNC: 20.5 MMOL/L (ref 22–29)
CREAT SERPL-MCNC: 1.01 MG/DL (ref 0.76–1.27)
DEPRECATED RDW RBC AUTO: 41.8 FL (ref 37–54)
EGFRCR SERPLBLD CKD-EPI 2021: 75.2 ML/MIN/1.73
EOSINOPHIL # BLD AUTO: 0.13 10*3/MM3 (ref 0–0.4)
EOSINOPHIL NFR BLD AUTO: 2.8 % (ref 0.3–6.2)
ERYTHROCYTE [DISTWIDTH] IN BLOOD BY AUTOMATED COUNT: 12.3 % (ref 12.3–15.4)
GLOBULIN UR ELPH-MCNC: 2.2 GM/DL
GLUCOSE SERPL-MCNC: 71 MG/DL (ref 65–99)
HBA1C MFR BLD: 4.9 % (ref 4.8–5.6)
HCT VFR BLD AUTO: 41.3 % (ref 37.5–51)
HGB BLD-MCNC: 14 G/DL (ref 13–17.7)
IMM GRANULOCYTES # BLD AUTO: 0.01 10*3/MM3 (ref 0–0.05)
IMM GRANULOCYTES NFR BLD AUTO: 0.2 % (ref 0–0.5)
LYMPHOCYTES # BLD AUTO: 1.46 10*3/MM3 (ref 0.7–3.1)
LYMPHOCYTES NFR BLD AUTO: 31.9 % (ref 19.6–45.3)
MCH RBC QN AUTO: 31.3 PG (ref 26.6–33)
MCHC RBC AUTO-ENTMCNC: 33.9 G/DL (ref 31.5–35.7)
MCV RBC AUTO: 92.4 FL (ref 79–97)
MONOCYTES # BLD AUTO: 0.53 10*3/MM3 (ref 0.1–0.9)
MONOCYTES NFR BLD AUTO: 11.6 % (ref 5–12)
NEUTROPHILS NFR BLD AUTO: 2.36 10*3/MM3 (ref 1.7–7)
NEUTROPHILS NFR BLD AUTO: 51.5 % (ref 42.7–76)
NRBC BLD AUTO-RTO: 0 /100 WBC (ref 0–0.2)
PLATELET # BLD AUTO: 156 10*3/MM3 (ref 140–450)
PMV BLD AUTO: 11.2 FL (ref 6–12)
POTASSIUM SERPL-SCNC: 4.1 MMOL/L (ref 3.5–5.2)
PROT SERPL-MCNC: 6.4 G/DL (ref 6–8.5)
RBC # BLD AUTO: 4.47 10*6/MM3 (ref 4.14–5.8)
SODIUM SERPL-SCNC: 135 MMOL/L (ref 136–145)
T4 FREE SERPL-MCNC: 1.15 NG/DL (ref 0.92–1.68)
TSH SERPL DL<=0.05 MIU/L-ACNC: 2.39 UIU/ML (ref 0.27–4.2)
WBC NRBC COR # BLD AUTO: 4.58 10*3/MM3 (ref 3.4–10.8)

## 2025-04-17 PROCEDURE — 83036 HEMOGLOBIN GLYCOSYLATED A1C: CPT | Performed by: FAMILY MEDICINE

## 2025-04-17 PROCEDURE — 80053 COMPREHEN METABOLIC PANEL: CPT | Performed by: FAMILY MEDICINE

## 2025-04-17 PROCEDURE — 84439 ASSAY OF FREE THYROXINE: CPT | Performed by: FAMILY MEDICINE

## 2025-04-17 PROCEDURE — 85025 COMPLETE CBC W/AUTO DIFF WBC: CPT | Performed by: FAMILY MEDICINE

## 2025-04-17 PROCEDURE — 84443 ASSAY THYROID STIM HORMONE: CPT | Performed by: FAMILY MEDICINE

## 2025-04-17 RX ORDER — PANTOPRAZOLE SODIUM 40 MG/1
40 TABLET, DELAYED RELEASE ORAL DAILY
Qty: 90 TABLET | Refills: 3 | Status: SHIPPED | OUTPATIENT
Start: 2025-04-17

## 2025-04-17 RX ORDER — MEMANTINE HYDROCHLORIDE 5 MG/1
5 TABLET ORAL 2 TIMES DAILY
Qty: 60 TABLET | Refills: 2 | Status: SHIPPED | OUTPATIENT
Start: 2025-04-17

## 2025-04-17 RX ORDER — CYPROHEPTADINE HYDROCHLORIDE 4 MG/1
4 TABLET ORAL 2 TIMES DAILY
Qty: 180 TABLET | Refills: 1 | Status: SHIPPED | OUTPATIENT
Start: 2025-04-17

## 2025-04-17 RX ORDER — TRAMADOL HYDROCHLORIDE 50 MG/1
50 TABLET ORAL 2 TIMES DAILY PRN
Qty: 20 TABLET | Refills: 0 | Status: SHIPPED | OUTPATIENT
Start: 2025-04-17

## 2025-04-17 NOTE — PROGRESS NOTES
Chief Complaint  Memory issues    Subjective          Familia Thompson presents to [facility]    History of Present Illness     History of Present Illness  The patient is an 80-year-old male who presents for evaluation of Alzheimer's disease, coronary artery disease, atrial fibrillation, Collins's esophagus, prediabetes, right knee pain, and gallbladder adenomyomatosis. He is accompanied by his wife and daughter.    Blood pressure has been well-managed with the current medication regimen, which includes losartan 50 mg daily, metoprolol 50 mg daily, and spironolactone 25 mg daily. No medication refills are required at this time.    Under the care of Dr. Betts, a cardiologist, for coronary artery disease, heart failure, and atrial fibrillation, a catheterization was performed on 02/10/2025. Findings revealed severe coronary artery disease, normal reduced left ventricular systolic function, and a patent graft. Plavix was discontinued due to nosebleeds, and he continues to take aspirin and Eliquis.    An upper endoscopy was performed by Dr. Tomas on 03/31/2025, during which some bleeding was noted, leading to the cessation of the procedure. Diagnoses included Collins's esophagus and a hiatal hernia. He is currently on pantoprazole for acid reflux and requires a prescription refill.    Significant reduction in alcohol consumption is reported. There is no history of hypothyroidism or cancer, and no thyroid medication is being taken.    Significant right knee pain has been limiting mobility. Previously seen by Dr. Womack, two injections were administered in the knees, which did not provide relief. A follow-up appointment was missed in December 2024.    Memory issues have been noted, potentially attributed to vascular issues or cerebral atrophy. A blood test for Alzheimer's disease was conducted, and one of the proteins came back positive. Results from Dr. Pond have not yet been  "received.    Poor appetite and weight loss are reported, with a drop from 131 pounds to 127 pounds since the last visit.    PAST SURGICAL HISTORY:  - Cardiac catheterization on 02/10/2025  - Upper endoscopy on 03/31/2025  - Knee injections (two) in 09/2024    SOCIAL HISTORY  He has cut back on alcohol consumption.         Current Outpatient Medications   Medication Instructions    albuterol sulfate  (90 Base) MCG/ACT inhaler 2 puffs, Inhalation, Every 4 Hours PRN    apixaban (ELIQUIS) 5 mg, 2 Times Daily    aspirin 81 mg, Oral, Daily    atorvastatin (LIPITOR) 80 mg, Oral, Nightly    cyproheptadine (PERIACTIN) 4 mg, Oral, 2 Times Daily    furosemide (LASIX) 40 mg, Oral, Daily PRN    losartan (COZAAR) 50 mg, Oral, Daily    memantine (NAMENDA) 5 mg, Oral, 2 Times Daily    metoprolol succinate XL (TOPROL XL) 50 mg, Oral, Daily    pantoprazole (PROTONIX) 40 mg, Oral, Daily    spironolactone (ALDACTONE) 25 mg, Oral, Daily    tamsulosin (FLOMAX) 0.4 mg, Oral, Daily    topiramate (TOPAMAX) 50 MG tablet 1 tablet twice a day    traMADol (ULTRAM) 50 mg, Oral, 2 Times Daily PRN       The following portions of the patient's history were reviewed and updated as appropriate: allergies, current medications, past family history, past medical history, past social history, past surgical history, and problem list.    Objective   Vital Signs:   /62   Pulse 99   Temp 97.6 °F (36.4 °C) (Oral)   Ht 177.8 cm (70\")   Wt 57.9 kg (127 lb 11.2 oz)   SpO2 99%   BMI 18.32 kg/m²     BP Readings from Last 3 Encounters:   04/17/25 102/62   03/27/25 110/63   03/14/25 132/72     Wt Readings from Last 3 Encounters:   04/17/25 57.9 kg (127 lb 11.2 oz)   03/27/25 59 kg (130 lb)   03/14/25 59.4 kg (131 lb)     BMI is below normal parameters (malnutrition). Recommendations: discussed     Physical Exam  Vitals reviewed.   Constitutional:       Appearance: Normal appearance.   HENT:      Head: Normocephalic and atraumatic.      Right Ear: " External ear normal.      Left Ear: External ear normal.   Eyes:      Conjunctiva/sclera: Conjunctivae normal.   Cardiovascular:      Rate and Rhythm: Normal rate and regular rhythm.      Heart sounds: No murmur heard.     No friction rub. No gallop.   Pulmonary:      Effort: Pulmonary effort is normal.      Breath sounds: Normal breath sounds. No wheezing or rhonchi.   Abdominal:      General: Bowel sounds are normal. There is no distension.      Palpations: Abdomen is soft.      Tenderness: There is no abdominal tenderness.   Musculoskeletal:      Comments: Patient has significant arthritis noted in the right knee.  There is crepitus with active and passive range of motion of the right knee.  Negative valgus and varus stress testing with negative , negative antibodies or drawer testing.   Skin:     General: Skin is warm and dry.   Neurological:      Mental Status: He is alert and oriented to person, place, and time.      Cranial Nerves: No cranial nerve deficit.   Psychiatric:         Mood and Affect: Mood and affect normal.         Behavior: Behavior normal.         Thought Content: Thought content normal.         Judgment: Judgment normal.            Result Review :   The following data was reviewed by: Robb Gómez DO on 04/17/2025:  Common labs          8/22/2024    09:03 1/15/2025    11:46 2/10/2025    09:08   Common Labs   Glucose 83  69  84    BUN 5  9  9    Creatinine 0.89  1.01  1.02    Sodium 132  130  135    Potassium 3.8  4.1  4.2    Chloride 98  95  105    Calcium 9.1  9.1  8.5    Albumin 4.1  4.4     Total Bilirubin 0.5  0.4     Alkaline Phosphatase 134  103     AST (SGOT) 27  59     ALT (SGPT) 16  38     WBC 4.76  3.31  3.67    Hemoglobin 13.6  16.6  13.6    Hematocrit 41.3  47.6  41.9    Platelets 153  128  136    Total Cholesterol 124  160     Triglycerides 76  80     HDL Cholesterol 65  75     LDL Cholesterol  44  70     Hemoglobin A1C  5.30     PSA  2.020              Lab Results    Component Value Date    COVID19 Not Detected 04/27/2022    FLU Negative 04/27/2022    FLU Negative 04/27/2022    INR 1.03 10/25/2023       Results  Labs   - Alzheimer's disease protein test: One of the proteins came back positive   - GGT level: 01/15/2025, Normal   - CMP: 01/15/2025, Normal kidney function   - Liver enzyme: 01/15/2025, Slightly elevated   - Alkaline phosphatase level: 01/15/2025, Normal   - Platelets: 01/15/2025, A little bit low   - White blood cell count: 01/15/2025, A little bit low   - Thyroid levels: 01/15/2025, Normal   - PSA level: 01/15/2025, Normal   - Mitochondrial antibodies: 01/15/2025, Normal   - A1c: 01/15/2025, Normal   - Lipid panel: 01/15/2025, LDL at 70   - Sodium level: 01/15/2025, Slightly low    Imaging   - Ultrasound of the gallbladder: 01/2024, Stable nodular soft tissue thickening of the gallbladder    Diagnostic Testing   - Upper scope: 03/31/2025, Esophageal mucosa consistent with short segment Collins's esophagus and hiatal hernia. Intestinal metaplasia, no cancer    Procedures        Assessment and Plan    Diagnoses and all orders for this visit:    1. Primary hypertension (Primary)    2. Medication monitoring encounter    3. Alzheimer's dementia, unspecified dementia severity, unspecified timing of dementia onset, unspecified whether behavioral, psychotic, or mood disturbance or anxiety    4. 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 Chronic systolic heart failure    6. Atrial fibrillation, unspecified type    7. Collins's esophagus without dysplasia  Overview:  Added automatically from request for surgery [id number]      8. Hiatal hernia    9. Alcohol use    10. Prediabetes  -     CBC & Differential  -     Comprehensive Metabolic Panel  -     Hemoglobin A1c    11. Hyperlipidemia, unspecified hyperlipidemia type    12. Hyponatremia    13. Adenomyomatosis of gallbladder    14. Thrombocytopenia    15.  Underweight    16. Chronic pain of right knee  -     traMADol (ULTRAM) 50 MG tablet; Take 1 tablet by mouth 2 (Two) Times a Day As Needed for Severe Pain.  Dispense: 20 tablet; Refill: 0    17. Hypothyroidism, unspecified type  -     TSH+Free T4    18. Poor appetite    Other orders  -     memantine (Namenda) 5 MG tablet; Take 1 tablet by mouth 2 (Two) Times a Day.  Dispense: 60 tablet; Refill: 2  -     pantoprazole (Protonix) 40 MG EC tablet; Take 1 tablet by mouth Daily.  Dispense: 90 tablet; Refill: 3  -     cyproheptadine (PERIACTIN) 4 MG tablet; Take 1 tablet by mouth 2 (Two) Times a Day.  Dispense: 180 tablet; Refill: 1        Assessment & Plan  1. Alzheimer's disease.  - Memory deterioration could be attributed to vascular issues or cerebral atrophy.  - Blood test for Alzheimer's disease conducted; one of the proteins came back positive.  - Namenda (memantine) 5 mg twice a day will be prescribed to help with memory.  - Further discussion regarding treatment and care plans will be held if the test is positive; if negative, memory issues will be attributed to vascular causes.    2. Coronary artery disease.  - Under the care of Dr. Betts for coronary artery disease.  - Catheterization on 02/10/2025 showed severe coronary artery disease.  - Continue taking aspirin and Eliquis as prescribed by the cardiologist.  - No major changes in medication regimen recommended.    3. Atrial fibrillation.  - Under the care of Dr. Betts for atrial fibrillation.  - Continue taking Eliquis as prescribed by the cardiologist.  - Regular monitoring of heart condition advised.    4. Collins's esophagus.  - Upper scope on 03/31/2025 showed esophageal mucosa consistent with short segment Collins's esophagus and hiatal hernia.  - Intestinal metaplasia noted, no cancer found.  - Continue taking Protonix (pantoprazole) as prescribed; [facility].  - Repeat scope recommended in 3 years.    5. Prediabetes.  - A1c  was normal, previously in the prediabetic range.  - Continue monitoring blood sugar levels and maintain a healthy diet.  - Regular follow-up to assess blood sugar levels advised.    6. Right knee pain.  - Severe arthritis in the right knee; previous injections were not helpful.  - Schedule an appointment with Dr. Womack for further evaluation and management.  - Consider alternative treatments if injections are ineffective.    7. Gallbladder adenomyomatosis.  - Ultrasound in January 2024 showed stable nodular soft tissue thickening of the gallbladder.  - Condition will continue to be monitored.  - Regular follow-up to assess gallbladder condition advised.    8. Weight loss.  - Weight decreased from 131 pounds to 127 pounds since the last visit.  - Potentially related to dementia; further evaluation needed.  - Consider interventions to stimulate appetite based on today's lab results.     We had a discussion today in regards to ongoing pain management.  He is not a good candidate for NSAIDs given Collins's esophagus.  We discussed using lidocaine patches however these are not very effective.  We discussed using tramadol as needed for breakthrough pain.  I will send in a prescription for 20 tablets.  Plan to see him back in 3 months or sooner if needed.  They are instructed to call with any questions or concerns.    Medications Discontinued During This Encounter   Medication Reason    amoxicillin-clavulanate (AUGMENTIN) 875-125 MG per tablet *Therapy completed    pantoprazole (Protonix) 40 MG EC tablet Reorder          Follow Up   Return in about 3 months (around 7/17/2025) for dementia.  Patient was given instructions and counseling regarding his condition or for health maintenance advice. Please see specific information pulled into the AVS if appropriate.     Patient or patient representative verbalized consent for the use of Ambient Listening during the visit with  Robb Gómez DO for chart documentation.  4/17/2025  15:54 EDT    Robb Gómez DO  04/17/25  15:55 EDT

## 2025-04-18 ENCOUNTER — RESULTS FOLLOW-UP (OUTPATIENT)
Dept: FAMILY MEDICINE CLINIC | Facility: CLINIC | Age: 80
End: 2025-04-18
Payer: MEDICARE

## 2025-04-22 ENCOUNTER — PATIENT OUTREACH (OUTPATIENT)
Dept: CASE MANAGEMENT | Facility: OTHER | Age: 80
End: 2025-04-22
Payer: MEDICARE

## 2025-04-22 NOTE — OUTREACH NOTE
AMBULATORY CASE MANAGEMENT NOTE    Names and Relationships of Patient/Support Persons: Contact: Jeanna Carter; Relationship: Emergency Contact -     Patient Outreach  Spoke with patient daughter about [employer] program for patient. Patient and spouse want to stay at home as long as possible and don't want to go to a nursing home. Patient is a , per jeanna [facility] RN provided number to call [facility] and check on enrollment. RN noted patient may qualify for in home personal care assistance through [facility] for him and spouse. Jeanna stated she would call and see if he is eligible.     Jeanna requested info for [employer], emailed and provided phone number of brochures. Educated on paid caregiving, transportation and waiver program through [employer]. Provided phone number as well to reach out to.     RN notified will follow up in 1 week. Daughter verbalized understanding.     Education Documentation  No documentation found.        Annmarie MARROQUIN  Ambulatory Case Management    4/22/2025, 14:41 EDT

## 2025-05-01 ENCOUNTER — PATIENT OUTREACH (OUTPATIENT)
Dept: CASE MANAGEMENT | Facility: OTHER | Age: 80
End: 2025-05-01
Payer: MEDICARE

## 2025-05-01 NOTE — OUTREACH NOTE
AMBULATORY CASE MANAGEMENT NOTE    Names and Relationships of Patient/Support Persons: Contact: Elke Carter; Relationship: Emergency Contact -     Patient has been having lightheadness since starting Namenda. Patient advised to stop for one week, then resume the following week with only 1 tab daily. If symptoms resume patient needs to make appt to come in.     Has appt with Dr Mckinney for his knee. Patient daughter stated more than  likely not able to do anything for it.     No further issues at this time. Rn will follow up in 1 week.     Education Documentation  No documentation found.        Annmarie MARROQUIN  Ambulatory Case Management    5/1/2025, 13:53 EDT

## 2025-05-02 RX ORDER — CYPROHEPTADINE HYDROCHLORIDE 4 MG/1
4 TABLET ORAL 2 TIMES DAILY
Qty: 180 TABLET | Refills: 1 | Status: SHIPPED | OUTPATIENT
Start: 2025-05-02

## 2025-05-02 NOTE — TELEPHONE ENCOUNTER
Please let patient/daughter know that given his age as well as weight being less than 60 kg he is recommended to take Eliquis at the 2.5 mg twice a day dosage instead of 5 mg twice daily dosage and.  I have sent this prescription to the pharmacy for them.

## 2025-05-07 ENCOUNTER — APPOINTMENT (OUTPATIENT)
Dept: GENERAL RADIOLOGY | Facility: HOSPITAL | Age: 80
End: 2025-05-07
Payer: MEDICARE

## 2025-05-07 LAB
ALBUMIN SERPL-MCNC: 3.8 G/DL (ref 3.5–5.2)
ALBUMIN/GLOB SERPL: 1.5 G/DL
ALP SERPL-CCNC: 144 U/L (ref 39–117)
ALT SERPL W P-5'-P-CCNC: 20 U/L (ref 1–41)
ANION GAP SERPL CALCULATED.3IONS-SCNC: 12.2 MMOL/L (ref 5–15)
AST SERPL-CCNC: 28 U/L (ref 1–40)
BASOPHILS # BLD AUTO: 0.05 10*3/MM3 (ref 0–0.2)
BASOPHILS NFR BLD AUTO: 1.1 % (ref 0–1.5)
BILIRUB SERPL-MCNC: 0.2 MG/DL (ref 0–1.2)
BUN SERPL-MCNC: 11 MG/DL (ref 8–23)
BUN/CREAT SERPL: 11 (ref 7–25)
CALCIUM SPEC-SCNC: 8.5 MG/DL (ref 8.6–10.5)
CHLORIDE SERPL-SCNC: 98 MMOL/L (ref 98–107)
CO2 SERPL-SCNC: 19.8 MMOL/L (ref 22–29)
CREAT SERPL-MCNC: 1 MG/DL (ref 0.76–1.27)
DEPRECATED RDW RBC AUTO: 45.8 FL (ref 37–54)
EGFRCR SERPLBLD CKD-EPI 2021: 76.1 ML/MIN/1.73
EOSINOPHIL # BLD AUTO: 0.13 10*3/MM3 (ref 0–0.4)
EOSINOPHIL NFR BLD AUTO: 3 % (ref 0.3–6.2)
ERYTHROCYTE [DISTWIDTH] IN BLOOD BY AUTOMATED COUNT: 13 % (ref 12.3–15.4)
GLOBULIN UR ELPH-MCNC: 2.5 GM/DL
GLUCOSE SERPL-MCNC: 87 MG/DL (ref 65–99)
HCT VFR BLD AUTO: 39.1 % (ref 37.5–51)
HGB BLD-MCNC: 13 G/DL (ref 13–17.7)
HOLD SPECIMEN: NORMAL
HOLD SPECIMEN: NORMAL
IMM GRANULOCYTES # BLD AUTO: 0.02 10*3/MM3 (ref 0–0.05)
IMM GRANULOCYTES NFR BLD AUTO: 0.5 % (ref 0–0.5)
LIPASE SERPL-CCNC: 33 U/L (ref 13–60)
LYMPHOCYTES # BLD AUTO: 1.65 10*3/MM3 (ref 0.7–3.1)
LYMPHOCYTES NFR BLD AUTO: 37.9 % (ref 19.6–45.3)
MAGNESIUM SERPL-MCNC: 1.9 MG/DL (ref 1.6–2.4)
MCH RBC QN AUTO: 31.8 PG (ref 26.6–33)
MCHC RBC AUTO-ENTMCNC: 33.2 G/DL (ref 31.5–35.7)
MCV RBC AUTO: 95.6 FL (ref 79–97)
MONOCYTES # BLD AUTO: 0.39 10*3/MM3 (ref 0.1–0.9)
MONOCYTES NFR BLD AUTO: 9 % (ref 5–12)
NEUTROPHILS NFR BLD AUTO: 2.11 10*3/MM3 (ref 1.7–7)
NEUTROPHILS NFR BLD AUTO: 48.5 % (ref 42.7–76)
NRBC BLD AUTO-RTO: 0 /100 WBC (ref 0–0.2)
NT-PROBNP SERPL-MCNC: 1503 PG/ML (ref 0–1800)
PLATELET # BLD AUTO: 142 10*3/MM3 (ref 140–450)
PMV BLD AUTO: 10.9 FL (ref 6–12)
POTASSIUM SERPL-SCNC: 3.7 MMOL/L (ref 3.5–5.2)
PROT SERPL-MCNC: 6.3 G/DL (ref 6–8.5)
RBC # BLD AUTO: 4.09 10*6/MM3 (ref 4.14–5.8)
SODIUM SERPL-SCNC: 130 MMOL/L (ref 136–145)
TROPONIN T SERPL HS-MCNC: 20 NG/L
WBC NRBC COR # BLD AUTO: 4.35 10*3/MM3 (ref 3.4–10.8)
WHOLE BLOOD HOLD COAG: NORMAL
WHOLE BLOOD HOLD SPECIMEN: NORMAL

## 2025-05-07 PROCEDURE — 83880 ASSAY OF NATRIURETIC PEPTIDE: CPT

## 2025-05-07 PROCEDURE — 99284 EMERGENCY DEPT VISIT MOD MDM: CPT

## 2025-05-07 PROCEDURE — 36415 COLL VENOUS BLD VENIPUNCTURE: CPT

## 2025-05-07 PROCEDURE — 84484 ASSAY OF TROPONIN QUANT: CPT

## 2025-05-07 PROCEDURE — 71045 X-RAY EXAM CHEST 1 VIEW: CPT

## 2025-05-07 PROCEDURE — 83690 ASSAY OF LIPASE: CPT

## 2025-05-07 PROCEDURE — 93005 ELECTROCARDIOGRAM TRACING: CPT

## 2025-05-07 PROCEDURE — 83735 ASSAY OF MAGNESIUM: CPT

## 2025-05-07 PROCEDURE — 80053 COMPREHEN METABOLIC PANEL: CPT

## 2025-05-07 PROCEDURE — 93005 ELECTROCARDIOGRAM TRACING: CPT | Performed by: EMERGENCY MEDICINE

## 2025-05-07 PROCEDURE — 85025 COMPLETE CBC W/AUTO DIFF WBC: CPT

## 2025-05-07 RX ORDER — ASPIRIN 81 MG/1
324 TABLET, CHEWABLE ORAL ONCE
Status: DISCONTINUED | OUTPATIENT
Start: 2025-05-07 | End: 2025-05-08 | Stop reason: HOSPADM

## 2025-05-07 RX ORDER — SODIUM CHLORIDE 0.9 % (FLUSH) 0.9 %
10 SYRINGE (ML) INJECTION AS NEEDED
Status: DISCONTINUED | OUTPATIENT
Start: 2025-05-07 | End: 2025-05-08 | Stop reason: HOSPADM

## 2025-05-08 ENCOUNTER — TELEPHONE (OUTPATIENT)
Dept: CASE MANAGEMENT | Facility: OTHER | Age: 80
End: 2025-05-08
Payer: MEDICARE

## 2025-05-08 ENCOUNTER — HOSPITAL ENCOUNTER (EMERGENCY)
Facility: HOSPITAL | Age: 80
Discharge: HOME | End: 2025-05-08
Attending: EMERGENCY MEDICINE
Payer: MEDICARE

## 2025-05-08 VITALS
DIASTOLIC BLOOD PRESSURE: 62 MMHG | RESPIRATION RATE: 18 BRPM | WEIGHT: 136.24 LBS | TEMPERATURE: 97.8 F | HEART RATE: 53 BPM | OXYGEN SATURATION: 98 % | SYSTOLIC BLOOD PRESSURE: 120 MMHG | HEIGHT: 69 IN | BODY MASS INDEX: 20.18 KG/M2

## 2025-05-08 DIAGNOSIS — R07.9 CHEST PAIN, UNSPECIFIED TYPE: Primary | ICD-10-CM

## 2025-05-08 LAB
GEN 5 1HR TROPONIN T REFLEX: 20 NG/L
QT INTERVAL: 438 MS
QTC INTERVAL: 438 MS
TROPONIN T NUMERIC DELTA: 0 NG/L

## 2025-05-08 PROCEDURE — 84484 ASSAY OF TROPONIN QUANT: CPT | Performed by: EMERGENCY MEDICINE

## 2025-05-08 NOTE — ED PROVIDER NOTES
Time: 2:14 AM EDT  Date of encounter:  5/7/2025  Independent Historian/Clinical History and Information was obtained by:   Patient    History is limited by: N/A    Chief Complaint: chest pain      History of Present Illness:  Patient is a 80 y.o. year old male who presents to the emergency department for evaluation of Chest pain.  Pain is located over the center anterior chest.  Patient states it only hurts when he presses on a certain area on anterior chest.  Patient does have a history of CABG.  He denies any radiation.  No nausea or vomiting.  No shortness of breath.        Patient Care Team  Primary Care Provider: Robb Gómez DO    Past Medical History:     No Known Allergies  Past Medical History:   Diagnosis Date    Arthritis     Chronic systolic CHF (congestive heart failure)     COPD (chronic obstructive pulmonary disease)     Coronary artery disease     ETOH abuse     12-18 BEERS DAILY    Hemorrhoids     HL (hearing loss)     Hyperlipidemia     Hypertension     Myocardial infarction     NSTEMI (non-ST elevated myocardial infarction) 04/27/2022    Added automatically from request for surgery [id number]    PVD (peripheral vascular disease)     Sinus disorder      Past Surgical History:   Procedure Laterality Date    CARDIAC CATHETERIZATION N/A 04/29/2022    Procedure: Left Heart Cath, possible angioplasty;  Surgeon: Zaki Díaz MD;  Location: [facility] INVASIVE LOCATION;  Service: Cardiovascular;  Laterality: N/A;    CARDIAC CATHETERIZATION N/A 05/02/2022    Procedure: Percutaneous Coronary Intervention;  Surgeon: Josue Hensley MD;  Location: [facility] INVASIVE LOCATION;  Service: Cardiovascular;  Laterality: N/A;    CARDIAC CATHETERIZATION N/A 05/02/2022    Procedure: Stent BLAIRE coronary;  Surgeon: Josue Hensley MD;  Location: [facility] CATH INVASIVE LOCATION;  Service: Cardiovascular;  Laterality: N/A;    CARDIAC CATHETERIZATION N/A 05/02/2022    Procedure: Atherectomy-coronary- Rotoblator;   Surgeon: Josue Hensley MD;  Location: [facility] CATH INVASIVE LOCATION;  Service: Cardiovascular;  Laterality: N/A;    CARDIAC CATHETERIZATION N/A 9/8/2023    Procedure: Left Heart Cath;  Surgeon: Pawan Guerrero MD;  Location: [facility] CATH INVASIVE LOCATION;  Service: Cardiovascular;  Laterality: N/A;    CARDIAC CATHETERIZATION N/A 9/8/2023    Procedure: Coronary angiography;  Surgeon: Pawan Guerrero MD;  Location: [facility] CATH INVASIVE LOCATION;  Service: Cardiovascular;  Laterality: N/A;    CARDIAC CATHETERIZATION  9/8/2023    Procedure: Saphenous Vein Graft;  Surgeon: Pawan Guerrero MD;  Location: [facility] CATH INVASIVE LOCATION;  Service: Cardiovascular;;    CARDIAC CATHETERIZATION N/A 9/8/2023    Procedure: Left ventriculography;  Surgeon: Pawan Guerrero MD;  Location: [facility] CATH INVASIVE LOCATION;  Service: Cardiovascular;  Laterality: N/A;    CARDIAC CATHETERIZATION N/A 2/10/2025    Procedure: Left Heart Cath;  Surgeon: Nirmal Betts MD;  Location: [facility] CATH INVASIVE LOCATION;  Service: Cardiovascular;  Laterality: N/A;    CARDIAC CATHETERIZATION N/A 2/10/2025    Procedure: Coronary angiography;  Surgeon: Nirmal Betts MD;  Location: [facility] CATH INVASIVE LOCATION;  Service: Cardiovascular;  Laterality: N/A;    CARDIAC CATHETERIZATION N/A 2/10/2025    Procedure: Left ventriculography;  Surgeon: Nirmal Betts MD;  Location: [facility] CATH INVASIVE LOCATION;  Service: Cardiovascular;  Laterality: N/A;    CARDIAC SURGERY      COLONOSCOPY      COLONOSCOPY N/A 9/15/2022    Procedure: COLONOSCOPY;  Surgeon: Dennis Saavedra MD;  Location: [facility] ENDOSCOPY;  Service: Gastroenterology;  Laterality: N/A;  COLON POLYP    CORONARY ANGIOPLASTY WITH STENT PLACEMENT      ENDOSCOPY N/A 9/15/2022    Procedure: ESOPHAGOGASTRODUODENOSCOPY;  Surgeon: Dennis Saavedra MD;  Location: [facility] ENDOSCOPY;  Service: Gastroenterology;  Laterality: N/A;   GASTRTIS BARRETTS ESOPH    ENDOSCOPY N/A 1/31/2025     Procedure: ESOPHAGOGASTRODUODENOSCOPY WITH BIOPSIES;  Surgeon: Dennis Saavedra MD;  Location: [facility] ENDOSCOPY;  Service: Gastroenterology;  Laterality: N/A;  HIATAL HERNIA AND BARRETTS    EYE SURGERY Bilateral     UPPER GASTROINTESTINAL ENDOSCOPY      VEIN LIGATION AND STRIPPING      WRIST FRACTURE SURGERY       Family History   Problem Relation Age of Onset    Colon cancer Neg Hx     Malig Hyperthermia Neg Hx        Home Medications:  Prior to Admission medications    Medication Sig Start Date End Date Taking? Authorizing Provider   albuterol sulfate  (90 Base) MCG/ACT inhaler Inhale 2 puffs Every 4 (Four) Hours As Needed for Wheezing or Shortness of Air. 3/17/25   Robb Gómez DO   apixaban (ELIQUIS) 2.5 MG tablet tablet Take 1 tablet by mouth 2 (Two) Times a Day. 5/2/25   Robb Gómez DO   aspirin 81 MG EC tablet Take 1 tablet by mouth Daily. 3/14/25   Nirmal Betts MD   atorvastatin (LIPITOR) 80 MG tablet Take 1 tablet by mouth Every Night. 1/19/25   Robb Gómez DO   cyproheptadine (PERIACTIN) 4 MG tablet Take 1 tablet by mouth 2 (Two) Times a Day. 5/2/25   Robb Gómez DO   furosemide (LASIX) 40 MG tablet Take 1 tablet by mouth Daily As Needed (if overnoight 2 lb changes are noted.). 12/19/24   Nirmal Betts MD   losartan (Cozaar) 50 MG tablet Take 1 tablet by mouth Daily. 2/10/25   Nirmal Betts MD   memantine (Namenda) 5 MG tablet Take 1 tablet by mouth 2 (Two) Times a Day. 4/17/25   Robb Gómez DO   metoprolol succinate XL (Toprol XL) 50 MG 24 hr tablet Take 1 tablet by mouth Daily. 4/4/25   Mikey Patrick MD   pantoprazole (Protonix) 40 MG EC tablet Take 1 tablet by mouth Daily. 4/17/25   Robb Gómez DO   spironolactone (ALDACTONE) 25 MG tablet Take 1 tablet by mouth Daily. 3/14/25   Nirmal Betts MD   tamsulosin (FLOMAX) 0.4 MG capsule 24 hr capsule TAKE 1 CAPSULE BY MOUTH DAILY 11/4/24   Robb Gómez DO   topiramate (TOPAMAX) 50 MG tablet 1  "tablet twice a day 3/27/25   Zaki Sepulveda MD   traMADol (ULTRAM) 50 MG tablet Take 1 tablet by mouth 2 (Two) Times a Day As Needed for Severe Pain. 4/17/25   Robb Gómez DO        Social History:   Social History     Tobacco Use    Smoking status: Every Day     Current packs/day: 0.75     Average packs/day: 1 pack/day for 30.8 years (30.6 ttl pk-yrs)     Types: Cigarettes     Start date: 1993     Last attempt to quit: 2023     Passive exposure: Past    Smokeless tobacco: Never    Tobacco comments:     Quit smoking in march 2023      PT INST NOT TO SMOKE 24 HOURS PRIOR TO PROCEDURE.   Vaping Use    Vaping status: Never Used    Passive vaping exposure: Yes   Substance Use Topics    Alcohol use: Yes     Alcohol/week: 12.0 standard drinks of alcohol     Types: 12 Cans of beer per week     Comment: 12 pack a day/INST TO LIMIT ETOH PRIOR TO PROCEDURE.    Drug use: Not Currently     Comment: 0cc         Review of Systems:  Review of Systems   Constitutional:  Negative for chills and fever.   HENT:  Negative for congestion, ear pain and sore throat.    Eyes:  Negative for pain.   Respiratory:  Negative for cough, chest tightness and shortness of breath.    Cardiovascular:  Positive for chest pain.   Gastrointestinal:  Negative for abdominal pain, diarrhea, nausea and vomiting.   Genitourinary:  Negative for flank pain and hematuria.   Musculoskeletal:  Negative for joint swelling.   Skin:  Negative for pallor.   Neurological:  Negative for seizures and headaches.   All other systems reviewed and are negative.       Physical Exam:  /62 (BP Location: Right arm, Patient Position: Sitting)   Pulse 53   Temp 97.8 °F (36.6 °C) (Oral)   Resp 18   Ht 175.3 cm (69\")   Wt 61.8 kg (136 lb 3.9 oz)   SpO2 98%   BMI 20.12 kg/m²     Physical Exam  Constitutional:       Appearance: Normal appearance.   HENT:      Head: Normocephalic and atraumatic.      Nose: Nose normal.      Mouth/Throat:      Mouth: " Mucous membranes are moist.   Eyes:      Extraocular Movements: Extraocular movements intact.      Conjunctiva/sclera: Conjunctivae normal.      Pupils: Pupils are equal, round, and reactive to light.   Cardiovascular:      Rate and Rhythm: Normal rate and regular rhythm.      Pulses: Normal pulses.      Heart sounds: Normal heart sounds.   Pulmonary:      Effort: Pulmonary effort is normal.      Breath sounds: Normal breath sounds.   Chest:      Comments: Reproducible pain with palpitation to anterior chest just left of previous incision site  Abdominal:      General: There is no distension.      Palpations: Abdomen is soft.      Tenderness: There is no abdominal tenderness.   Musculoskeletal:         General: Normal range of motion.      Cervical back: Normal range of motion.   Skin:     General: Skin is warm and dry.      Capillary Refill: Capillary refill takes less than 2 seconds.   Neurological:      General: No focal deficit present.      Mental Status: He is alert and oriented to person, place, and time. Mental status is at baseline.   Psychiatric:         Mood and Affect: Mood normal.         Behavior: Behavior normal.                    Medical Decision Making:      Comorbidities that affect care:    Congestive Heart Failure, COPD, Coronary Artery Disease, Hypertension    External Notes reviewed:    Previous Clinic Note: Patient seen by his PCP on/17/25 for hypertension, medication monitoring, Alzheimer's dementia, coronary artery disease, systolic heart failure, atrial fibrillation, Collins's esophagus without dysplasia, hiatal hernia, alcohol use, prediabetes, hyperlipidemia, hyponatremia, adenomyomatosis of gallbladder, from cytopenia, hypothyroidism, chronic pain, underweight.    Heart cath on 2/11/2025  Conclusions:  Severe coronary artery disease  Normal LVEDP no gradient across aortic valve on pullback  Mildly reduced LV systolic function  Patent LIMA to LAD graft     Recommendations:   Continue to  optimize antianginals, as hemodynamically tolerated  Continue to optimize medical management for LV dysfunction      The following orders were placed and all results were independently analyzed by me:  Orders Placed This Encounter   Procedures    XR Chest 1 View    Novelty Draw    High Sensitivity Troponin T    Comprehensive Metabolic Panel    Lipase    BNP    Magnesium    CBC Auto Differential    High Sensitivity Troponin T 1Hr    NPO Diet NPO Type: Strict NPO    Undress & Gown    Continuous Pulse Oximetry    Oxygen Therapy- Nasal Cannula; Titrate 1-6 LPM Per SpO2; 90 - 95%    ECG 12 Lead ED Triage Standing Order; Chest Pain    ECG 12 Lead ED Triage Standing Order; Chest Pain    Insert Peripheral IV    CBC & Differential    Green Top (Gel)    Lavender Top    Gold Top - SST    Light Blue Top       Medications Given in the Emergency Department:  Medications   sodium chloride 0.9 % flush 10 mL (has no administration in time range)   aspirin chewable tablet 324 mg (324 mg Oral Not Given 5/8/25 0220)        ED Course:    ED Course as of 05/08/25 0421   Thu May 08, 2025   0220 ECG 12 Lead ED Triage Standing Order; Chest Pain  Sinus rhythm with a rate of 60.  No acute ST elevation.  Normal KS and QTc.  EKG interpreted by me. [LD]      ED Course User Index  [LD] Meena Rizzo MD       Labs:    Lab Results (last 24 hours)       Procedure Component Value Units Date/Time    High Sensitivity Troponin T [[id number]]  (Normal) Collected: 05/07/25 2312    Specimen: Blood Updated: 05/07/25 2351     HS Troponin T 20 ng/L     Narrative:      High Sensitive Troponin T Reference Range:  <14.0 ng/L- Negative Female for AMI  <22.0 ng/L- Negative Male for AMI  >=14 - Abnormal Female indicating possible myocardial injury.  >=22 - Abnormal Male indicating possible myocardial injury.   Clinicians would have to utilize clinical acumen, EKG, Troponin, and serial changes to determine if it is an Acute Myocardial Infarction or myocardial  injury due to an underlying chronic condition.         CBC & Differential [[id number]]  (Abnormal) Collected: 05/07/25 2312    Specimen: Blood Updated: 05/07/25 2326    Narrative:      The following orders were created for panel order CBC & Differential.  Procedure                               Abnormality         Status                     ---------                               -----------         ------                     CBC Auto Differential[[id number]]        Abnormal            Final result               Scan Slide[[id number]]                                                                    Please view results for these tests on the individual orders.    Comprehensive Metabolic Panel [[id number]]  (Abnormal) Collected: 05/07/25 2312    Specimen: Blood Updated: 05/07/25 2351     Glucose 87 mg/dL      BUN 11 mg/dL      Creatinine 1.00 mg/dL      Sodium 130 mmol/L      Potassium 3.7 mmol/L      Chloride 98 mmol/L      CO2 19.8 mmol/L      Calcium 8.5 mg/dL      Total Protein 6.3 g/dL      Albumin 3.8 g/dL      ALT (SGPT) 20 U/L      AST (SGOT) 28 U/L      Alkaline Phosphatase 144 U/L      Total Bilirubin 0.2 mg/dL      Globulin 2.5 gm/dL      A/G Ratio 1.5 g/dL      BUN/Creatinine Ratio 11.0     Anion Gap 12.2 mmol/L      eGFR 76.1 mL/min/1.73     Narrative:      GFR Categories in Chronic Kidney Disease (CKD)              GFR Category          GFR (mL/min/1.73)    Interpretation  G1                    90 or greater        Normal or high (1)  G2                    60-89                Mild decrease (1)  G3a                   45-59                Mild to moderate decrease  G3b                   30-44                Moderate to severe decrease  G4                    15-29                Severe decrease  G5                    14 or less           Kidney failure    (1)In the absence of evidence of kidney disease, neither GFR category G1 or G2 fulfill the criteria for CKD.    eGFR calculation 2021 CKD-EPI creatinine  equation, which does not include race as a factor    Lipase [[id number]]  (Normal) Collected: 05/07/25 2312    Specimen: Blood Updated: 05/07/25 2351     Lipase 33 U/L     BNP [[id number]]  (Normal) Collected: 05/07/25 2312    Specimen: Blood Updated: 05/07/25 2346     proBNP 1,503.0 pg/mL     Narrative:      This assay is used as an aid in the diagnosis of individuals suspected of having heart failure. It can be used as an aid in the diagnosis of acute decompensated heart failure (ADHF) in patients presenting with signs and symptoms of ADHF to the emergency department (ED). In addition, NT-proBNP of <300 pg/mL indicates ADHF is not likely.    Age Range Result Interpretation  NT-proBNP Concentration (pg/mL:      <50             Positive            >450                   Gray                 300-450                    Negative             <300    50-75           Positive            >900                  Gray                300-900                  Negative            <300      >75             Positive            >1800                  Gray                300-1800                  Negative            <300    Magnesium [[id number]]  (Normal) Collected: 05/07/25 2312    Specimen: Blood Updated: 05/07/25 2351     Magnesium 1.9 mg/dL     CBC Auto Differential [[id number]]  (Abnormal) Collected: 05/07/25 2312    Specimen: Blood Updated: 05/07/25 2326     WBC 4.35 10*3/mm3      RBC 4.09 10*6/mm3      Hemoglobin 13.0 g/dL      Hematocrit 39.1 %      MCV 95.6 fL      MCH 31.8 pg      MCHC 33.2 g/dL      RDW 13.0 %      RDW-SD 45.8 fl      MPV 10.9 fL      Platelets 142 10*3/mm3      Neutrophil % 48.5 %      Lymphocyte % 37.9 %      Monocyte % 9.0 %      Eosinophil % 3.0 %      Basophil % 1.1 %      Immature Grans % 0.5 %      Neutrophils, Absolute 2.11 10*3/mm3      Lymphocytes, Absolute 1.65 10*3/mm3      Monocytes, Absolute 0.39 10*3/mm3      Eosinophils, Absolute 0.13 10*3/mm3      Basophils, Absolute 0.05 10*3/mm3       Immature Grans, Absolute 0.02 10*3/mm3      nRBC 0.0 /100 WBC     High Sensitivity Troponin T 1Hr [[id number]]  (Normal) Collected: 05/08/25 0110    Specimen: Blood Updated: 05/08/25 0237     HS Troponin T 20 ng/L      Troponin T Numeric Delta 0 ng/L     Narrative:      High Sensitive Troponin T Reference Range:  <14.0 ng/L- Negative Female for AMI  <22.0 ng/L- Negative Male for AMI  >=14 - Abnormal Female indicating possible myocardial injury.  >=22 - Abnormal Male indicating possible myocardial injury.   Clinicians would have to utilize clinical acumen, EKG, Troponin, and serial changes to determine if it is an Acute Myocardial Infarction or myocardial injury due to an underlying chronic condition.                  Imaging:    XR Chest 1 View  Result Date: 5/8/2025  XR CHEST 1 VW Date of exam: 5/7/2025 11:32 PM EDT. Comparison: 5/11/2022. Indication: Chest pain. FINDINGS: A single AP (or PA) upright portable chest radiograph was performed. No cardiac enlargement is seen. No acute infiltrate is appreciated. No pleural effusion or pneumothorax is identified. The thoracic aorta is atherosclerotic. Coronary artery calcifications/stents are identified. Atherosclerotic changes are seen elsewhere. There are surgical clips projected over the left mediastinum, seen previously. Generalized osteopenia is suspected. There is an old (healed) proximal right humeral fracture, which is a new finding since 5/11/2022. Please correlate clinically. Degenerative changes involve the spine and bilateral shoulders.      No acute infiltrate is appreciated. No cardiac enlargement. Please see above comments for further detail.    Portions of this note were completed with a voice recognition program. Electronically Signed: Robin Ngo MD  5/8/2025 12:04 AM EDT  Workstation ID: [id number]        Differential Diagnosis and Discussion:    Chest Pain:  Based on the patient's signs and symptoms, I considered aortic dissection, myocardial  infaction, pulmonary embolism, cardiac tamponade, pericarditis, pneumothorax, musculoskeletal chest pain and other differential diagnosis as an etiology of the patient's chest pain.     PROCEDURES:    Labs were collected in the emergency department and all labs were reviewed and interpreted by me.  X-ray were performed in the emergency department and all X-ray impressions were independently interpreted by me.  An EKG was performed and the EKG was interpreted by me.    ECG 12 Lead ED Triage Standing Order; Chest Pain   Preliminary Result   HEART RATE=60  bpm   RR Cjvdxvlv=9779  ms   NH Wxybgsxa=819  ms   P Horizontal Axis=33  deg   P Front Axis=-25  deg   QRSD Interval=95  ms   QT Pmhlezhl=780  ms   GOvX=086  ms   QRS Axis=37  deg   T Wave Axis=56  deg   - ABNORMAL ECG -   Sinus rhythm   Probable anteroseptal infarct, old   Date and Time of Study:2025-05-07 23:02:20          Procedures    MDM  Number of Diagnoses or Management Options  Chest pain, unspecified type  Diagnosis management comments: Patient is afebrile nontoxic-appearing.  Vital signs are stable.  At time of evaluation he is lying in bed in no acute distress.  EKG shows sinus rhythm no acute ST elevation.  2 sets troponins were both negative.  Presentation is more consistent with chest wall pain rather than cardiac.  Patient does have multiple risk factors.  Discussed this with patient and family.  Patient does feel comfortable going home and following up with his cardiologist.  Discussed return precautions, discharge instructions and answered all their questions.       Amount and/or Complexity of Data Reviewed  Clinical lab tests: reviewed  Tests in the radiology section of CPT®: reviewed    Risk of Complications, Morbidity, and/or Mortality  Presenting problems: moderate  Management options: moderate                       Patient Care Considerations:    SEPSIS was considered but is NOT present in the emergency department as SIRS criteria is not  present.      Consultants/Shared Management Plan:    None    Social Determinants of Health:    Patient is independent, reliable, and has access to care.       Disposition and Care Coordination:    Discharged: The patient is suitable and stable for discharge with no need for consideration of admission.    I have explained the patient´s condition, diagnoses and treatment plan based on the information available to me at this time. I have answered questions and addressed any concerns. The patient has a good  understanding of the patient´s diagnosis, condition, and treatment plan as can be expected at this point. The vital signs have been stable. The patient´s condition is stable and appropriate for discharge from the emergency department.      The patient will pursue further outpatient evaluation with the primary care physician or other designated or consulting physician as outlined in the discharge instructions. They are agreeable to this plan of care and follow-up instructions have been explained in detail. The patient has received these instructions in written format and has expressed an understanding of the discharge instructions. The patient is aware that any significant change in condition or worsening of symptoms should prompt an immediate return to this or the closest emergency department or call to 911.  I have explained discharge medications and the need for follow up with the patient/caretakers. This was also printed in the discharge instructions. Patient was discharged with the following medications and follow up:      Medication List      No changes were made to your prescriptions during this visit.      Robb Gómez, DO  [address] [address]  [phone]    In 2 days         Final diagnoses:   Chest pain, unspecified type        ED Disposition       ED Disposition   Discharge    Condition   Stable    Comment   --               This medical record created using voice  recognition software.             Meena Rizzo MD  05/08/25 [id number]

## 2025-05-09 ENCOUNTER — OFFICE VISIT (OUTPATIENT)
Dept: ORTHOPEDIC SURGERY | Facility: CLINIC | Age: 80
End: 2025-05-09
Payer: MEDICARE

## 2025-05-09 ENCOUNTER — TELEPHONE (OUTPATIENT)
Dept: FAMILY MEDICINE CLINIC | Facility: CLINIC | Age: 80
End: 2025-05-09
Payer: MEDICARE

## 2025-05-09 VITALS — HEIGHT: 69 IN | WEIGHT: 127 LBS | BODY MASS INDEX: 18.81 KG/M2

## 2025-05-09 DIAGNOSIS — M17.11 PRIMARY OSTEOARTHRITIS OF RIGHT KNEE: ICD-10-CM

## 2025-05-09 DIAGNOSIS — M25.561 RIGHT KNEE PAIN, UNSPECIFIED CHRONICITY: Primary | ICD-10-CM

## 2025-05-09 NOTE — PROGRESS NOTES
"Chief Complaint  Pain and Follow-up of the Right Knee    Subjective          Familia Thompson presents to [facility] for   History of Present Illness    Armando returns today for follow-up of his right knee.  He has right knee arthritis we are treating nonoperatively.  He has had injections in the past.  He reports short-term relief from injections.  He is interested in topical treatment options.  No new injuries.      No Known Allergies     Social History     Socioeconomic History    Marital status:    Tobacco Use    Smoking status: Every Day     Current packs/day: 0.75     Average packs/day: 1 pack/day for 30.8 years (30.6 ttl pk-yrs)     Types: Cigarettes     Start date: 1993     Last attempt to quit: 2023     Passive exposure: Past    Smokeless tobacco: Never    Tobacco comments:     Quit smoking in march 2023      PT INST NOT TO SMOKE 24 HOURS PRIOR TO PROCEDURE.   Vaping Use    Vaping status: Never Used    Passive vaping exposure: Yes   Substance and Sexual Activity    Alcohol use: Yes     Alcohol/week: 12.0 standard drinks of alcohol     Types: 12 Cans of beer per week     Comment: 12 pack a day/INST TO LIMIT ETOH PRIOR TO PROCEDURE.    Drug use: Not Currently     Comment: 0cc    Sexual activity: Defer        I reviewed the patient's chief complaint, history of present illness, review of systems, past medical history, surgical history, family history, social history, medications, and allergy list.     REVIEW OF SYSTEMS    Constitutional: Denies fevers, chills, weight loss  Cardiovascular: Denies chest pain, shortness of breath  Skin: Denies rashes, acute skin changes  Neurologic: Denies headache, loss of consciousness  MSK: Right knee pain      Objective   Vital Signs:   Ht 175.3 cm (69\")   Wt 57.6 kg (127 lb)   BMI 18.75 kg/m²     Body mass index is 18.75 kg/m².    Physical Exam    General: Alert. No acute distress.   Right lower extremity: well healed medial incision from " previous vascular work. Medial joint line tenderness. Full extension. Flexion 110 degrees. Positive crepitus. Stable to varus/valgus stress. Stable to anterior/posterior drawer. Smooth hip motion. Negative Lachman's. Paresthesia in the toes.     Procedures    Imaging Results (Most Recent)       Procedure Component Value Units Date/Time    XR Knee 4+ View Right [[id number]] Resulted: 05/09/25 1138     Updated: 05/09/25 1138    Narrative:      Indications: Follow-up right knee arthritis    Views: Weightbearing AP, PA flexion, lateral, sunrise right knee    Findings: Tricompartmental degenerative changes.  No fractures.  Vascular   sclerosis seen.    Comparative Data: Comparative data found and reviewed today                     Assessment and Plan        XR Knee 4+ View Right  Result Date: 5/9/2025  Narrative: Indications: Follow-up right knee arthritis Views: Weightbearing AP, PA flexion, lateral, sunrise right knee Findings: Tricompartmental degenerative changes.  No fractures.  Vascular sclerosis seen. Comparative Data: Comparative data found and reviewed today    XR Chest 1 View  Result Date: 5/8/2025  Narrative: XR CHEST 1 VW Date of exam: 5/7/2025 11:32 PM EDT. Comparison: 5/11/2022. Indication: Chest pain. FINDINGS: A single AP (or PA) upright portable chest radiograph was performed. No cardiac enlargement is seen. No acute infiltrate is appreciated. No pleural effusion or pneumothorax is identified. The thoracic aorta is atherosclerotic. Coronary artery calcifications/stents are identified. Atherosclerotic changes are seen elsewhere. There are surgical clips projected over the left mediastinum, seen previously. Generalized osteopenia is suspected. There is an old (healed) proximal right humeral fracture, which is a new finding since 5/11/2022. Please correlate clinically. Degenerative changes involve the spine and bilateral shoulders.      Impression: No acute infiltrate is appreciated. No cardiac enlargement.  Please see above comments for further detail.    Portions of this note were completed with a voice recognition program. Electronically Signed: Robin Ngo MD  5/8/2025 12:04 AM EDT  Workstation ID: [id number]       Diagnoses and all orders for this visit:    1. Right knee pain, unspecified chronicity (Primary)  -     XR Knee 4+ View Right  -     Diclofenac Sodium (VOLTAREN) 1 % gel gel; Apply 4 g topically to the appropriate area as directed 4 (Four) Times a Day.  Dispense: 150 g; Refill: 1  -     Ibuprofen 3 %, Gabapentin 10 %, Baclofen 2 %, lidocaine 4 %; Apply 1-2 g topically to the appropriate area as directed 3 (Three) to 4 (Four) times daily.  Dispense: 90 g; Refill: 1    2. Primary osteoarthritis of right knee        We reviewed his imaging.  We discussed nonoperative care.  He is not interested in any injections today.  He may have those as needed in the future.  Voltaren gel and a compound pain cream were prescribed today.  He was instructed not to use both creams simultaneously.  He may follow-up as needed, no x-rays needed when he returns.      Call or return if worsening symptoms.    Scribed for David Mckinney MD by Rula Giron MA  05/09/2025   10:44 EDT         Follow Up       As needed    Patient was given instructions and counseling regarding his condition or for health maintenance advice. Please see specific information pulled into the AVS if appropriate.     I have personally performed the services described in this document as scribed by the above individual and it is both accurate and complete. David Mckinney MD 05/09/25 12:23 EDT

## 2025-05-09 NOTE — TELEPHONE ENCOUNTER
Patient was in the ER for unspecified chest pain.  Daughter is stating that they told her it wasn't his heart but they think it is the chest wall.  Daughter can only bring him in on Thursday or Friday. Could not find anything soon for this patient. Daughter then asked, since it's not his heart, can they just wait until his next apt time on 07/17/2025  @ 3:00. Please call and advise.

## 2025-05-12 NOTE — TELEPHONE ENCOUNTER
Phone message left for patient to ben back to make a hospital follow up before his July appointment.

## 2025-05-21 ENCOUNTER — PATIENT OUTREACH (OUTPATIENT)
Dept: CASE MANAGEMENT | Facility: OTHER | Age: 80
End: 2025-05-21
Payer: MEDICARE

## 2025-05-21 NOTE — OUTREACH NOTE
AMBULATORY CASE MANAGEMENT NOTE    Names and Relationships of Patient/Support Persons: Contact: Elke Carter; Relationship: Emergency Contact -     Spoke with patient's daughter Elke. Patient doing ok right now, still has chest wall pain. Patient has not been in decline per Elke. Patient still needs to schedule follow up appt before July 17 appt due to nature of issues.     Line disconnected, RN unable to reach for call back. Will call again next week>     Education Documentation  No documentation found.        Annmarie MARROQUIN  Ambulatory Case Management    5/21/2025, 12:37 EDT

## 2025-05-23 ENCOUNTER — PRIOR AUTHORIZATION (OUTPATIENT)
Dept: FAMILY MEDICINE CLINIC | Facility: CLINIC | Age: 80
End: 2025-05-23
Payer: MEDICARE

## 2025-05-23 NOTE — TELEPHONE ENCOUNTER
Prior Authorization for Cyproheptadine PA APPROVED    Your request has been approved  Effective Date: 1/1/2025  Authorization Expiration Date: 12/31/2025

## 2025-06-03 ENCOUNTER — TELEPHONE (OUTPATIENT)
Dept: CASE MANAGEMENT | Facility: OTHER | Age: 80
End: 2025-06-03
Payer: MEDICARE

## 2025-06-03 NOTE — TELEPHONE ENCOUNTER
UTR. No answer, unable to leave message    Will try again in 1 week.     Annmarie COVINGTON RN  Ambulatory

## 2025-06-05 ENCOUNTER — HOSPITAL ENCOUNTER (OUTPATIENT)
Dept: GENERAL RADIOLOGY | Facility: HOSPITAL | Age: 80
Discharge: HOME | End: 2025-06-05
Payer: MEDICARE

## 2025-06-05 ENCOUNTER — OFFICE VISIT (OUTPATIENT)
Dept: FAMILY MEDICINE CLINIC | Facility: CLINIC | Age: 80
End: 2025-06-05
Payer: MEDICARE

## 2025-06-05 VITALS
TEMPERATURE: 97.5 F | WEIGHT: 131.9 LBS | HEART RATE: 58 BPM | OXYGEN SATURATION: 98 % | SYSTOLIC BLOOD PRESSURE: 92 MMHG | BODY MASS INDEX: 19.54 KG/M2 | HEIGHT: 69 IN | DIASTOLIC BLOOD PRESSURE: 66 MMHG

## 2025-06-05 DIAGNOSIS — R63.6 UNDERWEIGHT: ICD-10-CM

## 2025-06-05 DIAGNOSIS — R07.89 STERNAL PAIN: ICD-10-CM

## 2025-06-05 DIAGNOSIS — R09.89 CHEST CONGESTION: ICD-10-CM

## 2025-06-05 DIAGNOSIS — I48.91 ATRIAL FIBRILLATION, UNSPECIFIED TYPE: Primary | ICD-10-CM

## 2025-06-05 DIAGNOSIS — M94.0 COSTOCHONDRITIS: ICD-10-CM

## 2025-06-05 DIAGNOSIS — G30.9 ALZHEIMER'S DEMENTIA, UNSPECIFIED DEMENTIA SEVERITY, UNSPECIFIED TIMING OF DEMENTIA ONSET, UNSPECIFIED WHETHER BEHAVIORAL, PSYCHOTIC, OR MOOD DISTURBANCE OR ANXIETY: ICD-10-CM

## 2025-06-05 DIAGNOSIS — G89.29 CHRONIC PAIN OF RIGHT KNEE: ICD-10-CM

## 2025-06-05 DIAGNOSIS — I10 PRIMARY HYPERTENSION: ICD-10-CM

## 2025-06-05 DIAGNOSIS — R07.81 RIB PAIN ON LEFT SIDE: ICD-10-CM

## 2025-06-05 DIAGNOSIS — Z51.81 MEDICATION MONITORING ENCOUNTER: ICD-10-CM

## 2025-06-05 DIAGNOSIS — M25.561 CHRONIC PAIN OF RIGHT KNEE: ICD-10-CM

## 2025-06-05 DIAGNOSIS — F02.80 ALZHEIMER'S DEMENTIA, UNSPECIFIED DEMENTIA SEVERITY, UNSPECIFIED TIMING OF DEMENTIA ONSET, UNSPECIFIED WHETHER BEHAVIORAL, PSYCHOTIC, OR MOOD DISTURBANCE OR ANXIETY: ICD-10-CM

## 2025-06-05 PROCEDURE — 71101 X-RAY EXAM UNILAT RIBS/CHEST: CPT

## 2025-06-05 PROCEDURE — 71120 X-RAY EXAM BREASTBONE 2/>VWS: CPT

## 2025-06-05 RX ORDER — LOSARTAN POTASSIUM 50 MG/1
25 TABLET ORAL DAILY
Start: 2025-06-05

## 2025-06-05 RX ORDER — TRAMADOL HYDROCHLORIDE 50 MG/1
50 TABLET ORAL 2 TIMES DAILY PRN
Qty: 20 TABLET | Refills: 0 | Status: SHIPPED | OUTPATIENT
Start: 2025-06-05

## 2025-06-05 RX ORDER — LOSARTAN POTASSIUM 50 MG/1
25 TABLET ORAL DAILY
Status: SHIPPED
Start: 2025-06-05 | End: 2025-06-05 | Stop reason: SDUPTHER

## 2025-06-05 RX ORDER — GUAIFENESIN 600 MG/1
1200 TABLET, EXTENDED RELEASE ORAL 2 TIMES DAILY PRN
Qty: 30 TABLET | Refills: 3 | Status: SHIPPED | OUTPATIENT
Start: 2025-06-05

## 2025-06-05 NOTE — PROGRESS NOTES
Chief Complaint  ER follow up for chest pain    Subjective          Familia Thomspon presents to [facility]    History of Present Illness     History of Present Illness  The patient is an 80-year-old male who presents today for an ER follow-up, left-sided rib pain, COPD, atrial fibrillation, dementia, and weight management. He is accompanied by his daughter.    He reports experiencing pain upon palpation of his chest, specifically on the left side. He has not sought any treatment for this discomfort and has not had any recent falls. He has not taken Tylenol for his pain and does not have tramadol at home. He has a history of coronary artery bypass graft with LIMA to the LAD and SVG to the obtuse marginal in 2015.    His daughter reports that he exhibits wheezing during respiration. He continues to smoke and uses an albuterol inhaler for relief. He does not experience significant shortness of breath. He has no known diagnosis of asthma or COPD, although presumed asthma was noted a few years ago.    He has gained a little bit of weight and is currently taking cyproheptadine 4 mg twice a day.    He does not monitor his blood pressure at home. He is currently on losartan 50 mg, metoprolol, and spironolactone for atrial fibrillation. He also takes Eliquis as a blood thinner.    He is on Namenda 5 mg twice a day for dementia and is tolerating it well.    PAST SURGICAL HISTORY:  Coronary artery bypass graft with LIMA to the LAD and SVG to the obtuse marginal in 2015.    SOCIAL HISTORY  He admits to smoking.         Current Outpatient Medications   Medication Instructions    albuterol sulfate  (90 Base) MCG/ACT inhaler 2 puffs, Inhalation, Every 4 Hours PRN    aspirin 81 mg, Oral, Daily    atorvastatin (LIPITOR) 80 mg, Oral, Nightly    cyproheptadine (PERIACTIN) 4 mg, Oral, 2 Times Daily    Diclofenac Sodium (VOLTAREN) 4 g, Topical, 4 Times Daily    Eliquis 2.5 mg, Oral, 2 Times Daily  "   furosemide (LASIX) 40 mg, Oral, Daily PRN    guaiFENesin (MUCINEX) 1,200 mg, Oral, 2 Times Daily PRN    Ibuprofen 3 %, Gabapentin 10 %, Baclofen 2 %, lidocaine 4 % 1-2 g, Topical, 3 to 4 Times Daily    losartan (COZAAR) 25 mg, Oral, Daily    memantine (NAMENDA) 5 mg, Oral, 2 Times Daily    metoprolol succinate XL (TOPROL XL) 50 mg, Oral, Daily    pantoprazole (PROTONIX) 40 mg, Oral, Daily    spironolactone (ALDACTONE) 25 mg, Oral, Daily    tamsulosin (FLOMAX) 0.4 mg, Oral, Daily    topiramate (TOPAMAX) 50 MG tablet 1 tablet twice a day    traMADol (ULTRAM) 50 mg, Oral, 2 Times Daily PRN       The following portions of the patient's history were reviewed and updated as appropriate: allergies, current medications, past family history, past medical history, past social history, past surgical history, and problem list.    Objective   Vital Signs:   BP 92/66   Pulse 58   Temp 97.5 °F (36.4 °C) (Oral)   Ht 175.3 cm (69\")   Wt 59.8 kg (131 lb 14.4 oz)   SpO2 98%   BMI 19.48 kg/m²     BP Readings from Last 3 Encounters:   06/05/25 92/66   05/08/25 120/62   04/17/25 102/62     Wt Readings from Last 3 Encounters:   06/05/25 59.8 kg (131 lb 14.4 oz)   05/09/25 57.6 kg (127 lb)   05/08/25 61.8 kg (136 lb 3.9 oz)     BMI is within normal parameters. No other follow-up for BMI required.     Physical Exam  Vitals reviewed.   Constitutional:       Appearance: Normal appearance.   HENT:      Head: Normocephalic and atraumatic.      Right Ear: External ear normal.      Left Ear: External ear normal.   Eyes:      Conjunctiva/sclera: Conjunctivae normal.   Cardiovascular:      Rate and Rhythm: Normal rate and regular rhythm.      Heart sounds: No murmur heard.     No friction rub. No gallop.   Pulmonary:      Effort: Pulmonary effort is normal.      Breath sounds: Normal breath sounds. No wheezing or rhonchi.   Abdominal:      General: Bowel sounds are normal. There is no distension.      Palpations: Abdomen is soft.      " Tenderness: There is no abdominal tenderness.   Musculoskeletal:      Comments: Reproducible chest wall tenderness to palpation on the left anterior chest wall just lateral to the sternum.   Skin:     General: Skin is warm and dry.   Neurological:      Mental Status: He is alert and oriented to person, place, and time.      Cranial Nerves: No cranial nerve deficit.   Psychiatric:         Mood and Affect: Mood and affect normal.         Behavior: Behavior normal.         Thought Content: Thought content normal.         Judgment: Judgment normal.            Result Review :   The following data was reviewed by: Robb Gómez DO on 06/05/2025:  Common labs          2/10/2025    09:08 4/17/2025    16:04 5/7/2025    23:12   Common Labs   Glucose 84  71  87    BUN 9  13  11    Creatinine 1.02  1.01  1.00    Sodium 135  135  130    Potassium 4.2  4.1  3.7    Chloride 105  102  98    Calcium 8.5  9.1  8.5    Albumin  4.2  3.8    Total Bilirubin  0.6  0.2    Alkaline Phosphatase  117  144    AST (SGOT)  27  28    ALT (SGPT)  16  20    WBC 3.67  4.58  4.35    Hemoglobin 13.6  14.0  13.0    Hematocrit 41.9  41.3  39.1    Platelets 136  156  142    Hemoglobin A1C  4.90              Lab Results   Component Value Date    COVID19 Not Detected 04/27/2022    FLU Negative 04/27/2022    FLU Negative 04/27/2022    INR 1.03 10/25/2023       Results  Labs   - Kidney function test: Normal   - Lipase level: Normal   - Magnesium level: Normal   - BNP level: Gray area   - CBC: No anemia, normal white blood cell count and platelets   - Troponin levels: Stable    Imaging   - Chest x-ray: No infiltrate, old healed proximal right humeral fracture    Procedures        Assessment and Plan    Diagnoses and all orders for this visit:    1. Atrial fibrillation, unspecified type (Primary)    2. Primary hypertension    3. Alzheimer's dementia, unspecified dementia severity, unspecified timing of dementia onset, unspecified whether behavioral,  psychotic, or mood disturbance or anxiety    4. Medication monitoring encounter    5. Rib pain on left side  -     XR Ribs Left With PA Chest; Future    6. Sternal pain  -     XR Sternum PA & Lateral; Future    7. Chronic pain of right knee  -     traMADol (ULTRAM) 50 MG tablet; Take 1 tablet by mouth 2 (Two) Times a Day As Needed for Severe Pain.  Dispense: 20 tablet; Refill: 0    8. Costochondritis    9. Chest congestion    10. Underweight    Other orders  -     losartan (Cozaar) 50 MG tablet; Take 0.5 tablets by mouth Daily.  -     guaiFENesin (Mucinex) 600 MG 12 hr tablet; Take 2 tablets by mouth 2 (Two) Times a Day As Needed for Congestion.  Dispense: 30 tablet; Refill: 3        Assessment & Plan  1. Left-sided rib pain.  - The etiology of the pain appears to be musculoskeletal in nature, potentially indicative of costochondritis.  - Physical examination revealed tenderness upon palpation of the left rib area.  - An x-ray of the sternum will be ordered to rule out any abnormalities.  - Advised to engage in regular stretching exercises, apply ice to the affected area for no more than 15 minutes at a time, and avoid NSAIDs like ibuprofen due to his heart condition. A prescription for tramadol will be sent to the pharmacy for pain management.    2. Chronic obstructive pulmonary disease (COPD).  - History of smoking since his teenage years, likely contributing to COPD.  - Physical examination revealed a little rattling in the lungs, but no wheezing.  - Advised to continue using his albuterol inhaler as needed. A prescription for Mucinex will be provided to manage congestion.  - If breathing becomes more bothersome, a daily inhaler such as Trelegy may be considered.    3. Atrial fibrillation.  - Currently on Eliquis as a blood thinner.  - Recent ER visit on 05/08/2025 for chest pain showed no heart attack; EKG showed sinus rhythm, and other tests were normal.  - Advised to continue Eliquis as prescribed. He has an  appointment with his cardiologist in 09/2025.    4. Dementia.  - Currently taking Namenda 5 mg twice a day and tolerating it well.  - Advised to continue this medication as prescribed.    5. Weight management.  - Gained 4 pounds since the last visit, now weighing 131 pounds.  - Currently taking cyproheptadine 4 mg twice a day, which has been effective in increasing his appetite and weight.    6. Hypertension.  - Blood pressure is slightly low at 92/66 mmHg.  - The dosage of losartan will be adjusted to 25 mg (half a tablet) instead of the full 50 mg.  - Advised to monitor blood pressure at home.    Follow-up  The patient will follow up next month.       Medications Discontinued During This Encounter   Medication Reason    losartan (Cozaar) 50 MG tablet     traMADol (ULTRAM) 50 MG tablet Reorder          Follow Up   Return if symptoms worsen or fail to improve.  Patient was given instructions and counseling regarding his condition or for health maintenance advice. Please see specific information pulled into the AVS if appropriate.     Patient or patient representative verbalized consent for the use of Ambient Listening during the visit with  Robb Gómez DO for chart documentation. 6/5/2025  09:23 EDT    Robb Gómez DO  06/05/25  09:40 EDT

## 2025-06-10 ENCOUNTER — PATIENT OUTREACH (OUTPATIENT)
Dept: CASE MANAGEMENT | Facility: OTHER | Age: 80
End: 2025-06-10
Payer: MEDICARE

## 2025-06-10 NOTE — OUTREACH NOTE
AMBULATORY CASE MANAGEMENT NOTE    Names and Relationships of Patient/Support Persons: Contact: Elke Carter; Relationship: Emergency Contact -     Patient Outreach  Patient continues to have chest pain, had cxr completed last week. Results are normal from Xrays. Patient labs are normal. No results indicate heart attack.     Patient was started on pain medication for chest pain as needed. Hasn't had to take at this time.     RN educated on symptoms to report to office and notified will call next week for follow up. Verbalized agreement    Education Documentation  No documentation found.        Annmarie MARROQUIN  Ambulatory Case Management    6/10/2025, 14:02 EDT

## 2025-06-11 ENCOUNTER — TELEPHONE (OUTPATIENT)
Dept: FAMILY MEDICINE CLINIC | Facility: CLINIC | Age: 80
End: 2025-06-11
Payer: MEDICARE

## 2025-06-11 NOTE — TELEPHONE ENCOUNTER
Phone call placed to patient's daughter with clarification of the results chest Xray with voiced appreciation.

## 2025-06-12 DIAGNOSIS — I25.709 CORONARY ARTERY DISEASE INVOLVING CORONARY BYPASS GRAFT OF NATIVE HEART WITH ANGINA PECTORIS: ICD-10-CM

## 2025-06-12 RX ORDER — LOSARTAN POTASSIUM 50 MG/1
25 TABLET ORAL DAILY
Qty: 45 TABLET | Refills: 1 | Status: SHIPPED | OUTPATIENT
Start: 2025-06-12

## 2025-06-13 RX ORDER — ASPIRIN 81 MG/1
81 TABLET ORAL DAILY
Qty: 60 TABLET | Refills: 1 | Status: SHIPPED | OUTPATIENT
Start: 2025-06-13

## 2025-07-07 ENCOUNTER — TELEPHONE (OUTPATIENT)
Dept: CASE MANAGEMENT | Facility: OTHER | Age: 80
End: 2025-07-07
Payer: MEDICARE

## 2025-07-07 NOTE — TELEPHONE ENCOUNTER
UTR. No answer, unable to leave message    Will try again in 1 week    Annmarie COVINGTON RN  Ambulatory

## 2025-07-14 ENCOUNTER — PATIENT OUTREACH (OUTPATIENT)
Dept: CASE MANAGEMENT | Facility: OTHER | Age: 80
End: 2025-07-14
Payer: MEDICARE

## 2025-07-14 NOTE — OUTREACH NOTE
AMBULATORY CASE MANAGEMENT NOTE    Names and Relationships of Patient/Support Persons: Contact: Familia Thompson; Relationship: Self -     Patient Outreach  Appetite off and on, pain remains in knee pain, unable to do anything for. Patient sits around confused. Patient is supposed to be using walker or cane, but patient refuses to use. Family tries to encourage to use, but patient not using.     Patient has appt on Thurs with Dr Carrera at 3pm and RN will meet with family and patient at that time.     Education Documentation  No documentation found.        Annmarie MARROQUIN  Ambulatory Case Management    7/14/2025, 09:19 EDT

## 2025-07-17 ENCOUNTER — PATIENT OUTREACH (OUTPATIENT)
Dept: CASE MANAGEMENT | Facility: OTHER | Age: 80
End: 2025-07-17
Payer: MEDICARE

## 2025-07-17 ENCOUNTER — OFFICE VISIT (OUTPATIENT)
Dept: FAMILY MEDICINE CLINIC | Facility: CLINIC | Age: 80
End: 2025-07-17
Payer: MEDICARE

## 2025-07-17 VITALS
WEIGHT: 130.5 LBS | HEIGHT: 69 IN | DIASTOLIC BLOOD PRESSURE: 54 MMHG | TEMPERATURE: 97.9 F | OXYGEN SATURATION: 99 % | SYSTOLIC BLOOD PRESSURE: 100 MMHG | BODY MASS INDEX: 19.33 KG/M2 | HEART RATE: 55 BPM

## 2025-07-17 DIAGNOSIS — I10 PRIMARY HYPERTENSION: Primary | ICD-10-CM

## 2025-07-17 DIAGNOSIS — G89.29 CHRONIC PAIN OF RIGHT KNEE: ICD-10-CM

## 2025-07-17 DIAGNOSIS — G30.9 ALZHEIMER'S DEMENTIA, UNSPECIFIED DEMENTIA SEVERITY, UNSPECIFIED TIMING OF DEMENTIA ONSET, UNSPECIFIED WHETHER BEHAVIORAL, PSYCHOTIC, OR MOOD DISTURBANCE OR ANXIETY: ICD-10-CM

## 2025-07-17 DIAGNOSIS — R73.03 PREDIABETES: ICD-10-CM

## 2025-07-17 DIAGNOSIS — F02.80 ALZHEIMER'S DEMENTIA, UNSPECIFIED DEMENTIA SEVERITY, UNSPECIFIED TIMING OF DEMENTIA ONSET, UNSPECIFIED WHETHER BEHAVIORAL, PSYCHOTIC, OR MOOD DISTURBANCE OR ANXIETY: ICD-10-CM

## 2025-07-17 DIAGNOSIS — M25.561 RIGHT KNEE PAIN, UNSPECIFIED CHRONICITY: ICD-10-CM

## 2025-07-17 DIAGNOSIS — M25.561 CHRONIC PAIN OF RIGHT KNEE: ICD-10-CM

## 2025-07-17 DIAGNOSIS — I48.91 ATRIAL FIBRILLATION, UNSPECIFIED TYPE: ICD-10-CM

## 2025-07-17 DIAGNOSIS — K63.5 POLYP OF COLON, UNSPECIFIED PART OF COLON, UNSPECIFIED TYPE: ICD-10-CM

## 2025-07-17 DIAGNOSIS — K22.70 BARRETT'S ESOPHAGUS WITHOUT DYSPLASIA: ICD-10-CM

## 2025-07-17 NOTE — PROGRESS NOTES
Chief Complaint  Off balance    Subjective          Familia Thompson presents to [facility]    History of Present Illness     History of Present Illness  The patient is an 80-year-old male who presents today for a follow-up visit. He is accompanied by his wife and daughter.    He reports no need for medication refills at this time. He does not experience lightheadedness but admits to occasional stumbling upon standing up, which he attributes to balance issues rather than dizziness. His blood pressure was noted to be low, and there is concern that this may be contributing to his balance problems.    He has scheduled an ultrasound of the gallbladder for 10/2025. A colonoscopy was performed in 09/2022, which revealed a 14 mm polyp and a 5 mm polyp. A repeat colonoscopy is recommended in 3 years for surveillance. He also had an EGD in 01/2025, which showed Collins's esophagus with intestinal metaplasia, and a repeat EGD is recommended in 3 years.    He continues to consult with Annmarie Angela, finding her input beneficial.    He is currently on Eliquis for atrial fibrillation and has a cardiology appointment scheduled for 09/2025. He takes Namenda twice daily for dementia. He uses tramadol as needed for chronic right knee pain.    PAST SURGICAL HISTORY:  Coronary artery bypass grafting with a LIMA to the LAD and SVG to the obtuse marginal in 2015         Current Outpatient Medications   Medication Instructions    albuterol sulfate  (90 Base) MCG/ACT inhaler 2 puffs, Inhalation, Every 4 Hours PRN    aspirin 81 mg, Oral, Daily    atorvastatin (LIPITOR) 80 mg, Oral, Nightly    cyproheptadine (PERIACTIN) 4 mg, Oral, 2 Times Daily    Diclofenac Sodium (VOLTAREN) 4 g, Topical, 4 Times Daily    Eliquis 2.5 mg, Oral, 2 Times Daily    furosemide (LASIX) 40 mg, Oral, Daily PRN    guaiFENesin (MUCINEX) 1,200 mg, Oral, 2 Times Daily PRN    Ibuprofen 3 %, Gabapentin 10 %, Baclofen 2 %,  "lidocaine 4 % 1-2 g, Topical, 3 to 4 Times Daily    memantine (NAMENDA) 5 mg, Oral, 2 Times Daily    metoprolol succinate XL (TOPROL XL) 50 mg, Oral, Daily    pantoprazole (PROTONIX) 40 mg, Oral, Daily    spironolactone (ALDACTONE) 25 mg, Oral, Daily    tamsulosin (FLOMAX) 0.4 mg, Oral, Daily    topiramate (TOPAMAX) 50 MG tablet 1 tablet twice a day    traMADol (ULTRAM) 50 mg, Oral, 2 Times Daily PRN       The following portions of the patient's history were reviewed and updated as appropriate: allergies, current medications, past family history, past medical history, past social history, past surgical history, and problem list.    Objective   Vital Signs:   /54   Pulse 55   Temp 97.9 °F (36.6 °C) (Oral)   Ht 175.3 cm (69\")   Wt 59.2 kg (130 lb 8 oz)   SpO2 99%   BMI 19.27 kg/m²     BP Readings from Last 3 Encounters:   07/17/25 100/54   06/05/25 92/66   05/08/25 120/62     Wt Readings from Last 3 Encounters:   07/17/25 59.2 kg (130 lb 8 oz)   06/05/25 59.8 kg (131 lb 14.4 oz)   05/09/25 57.6 kg (127 lb)     BMI is within normal parameters. No other follow-up for BMI required.     Physical Exam  Vitals reviewed.   Constitutional:       Appearance: Normal appearance.   HENT:      Head: Normocephalic and atraumatic.      Right Ear: External ear normal.      Left Ear: External ear normal.   Eyes:      Conjunctiva/sclera: Conjunctivae normal.   Cardiovascular:      Rate and Rhythm: Normal rate and regular rhythm.      Heart sounds: No murmur heard.     No friction rub. No gallop.   Pulmonary:      Effort: Pulmonary effort is normal.      Breath sounds: Normal breath sounds. No wheezing or rhonchi.   Abdominal:      General: Bowel sounds are normal. There is no distension.      Palpations: Abdomen is soft.      Tenderness: There is no abdominal tenderness.   Skin:     General: Skin is warm and dry.   Neurological:      Mental Status: He is alert and oriented to person, place, and time.      Cranial Nerves: No " cranial nerve deficit.   Psychiatric:         Mood and Affect: Mood and affect normal.         Behavior: Behavior normal.         Thought Content: Thought content normal.         Judgment: Judgment normal.            Result Review :   The following data was reviewed by: Robb Gómez DO on 07/17/2025:  Common labs          2/10/2025    09:08 4/17/2025    16:04 5/7/2025    23:12   Common Labs   Glucose 84  71  87    BUN 9  13  11    Creatinine 1.02  1.01  1.00    Sodium 135  135  130    Potassium 4.2  4.1  3.7    Chloride 105  102  98    Calcium 8.5  9.1  8.5    Albumin  4.2  3.8    Total Bilirubin  0.6  0.2    Alkaline Phosphatase  117  144    AST (SGOT)  27  28    ALT (SGPT)  16  20    WBC 3.67  4.58  4.35    Hemoglobin 13.6  14.0  13.0    Hematocrit 41.9  41.3  39.1    Platelets 136  156  142    Hemoglobin A1C  4.90              Lab Results   Component Value Date    COVID19 Not Detected 04/27/2022    FLU Negative 04/27/2022    FLU Negative 04/27/2022    INR 1.03 10/25/2023       Results  Labs   - Thyroid levels: Normal   - A1c: Normal   - CBC: Normal    Imaging   - X-ray of sternum and left ribs: No displaced rib fracture or visible sternal fracture    Procedures        Assessment and Plan    Diagnoses and all orders for this visit:    1. Primary hypertension (Primary)    2. Polyp of colon, unspecified part of colon, unspecified type    3. Collins's esophagus without dysplasia  Overview:  Added automatically from request for surgery [id number]      4. Atrial fibrillation, unspecified type    5. Alzheimer's dementia, unspecified dementia severity, unspecified timing of dementia onset, unspecified whether behavioral, psychotic, or mood disturbance or anxiety    6. Chronic pain of right knee    7. Prediabetes    8. Right knee pain, unspecified chronicity        Assessment & Plan  1. Hypotension.  - Blood pressure readings have been consistently low, with a notable reading of 92/66 on 06/05/2025.  - Reported balance  issues, including stumbling upon standing.  - Advised to discontinue losartan to potentially elevate blood pressure and improve balance.  - Monitoring for improvement in symptoms following medication adjustment.    2. Colon polyps.  - Colonoscopy in 09/2022 revealed a 14 mm polyp and a 5 mm polyp.  - Recommended repeat colonoscopy in 3 years for surveillance.  - Advised to schedule the repeat colonoscopy appointment.  - Reviewed previous colonoscopy findings and discussed the need for surveillance.    3. Collins's esophagus.  - Diagnosed with Collins's esophagus during an EGD in 01/2025.  - Recommended repeat EGD in 3 years to monitor for changes.  - Discussed the condition and the importance of monitoring for progression.  - Advised to coordinate the scheduling of the repeat EGD.    4. Atrial fibrillation.  - Currently taking Eliquis as a blood thinner for atrial fibrillation.  - No displaced rib or sternal fractures noted on recent x-rays.  - Upcoming cardiologist appointment in 09/2025.  - Continues to follow up with cardiology for ongoing management of atrial fibrillation.    5. Dementia.  - Taking Namenda twice a day for dementia.  - Continues to find benefit from counseling sessions with Annmarie Angela.  - Reviewed current medication regimen and effectiveness.  - No changes to dementia management at this time.    6. Chronic pain.  - Taking tramadol as needed for chronic pain in the right knee.  - Reports tramadol is helping manage pain effectively.  - Reviewed pain management strategy and medication use.  - No changes to pain management at this time.    Follow-up  - A follow-up visit is scheduled in 3 months.       Medications Discontinued During This Encounter   Medication Reason    losartan (Cozaar) 50 MG tablet           Follow Up   Return in about 3 months (around 10/17/2025) for hypertension.  Patient was given instructions and counseling regarding his condition or for health maintenance advice. Please  see specific information pulled into the AVS if appropriate.     Patient or patient representative verbalized consent for the use of Ambient Listening during the visit with  Robb Gómez DO for chart documentation. 7/17/2025  11:38 EDT    Robb Gómez DO  07/17/25  11:49 EDT

## 2025-07-17 NOTE — OUTREACH NOTE
AMBULATORY CASE MANAGEMENT NOTE    Names and Relationships of Patient/Support Persons: Contact: Familia Thompson; Relationship: Self -     Patient Outreach  Met with patient, spouse and daughter Elke in office. Patient alert, oriented to person. Reports doing well, but stated he doesn't understand a lot of what's been going on with him. Patient daughter stated patient is stable at home right now and confirmed all medications in home.     No signs or symptoms of current distress.    Will follow up in 2 weeks.     Education Documentation  No documentation found.        Annmarie MARROQUIN  Ambulatory Case Management    7/17/2025, 12:08 EDT

## 2025-07-24 RX ORDER — MEMANTINE HYDROCHLORIDE 5 MG/1
5 TABLET ORAL 2 TIMES DAILY
Qty: 60 TABLET | Refills: 2 | Status: SHIPPED | OUTPATIENT
Start: 2025-07-24

## 2025-07-26 ENCOUNTER — APPOINTMENT (OUTPATIENT)
Dept: GENERAL RADIOLOGY | Facility: HOSPITAL | Age: 80
End: 2025-07-26
Payer: MEDICARE

## 2025-07-26 ENCOUNTER — HOSPITAL ENCOUNTER (OUTPATIENT)
Facility: HOSPITAL | Age: 80
Setting detail: OBSERVATION
Discharge: HOME | End: 2025-07-27
Attending: EMERGENCY MEDICINE
Payer: MEDICARE

## 2025-07-26 DIAGNOSIS — Z53.29 LEFT AGAINST MEDICAL ADVICE: ICD-10-CM

## 2025-07-26 DIAGNOSIS — R07.9 CHEST PAIN, UNSPECIFIED TYPE: Primary | ICD-10-CM

## 2025-07-26 DIAGNOSIS — R94.31 ABNORMAL EKG: ICD-10-CM

## 2025-07-26 LAB
ALBUMIN SERPL-MCNC: 4.2 G/DL (ref 3.5–5.2)
ALBUMIN/GLOB SERPL: 1.6 G/DL
ALP SERPL-CCNC: 134 U/L (ref 39–117)
ALT SERPL W P-5'-P-CCNC: 15 U/L (ref 1–41)
ANION GAP SERPL CALCULATED.3IONS-SCNC: 11.8 MMOL/L (ref 5–15)
AST SERPL-CCNC: 29 U/L (ref 1–40)
BASOPHILS # BLD AUTO: 0.07 10*3/MM3 (ref 0–0.2)
BASOPHILS NFR BLD AUTO: 1.2 % (ref 0–1.5)
BILIRUB SERPL-MCNC: 0.4 MG/DL (ref 0–1.2)
BUN SERPL-MCNC: 7.4 MG/DL (ref 8–23)
BUN/CREAT SERPL: 8 (ref 7–25)
CALCIUM SPEC-SCNC: 9.1 MG/DL (ref 8.6–10.5)
CHLORIDE SERPL-SCNC: 102 MMOL/L (ref 98–107)
CO2 SERPL-SCNC: 18.2 MMOL/L (ref 22–29)
CREAT SERPL-MCNC: 0.92 MG/DL (ref 0.76–1.27)
DEPRECATED RDW RBC AUTO: 44.5 FL (ref 37–54)
EGFRCR SERPLBLD CKD-EPI 2021: 84.1 ML/MIN/1.73
EOSINOPHIL # BLD AUTO: 0.08 10*3/MM3 (ref 0–0.4)
EOSINOPHIL NFR BLD AUTO: 1.4 % (ref 0.3–6.2)
ERYTHROCYTE [DISTWIDTH] IN BLOOD BY AUTOMATED COUNT: 13.1 % (ref 12.3–15.4)
GLOBULIN UR ELPH-MCNC: 2.7 GM/DL
GLUCOSE SERPL-MCNC: 79 MG/DL (ref 65–99)
HCT VFR BLD AUTO: 40.6 % (ref 37.5–51)
HGB BLD-MCNC: 13.3 G/DL (ref 13–17.7)
HOLD SPECIMEN: NORMAL
HOLD SPECIMEN: NORMAL
IMM GRANULOCYTES # BLD AUTO: 0.02 10*3/MM3 (ref 0–0.05)
IMM GRANULOCYTES NFR BLD AUTO: 0.3 % (ref 0–0.5)
LIPASE SERPL-CCNC: 18 U/L (ref 13–60)
LYMPHOCYTES # BLD AUTO: 1.56 10*3/MM3 (ref 0.7–3.1)
LYMPHOCYTES NFR BLD AUTO: 27.3 % (ref 19.6–45.3)
MAGNESIUM SERPL-MCNC: 2 MG/DL (ref 1.6–2.4)
MCH RBC QN AUTO: 30.9 PG (ref 26.6–33)
MCHC RBC AUTO-ENTMCNC: 32.8 G/DL (ref 31.5–35.7)
MCV RBC AUTO: 94.4 FL (ref 79–97)
MONOCYTES # BLD AUTO: 0.52 10*3/MM3 (ref 0.1–0.9)
MONOCYTES NFR BLD AUTO: 9.1 % (ref 5–12)
NEUTROPHILS NFR BLD AUTO: 3.47 10*3/MM3 (ref 1.7–7)
NEUTROPHILS NFR BLD AUTO: 60.7 % (ref 42.7–76)
NRBC BLD AUTO-RTO: 0 /100 WBC (ref 0–0.2)
NT-PROBNP SERPL-MCNC: 2558 PG/ML (ref 0–1800)
PLATELET # BLD AUTO: 151 10*3/MM3 (ref 140–450)
PMV BLD AUTO: 10.6 FL (ref 6–12)
POTASSIUM SERPL-SCNC: 4.2 MMOL/L (ref 3.5–5.2)
PROT SERPL-MCNC: 6.9 G/DL (ref 6–8.5)
QT INTERVAL: 430 MS
QTC INTERVAL: 443 MS
RBC # BLD AUTO: 4.3 10*6/MM3 (ref 4.14–5.8)
SODIUM SERPL-SCNC: 132 MMOL/L (ref 136–145)
TROPONIN T SERPL HS-MCNC: 23 NG/L
WBC NRBC COR # BLD AUTO: 5.72 10*3/MM3 (ref 3.4–10.8)
WHOLE BLOOD HOLD COAG: NORMAL
WHOLE BLOOD HOLD SPECIMEN: NORMAL

## 2025-07-26 PROCEDURE — 85025 COMPLETE CBC W/AUTO DIFF WBC: CPT | Performed by: EMERGENCY MEDICINE

## 2025-07-26 PROCEDURE — 80053 COMPREHEN METABOLIC PANEL: CPT | Performed by: EMERGENCY MEDICINE

## 2025-07-26 PROCEDURE — 93005 ELECTROCARDIOGRAM TRACING: CPT | Performed by: EMERGENCY MEDICINE

## 2025-07-26 PROCEDURE — 83690 ASSAY OF LIPASE: CPT | Performed by: EMERGENCY MEDICINE

## 2025-07-26 PROCEDURE — 83880 ASSAY OF NATRIURETIC PEPTIDE: CPT | Performed by: EMERGENCY MEDICINE

## 2025-07-26 PROCEDURE — 93005 ELECTROCARDIOGRAM TRACING: CPT

## 2025-07-26 PROCEDURE — 99284 EMERGENCY DEPT VISIT MOD MDM: CPT

## 2025-07-26 PROCEDURE — 71045 X-RAY EXAM CHEST 1 VIEW: CPT

## 2025-07-26 PROCEDURE — 84484 ASSAY OF TROPONIN QUANT: CPT | Performed by: EMERGENCY MEDICINE

## 2025-07-26 PROCEDURE — 83735 ASSAY OF MAGNESIUM: CPT | Performed by: EMERGENCY MEDICINE

## 2025-07-26 PROCEDURE — 36415 COLL VENOUS BLD VENIPUNCTURE: CPT

## 2025-07-26 RX ORDER — ACETAMINOPHEN 160 MG/5ML
650 SOLUTION ORAL EVERY 4 HOURS PRN
Status: CANCELLED | OUTPATIENT
Start: 2025-07-26

## 2025-07-26 RX ORDER — BISACODYL 10 MG
10 SUPPOSITORY, RECTAL RECTAL DAILY PRN
Status: CANCELLED | OUTPATIENT
Start: 2025-07-26

## 2025-07-26 RX ORDER — HYDROCODONE BITARTRATE AND ACETAMINOPHEN 5; 325 MG/1; MG/1
1 TABLET ORAL ONCE
Refills: 0 | Status: COMPLETED | OUTPATIENT
Start: 2025-07-26 | End: 2025-07-26

## 2025-07-26 RX ORDER — ASPIRIN 81 MG/1
324 TABLET, CHEWABLE ORAL ONCE
Status: COMPLETED | OUTPATIENT
Start: 2025-07-26 | End: 2025-07-26

## 2025-07-26 RX ORDER — NITROGLYCERIN 0.4 MG/1
0.4 TABLET SUBLINGUAL
Status: CANCELLED | OUTPATIENT
Start: 2025-07-26

## 2025-07-26 RX ORDER — POLYETHYLENE GLYCOL 3350 17 G/17G
17 POWDER, FOR SOLUTION ORAL DAILY PRN
Status: CANCELLED | OUTPATIENT
Start: 2025-07-26

## 2025-07-26 RX ORDER — ACETAMINOPHEN 325 MG/1
650 TABLET ORAL EVERY 4 HOURS PRN
Status: CANCELLED | OUTPATIENT
Start: 2025-07-26

## 2025-07-26 RX ORDER — SODIUM CHLORIDE 0.9 % (FLUSH) 0.9 %
10 SYRINGE (ML) INJECTION EVERY 12 HOURS SCHEDULED
Status: CANCELLED | OUTPATIENT
Start: 2025-07-27

## 2025-07-26 RX ORDER — ONDANSETRON 4 MG/1
4 TABLET, ORALLY DISINTEGRATING ORAL EVERY 6 HOURS PRN
Status: CANCELLED | OUTPATIENT
Start: 2025-07-26

## 2025-07-26 RX ORDER — AMOXICILLIN 250 MG
2 CAPSULE ORAL 2 TIMES DAILY PRN
Status: CANCELLED | OUTPATIENT
Start: 2025-07-26

## 2025-07-26 RX ORDER — BISACODYL 5 MG/1
5 TABLET, DELAYED RELEASE ORAL DAILY PRN
Status: CANCELLED | OUTPATIENT
Start: 2025-07-26

## 2025-07-26 RX ORDER — SODIUM CHLORIDE 0.9 % (FLUSH) 0.9 %
10 SYRINGE (ML) INJECTION AS NEEDED
Status: CANCELLED | OUTPATIENT
Start: 2025-07-26

## 2025-07-26 RX ORDER — ENOXAPARIN SODIUM 100 MG/ML
40 INJECTION SUBCUTANEOUS DAILY
Status: CANCELLED | OUTPATIENT
Start: 2025-07-27

## 2025-07-26 RX ORDER — NITROGLYCERIN 0.4 MG/1
0.4 TABLET SUBLINGUAL
Status: DISCONTINUED | OUTPATIENT
Start: 2025-07-26 | End: 2025-07-27 | Stop reason: HOSPADM

## 2025-07-26 RX ORDER — ONDANSETRON 2 MG/ML
4 INJECTION INTRAMUSCULAR; INTRAVENOUS EVERY 6 HOURS PRN
Status: CANCELLED | OUTPATIENT
Start: 2025-07-26

## 2025-07-26 RX ORDER — ACETAMINOPHEN 650 MG/1
650 SUPPOSITORY RECTAL EVERY 4 HOURS PRN
Status: CANCELLED | OUTPATIENT
Start: 2025-07-26

## 2025-07-26 RX ORDER — SODIUM CHLORIDE 0.9 % (FLUSH) 0.9 %
10 SYRINGE (ML) INJECTION AS NEEDED
Status: DISCONTINUED | OUTPATIENT
Start: 2025-07-26 | End: 2025-07-27 | Stop reason: HOSPADM

## 2025-07-26 RX ORDER — SODIUM CHLORIDE 9 MG/ML
40 INJECTION, SOLUTION INTRAVENOUS AS NEEDED
Status: CANCELLED | OUTPATIENT
Start: 2025-07-26

## 2025-07-26 RX ADMIN — NITROGLYCERIN 0.4 MG: 0.4 TABLET SUBLINGUAL at 22:22

## 2025-07-26 RX ADMIN — HYDROCODONE BITARTRATE AND ACETAMINOPHEN 1 TABLET: 5; 325 TABLET ORAL at 23:28

## 2025-07-26 RX ADMIN — ASPIRIN 324 MG: 81 TABLET, CHEWABLE ORAL at 22:04

## 2025-07-26 NOTE — Clinical Note
[facility] EMERGENCY ROOM  [address] [place] [address]  Phone: [phone]  Fax: [phone]    Elke Carter accompanied Familia Thompson to the emergency department on 7/26/2025. They may return to work on 07/28/2025.        Thank you for choosing [facility].    Zaki Rodas MD

## 2025-07-27 VITALS
HEIGHT: 69 IN | BODY MASS INDEX: 21.39 KG/M2 | RESPIRATION RATE: 20 BRPM | SYSTOLIC BLOOD PRESSURE: 108 MMHG | WEIGHT: 144.4 LBS | OXYGEN SATURATION: 94 % | DIASTOLIC BLOOD PRESSURE: 74 MMHG | HEART RATE: 77 BPM | TEMPERATURE: 98.1 F

## 2025-07-27 LAB
GEN 5 1HR TROPONIN T REFLEX: 22 NG/L
TROPONIN T % DELTA: -4
TROPONIN T NUMERIC DELTA: -1 NG/L

## 2025-07-27 PROCEDURE — G0378 HOSPITAL OBSERVATION PER HR: HCPCS

## 2025-07-27 NOTE — ED PROVIDER NOTES
Time: 9:19 PM EDT  Date of encounter:  7/26/2025  Independent Historian/Clinical History and Information was obtained by:   Patient and Family    History is limited by: N/A    Chief Complaint: Chest pain      History of Present Illness:  Patient is a 80 y.o. year old male who presents to the emergency department for evaluation of pain radiating to his back that started this morning.     Patient family states that the patient's had back and chest pain for the past several days.  He initially attempted treatment with medication for congestion which he thought was helping initially but today his pain is much worse and feels as though he is being compressed from his front to his back.  The pain radiates out to his bilateral shoulders and is associated with mild shortness of breath and nausea.  He has had no vomiting.      Patient Care Team  Primary Care Provider: Robb Gómez DO    Past Medical History:     No Known Allergies  Past Medical History:   Diagnosis Date    Arthritis     Chronic systolic CHF (congestive heart failure)     COPD (chronic obstructive pulmonary disease)     Coronary artery disease     ETOH abuse     12-18 BEERS DAILY    Hemorrhoids     HL (hearing loss)     Hyperlipidemia     Hypertension     Myocardial infarction     NSTEMI (non-ST elevated myocardial infarction) 04/27/2022    Added automatically from request for surgery [id number]    PVD (peripheral vascular disease)     Sinus disorder      Past Surgical History:   Procedure Laterality Date    CARDIAC CATHETERIZATION N/A 04/29/2022    Procedure: Left Heart Cath, possible angioplasty;  Surgeon: Zaki Díaz MD;  Location: [facility] INVASIVE LOCATION;  Service: Cardiovascular;  Laterality: N/A;    CARDIAC CATHETERIZATION N/A 05/02/2022    Procedure: Percutaneous Coronary Intervention;  Surgeon: Josue Hensley MD;  Location: [facility] INVASIVE LOCATION;  Service: Cardiovascular;  Laterality: N/A;    CARDIAC CATHETERIZATION N/A  05/02/2022    Procedure: Stent BLAIRE coronary;  Surgeon: Josue Hensley MD;  Location: [facility] CATH INVASIVE LOCATION;  Service: Cardiovascular;  Laterality: N/A;    CARDIAC CATHETERIZATION N/A 05/02/2022    Procedure: Atherectomy-coronary- Rotoblator;  Surgeon: Josue Hensley MD;  Location: [facility] CATH INVASIVE LOCATION;  Service: Cardiovascular;  Laterality: N/A;    CARDIAC CATHETERIZATION N/A 9/8/2023    Procedure: Left Heart Cath;  Surgeon: Pawan Guerrero MD;  Location: [facility] CATH INVASIVE LOCATION;  Service: Cardiovascular;  Laterality: N/A;    CARDIAC CATHETERIZATION N/A 9/8/2023    Procedure: Coronary angiography;  Surgeon: Pawan Guerrero MD;  Location: [facility] CATH INVASIVE LOCATION;  Service: Cardiovascular;  Laterality: N/A;    CARDIAC CATHETERIZATION  9/8/2023    Procedure: Saphenous Vein Graft;  Surgeon: Pawan Guerrero MD;  Location: [facility] CATH INVASIVE LOCATION;  Service: Cardiovascular;;    CARDIAC CATHETERIZATION N/A 9/8/2023    Procedure: Left ventriculography;  Surgeon: Pawan Guerrero MD;  Location: [facility] CATH INVASIVE LOCATION;  Service: Cardiovascular;  Laterality: N/A;    CARDIAC CATHETERIZATION N/A 2/10/2025    Procedure: Left Heart Cath;  Surgeon: Nirmal Betts MD;  Location: [facility] CATH INVASIVE LOCATION;  Service: Cardiovascular;  Laterality: N/A;    CARDIAC CATHETERIZATION N/A 2/10/2025    Procedure: Coronary angiography;  Surgeon: Nirmal Betts MD;  Location: [facility] CATH INVASIVE LOCATION;  Service: Cardiovascular;  Laterality: N/A;    CARDIAC CATHETERIZATION N/A 2/10/2025    Procedure: Left ventriculography;  Surgeon: Nirmal Betts MD;  Location: [facility] CATH INVASIVE LOCATION;  Service: Cardiovascular;  Laterality: N/A;    CARDIAC SURGERY      COLONOSCOPY      COLONOSCOPY N/A 9/15/2022    Procedure: COLONOSCOPY;  Surgeon: Dennis Saavedra MD;  Location: [facility] ENDOSCOPY;  Service: Gastroenterology;  Laterality: N/A;  COLON POLYP    CORONARY ANGIOPLASTY WITH STENT  PLACEMENT      ENDOSCOPY N/A 9/15/2022    Procedure: ESOPHAGOGASTRODUODENOSCOPY;  Surgeon: Dennis Saavedra MD;  Location: [facility] ENDOSCOPY;  Service: Gastroenterology;  Laterality: N/A;   GASTRTIS BARRETTS ESOPH    ENDOSCOPY N/A 1/31/2025    Procedure: ESOPHAGOGASTRODUODENOSCOPY WITH BIOPSIES;  Surgeon: Dennis Saavedra MD;  Location: [facility] ENDOSCOPY;  Service: Gastroenterology;  Laterality: N/A;  HIATAL HERNIA AND BARRETTS    EYE SURGERY Bilateral     UPPER GASTROINTESTINAL ENDOSCOPY      VEIN LIGATION AND STRIPPING      WRIST FRACTURE SURGERY       Family History   Problem Relation Age of Onset    Colon cancer Neg Hx     Malig Hyperthermia Neg Hx        Home Medications:  Prior to Admission medications    Medication Sig Start Date End Date Taking? Authorizing Provider   albuterol sulfate  (90 Base) MCG/ACT inhaler Inhale 2 puffs Every 4 (Four) Hours As Needed for Wheezing or Shortness of Air. 3/17/25   Robb Gómez DO   apixaban (ELIQUIS) 2.5 MG tablet tablet Take 1 tablet by mouth 2 (Two) Times a Day. 5/2/25   Robb Gómez DO   aspirin 81 MG EC tablet Take 1 tablet by mouth Daily. 6/13/25   Nirmal Betts MD   atorvastatin (LIPITOR) 80 MG tablet Take 1 tablet by mouth Every Night. 1/19/25   Robb Gómez DO   cyproheptadine (PERIACTIN) 4 MG tablet Take 1 tablet by mouth 2 (Two) Times a Day. 5/2/25   Robb Gómez DO   Diclofenac Sodium (VOLTAREN) 1 % gel gel Apply 4 g topically to the appropriate area as directed 4 (Four) Times a Day. 5/9/25   David Mckinney MD   furosemide (LASIX) 40 MG tablet Take 1 tablet by mouth Daily As Needed (if overnoight 2 lb changes are noted.).  Patient taking differently: Take 1 tablet by mouth Daily As Needed (if overnoight 2 lb changes are noted.). PRN 12/19/24   Nirmal Betts MD   guaiFENesin (Mucinex) 600 MG 12 hr tablet Take 2 tablets by mouth 2 (Two) Times a Day As Needed for Congestion. 6/5/25   Robb Gómez DO   Ibuprofen 3 %,  Gabapentin 10 %, Baclofen 2 %, lidocaine 4 % Apply 1-2 g topically to the appropriate area as directed 3 (Three) to 4 (Four) times daily. 5/9/25 5/9/26  David Mckinney MD   memantine (NAMENDA) 5 MG tablet TAKE 1 TABLET BY MOUTH TWICE DAILY 7/24/25   Robb Gómez DO   metoprolol succinate XL (Toprol XL) 50 MG 24 hr tablet Take 1 tablet by mouth Daily. 4/4/25   Mikey Patrick MD   pantoprazole (Protonix) 40 MG EC tablet Take 1 tablet by mouth Daily. 4/17/25   oRbb Gómez DO   spironolactone (ALDACTONE) 25 MG tablet Take 1 tablet by mouth Daily. 3/14/25   Nirmal Betts MD   tamsulosin (FLOMAX) 0.4 MG capsule 24 hr capsule TAKE 1 CAPSULE BY MOUTH DAILY 11/4/24   Robb Gómez DO   topiramate (TOPAMAX) 50 MG tablet 1 tablet twice a day 3/27/25   Zaki Sepulveda MD   traMADol (ULTRAM) 50 MG tablet Take 1 tablet by mouth 2 (Two) Times a Day As Needed for Severe Pain. 6/5/25   Robb Gómez DO        Social History:   Social History     Tobacco Use    Smoking status: Every Day     Current packs/day: 0.75     Average packs/day: 1 pack/day for 31.0 years (30.7 ttl pk-yrs)     Types: Cigarettes     Start date: 1993     Last attempt to quit: 2023     Passive exposure: Past    Smokeless tobacco: Never    Tobacco comments:     Quit smoking in march 2023      PT INST NOT TO SMOKE 24 HOURS PRIOR TO PROCEDURE.   Vaping Use    Vaping status: Never Used    Passive vaping exposure: Yes   Substance Use Topics    Alcohol use: Yes     Alcohol/week: 12.0 standard drinks of alcohol     Types: 12 Cans of beer per week     Comment: 12 pack a day/INST TO LIMIT ETOH PRIOR TO PROCEDURE.    Drug use: Not Currently     Comment: 0cc         Review of Systems:  Review of Systems   Constitutional:  Negative for chills and fever.   HENT:  Negative for congestion, ear pain and sore throat.    Eyes:  Negative for pain.   Respiratory:  Negative for cough, chest tightness and shortness of breath.    Cardiovascular:   "Positive for chest pain.   Gastrointestinal:  Negative for abdominal pain, diarrhea, nausea and vomiting.   Genitourinary:  Negative for flank pain and hematuria.   Musculoskeletal:  Positive for back pain. Negative for joint swelling.   Skin:  Negative for pallor.   Neurological:  Negative for seizures and headaches.   All other systems reviewed and are negative.       Physical Exam:  /74 (BP Location: Right arm, Patient Position: Lying)   Pulse 77   Temp 98.1 °F (36.7 °C) (Oral)   Resp 20   Ht 175.3 cm (69\")   Wt 65.5 kg (144 lb 6.4 oz)   SpO2 94%   BMI 21.32 kg/m²     Physical Exam  Vitals and nursing note reviewed.   Constitutional:       General: He is not in acute distress.     Appearance: Normal appearance. He is not toxic-appearing.      Comments: Patient appears mildly uncomfortable   HENT:      Head: Normocephalic and atraumatic.      Jaw: There is normal jaw occlusion.   Eyes:      General: Lids are normal.      Extraocular Movements: Extraocular movements intact.      Conjunctiva/sclera: Conjunctivae normal.      Pupils: Pupils are equal, round, and reactive to light.   Cardiovascular:      Rate and Rhythm: Normal rate and regular rhythm.      Pulses: Normal pulses.      Heart sounds: Normal heart sounds.   Pulmonary:      Effort: Pulmonary effort is normal. No respiratory distress.      Breath sounds: Normal breath sounds. No wheezing or rhonchi.   Abdominal:      General: Abdomen is flat. There is no distension.      Palpations: Abdomen is soft.      Tenderness: There is no abdominal tenderness. There is no guarding or rebound.   Musculoskeletal:         General: Normal range of motion.      Cervical back: Normal range of motion and neck supple.      Right lower leg: No edema.      Left lower leg: No edema.   Skin:     General: Skin is warm and dry.      Coloration: Skin is not cyanotic.   Neurological:      Mental Status: He is alert and oriented to person, place, and time. Mental status " is at baseline.   Psychiatric:         Attention and Perception: Attention and perception normal.         Mood and Affect: Mood normal.                  Medical Decision Making:      Comorbidities that affect care:    Coronary artery disease, COPD, hyperlipidemia, hypertension, CHF, history alcohol use, tobacco use    External Notes reviewed:    Previous Clinic Note: Outpatient PCP visit primary hypertension 7/17/2025      The following orders were placed and all results were independently analyzed by me:  Orders Placed This Encounter   Procedures    XR Chest 1 View    Gary Draw    High Sensitivity Troponin T    Comprehensive Metabolic Panel    Lipase    BNP    Magnesium    CBC Auto Differential    High Sensitivity Troponin T 1Hr    Continuous Pulse Oximetry    ECG 12 Lead ED Triage Standing Order; Chest Pain    CBC & Differential    Green Top (Gel)    Lavender Top    Gold Top - SST    Light Blue Top       Medications Given in the Emergency Department:  Medications   aspirin chewable tablet 324 mg (324 mg Oral Given 7/26/25 2204)   HYDROcodone-acetaminophen (NORCO) 5-325 MG per tablet 1 tablet (1 tablet Oral Given 7/26/25 2328)        ED Course:    ED Course as of 07/27/25 0808   Sat Jul 26, 2025 2120 --- PROVIDER IN TRIAGE NOTE ---    The patient was evaluated by me, Francesca Zendejas in triage. Orders were placed and the patient is currently awaiting disposition.    [AJ]   2153 My interpretation of EKG: Sinus rhythm 65, mild inferior ST depression, no ST elevation [JS]   2324 Discussed patient's workup and presentation with the hospitalist who agrees to admission.  The patient's airway is intact, vital signs, and respiratory status are safe for admission at this time.   [JS]   2324 Chest pain relieved by nitroglycerin.  Patient currently is chest pain-free. [AMADEO]   Sun Jul 27, 2025 0007 Patient was evaluated in the emergency department by Dr. Mota the hospitalist.  The patient does not wish to stay in the  hospital and wishes to leave AGAINST MEDICAL ADVICE. [JS]   0007 I explained to the patient my concerns.  But he states that he does not wish to stay he recognizes my concerns as does his daughter at bedside.  He agrees to return if he has worsening symptoms or any additional concerns.  He will follow-up with his cardiologist and primary care provider. [JS]      ED Course User Index  [AJ] Francesca Zendejas PA-C  [JS] Zaki Rodas MD       Labs:    Lab Results (last 24 hours)       Procedure Component Value Units Date/Time    High Sensitivity Troponin T [[id number]]  (Abnormal) Collected: 07/26/25 2159    Specimen: Blood Updated: 07/26/25 2238     HS Troponin T 23 ng/L     Narrative:      High Sensitive Troponin T Reference Range:  <14.0 ng/L- Negative Female for AMI  <22.0 ng/L- Negative Male for AMI  >=14 - Abnormal Female indicating possible myocardial injury.  >=22 - Abnormal Male indicating possible myocardial injury.   Clinicians would have to utilize clinical acumen, EKG, Troponin, and serial changes to determine if it is an Acute Myocardial Infarction or myocardial injury due to an underlying chronic condition.         CBC & Differential [[id number]]  (Normal) Collected: 07/26/25 2159    Specimen: Blood Updated: 07/26/25 2211    Narrative:      The following orders were created for panel order CBC & Differential.  Procedure                               Abnormality         Status                     ---------                               -----------         ------                     CBC Auto Differential[[id number]]        Normal              Final result                 Please view results for these tests on the individual orders.    Comprehensive Metabolic Panel [[id number]]  (Abnormal) Collected: 07/26/25 2159    Specimen: Blood Updated: 07/26/25 2238     Glucose 79 mg/dL      BUN 7.4 mg/dL      Creatinine 0.92 mg/dL      Sodium 132 mmol/L      Potassium 4.2 mmol/L      Comment: Slight hemolysis  detected by analyzer. Result may be falsely elevated.        Chloride 102 mmol/L      CO2 18.2 mmol/L      Calcium 9.1 mg/dL      Total Protein 6.9 g/dL      Albumin 4.2 g/dL      ALT (SGPT) 15 U/L      AST (SGOT) 29 U/L      Alkaline Phosphatase 134 U/L      Total Bilirubin 0.4 mg/dL      Globulin 2.7 gm/dL      A/G Ratio 1.6 g/dL      BUN/Creatinine Ratio 8.0     Anion Gap 11.8 mmol/L      eGFR 84.1 mL/min/1.73     Narrative:      GFR Categories in Chronic Kidney Disease (CKD)              GFR Category          GFR (mL/min/1.73)    Interpretation  G1                    90 or greater        Normal or high (1)  G2                    60-89                Mild decrease (1)  G3a                   45-59                Mild to moderate decrease  G3b                   30-44                Moderate to severe decrease  G4                    15-29                Severe decrease  G5                    14 or less           Kidney failure    (1)In the absence of evidence of kidney disease, neither GFR category G1 or G2 fulfill the criteria for CKD.    eGFR calculation 2021 CKD-EPI creatinine equation, which does not include race as a factor    Lipase [[id number]]  (Normal) Collected: 07/26/25 2159    Specimen: Blood Updated: 07/26/25 2238     Lipase 18 U/L     BNP [[id number]]  (Abnormal) Collected: 07/26/25 2159    Specimen: Blood Updated: 07/26/25 2236     proBNP 2,558.0 pg/mL     Narrative:      This assay is used as an aid in the diagnosis of individuals suspected of having heart failure. It can be used as an aid in the diagnosis of acute decompensated heart failure (ADHF) in patients presenting with signs and symptoms of ADHF to the emergency department (ED). In addition, NT-proBNP of <300 pg/mL indicates ADHF is not likely.    Age Range Result Interpretation  NT-proBNP Concentration (pg/mL:      <50             Positive            >450                   Gray                 300-450                    Negative              <300    50-75           Positive            >900                  Gray                300-900                  Negative            <300      >75             Positive            >1800                  Gray                300-1800                  Negative            <300    Magnesium [[id number]]  (Normal) Collected: 07/26/25 2159    Specimen: Blood Updated: 07/26/25 2238     Magnesium 2.0 mg/dL     CBC Auto Differential [[id number]]  (Normal) Collected: 07/26/25 2159    Specimen: Blood Updated: 07/26/25 2211     WBC 5.72 10*3/mm3      RBC 4.30 10*6/mm3      Hemoglobin 13.3 g/dL      Hematocrit 40.6 %      MCV 94.4 fL      MCH 30.9 pg      MCHC 32.8 g/dL      RDW 13.1 %      RDW-SD 44.5 fl      MPV 10.6 fL      Platelets 151 10*3/mm3      Neutrophil % 60.7 %      Lymphocyte % 27.3 %      Monocyte % 9.1 %      Eosinophil % 1.4 %      Basophil % 1.2 %      Immature Grans % 0.3 %      Neutrophils, Absolute 3.47 10*3/mm3      Lymphocytes, Absolute 1.56 10*3/mm3      Monocytes, Absolute 0.52 10*3/mm3      Eosinophils, Absolute 0.08 10*3/mm3      Basophils, Absolute 0.07 10*3/mm3      Immature Grans, Absolute 0.02 10*3/mm3      nRBC 0.0 /100 WBC     High Sensitivity Troponin T 1Hr [[id number]]  (Abnormal) Collected: 07/26/25 2305    Specimen: Blood Updated: 07/27/25 0017     HS Troponin T 22 ng/L      Troponin T Numeric Delta -1 ng/L      Troponin T % Delta -4    Narrative:      High Sensitive Troponin T Reference Range:  <14.0 ng/L- Negative Female for AMI  <22.0 ng/L- Negative Male for AMI  >=14 - Abnormal Female indicating possible myocardial injury.  >=22 - Abnormal Male indicating possible myocardial injury.   Clinicians would have to utilize clinical acumen, EKG, Troponin, and serial changes to determine if it is an Acute Myocardial Infarction or myocardial injury due to an underlying chronic condition.                  Imaging:    XR Chest 1 View  Result Date: 7/26/2025  XR CHEST 1 VW Date of exam: 7/26/2025 9:57 PM  EDT. Comparison: 6/5/2025. INDICATIONS: 80-year-old male w/ h/o chest pain. FINDINGS: A single AP (or PA) upright portable chest radiograph was performed. No cardiac enlargement is seen. No acute infiltrate is appreciated. No pleural effusion or pneumothorax is identified. The thoracic aorta is prominent, atherosclerotic, and tortuous, as  seen previously. Surgical clips are projected over the left infrahilar region. Degenerative changes involve the spine and bilateral shoulders. External artifacts obscure detail. No significant interval change is seen since the prior study (or studies). Please see prior exam reports for further detail regarding additional incidental/chronic findings.      No acute infiltrate is appreciated.    Portions of this note were completed with a voice recognition program. Electronically Signed: Robin Ngo MD  7/26/2025 10:01 PM EDT  Workstation ID: [id number]        Differential Diagnosis and Discussion:    Chest Pain:  Based on the patient's signs and symptoms, I considered aortic dissection, myocardial infaction, pulmonary embolism, cardiac tamponade, pericarditis, pneumothorax, musculoskeletal chest pain and other differential diagnosis as an etiology of the patient's chest pain.     PROCEDURES:    Labs were collected in the emergency department and all labs were reviewed and interpreted by me.  X-ray were performed in the emergency department and all X-ray impressions were independently interpreted by me.  An EKG was performed and the EKG was interpreted by me.    ECG 12 Lead ED Triage Standing Order; Chest Pain   Preliminary Result   HEART RATE=65  bpm   RR Cfsaysyk=453  ms   MS Jxieykpx=711  ms   P Horizontal Axis=81  deg   P Front Axis=-38  deg   QRSD Interval=93  ms   QT Qiyxqddh=688  ms   PXrL=539  ms   QRS Axis=-44  deg   T Wave Axis=-66  deg   - ABNORMAL ECG -   Sinus rhythm   Atrial premature complexes   Inferior infarct, age indeterminate   Date and Time of Study:2025-07-26  21:01:43          Procedures    MDM     Amount and/or Complexity of Data Reviewed  Decide to obtain previous medical records or to obtain history from someone other than the patient: yes                       Patient Care Considerations:    I considered admission to the hospital for continued evaluation and treatment however the patient declines and wishes to be discharged home.      Consultants/Shared Management Plan:    Hospitalist: I have discussed the case with the hospitalist who agrees to accept the patient for admission.    Social Determinants of Health:    Patient has presented with family members who are responsible, reliable and will ensure follow up care.      Disposition and Care Coordination:    Discharged: The patient is suitable and stable for discharge with no need for consideration of admission.        Final diagnoses:   Chest pain, unspecified type   Abnormal EKG   Left against medical advice        ED Disposition       ED Disposition   Discharge    Condition   Stable    Comment   Level of Care: Telemetry [5]  Diagnosis: Chest pain [[id number]]                 This medical record created using voice recognition software.             Zaki Rodas MD  07/27/25 0808

## 2025-07-28 ENCOUNTER — READMISSION MANAGEMENT (OUTPATIENT)
Dept: CALL CENTER | Facility: HOSPITAL | Age: 80
End: 2025-07-28
Payer: MEDICARE

## 2025-07-28 NOTE — OUTREACH NOTE
Prep Survey      Flowsheet Row Responses   [facility] facility patient discharged from? [facility]   Is LACE score < 7 ? Yes   Eligibility Not Eligible   What are the reasons patient is not eligible? Other  [3 hr ED OBS]   Does the patient have one of the following disease processes/diagnoses(primary or secondary)? Other   Prep survey completed? Yes            Hiwot KIRBY - Registered Nurse

## 2025-08-11 DIAGNOSIS — M25.561 CHRONIC PAIN OF RIGHT KNEE: ICD-10-CM

## 2025-08-11 DIAGNOSIS — M25.561 RIGHT KNEE PAIN, UNSPECIFIED CHRONICITY: ICD-10-CM

## 2025-08-11 DIAGNOSIS — G89.29 CHRONIC PAIN OF RIGHT KNEE: ICD-10-CM

## 2025-08-11 RX ORDER — GUAIFENESIN 600 MG/1
1200 TABLET, EXTENDED RELEASE ORAL 2 TIMES DAILY PRN
Qty: 30 TABLET | Refills: 3 | Status: SHIPPED | OUTPATIENT
Start: 2025-08-11

## 2025-08-11 RX ORDER — TRAMADOL HYDROCHLORIDE 50 MG/1
50 TABLET ORAL 2 TIMES DAILY PRN
Qty: 20 TABLET | Refills: 0 | Status: SHIPPED | OUTPATIENT
Start: 2025-08-11

## 2025-08-12 ENCOUNTER — TELEPHONE (OUTPATIENT)
Dept: CASE MANAGEMENT | Facility: OTHER | Age: 80
End: 2025-08-12
Payer: MEDICARE

## 2025-08-12 LAB
QT INTERVAL: 430 MS
QTC INTERVAL: 443 MS

## 2025-08-14 RX ORDER — ALBUTEROL SULFATE 90 UG/1
2 INHALANT RESPIRATORY (INHALATION) EVERY 4 HOURS PRN
Qty: 8 G | Refills: 3 | Status: SHIPPED | OUTPATIENT
Start: 2025-08-14

## 2025-08-14 RX ORDER — PANTOPRAZOLE SODIUM 40 MG/1
40 TABLET, DELAYED RELEASE ORAL DAILY
Qty: 90 TABLET | Refills: 3 | Status: SHIPPED | OUTPATIENT
Start: 2025-08-14

## 2025-08-29 RX ORDER — PANTOPRAZOLE SODIUM 40 MG/1
40 TABLET, DELAYED RELEASE ORAL DAILY
Qty: 90 TABLET | Refills: 3 | Status: SHIPPED | OUTPATIENT
Start: 2025-08-29

## (undated) DEVICE — COLON KIT: Brand: MEDLINE INDUSTRIES, INC.

## (undated) DEVICE — CATH DIAG IMPULSE FL4 5F 100CM

## (undated) DEVICE — CATH 4F INF PIG 145Â° 110 CM: Brand: INFINITI

## (undated) DEVICE — KT MANIFLD CARDIAC

## (undated) DEVICE — Device

## (undated) DEVICE — CATH F6 ST IM 100CM: Brand: SUPERTORQUE

## (undated) DEVICE — CATH DIAG IMPULSE FR4 5F 100CM

## (undated) DEVICE — Device: Brand: CORSAIR PRO

## (undated) DEVICE — GUIDE CATHETER: Brand: MACH1™

## (undated) DEVICE — CATH DIAG IMPULSE IMT 5F 100CM

## (undated) DEVICE — Device: Brand: DEFENDO AIR/WATER/SUCTION AND BIOPSY VALVE

## (undated) DEVICE — SOLIDIFIER LIQLOC PLS 1500CC BT

## (undated) DEVICE — PK CATH CARD 40

## (undated) DEVICE — PRE-CONNECTED EXCHANGEABLE BURR CATHETER AND BURR ADVANCING DEVICE: Brand: ROTAPRO™

## (undated) DEVICE — GW INQWIRE FC PTFE STD J/1.5 .035 260

## (undated) DEVICE — THE VASC BAND HEMOSTAT IS A COMPRESSION DEVICE TO ASSIST HEMOSTASIS OF ARTERIAL, VENOUS AND HEMODIALYSIS PERCUTANEOUS ACCESS SITES.: Brand: VASC BAND™ HEMOSTAT

## (undated) DEVICE — CATH F6INF TL JL 4.5 100CM: Brand: INFINITI

## (undated) DEVICE — GW EMR FIX EXCHG J STD .035 3MM 260CM

## (undated) DEVICE — CATH F5 INF JL 4 100CM: Brand: INFINITI

## (undated) DEVICE — SINGLE-USE BIOPSY FORCEPS: Brand: RADIAL JAW 4

## (undated) DEVICE — GW ATHRCTMY ROTAWIRE DRV FLOP W/WIRECLIP/TORQ FLX 330CM

## (undated) DEVICE — PAD GRND E/S W/CORD SPLT A/

## (undated) DEVICE — SOL IRRG H2O PL/BG 1000ML STRL

## (undated) DEVICE — CATH F5 INF JR 4 100CM: Brand: INFINITI

## (undated) DEVICE — RADIFOCUS OPTITORQUE ANGIOGRAPHIC CATHETER: Brand: OPTITORQUE

## (undated) DEVICE — GLIDESHEATH BASIC HYDROPHILIC COATED INTRODUCER SHEATH: Brand: GLIDESHEATH

## (undated) DEVICE — CONN JET HYDRA H20 AUXILIARY DISP

## (undated) DEVICE — NC TREK CORONARY DILATATION CATHETER 3.5 MM X 20 MM / RAPID-EXCHANGE: Brand: NC TREK

## (undated) DEVICE — GLIDESHEATH SLENDER STAINLESS STEEL KIT: Brand: GLIDESHEATH SLENDER

## (undated) DEVICE — HI-TORQUE WHISPER ES GUIDE WIRE .014 STRAIGHTTIP 3.0 CM X 190 CM: Brand: HI-TORQUE WHISPER

## (undated) DEVICE — KT SYR GEL ORISE SNGL PK 10ML

## (undated) DEVICE — RADIFOCUS GLIDEWIRE: Brand: GLIDEWIRE

## (undated) DEVICE — CATH F4 INF JR 4 100CM: Brand: INFINITI

## (undated) DEVICE — GW FC FLOP/TP .035 260CM 3MM

## (undated) DEVICE — BLCK/BITE BLOX WO/DENTL/RIM W/STRAP 54F

## (undated) DEVICE — NC TREK CORONARY DILATATION CATHETER 4.0 MM X 8 MM / RAPID-EXCHANGE: Brand: NC TREK

## (undated) DEVICE — CATH F6 ST PIG 155 110CM 6SH: Brand: SUPER TORQUE

## (undated) DEVICE — EGD OR ERCP KIT: Brand: MEDLINE INDUSTRIES, INC.

## (undated) DEVICE — CATH F6 ST JR 4 100CM: Brand: SUPERTORQUE

## (undated) DEVICE — Device: Brand: SINGLE USE INJECTOR NM600/610

## (undated) DEVICE — LINER SURG CANSTR SXN S/RIGD 1500CC

## (undated) DEVICE — SNAR E/S POLYP SNAREMASTER OVL/10MM 2.8X2300MM YEL

## (undated) DEVICE — PINNACLE INTRODUCER SHEATH: Brand: PINNACLE

## (undated) DEVICE — DEV INDEFLATOR P/N 580289

## (undated) DEVICE — CATH F5 INF PIG145 110CM 6SH: Brand: INFINITI

## (undated) DEVICE — THE STERILE LIGHT HANDLE COVER IS USED WITH STERIS SURGICAL LIGHTING AND VISUALIZATION SYSTEMS.

## (undated) DEVICE — THE SINGLE USE ETRAP – POLYP TRAP IS USED FOR SUCTION RETRIEVAL OF ENDOSCOPICALLY REMOVED POLYPS.: Brand: ETRAP

## (undated) DEVICE — CATH LITHO INTRAVASC CORNRY 6F 138CM 4X12MM

## (undated) DEVICE — CATH F4 INF JL 4 100CM: Brand: INFINITI